# Patient Record
Sex: FEMALE | Race: BLACK OR AFRICAN AMERICAN | Employment: UNEMPLOYED | URBAN - METROPOLITAN AREA
[De-identification: names, ages, dates, MRNs, and addresses within clinical notes are randomized per-mention and may not be internally consistent; named-entity substitution may affect disease eponyms.]

---

## 2017-01-01 ENCOUNTER — TELEPHONE (OUTPATIENT)
Dept: ONCOLOGY | Facility: CLINIC | Age: 62
End: 2017-01-01

## 2017-01-01 ENCOUNTER — APPOINTMENT (OUTPATIENT)
Dept: CT IMAGING | Facility: CLINIC | Age: 62
End: 2017-01-01
Attending: EMERGENCY MEDICINE

## 2017-01-01 ENCOUNTER — HOSPITAL ENCOUNTER (OUTPATIENT)
Facility: AMBULATORY SURGERY CENTER | Age: 62
End: 2017-09-21
Attending: PHYSICIAN ASSISTANT

## 2017-01-01 ENCOUNTER — INFUSION THERAPY VISIT (OUTPATIENT)
Dept: ONCOLOGY | Facility: CLINIC | Age: 62
End: 2017-01-01
Attending: OBSTETRICS & GYNECOLOGY

## 2017-01-01 ENCOUNTER — NURSE TRIAGE (OUTPATIENT)
Dept: NURSING | Facility: CLINIC | Age: 62
End: 2017-01-01

## 2017-01-01 ENCOUNTER — OFFICE VISIT (OUTPATIENT)
Dept: INTERVENTIONAL RADIOLOGY/VASCULAR | Facility: CLINIC | Age: 62
End: 2017-01-01

## 2017-01-01 ENCOUNTER — INFUSION THERAPY VISIT (OUTPATIENT)
Dept: INFUSION THERAPY | Facility: CLINIC | Age: 62
End: 2017-01-01
Attending: OBSTETRICS & GYNECOLOGY
Payer: COMMERCIAL

## 2017-01-01 ENCOUNTER — OFFICE VISIT (OUTPATIENT)
Dept: PALLIATIVE CARE | Facility: CLINIC | Age: 62
End: 2017-01-01
Attending: INTERNAL MEDICINE
Payer: COMMERCIAL

## 2017-01-01 ENCOUNTER — INFUSION THERAPY VISIT (OUTPATIENT)
Dept: ONCOLOGY | Facility: CLINIC | Age: 62
End: 2017-01-01
Attending: NURSE PRACTITIONER
Payer: COMMERCIAL

## 2017-01-01 ENCOUNTER — APPOINTMENT (OUTPATIENT)
Dept: LAB | Facility: CLINIC | Age: 62
End: 2017-01-01
Attending: OBSTETRICS & GYNECOLOGY
Payer: COMMERCIAL

## 2017-01-01 ENCOUNTER — CARE COORDINATION (OUTPATIENT)
Dept: ONCOLOGY | Facility: CLINIC | Age: 62
End: 2017-01-01

## 2017-01-01 ENCOUNTER — APPOINTMENT (OUTPATIENT)
Dept: GENERAL RADIOLOGY | Facility: CLINIC | Age: 62
End: 2017-01-01
Attending: EMERGENCY MEDICINE
Payer: COMMERCIAL

## 2017-01-01 ENCOUNTER — MEDICAL CORRESPONDENCE (OUTPATIENT)
Dept: HEALTH INFORMATION MANAGEMENT | Facility: CLINIC | Age: 62
End: 2017-01-01

## 2017-01-01 ENCOUNTER — ONCOLOGY VISIT (OUTPATIENT)
Dept: ONCOLOGY | Facility: CLINIC | Age: 62
End: 2017-01-01
Attending: OBSTETRICS & GYNECOLOGY
Payer: COMMERCIAL

## 2017-01-01 ENCOUNTER — HOSPITAL ENCOUNTER (EMERGENCY)
Facility: CLINIC | Age: 62
Discharge: HOME | End: 2017-10-05
Attending: EMERGENCY MEDICINE | Admitting: EMERGENCY MEDICINE
Payer: COMMERCIAL

## 2017-01-01 ENCOUNTER — HOSPITAL ENCOUNTER (OUTPATIENT)
Facility: CLINIC | Age: 62
Setting detail: OBSERVATION
Discharge: HOME | End: 2017-09-07
Attending: EMERGENCY MEDICINE | Admitting: OBSTETRICS & GYNECOLOGY

## 2017-01-01 ENCOUNTER — APPOINTMENT (OUTPATIENT)
Dept: LAB | Facility: CLINIC | Age: 62
End: 2017-01-01
Attending: INTERNAL MEDICINE
Payer: COMMERCIAL

## 2017-01-01 ENCOUNTER — TRANSFERRED RECORDS (OUTPATIENT)
Dept: HEALTH INFORMATION MANAGEMENT | Facility: CLINIC | Age: 62
End: 2017-01-01

## 2017-01-01 ENCOUNTER — ANESTHESIA EVENT (OUTPATIENT)
Dept: SURGERY | Facility: AMBULATORY SURGERY CENTER | Age: 62
End: 2017-01-01

## 2017-01-01 ENCOUNTER — ANESTHESIA (OUTPATIENT)
Dept: SURGERY | Facility: AMBULATORY SURGERY CENTER | Age: 62
End: 2017-01-01

## 2017-01-01 ENCOUNTER — ONCOLOGY VISIT (OUTPATIENT)
Dept: ONCOLOGY | Facility: CLINIC | Age: 62
End: 2017-01-01
Attending: OBSTETRICS & GYNECOLOGY

## 2017-01-01 ENCOUNTER — TELEPHONE (OUTPATIENT)
Dept: PALLIATIVE CARE | Facility: CLINIC | Age: 62
End: 2017-01-01

## 2017-01-01 ENCOUNTER — SURGERY (OUTPATIENT)
Age: 62
End: 2017-01-01

## 2017-01-01 ENCOUNTER — CARE COORDINATION (OUTPATIENT)
Dept: CARE COORDINATION | Facility: CLINIC | Age: 62
End: 2017-01-01

## 2017-01-01 ENCOUNTER — ALLIED HEALTH/NURSE VISIT (OUTPATIENT)
Dept: ONCOLOGY | Facility: CLINIC | Age: 62
End: 2017-01-01

## 2017-01-01 VITALS
OXYGEN SATURATION: 96 % | RESPIRATION RATE: 16 BRPM | HEART RATE: 110 BPM | DIASTOLIC BLOOD PRESSURE: 88 MMHG | TEMPERATURE: 99 F | WEIGHT: 177.9 LBS | SYSTOLIC BLOOD PRESSURE: 154 MMHG | BODY MASS INDEX: 31.51 KG/M2

## 2017-01-01 VITALS
HEART RATE: 95 BPM | SYSTOLIC BLOOD PRESSURE: 118 MMHG | DIASTOLIC BLOOD PRESSURE: 62 MMHG | TEMPERATURE: 98.1 F | RESPIRATION RATE: 18 BRPM

## 2017-01-01 VITALS
HEART RATE: 114 BPM | HEIGHT: 63 IN | RESPIRATION RATE: 17 BRPM | WEIGHT: 189.15 LBS | DIASTOLIC BLOOD PRESSURE: 82 MMHG | SYSTOLIC BLOOD PRESSURE: 150 MMHG | TEMPERATURE: 99.4 F | BODY MASS INDEX: 33.52 KG/M2 | OXYGEN SATURATION: 96 %

## 2017-01-01 VITALS
WEIGHT: 190.8 LBS | DIASTOLIC BLOOD PRESSURE: 96 MMHG | OXYGEN SATURATION: 97 % | BODY MASS INDEX: 36.02 KG/M2 | RESPIRATION RATE: 16 BRPM | HEART RATE: 93 BPM | HEIGHT: 61 IN | TEMPERATURE: 98.1 F | SYSTOLIC BLOOD PRESSURE: 165 MMHG

## 2017-01-01 VITALS
RESPIRATION RATE: 14 BRPM | TEMPERATURE: 98.2 F | OXYGEN SATURATION: 95 % | DIASTOLIC BLOOD PRESSURE: 80 MMHG | SYSTOLIC BLOOD PRESSURE: 147 MMHG

## 2017-01-01 VITALS
HEART RATE: 89 BPM | BODY MASS INDEX: 33.52 KG/M2 | HEIGHT: 63 IN | OXYGEN SATURATION: 95 % | RESPIRATION RATE: 16 BRPM | TEMPERATURE: 98.7 F | DIASTOLIC BLOOD PRESSURE: 70 MMHG | WEIGHT: 189.15 LBS | SYSTOLIC BLOOD PRESSURE: 151 MMHG

## 2017-01-01 VITALS
SYSTOLIC BLOOD PRESSURE: 179 MMHG | TEMPERATURE: 97.9 F | OXYGEN SATURATION: 97 % | HEART RATE: 88 BPM | DIASTOLIC BLOOD PRESSURE: 90 MMHG

## 2017-01-01 VITALS — SYSTOLIC BLOOD PRESSURE: 163 MMHG | DIASTOLIC BLOOD PRESSURE: 96 MMHG

## 2017-01-01 VITALS
OXYGEN SATURATION: 93 % | DIASTOLIC BLOOD PRESSURE: 66 MMHG | SYSTOLIC BLOOD PRESSURE: 152 MMHG | TEMPERATURE: 98.5 F | RESPIRATION RATE: 16 BRPM | HEART RATE: 100 BPM

## 2017-01-01 VITALS
OXYGEN SATURATION: 99 % | HEART RATE: 98 BPM | DIASTOLIC BLOOD PRESSURE: 86 MMHG | WEIGHT: 176.6 LBS | BODY MASS INDEX: 31.29 KG/M2 | TEMPERATURE: 98.4 F | SYSTOLIC BLOOD PRESSURE: 146 MMHG

## 2017-01-01 VITALS — DIASTOLIC BLOOD PRESSURE: 72 MMHG | SYSTOLIC BLOOD PRESSURE: 127 MMHG | HEART RATE: 98 BPM

## 2017-01-01 VITALS
HEART RATE: 107 BPM | TEMPERATURE: 98.9 F | WEIGHT: 177 LBS | OXYGEN SATURATION: 98 % | HEIGHT: 63 IN | DIASTOLIC BLOOD PRESSURE: 99 MMHG | RESPIRATION RATE: 18 BRPM | SYSTOLIC BLOOD PRESSURE: 175 MMHG | BODY MASS INDEX: 31.36 KG/M2

## 2017-01-01 VITALS
DIASTOLIC BLOOD PRESSURE: 78 MMHG | RESPIRATION RATE: 16 BRPM | BODY MASS INDEX: 32.7 KG/M2 | WEIGHT: 184.6 LBS | HEART RATE: 92 BPM | TEMPERATURE: 98.8 F | OXYGEN SATURATION: 96 % | SYSTOLIC BLOOD PRESSURE: 160 MMHG

## 2017-01-01 VITALS
TEMPERATURE: 99.3 F | DIASTOLIC BLOOD PRESSURE: 97 MMHG | BODY MASS INDEX: 32.69 KG/M2 | OXYGEN SATURATION: 98 % | WEIGHT: 184.5 LBS | HEART RATE: 115 BPM | SYSTOLIC BLOOD PRESSURE: 182 MMHG | HEIGHT: 63 IN

## 2017-01-01 VITALS
TEMPERATURE: 99.2 F | HEART RATE: 96 BPM | SYSTOLIC BLOOD PRESSURE: 156 MMHG | OXYGEN SATURATION: 100 % | RESPIRATION RATE: 16 BRPM | DIASTOLIC BLOOD PRESSURE: 90 MMHG

## 2017-01-01 VITALS
SYSTOLIC BLOOD PRESSURE: 151 MMHG | WEIGHT: 192.4 LBS | HEART RATE: 95 BPM | DIASTOLIC BLOOD PRESSURE: 78 MMHG | RESPIRATION RATE: 16 BRPM | BODY MASS INDEX: 34.08 KG/M2 | TEMPERATURE: 99.4 F

## 2017-01-01 VITALS
RESPIRATION RATE: 18 BRPM | HEART RATE: 97 BPM | SYSTOLIC BLOOD PRESSURE: 150 MMHG | TEMPERATURE: 98.6 F | DIASTOLIC BLOOD PRESSURE: 83 MMHG | OXYGEN SATURATION: 100 %

## 2017-01-01 VITALS
DIASTOLIC BLOOD PRESSURE: 86 MMHG | WEIGHT: 184.4 LBS | HEART RATE: 106 BPM | TEMPERATURE: 99.4 F | RESPIRATION RATE: 16 BRPM | BODY MASS INDEX: 32.67 KG/M2 | SYSTOLIC BLOOD PRESSURE: 147 MMHG | OXYGEN SATURATION: 96 % | HEIGHT: 63 IN

## 2017-01-01 VITALS
RESPIRATION RATE: 18 BRPM | OXYGEN SATURATION: 95 % | TEMPERATURE: 98.5 F | SYSTOLIC BLOOD PRESSURE: 159 MMHG | DIASTOLIC BLOOD PRESSURE: 78 MMHG | HEART RATE: 100 BPM

## 2017-01-01 VITALS
DIASTOLIC BLOOD PRESSURE: 104 MMHG | HEART RATE: 106 BPM | RESPIRATION RATE: 18 BRPM | SYSTOLIC BLOOD PRESSURE: 184 MMHG | WEIGHT: 177 LBS | BODY MASS INDEX: 31.36 KG/M2 | HEIGHT: 63 IN | OXYGEN SATURATION: 98 % | TEMPERATURE: 98.9 F

## 2017-01-01 VITALS
HEIGHT: 63 IN | RESPIRATION RATE: 16 BRPM | WEIGHT: 188.7 LBS | BODY MASS INDEX: 33.43 KG/M2 | SYSTOLIC BLOOD PRESSURE: 136 MMHG | HEART RATE: 84 BPM | DIASTOLIC BLOOD PRESSURE: 67 MMHG | TEMPERATURE: 96.8 F | OXYGEN SATURATION: 93 %

## 2017-01-01 VITALS
DIASTOLIC BLOOD PRESSURE: 80 MMHG | HEART RATE: 98 BPM | WEIGHT: 189.2 LBS | SYSTOLIC BLOOD PRESSURE: 135 MMHG | OXYGEN SATURATION: 94 % | BODY MASS INDEX: 33.52 KG/M2

## 2017-01-01 VITALS
TEMPERATURE: 97.7 F | OXYGEN SATURATION: 98 % | SYSTOLIC BLOOD PRESSURE: 134 MMHG | WEIGHT: 185.2 LBS | HEART RATE: 94 BPM | BODY MASS INDEX: 32.81 KG/M2 | DIASTOLIC BLOOD PRESSURE: 84 MMHG | RESPIRATION RATE: 18 BRPM

## 2017-01-01 VITALS
BODY MASS INDEX: 31.64 KG/M2 | DIASTOLIC BLOOD PRESSURE: 92 MMHG | WEIGHT: 178.56 LBS | HEIGHT: 63 IN | RESPIRATION RATE: 16 BRPM | HEART RATE: 115 BPM | SYSTOLIC BLOOD PRESSURE: 156 MMHG | TEMPERATURE: 98.7 F | OXYGEN SATURATION: 99 %

## 2017-01-01 DIAGNOSIS — G62.0 DRUG-INDUCED POLYNEUROPATHY (H): ICD-10-CM

## 2017-01-01 DIAGNOSIS — C53.9 MALIGNANT NEOPLASM OF CERVIX, UNSPECIFIED SITE (H): ICD-10-CM

## 2017-01-01 DIAGNOSIS — C53.9 MALIGNANT NEOPLASM OF CERVIX, UNSPECIFIED SITE (H): Primary | ICD-10-CM

## 2017-01-01 DIAGNOSIS — R53.83 FATIGUE: ICD-10-CM

## 2017-01-01 DIAGNOSIS — G89.3 CANCER RELATED PAIN: ICD-10-CM

## 2017-01-01 DIAGNOSIS — R11.0 NAUSEA: ICD-10-CM

## 2017-01-01 DIAGNOSIS — M54.9 DORSALGIA: ICD-10-CM

## 2017-01-01 DIAGNOSIS — R91.8 LUNG MASS: ICD-10-CM

## 2017-01-01 DIAGNOSIS — C53.9 CERVIX CANCER (H): Primary | ICD-10-CM

## 2017-01-01 DIAGNOSIS — R63.4 LOSS OF WEIGHT: ICD-10-CM

## 2017-01-01 DIAGNOSIS — M79.10 MYALGIA: ICD-10-CM

## 2017-01-01 DIAGNOSIS — D64.9 ANEMIA, UNSPECIFIED: ICD-10-CM

## 2017-01-01 DIAGNOSIS — G62.0 CHEMOTHERAPY-INDUCED PERIPHERAL NEUROPATHY (H): ICD-10-CM

## 2017-01-01 DIAGNOSIS — F11.93 OPIATE WITHDRAWAL (H): ICD-10-CM

## 2017-01-01 DIAGNOSIS — Z51.11 ENCOUNTER FOR ANTINEOPLASTIC CHEMOTHERAPY: ICD-10-CM

## 2017-01-01 DIAGNOSIS — N39.0 URINARY TRACT INFECTION WITH HEMATURIA, SITE UNSPECIFIED: ICD-10-CM

## 2017-01-01 DIAGNOSIS — R07.9 CHEST PAIN, UNSPECIFIED: ICD-10-CM

## 2017-01-01 DIAGNOSIS — R11.0 NAUSEA: Primary | ICD-10-CM

## 2017-01-01 DIAGNOSIS — I10 ESSENTIAL HYPERTENSION: ICD-10-CM

## 2017-01-01 DIAGNOSIS — R10.9 STOMACH ACHE: ICD-10-CM

## 2017-01-01 DIAGNOSIS — R10.84 GENERALIZED ABDOMINAL PAIN: Primary | ICD-10-CM

## 2017-01-01 DIAGNOSIS — I15.9 SECONDARY HYPERTENSION: ICD-10-CM

## 2017-01-01 DIAGNOSIS — R63.0 ANOREXIA: ICD-10-CM

## 2017-01-01 DIAGNOSIS — R59.9 ENLARGED LYMPH NODES: Primary | ICD-10-CM

## 2017-01-01 DIAGNOSIS — T45.1X5A CHEMOTHERAPY-INDUCED PERIPHERAL NEUROPATHY (H): ICD-10-CM

## 2017-01-01 DIAGNOSIS — R10.84 GENERALIZED ABDOMINAL PAIN: ICD-10-CM

## 2017-01-01 DIAGNOSIS — C53.9 CERVICAL CANCER (H): Primary | ICD-10-CM

## 2017-01-01 DIAGNOSIS — R26.9 GAIT DISTURBANCE: ICD-10-CM

## 2017-01-01 DIAGNOSIS — R31.9 URINARY TRACT INFECTION WITH HEMATURIA, SITE UNSPECIFIED: ICD-10-CM

## 2017-01-01 DIAGNOSIS — N88.8 CERVICAL MASS: Primary | ICD-10-CM

## 2017-01-01 DIAGNOSIS — R06.02 SOB (SHORTNESS OF BREATH): ICD-10-CM

## 2017-01-01 DIAGNOSIS — G44.001 INTRACTABLE CLUSTER HEADACHE SYNDROME, UNSPECIFIED CHRONICITY PATTERN: ICD-10-CM

## 2017-01-01 DIAGNOSIS — Z71.9 VISIT FOR COUNSELING: Primary | ICD-10-CM

## 2017-01-01 LAB
ABO + RH BLD: NORMAL
ALBUMIN SERPL-MCNC: 2.8 G/DL (ref 3.4–5)
ALBUMIN SERPL-MCNC: 2.8 G/DL (ref 3.4–5)
ALBUMIN SERPL-MCNC: 2.9 G/DL (ref 3.4–5)
ALBUMIN SERPL-MCNC: 3.2 G/DL (ref 3.4–5)
ALBUMIN SERPL-MCNC: 3.3 G/DL (ref 3.4–5)
ALBUMIN SERPL-MCNC: 3.5 G/DL (ref 3.4–5)
ALBUMIN UR-MCNC: 30 MG/DL
ALBUMIN UR-MCNC: ABNORMAL MG/DL
ALBUMIN UR-MCNC: NEGATIVE MG/DL
ALP SERPL-CCNC: 102 U/L (ref 40–150)
ALP SERPL-CCNC: 110 U/L (ref 40–150)
ALP SERPL-CCNC: 113 U/L (ref 40–150)
ALP SERPL-CCNC: 86 U/L (ref 40–150)
ALP SERPL-CCNC: 86 U/L (ref 40–150)
ALP SERPL-CCNC: 94 U/L (ref 40–150)
ALP SERPL-CCNC: 97 U/L (ref 40–150)
ALP SERPL-CCNC: 98 U/L (ref 40–150)
ALT SERPL W P-5'-P-CCNC: 21 U/L (ref 0–50)
ALT SERPL W P-5'-P-CCNC: 22 U/L (ref 0–50)
ALT SERPL W P-5'-P-CCNC: 31 U/L (ref 0–50)
ALT SERPL W P-5'-P-CCNC: 32 U/L (ref 0–50)
ALT SERPL W P-5'-P-CCNC: 35 U/L (ref 0–50)
ALT SERPL W P-5'-P-CCNC: 80 U/L (ref 0–50)
ANION GAP SERPL CALCULATED.3IONS-SCNC: 10 MMOL/L (ref 3–14)
ANION GAP SERPL CALCULATED.3IONS-SCNC: 10 MMOL/L (ref 3–14)
ANION GAP SERPL CALCULATED.3IONS-SCNC: 6 MMOL/L (ref 3–14)
ANION GAP SERPL CALCULATED.3IONS-SCNC: 7 MMOL/L (ref 3–14)
ANION GAP SERPL CALCULATED.3IONS-SCNC: 8 MMOL/L (ref 3–14)
ANION GAP SERPL CALCULATED.3IONS-SCNC: 9 MMOL/L (ref 3–14)
APPEARANCE UR: ABNORMAL
APPEARANCE UR: CLEAR
APTT PPP: 28 SEC (ref 22–37)
AST SERPL W P-5'-P-CCNC: 18 U/L (ref 0–45)
AST SERPL W P-5'-P-CCNC: 18 U/L (ref 0–45)
AST SERPL W P-5'-P-CCNC: 20 U/L (ref 0–45)
AST SERPL W P-5'-P-CCNC: 22 U/L (ref 0–45)
AST SERPL W P-5'-P-CCNC: 29 U/L (ref 0–45)
AST SERPL W P-5'-P-CCNC: 33 U/L (ref 0–45)
AST SERPL W P-5'-P-CCNC: 40 U/L (ref 0–45)
AST SERPL W P-5'-P-CCNC: 65 U/L (ref 0–45)
BACTERIA #/AREA URNS HPF: ABNORMAL /HPF
BACTERIA SPEC CULT: NO GROWTH
BACTERIA SPEC CULT: NORMAL
BASOPHILS # BLD AUTO: 0 10E9/L (ref 0–0.2)
BASOPHILS NFR BLD AUTO: 0.2 %
BASOPHILS NFR BLD AUTO: 0.4 %
BASOPHILS NFR BLD AUTO: 0.4 %
BASOPHILS NFR BLD AUTO: 0.5 %
BASOPHILS NFR BLD AUTO: 0.8 %
BILIRUB SERPL-MCNC: 0.2 MG/DL (ref 0.2–1.3)
BILIRUB SERPL-MCNC: 0.4 MG/DL (ref 0.2–1.3)
BILIRUB SERPL-MCNC: 0.6 MG/DL (ref 0.2–1.3)
BILIRUB UR QL STRIP: NEGATIVE
BILIRUB UR QL STRIP: NEGATIVE
BLD GP AB SCN SERPL QL: NORMAL
BLD GP AB SCN SERPL QL: NORMAL
BLOOD BANK CMNT PATIENT-IMP: NORMAL
BLOOD BANK CMNT PATIENT-IMP: NORMAL
BUN SERPL-MCNC: 11 MG/DL (ref 7–30)
BUN SERPL-MCNC: 11 MG/DL (ref 7–30)
BUN SERPL-MCNC: 12 MG/DL (ref 7–30)
BUN SERPL-MCNC: 8 MG/DL (ref 7–30)
BUN SERPL-MCNC: 9 MG/DL (ref 7–30)
BUN SERPL-MCNC: 9 MG/DL (ref 7–30)
CALCIUM SERPL-MCNC: 8.6 MG/DL (ref 8.5–10.1)
CALCIUM SERPL-MCNC: 8.7 MG/DL (ref 8.5–10.1)
CALCIUM SERPL-MCNC: 8.8 MG/DL (ref 8.5–10.1)
CALCIUM SERPL-MCNC: 8.9 MG/DL (ref 8.5–10.1)
CALCIUM SERPL-MCNC: 9 MG/DL (ref 8.5–10.1)
CALCIUM SERPL-MCNC: 9 MG/DL (ref 8.5–10.1)
CALCIUM SERPL-MCNC: 9.1 MG/DL (ref 8.5–10.1)
CALCIUM SERPL-MCNC: 9.3 MG/DL (ref 8.5–10.1)
CHLORIDE SERPL-SCNC: 102 MMOL/L (ref 94–109)
CHLORIDE SERPL-SCNC: 104 MMOL/L (ref 94–109)
CHLORIDE SERPL-SCNC: 104 MMOL/L (ref 94–109)
CHLORIDE SERPL-SCNC: 105 MMOL/L (ref 94–109)
CHLORIDE SERPL-SCNC: 106 MMOL/L (ref 94–109)
CHLORIDE SERPL-SCNC: 106 MMOL/L (ref 94–109)
CK SERPL-CCNC: 63 U/L (ref 30–225)
CO2 BLDCOV-SCNC: 27 MMOL/L (ref 21–28)
CO2 SERPL-SCNC: 24 MMOL/L (ref 20–32)
CO2 SERPL-SCNC: 25 MMOL/L (ref 20–32)
CO2 SERPL-SCNC: 26 MMOL/L (ref 20–32)
CO2 SERPL-SCNC: 28 MMOL/L (ref 20–32)
COLOR UR AUTO: ABNORMAL
COLOR UR AUTO: YELLOW
COPATH REPORT: ABNORMAL
COPATH REPORT: NORMAL
COPATH REPORT: NORMAL
CREAT SERPL-MCNC: 0.69 MG/DL (ref 0.52–1.04)
CREAT SERPL-MCNC: 0.73 MG/DL (ref 0.52–1.04)
CREAT SERPL-MCNC: 0.78 MG/DL (ref 0.52–1.04)
CREAT SERPL-MCNC: 0.8 MG/DL (ref 0.52–1.04)
CREAT SERPL-MCNC: 0.8 MG/DL (ref 0.52–1.04)
CREAT SERPL-MCNC: 0.84 MG/DL (ref 0.52–1.04)
CREAT SERPL-MCNC: 0.84 MG/DL (ref 0.52–1.04)
CREAT SERPL-MCNC: 0.92 MG/DL (ref 0.52–1.04)
CRP SERPL-MCNC: 18 MG/L (ref 0–8)
D DIMER PPP FEU-MCNC: 0.9 UG/ML FEU (ref 0–0.5)
DIFFERENTIAL METHOD BLD: ABNORMAL
DIFFERENTIAL METHOD BLD: NORMAL
EOSINOPHIL # BLD AUTO: 0 10E9/L (ref 0–0.7)
EOSINOPHIL # BLD AUTO: 0.1 10E9/L (ref 0–0.7)
EOSINOPHIL NFR BLD AUTO: 0.2 %
EOSINOPHIL NFR BLD AUTO: 0.7 %
EOSINOPHIL NFR BLD AUTO: 0.8 %
EOSINOPHIL NFR BLD AUTO: 1 %
EOSINOPHIL NFR BLD AUTO: 1 %
EOSINOPHIL NFR BLD AUTO: 1.4 %
ERYTHROCYTE [DISTWIDTH] IN BLOOD BY AUTOMATED COUNT: 16.3 % (ref 10–15)
ERYTHROCYTE [DISTWIDTH] IN BLOOD BY AUTOMATED COUNT: 17.4 % (ref 10–15)
ERYTHROCYTE [DISTWIDTH] IN BLOOD BY AUTOMATED COUNT: 17.6 % (ref 10–15)
ERYTHROCYTE [DISTWIDTH] IN BLOOD BY AUTOMATED COUNT: 18 % (ref 10–15)
ERYTHROCYTE [DISTWIDTH] IN BLOOD BY AUTOMATED COUNT: 19.4 % (ref 10–15)
ERYTHROCYTE [DISTWIDTH] IN BLOOD BY AUTOMATED COUNT: 20.2 % (ref 10–15)
ERYTHROCYTE [DISTWIDTH] IN BLOOD BY AUTOMATED COUNT: 22.4 % (ref 10–15)
ERYTHROCYTE [DISTWIDTH] IN BLOOD BY AUTOMATED COUNT: 24.1 % (ref 10–15)
ERYTHROCYTE [DISTWIDTH] IN BLOOD BY AUTOMATED COUNT: 24.2 % (ref 10–15)
ERYTHROCYTE [SEDIMENTATION RATE] IN BLOOD BY WESTERGREN METHOD: 92 MM/H (ref 0–30)
FINAL DIAGNOSIS: NORMAL
GFR SERPL CREATININE-BSD FRML MDRD: 61 ML/MIN/1.7M2
GFR SERPL CREATININE-BSD FRML MDRD: 68 ML/MIN/1.7M2
GFR SERPL CREATININE-BSD FRML MDRD: 68 ML/MIN/1.7M2
GFR SERPL CREATININE-BSD FRML MDRD: 72 ML/MIN/1.7M2
GFR SERPL CREATININE-BSD FRML MDRD: 73 ML/MIN/1.7M2
GFR SERPL CREATININE-BSD FRML MDRD: 74 ML/MIN/1.7M2
GFR SERPL CREATININE-BSD FRML MDRD: 80 ML/MIN/1.7M2
GFR SERPL CREATININE-BSD FRML MDRD: 85 ML/MIN/1.7M2
GLUCOSE SERPL-MCNC: 100 MG/DL (ref 70–99)
GLUCOSE SERPL-MCNC: 105 MG/DL (ref 70–99)
GLUCOSE SERPL-MCNC: 127 MG/DL (ref 70–99)
GLUCOSE SERPL-MCNC: 134 MG/DL (ref 70–99)
GLUCOSE SERPL-MCNC: 134 MG/DL (ref 70–99)
GLUCOSE SERPL-MCNC: 91 MG/DL (ref 70–99)
GLUCOSE SERPL-MCNC: 94 MG/DL (ref 70–99)
GLUCOSE SERPL-MCNC: 96 MG/DL (ref 70–99)
GLUCOSE UR STRIP-MCNC: NEGATIVE MG/DL
GLUCOSE UR STRIP-MCNC: NEGATIVE MG/DL
HCT VFR BLD AUTO: 28 % (ref 35–47)
HCT VFR BLD AUTO: 29.1 % (ref 35–47)
HCT VFR BLD AUTO: 29.4 % (ref 35–47)
HCT VFR BLD AUTO: 30.5 % (ref 35–47)
HCT VFR BLD AUTO: 32.2 % (ref 35–47)
HCT VFR BLD AUTO: 33.4 % (ref 35–47)
HCT VFR BLD AUTO: 34.3 % (ref 35–47)
HCT VFR BLD AUTO: 34.4 % (ref 35–47)
HCT VFR BLD AUTO: 36.3 % (ref 35–47)
HGB BLD-MCNC: 10.1 G/DL (ref 11.7–15.7)
HGB BLD-MCNC: 10.2 G/DL (ref 11.7–15.7)
HGB BLD-MCNC: 10.9 G/DL (ref 11.7–15.7)
HGB BLD-MCNC: 8.3 G/DL (ref 11.7–15.7)
HGB BLD-MCNC: 8.5 G/DL (ref 11.7–15.7)
HGB BLD-MCNC: 8.7 G/DL (ref 11.7–15.7)
HGB BLD-MCNC: 9.1 G/DL (ref 11.7–15.7)
HGB BLD-MCNC: 9.1 G/DL (ref 11.7–15.7)
HGB BLD-MCNC: 9.6 G/DL (ref 11.7–15.7)
HGB UR QL STRIP: ABNORMAL
HGB UR QL STRIP: NEGATIVE
HPV HR 12 DNA CVX QL NAA+PROBE: NEGATIVE
HPV16 DNA SPEC QL NAA+PROBE: NEGATIVE
HPV18 DNA SPEC QL NAA+PROBE: NEGATIVE
IMM GRANULOCYTES # BLD: 0 10E9/L (ref 0–0.4)
IMM GRANULOCYTES NFR BLD: 0 %
IMM GRANULOCYTES NFR BLD: 0 %
IMM GRANULOCYTES NFR BLD: 0.2 %
IMM GRANULOCYTES NFR BLD: 0.3 %
INR PPP: 0.99 (ref 0.86–1.14)
INR PPP: 1.03 (ref 0.86–1.14)
INR PPP: 1.07 (ref 0.86–1.14)
INTERPRETATION ECG - MUSE: NORMAL
KETONES UR STRIP-MCNC: NEGATIVE MG/DL
KETONES UR STRIP-MCNC: NEGATIVE MG/DL
LACTATE BLD-SCNC: 1.2 MMOL/L (ref 0.7–2.1)
LACTATE BLD-SCNC: 1.4 MMOL/L (ref 0.7–2)
LEUKOCYTE ESTERASE UR QL STRIP: ABNORMAL
LEUKOCYTE ESTERASE UR QL STRIP: ABNORMAL
LIPASE SERPL-CCNC: 350 U/L (ref 73–393)
LIPASE SERPL-CCNC: 90 U/L (ref 73–393)
LYMPHOCYTES # BLD AUTO: 1 10E9/L (ref 0.8–5.3)
LYMPHOCYTES # BLD AUTO: 1.3 10E9/L (ref 0.8–5.3)
LYMPHOCYTES # BLD AUTO: 1.4 10E9/L (ref 0.8–5.3)
LYMPHOCYTES # BLD AUTO: 1.5 10E9/L (ref 0.8–5.3)
LYMPHOCYTES # BLD AUTO: 1.6 10E9/L (ref 0.8–5.3)
LYMPHOCYTES # BLD AUTO: 1.7 10E9/L (ref 0.8–5.3)
LYMPHOCYTES # BLD AUTO: 1.9 10E9/L (ref 0.8–5.3)
LYMPHOCYTES # BLD AUTO: 1.9 10E9/L (ref 0.8–5.3)
LYMPHOCYTES NFR BLD AUTO: 17.3 %
LYMPHOCYTES NFR BLD AUTO: 26.9 %
LYMPHOCYTES NFR BLD AUTO: 27.9 %
LYMPHOCYTES NFR BLD AUTO: 33.3 %
LYMPHOCYTES NFR BLD AUTO: 33.7 %
LYMPHOCYTES NFR BLD AUTO: 36.1 %
LYMPHOCYTES NFR BLD AUTO: 39.5 %
LYMPHOCYTES NFR BLD AUTO: 45.7 %
Lab: NORMAL
MAGNESIUM SERPL-MCNC: 1.8 MG/DL (ref 1.6–2.3)
MAGNESIUM SERPL-MCNC: 1.9 MG/DL (ref 1.6–2.3)
MAGNESIUM SERPL-MCNC: 2 MG/DL (ref 1.6–2.3)
MAGNESIUM SERPL-MCNC: 2 MG/DL (ref 1.6–2.3)
MAGNESIUM SERPL-MCNC: 2.1 MG/DL (ref 1.6–2.3)
MCH RBC QN AUTO: 20.6 PG (ref 26.5–33)
MCH RBC QN AUTO: 20.8 PG (ref 26.5–33)
MCH RBC QN AUTO: 21.1 PG (ref 26.5–33)
MCH RBC QN AUTO: 21.3 PG (ref 26.5–33)
MCH RBC QN AUTO: 21.5 PG (ref 26.5–33)
MCH RBC QN AUTO: 21.8 PG (ref 26.5–33)
MCH RBC QN AUTO: 22 PG (ref 26.5–33)
MCH RBC QN AUTO: 23.2 PG (ref 26.5–33)
MCH RBC QN AUTO: 24.7 PG (ref 26.5–33)
MCHC RBC AUTO-ENTMCNC: 28.3 G/DL (ref 31.5–36.5)
MCHC RBC AUTO-ENTMCNC: 28.7 G/DL (ref 31.5–36.5)
MCHC RBC AUTO-ENTMCNC: 28.9 G/DL (ref 31.5–36.5)
MCHC RBC AUTO-ENTMCNC: 29.4 G/DL (ref 31.5–36.5)
MCHC RBC AUTO-ENTMCNC: 29.6 G/DL (ref 31.5–36.5)
MCHC RBC AUTO-ENTMCNC: 29.7 G/DL (ref 31.5–36.5)
MCHC RBC AUTO-ENTMCNC: 29.8 G/DL (ref 31.5–36.5)
MCHC RBC AUTO-ENTMCNC: 29.9 G/DL (ref 31.5–36.5)
MCHC RBC AUTO-ENTMCNC: 30 G/DL (ref 31.5–36.5)
MCV RBC AUTO: 71 FL (ref 78–100)
MCV RBC AUTO: 72 FL (ref 78–100)
MCV RBC AUTO: 73 FL (ref 78–100)
MCV RBC AUTO: 75 FL (ref 78–100)
MCV RBC AUTO: 76 FL (ref 78–100)
MCV RBC AUTO: 78 FL (ref 78–100)
MCV RBC AUTO: 82 FL (ref 78–100)
MONOCYTES # BLD AUTO: 0.5 10E9/L (ref 0–1.3)
MONOCYTES # BLD AUTO: 0.6 10E9/L (ref 0–1.3)
MONOCYTES # BLD AUTO: 0.7 10E9/L (ref 0–1.3)
MONOCYTES # BLD AUTO: 0.7 10E9/L (ref 0–1.3)
MONOCYTES NFR BLD AUTO: 11.7 %
MONOCYTES NFR BLD AUTO: 11.8 %
MONOCYTES NFR BLD AUTO: 12.5 %
MONOCYTES NFR BLD AUTO: 12.8 %
MONOCYTES NFR BLD AUTO: 14 %
MONOCYTES NFR BLD AUTO: 15.5 %
MONOCYTES NFR BLD AUTO: 9.1 %
MONOCYTES NFR BLD AUTO: 9.7 %
MUCOUS THREADS #/AREA URNS LPF: PRESENT /LPF
MUCOUS THREADS #/AREA URNS LPF: PRESENT /LPF
NEUTROPHILS # BLD AUTO: 1.6 10E9/L (ref 1.6–8.3)
NEUTROPHILS # BLD AUTO: 2.1 10E9/L (ref 1.6–8.3)
NEUTROPHILS # BLD AUTO: 2.1 10E9/L (ref 1.6–8.3)
NEUTROPHILS # BLD AUTO: 2.3 10E9/L (ref 1.6–8.3)
NEUTROPHILS # BLD AUTO: 2.6 10E9/L (ref 1.6–8.3)
NEUTROPHILS # BLD AUTO: 3.1 10E9/L (ref 1.6–8.3)
NEUTROPHILS # BLD AUTO: 3.6 10E9/L (ref 1.6–8.3)
NEUTROPHILS # BLD AUTO: 4.1 10E9/L (ref 1.6–8.3)
NEUTROPHILS NFR BLD AUTO: 39.9 %
NEUTROPHILS NFR BLD AUTO: 46.9 %
NEUTROPHILS NFR BLD AUTO: 47.8 %
NEUTROPHILS NFR BLD AUTO: 52.6 %
NEUTROPHILS NFR BLD AUTO: 53.1 %
NEUTROPHILS NFR BLD AUTO: 60.4 %
NEUTROPHILS NFR BLD AUTO: 62.9 %
NEUTROPHILS NFR BLD AUTO: 69.2 %
NITRATE UR QL: NEGATIVE
NITRATE UR QL: NEGATIVE
NRBC # BLD AUTO: 0 10*3/UL
NRBC BLD AUTO-RTO: 0 /100
PAP: ABNORMAL
PCO2 BLDV: 36 MM HG (ref 40–50)
PH BLDV: 7.49 PH (ref 7.32–7.43)
PH UR STRIP: 5 PH (ref 5–7)
PH UR STRIP: 5.5 PH (ref 5–7)
PLATELET # BLD AUTO: 177 10E9/L (ref 150–450)
PLATELET # BLD AUTO: 219 10E9/L (ref 150–450)
PLATELET # BLD AUTO: 224 10E9/L (ref 150–450)
PLATELET # BLD AUTO: 255 10E9/L (ref 150–450)
PLATELET # BLD AUTO: 303 10E9/L (ref 150–450)
PLATELET # BLD AUTO: 381 10E9/L (ref 150–450)
PLATELET # BLD AUTO: 430 10E9/L (ref 150–450)
PLATELET # BLD AUTO: 439 10E9/L (ref 150–450)
PLATELET # BLD AUTO: 467 10E9/L (ref 150–450)
PO2 BLDV: 26 MM HG (ref 25–47)
POTASSIUM SERPL-SCNC: 3.5 MMOL/L (ref 3.4–5.3)
POTASSIUM SERPL-SCNC: 3.5 MMOL/L (ref 3.4–5.3)
POTASSIUM SERPL-SCNC: 3.6 MMOL/L (ref 3.4–5.3)
POTASSIUM SERPL-SCNC: 3.8 MMOL/L (ref 3.4–5.3)
POTASSIUM SERPL-SCNC: 3.9 MMOL/L (ref 3.4–5.3)
POTASSIUM SERPL-SCNC: 3.9 MMOL/L (ref 3.4–5.3)
PROT SERPL-MCNC: 7.4 G/DL (ref 6.8–8.8)
PROT SERPL-MCNC: 7.6 G/DL (ref 6.8–8.8)
PROT SERPL-MCNC: 7.9 G/DL (ref 6.8–8.8)
PROT SERPL-MCNC: 7.9 G/DL (ref 6.8–8.8)
PROT SERPL-MCNC: 8.3 G/DL (ref 6.8–8.8)
PROT SERPL-MCNC: 8.3 G/DL (ref 6.8–8.8)
PROT SERPL-MCNC: 8.6 G/DL (ref 6.8–8.8)
PROT SERPL-MCNC: 8.6 G/DL (ref 6.8–8.8)
RBC # BLD AUTO: 3.89 10E12/L (ref 3.8–5.2)
RBC # BLD AUTO: 4.08 10E12/L (ref 3.8–5.2)
RBC # BLD AUTO: 4.12 10E12/L (ref 3.8–5.2)
RBC # BLD AUTO: 4.24 10E12/L (ref 3.8–5.2)
RBC # BLD AUTO: 4.39 10E12/L (ref 3.8–5.2)
RBC # BLD AUTO: 4.41 10E12/L (ref 3.8–5.2)
RBC # BLD AUTO: 4.41 10E12/L (ref 3.8–5.2)
RBC # BLD AUTO: 4.42 10E12/L (ref 3.8–5.2)
RBC # BLD AUTO: 4.6 10E12/L (ref 3.8–5.2)
RBC #/AREA URNS AUTO: 0 /HPF (ref 0–2)
RBC #/AREA URNS AUTO: 24 /HPF (ref 0–2)
SAO2 % BLDV FROM PO2: 54 %
SODIUM SERPL-SCNC: 136 MMOL/L (ref 133–144)
SODIUM SERPL-SCNC: 137 MMOL/L (ref 133–144)
SODIUM SERPL-SCNC: 138 MMOL/L (ref 133–144)
SODIUM SERPL-SCNC: 139 MMOL/L (ref 133–144)
SODIUM SERPL-SCNC: 139 MMOL/L (ref 133–144)
SODIUM SERPL-SCNC: 140 MMOL/L (ref 133–144)
SOURCE: ABNORMAL
SOURCE: ABNORMAL
SP GR UR STRIP: 1.01 (ref 1–1.03)
SP GR UR STRIP: 1.02 (ref 1–1.03)
SPECIMEN DESCRIPTION: NORMAL
SPECIMEN EXP DATE BLD: NORMAL
SPECIMEN EXP DATE BLD: NORMAL
SPECIMEN SOURCE: NORMAL
SPECIMEN SOURCE: NORMAL
SQUAMOUS #/AREA URNS AUTO: <1 /HPF (ref 0–1)
SQUAMOUS #/AREA URNS AUTO: <1 /HPF (ref 0–1)
TRANS CELLS #/AREA URNS HPF: <1 /HPF (ref 0–1)
TROPONIN I SERPL-MCNC: 0.03 UG/L (ref 0–0.04)
UROBILINOGEN UR STRIP-MCNC: NORMAL MG/DL (ref 0–2)
UROBILINOGEN UR STRIP-MCNC: NORMAL MG/DL (ref 0–2)
VIT B6 SERPL-MCNC: 15 NMOL/L (ref 20–125)
WBC # BLD AUTO: 4 10E9/L (ref 4–11)
WBC # BLD AUTO: 4.1 10E9/L (ref 4–11)
WBC # BLD AUTO: 4.4 10E9/L (ref 4–11)
WBC # BLD AUTO: 4.9 10E9/L (ref 4–11)
WBC # BLD AUTO: 5 10E9/L (ref 4–11)
WBC # BLD AUTO: 5.2 10E9/L (ref 4–11)
WBC # BLD AUTO: 5.7 10E9/L (ref 4–11)
WBC # BLD AUTO: 5.7 10E9/L (ref 4–11)
WBC # BLD AUTO: 6.1 10E9/L (ref 4–11)
WBC #/AREA URNS AUTO: 1 /HPF (ref 0–2)
WBC #/AREA URNS AUTO: 11 /HPF (ref 0–2)

## 2017-01-01 PROCEDURE — 83735 ASSAY OF MAGNESIUM: CPT | Performed by: NURSE PRACTITIONER

## 2017-01-01 PROCEDURE — 99285 EMERGENCY DEPT VISIT HI MDM: CPT | Mod: 25 | Performed by: EMERGENCY MEDICINE

## 2017-01-01 PROCEDURE — 71020 XR CHEST 2 VW: CPT

## 2017-01-01 PROCEDURE — 99214 OFFICE O/P EST MOD 30 MIN: CPT | Mod: ZP | Performed by: INTERNAL MEDICINE

## 2017-01-01 PROCEDURE — 81003 URINALYSIS AUTO W/O SCOPE: CPT | Performed by: NURSE PRACTITIONER

## 2017-01-01 PROCEDURE — 25000132 ZZH RX MED GY IP 250 OP 250 PS 637: Performed by: STUDENT IN AN ORGANIZED HEALTH CARE EDUCATION/TRAINING PROGRAM

## 2017-01-01 PROCEDURE — 88305 TISSUE EXAM BY PATHOLOGIST: CPT | Performed by: OBSTETRICS & GYNECOLOGY

## 2017-01-01 PROCEDURE — 99204 OFFICE O/P NEW MOD 45 MIN: CPT | Mod: 25 | Performed by: OBSTETRICS & GYNECOLOGY

## 2017-01-01 PROCEDURE — 99212 OFFICE O/P EST SF 10 MIN: CPT | Mod: ZF

## 2017-01-01 PROCEDURE — 25000132 ZZH RX MED GY IP 250 OP 250 PS 637: Performed by: EMERGENCY MEDICINE

## 2017-01-01 PROCEDURE — 25000128 H RX IP 250 OP 636: Mod: ZF | Performed by: NURSE PRACTITIONER

## 2017-01-01 PROCEDURE — 99214 OFFICE O/P EST MOD 30 MIN: CPT | Performed by: INTERNAL MEDICINE

## 2017-01-01 PROCEDURE — 99215 OFFICE O/P EST HI 40 MIN: CPT | Mod: ZP | Performed by: NURSE PRACTITIONER

## 2017-01-01 PROCEDURE — 57500 BIOPSY OF CERVIX: CPT | Mod: ZF | Performed by: OBSTETRICS & GYNECOLOGY

## 2017-01-01 PROCEDURE — 74176 CT ABD & PELVIS W/O CONTRAST: CPT

## 2017-01-01 PROCEDURE — 72131 CT LUMBAR SPINE W/O DYE: CPT

## 2017-01-01 PROCEDURE — 99213 OFFICE O/P EST LOW 20 MIN: CPT | Mod: ZF

## 2017-01-01 PROCEDURE — 25000128 H RX IP 250 OP 636

## 2017-01-01 PROCEDURE — 25000125 ZZHC RX 250: Mod: ZF | Performed by: NURSE PRACTITIONER

## 2017-01-01 PROCEDURE — 96415 CHEMO IV INFUSION ADDL HR: CPT

## 2017-01-01 PROCEDURE — 83735 ASSAY OF MAGNESIUM: CPT | Performed by: OBSTETRICS & GYNECOLOGY

## 2017-01-01 PROCEDURE — 25000128 H RX IP 250 OP 636: Performed by: OBSTETRICS & GYNECOLOGY

## 2017-01-01 PROCEDURE — 86850 RBC ANTIBODY SCREEN: CPT | Performed by: OBSTETRICS & GYNECOLOGY

## 2017-01-01 PROCEDURE — 25000128 H RX IP 250 OP 636: Mod: ZF | Performed by: OBSTETRICS & GYNECOLOGY

## 2017-01-01 PROCEDURE — 86140 C-REACTIVE PROTEIN: CPT | Performed by: EMERGENCY MEDICINE

## 2017-01-01 PROCEDURE — 85610 PROTHROMBIN TIME: CPT | Performed by: STUDENT IN AN ORGANIZED HEALTH CARE EDUCATION/TRAINING PROGRAM

## 2017-01-01 PROCEDURE — 96375 TX/PRO/DX INJ NEW DRUG ADDON: CPT

## 2017-01-01 PROCEDURE — 82803 BLOOD GASES ANY COMBINATION: CPT

## 2017-01-01 PROCEDURE — 80053 COMPREHEN METABOLIC PANEL: CPT | Performed by: OBSTETRICS & GYNECOLOGY

## 2017-01-01 PROCEDURE — 96374 THER/PROPH/DIAG INJ IV PUSH: CPT

## 2017-01-01 PROCEDURE — 86901 BLOOD TYPING SEROLOGIC RH(D): CPT | Performed by: EMERGENCY MEDICINE

## 2017-01-01 PROCEDURE — 96417 CHEMO IV INFUS EACH ADDL SEQ: CPT

## 2017-01-01 PROCEDURE — 83605 ASSAY OF LACTIC ACID: CPT

## 2017-01-01 PROCEDURE — 96360 HYDRATION IV INFUSION INIT: CPT

## 2017-01-01 PROCEDURE — 88342 IMHCHEM/IMCYTCHM 1ST ANTB: CPT | Performed by: OBSTETRICS & GYNECOLOGY

## 2017-01-01 PROCEDURE — 80053 COMPREHEN METABOLIC PANEL: CPT | Performed by: NURSE PRACTITIONER

## 2017-01-01 PROCEDURE — 25000128 H RX IP 250 OP 636: Performed by: STUDENT IN AN ORGANIZED HEALTH CARE EDUCATION/TRAINING PROGRAM

## 2017-01-01 PROCEDURE — 99207 ZZC CDG-CORRECTLY CODED, REVIEWED AND AGREE: CPT | Performed by: INTERNAL MEDICINE

## 2017-01-01 PROCEDURE — 86850 RBC ANTIBODY SCREEN: CPT | Performed by: EMERGENCY MEDICINE

## 2017-01-01 PROCEDURE — 86900 BLOOD TYPING SEROLOGIC ABO: CPT | Performed by: OBSTETRICS & GYNECOLOGY

## 2017-01-01 PROCEDURE — 85025 COMPLETE CBC W/AUTO DIFF WBC: CPT | Performed by: NURSE PRACTITIONER

## 2017-01-01 PROCEDURE — 96365 THER/PROPH/DIAG IV INF INIT: CPT

## 2017-01-01 PROCEDURE — S0028 INJECTION, FAMOTIDINE, 20 MG: HCPCS | Mod: ZF | Performed by: NURSE PRACTITIONER

## 2017-01-01 PROCEDURE — 96376 TX/PRO/DX INJ SAME DRUG ADON: CPT

## 2017-01-01 PROCEDURE — 96413 CHEMO IV INFUSION 1 HR: CPT

## 2017-01-01 PROCEDURE — 96361 HYDRATE IV INFUSION ADD-ON: CPT

## 2017-01-01 PROCEDURE — 85027 COMPLETE CBC AUTOMATED: CPT | Performed by: STUDENT IN AN ORGANIZED HEALTH CARE EDUCATION/TRAINING PROGRAM

## 2017-01-01 PROCEDURE — 25000132 ZZH RX MED GY IP 250 OP 250 PS 637: Mod: ZF | Performed by: NURSE PRACTITIONER

## 2017-01-01 PROCEDURE — 83690 ASSAY OF LIPASE: CPT | Performed by: EMERGENCY MEDICINE

## 2017-01-01 PROCEDURE — 87040 BLOOD CULTURE FOR BACTERIA: CPT | Performed by: EMERGENCY MEDICINE

## 2017-01-01 PROCEDURE — 99284 EMERGENCY DEPT VISIT MOD MDM: CPT | Mod: 25

## 2017-01-01 PROCEDURE — 99214 OFFICE O/P EST MOD 30 MIN: CPT | Mod: ZP | Performed by: NURSE PRACTITIONER

## 2017-01-01 PROCEDURE — 85652 RBC SED RATE AUTOMATED: CPT | Performed by: EMERGENCY MEDICINE

## 2017-01-01 PROCEDURE — 85025 COMPLETE CBC W/AUTO DIFF WBC: CPT | Performed by: EMERGENCY MEDICINE

## 2017-01-01 PROCEDURE — 99214 OFFICE O/P EST MOD 30 MIN: CPT | Mod: ZP | Performed by: OBSTETRICS & GYNECOLOGY

## 2017-01-01 PROCEDURE — 84484 ASSAY OF TROPONIN QUANT: CPT | Performed by: EMERGENCY MEDICINE

## 2017-01-01 PROCEDURE — 80053 COMPREHEN METABOLIC PANEL: CPT | Performed by: EMERGENCY MEDICINE

## 2017-01-01 PROCEDURE — 71260 CT THORAX DX C+: CPT

## 2017-01-01 PROCEDURE — 99218 ZZC INITIAL OBSERVATION CARE,LEVL I: CPT | Mod: AI | Performed by: OBSTETRICS & GYNECOLOGY

## 2017-01-01 PROCEDURE — 36591 DRAW BLOOD OFF VENOUS DEVICE: CPT

## 2017-01-01 PROCEDURE — 87624 HPV HI-RISK TYP POOLED RSLT: CPT | Performed by: OBSTETRICS & GYNECOLOGY

## 2017-01-01 PROCEDURE — 82550 ASSAY OF CK (CPK): CPT | Performed by: EMERGENCY MEDICINE

## 2017-01-01 PROCEDURE — 81003 URINALYSIS AUTO W/O SCOPE: CPT | Performed by: OBSTETRICS & GYNECOLOGY

## 2017-01-01 PROCEDURE — 96374 THER/PROPH/DIAG INJ IV PUSH: CPT | Mod: 59 | Performed by: EMERGENCY MEDICINE

## 2017-01-01 PROCEDURE — 81001 URINALYSIS AUTO W/SCOPE: CPT | Performed by: EMERGENCY MEDICINE

## 2017-01-01 PROCEDURE — 86901 BLOOD TYPING SEROLOGIC RH(D): CPT | Performed by: OBSTETRICS & GYNECOLOGY

## 2017-01-01 PROCEDURE — 83605 ASSAY OF LACTIC ACID: CPT | Performed by: EMERGENCY MEDICINE

## 2017-01-01 PROCEDURE — 99214 OFFICE O/P EST MOD 30 MIN: CPT

## 2017-01-01 PROCEDURE — 87086 URINE CULTURE/COLONY COUNT: CPT | Performed by: EMERGENCY MEDICINE

## 2017-01-01 PROCEDURE — 25000128 H RX IP 250 OP 636: Performed by: EMERGENCY MEDICINE

## 2017-01-01 PROCEDURE — 85379 FIBRIN DEGRADATION QUANT: CPT | Performed by: EMERGENCY MEDICINE

## 2017-01-01 PROCEDURE — 99205 OFFICE O/P NEW HI 60 MIN: CPT | Mod: ZP | Performed by: INTERNAL MEDICINE

## 2017-01-01 PROCEDURE — 99284 EMERGENCY DEPT VISIT MOD MDM: CPT | Mod: Z6 | Performed by: EMERGENCY MEDICINE

## 2017-01-01 PROCEDURE — G0378 HOSPITAL OBSERVATION PER HR: HCPCS

## 2017-01-01 PROCEDURE — 25000125 ZZHC RX 250: Mod: ZF | Performed by: OBSTETRICS & GYNECOLOGY

## 2017-01-01 PROCEDURE — 25000125 ZZHC RX 250: Performed by: STUDENT IN AN ORGANIZED HEALTH CARE EDUCATION/TRAINING PROGRAM

## 2017-01-01 PROCEDURE — 93010 ELECTROCARDIOGRAM REPORT: CPT | Mod: Z6 | Performed by: EMERGENCY MEDICINE

## 2017-01-01 PROCEDURE — 85025 COMPLETE CBC W/AUTO DIFF WBC: CPT | Performed by: OBSTETRICS & GYNECOLOGY

## 2017-01-01 PROCEDURE — 88175 CYTOPATH C/V AUTO FLUID REDO: CPT | Performed by: OBSTETRICS & GYNECOLOGY

## 2017-01-01 PROCEDURE — 96367 TX/PROPH/DG ADDL SEQ IV INF: CPT

## 2017-01-01 PROCEDURE — 99212 OFFICE O/P EST SF 10 MIN: CPT | Mod: 25

## 2017-01-01 PROCEDURE — 36415 COLL VENOUS BLD VENIPUNCTURE: CPT | Performed by: STUDENT IN AN ORGANIZED HEALTH CARE EDUCATION/TRAINING PROGRAM

## 2017-01-01 PROCEDURE — 99205 OFFICE O/P NEW HI 60 MIN: CPT | Performed by: INTERNAL MEDICINE

## 2017-01-01 PROCEDURE — 85610 PROTHROMBIN TIME: CPT | Performed by: EMERGENCY MEDICINE

## 2017-01-01 PROCEDURE — 88341 IMHCHEM/IMCYTCHM EA ADD ANTB: CPT | Performed by: OBSTETRICS & GYNECOLOGY

## 2017-01-01 PROCEDURE — S0028 INJECTION, FAMOTIDINE, 20 MG: HCPCS | Mod: ZF | Performed by: OBSTETRICS & GYNECOLOGY

## 2017-01-01 PROCEDURE — 85730 THROMBOPLASTIN TIME PARTIAL: CPT | Performed by: STUDENT IN AN ORGANIZED HEALTH CARE EDUCATION/TRAINING PROGRAM

## 2017-01-01 PROCEDURE — 86900 BLOOD TYPING SEROLOGIC ABO: CPT | Performed by: EMERGENCY MEDICINE

## 2017-01-01 DEVICE — CATH PORT POWERPORT CLEARVUE ISP 8FR 5608062: Type: IMPLANTABLE DEVICE | Site: JUGULAR | Status: FUNCTIONAL

## 2017-01-01 RX ORDER — METHYLPREDNISOLONE SODIUM SUCCINATE 125 MG/2ML
125 INJECTION, POWDER, LYOPHILIZED, FOR SOLUTION INTRAMUSCULAR; INTRAVENOUS
Status: CANCELLED
Start: 2017-01-01

## 2017-01-01 RX ORDER — OXYCODONE HYDROCHLORIDE 5 MG/1
5-10 TABLET ORAL 3 TIMES DAILY
Qty: 15 TABLET | Refills: 0 | Status: SHIPPED | OUTPATIENT
Start: 2017-01-01 | End: 2017-01-01

## 2017-01-01 RX ORDER — SODIUM CHLORIDE 9 MG/ML
1000 INJECTION, SOLUTION INTRAVENOUS CONTINUOUS PRN
Status: CANCELLED
Start: 2017-01-01

## 2017-01-01 RX ORDER — OXYCODONE HYDROCHLORIDE 5 MG/1
10 TABLET ORAL ONCE
Status: COMPLETED | OUTPATIENT
Start: 2017-01-01 | End: 2017-01-01

## 2017-01-01 RX ORDER — MEPERIDINE HYDROCHLORIDE 25 MG/ML
25 INJECTION INTRAMUSCULAR; INTRAVENOUS; SUBCUTANEOUS EVERY 30 MIN PRN
Status: CANCELLED | OUTPATIENT
Start: 2017-01-01

## 2017-01-01 RX ORDER — HEPARIN SODIUM,PORCINE 10 UNIT/ML
VIAL (ML) INTRAVENOUS PRN
Status: DISCONTINUED | OUTPATIENT
Start: 2017-01-01 | End: 2017-01-01 | Stop reason: HOSPADM

## 2017-01-01 RX ORDER — DIPHENHYDRAMINE HCL 25 MG
50 CAPSULE ORAL ONCE
Status: COMPLETED | OUTPATIENT
Start: 2017-01-01 | End: 2017-01-01

## 2017-01-01 RX ORDER — EPINEPHRINE 0.3 MG/.3ML
0.3 INJECTION SUBCUTANEOUS EVERY 5 MIN PRN
Status: CANCELLED | OUTPATIENT
Start: 2017-01-01

## 2017-01-01 RX ORDER — PROCHLORPERAZINE 25 MG
25 SUPPOSITORY, RECTAL RECTAL EVERY 12 HOURS PRN
Status: DISCONTINUED | OUTPATIENT
Start: 2017-01-01 | End: 2017-01-01 | Stop reason: HOSPADM

## 2017-01-01 RX ORDER — AMOXICILLIN 250 MG
1-2 CAPSULE ORAL 2 TIMES DAILY PRN
Status: DISCONTINUED | OUTPATIENT
Start: 2017-01-01 | End: 2017-01-01 | Stop reason: HOSPADM

## 2017-01-01 RX ORDER — LIDOCAINE 40 MG/G
CREAM TOPICAL
Status: DISCONTINUED | OUTPATIENT
Start: 2017-01-01 | End: 2017-01-01 | Stop reason: HOSPADM

## 2017-01-01 RX ORDER — LORAZEPAM 2 MG/ML
1 INJECTION INTRAMUSCULAR EVERY 6 HOURS PRN
Status: CANCELLED | OUTPATIENT
Start: 2017-01-01

## 2017-01-01 RX ORDER — ALBUTEROL SULFATE 0.83 MG/ML
2.5 SOLUTION RESPIRATORY (INHALATION)
Status: CANCELLED | OUTPATIENT
Start: 2017-01-01

## 2017-01-01 RX ORDER — HEPARIN SODIUM (PORCINE) LOCK FLUSH IV SOLN 100 UNIT/ML 100 UNIT/ML
500 SOLUTION INTRAVENOUS EVERY 8 HOURS
Status: CANCELLED
Start: 2017-01-01

## 2017-01-01 RX ORDER — IBUPROFEN 600 MG/1
600 TABLET, FILM COATED ORAL EVERY 6 HOURS
Status: CANCELLED | OUTPATIENT
Start: 2017-01-01

## 2017-01-01 RX ORDER — HEPARIN SODIUM (PORCINE) LOCK FLUSH IV SOLN 100 UNIT/ML 100 UNIT/ML
SOLUTION INTRAVENOUS
Status: COMPLETED
Start: 2017-01-01 | End: 2017-01-01

## 2017-01-01 RX ORDER — DEXAMETHASONE SODIUM PHOSPHATE 10 MG/ML
20 INJECTION, SOLUTION INTRAMUSCULAR; INTRAVENOUS ONCE
Status: CANCELLED | OUTPATIENT
Start: 2017-01-01 | End: 2017-01-01

## 2017-01-01 RX ORDER — DIPHENHYDRAMINE HYDROCHLORIDE 50 MG/ML
50 INJECTION INTRAMUSCULAR; INTRAVENOUS
Status: CANCELLED
Start: 2017-01-01

## 2017-01-01 RX ORDER — ALBUTEROL SULFATE 90 UG/1
1-2 AEROSOL, METERED RESPIRATORY (INHALATION)
Status: CANCELLED
Start: 2017-01-01

## 2017-01-01 RX ORDER — DIPHENHYDRAMINE HCL 25 MG
50 CAPSULE ORAL ONCE
Status: CANCELLED
Start: 2017-01-01

## 2017-01-01 RX ORDER — PROCHLORPERAZINE MALEATE 10 MG
10 TABLET ORAL EVERY 6 HOURS PRN
Qty: 30 TABLET | Refills: 2 | Status: SHIPPED | OUTPATIENT
Start: 2017-01-01 | End: 2017-01-01

## 2017-01-01 RX ORDER — FENTANYL CITRATE 50 UG/ML
25-50 INJECTION, SOLUTION INTRAMUSCULAR; INTRAVENOUS
Status: DISCONTINUED | OUTPATIENT
Start: 2017-01-01 | End: 2017-01-01 | Stop reason: HOSPADM

## 2017-01-01 RX ORDER — OXYCODONE AND ACETAMINOPHEN 5; 325 MG/1; MG/1
TABLET ORAL
Status: ON HOLD | COMMUNITY
Start: 2017-01-01 | End: 2017-01-01

## 2017-01-01 RX ORDER — POLYETHYLENE GLYCOL 3350 17 G/17G
1-2 POWDER, FOR SOLUTION ORAL DAILY
Qty: 510 G | Refills: 1 | Status: SHIPPED | OUTPATIENT
Start: 2017-01-01 | End: 2017-01-01

## 2017-01-01 RX ORDER — ONDANSETRON 2 MG/ML
4 INJECTION INTRAMUSCULAR; INTRAVENOUS EVERY 6 HOURS PRN
Status: DISCONTINUED | OUTPATIENT
Start: 2017-01-01 | End: 2017-01-01 | Stop reason: HOSPADM

## 2017-01-01 RX ORDER — HEPARIN SODIUM (PORCINE) LOCK FLUSH IV SOLN 100 UNIT/ML 100 UNIT/ML
5 SOLUTION INTRAVENOUS
Status: DISCONTINUED | OUTPATIENT
Start: 2017-01-01 | End: 2017-01-01 | Stop reason: HOSPADM

## 2017-01-01 RX ORDER — EPINEPHRINE 1 MG/ML
0.3 INJECTION, SOLUTION, CONCENTRATE INTRAVENOUS EVERY 5 MIN PRN
Status: CANCELLED | OUTPATIENT
Start: 2017-01-01

## 2017-01-01 RX ORDER — IOPAMIDOL 755 MG/ML
116 INJECTION, SOLUTION INTRAVASCULAR ONCE
Status: COMPLETED | OUTPATIENT
Start: 2017-01-01 | End: 2017-01-01

## 2017-01-01 RX ORDER — DIPHENHYDRAMINE HYDROCHLORIDE 50 MG/ML
50 INJECTION INTRAMUSCULAR; INTRAVENOUS ONCE
Status: DISCONTINUED | OUTPATIENT
Start: 2017-01-01 | End: 2017-01-01 | Stop reason: HOSPADM

## 2017-01-01 RX ORDER — PALONOSETRON 0.05 MG/ML
0.25 INJECTION, SOLUTION INTRAVENOUS ONCE
Status: COMPLETED | OUTPATIENT
Start: 2017-01-01 | End: 2017-01-01

## 2017-01-01 RX ORDER — SODIUM CHLORIDE, SODIUM LACTATE, POTASSIUM CHLORIDE, CALCIUM CHLORIDE 600; 310; 30; 20 MG/100ML; MG/100ML; MG/100ML; MG/100ML
INJECTION, SOLUTION INTRAVENOUS CONTINUOUS
Status: DISCONTINUED | OUTPATIENT
Start: 2017-01-01 | End: 2017-01-01

## 2017-01-01 RX ORDER — HEPARIN SODIUM (PORCINE) LOCK FLUSH IV SOLN 100 UNIT/ML 100 UNIT/ML
500 SOLUTION INTRAVENOUS EVERY 8 HOURS
Status: DISCONTINUED | OUTPATIENT
Start: 2017-01-01 | End: 2017-01-01 | Stop reason: HOSPADM

## 2017-01-01 RX ORDER — AMOXICILLIN 250 MG
2-4 CAPSULE ORAL 2 TIMES DAILY
Qty: 100 TABLET | Refills: 1 | Status: SHIPPED | OUTPATIENT
Start: 2017-01-01 | End: 2017-01-01

## 2017-01-01 RX ORDER — SODIUM CHLORIDE 9 MG/ML
INJECTION, SOLUTION INTRAVENOUS CONTINUOUS
Status: DISCONTINUED | OUTPATIENT
Start: 2017-01-01 | End: 2017-01-01 | Stop reason: HOSPADM

## 2017-01-01 RX ORDER — DRONABINOL 5 MG/1
5 CAPSULE ORAL
Qty: 60 CAPSULE | Refills: 0 | Status: SHIPPED | OUTPATIENT
Start: 2017-01-01 | End: 2018-01-01

## 2017-01-01 RX ORDER — HYDRALAZINE HYDROCHLORIDE 20 MG/ML
5 INJECTION INTRAMUSCULAR; INTRAVENOUS
Status: DISCONTINUED | OUTPATIENT
Start: 2017-01-01 | End: 2017-01-01 | Stop reason: HOSPADM

## 2017-01-01 RX ORDER — HEPARIN SODIUM (PORCINE) LOCK FLUSH IV SOLN 100 UNIT/ML 100 UNIT/ML
500 SOLUTION INTRAVENOUS
Status: CANCELLED
Start: 2017-01-01

## 2017-01-01 RX ORDER — PROCHLORPERAZINE MALEATE 5 MG
5-10 TABLET ORAL EVERY 6 HOURS PRN
Status: DISCONTINUED | OUTPATIENT
Start: 2017-01-01 | End: 2017-01-01 | Stop reason: HOSPADM

## 2017-01-01 RX ORDER — HEPARIN SODIUM,PORCINE 10 UNIT/ML
5 VIAL (ML) INTRAVENOUS EVERY 24 HOURS
Status: DISCONTINUED | OUTPATIENT
Start: 2017-01-01 | End: 2017-01-01 | Stop reason: HOSPADM

## 2017-01-01 RX ORDER — HYDROMORPHONE HCL/0.9% NACL/PF 0.2MG/0.2
.2-.3 SYRINGE (ML) INTRAVENOUS
Status: COMPLETED | OUTPATIENT
Start: 2017-01-01 | End: 2017-01-01

## 2017-01-01 RX ORDER — PALONOSETRON 0.05 MG/ML
0.25 INJECTION, SOLUTION INTRAVENOUS ONCE
Status: CANCELLED
Start: 2017-01-01

## 2017-01-01 RX ORDER — HEPARIN SODIUM (PORCINE) LOCK FLUSH IV SOLN 100 UNIT/ML 100 UNIT/ML
5 SOLUTION INTRAVENOUS EVERY 8 HOURS PRN
Status: DISCONTINUED | OUTPATIENT
Start: 2017-01-01 | End: 2017-01-01 | Stop reason: HOSPADM

## 2017-01-01 RX ORDER — ONDANSETRON 2 MG/ML
4 INJECTION INTRAMUSCULAR; INTRAVENOUS EVERY 30 MIN PRN
Status: DISCONTINUED | OUTPATIENT
Start: 2017-01-01 | End: 2017-01-01 | Stop reason: HOSPADM

## 2017-01-01 RX ORDER — AMOXICILLIN 250 MG
2-4 CAPSULE ORAL 2 TIMES DAILY
Qty: 100 TABLET | Refills: 1 | Status: SHIPPED | OUTPATIENT
Start: 2017-01-01

## 2017-01-01 RX ORDER — ONDANSETRON 2 MG/ML
8 INJECTION INTRAMUSCULAR; INTRAVENOUS ONCE
Status: COMPLETED | OUTPATIENT
Start: 2017-01-01 | End: 2017-01-01

## 2017-01-01 RX ORDER — AMLODIPINE BESYLATE 2.5 MG/1
2.5 TABLET ORAL DAILY
Qty: 30 TABLET | Refills: 1 | Status: SHIPPED | OUTPATIENT
Start: 2017-01-01 | End: 2017-01-01

## 2017-01-01 RX ORDER — ONDANSETRON 2 MG/ML
INJECTION INTRAMUSCULAR; INTRAVENOUS
Status: DISCONTINUED
Start: 2017-01-01 | End: 2017-01-01 | Stop reason: HOSPADM

## 2017-01-01 RX ORDER — EPINEPHRINE 0.3 MG/.3ML
INJECTION SUBCUTANEOUS
Status: DISCONTINUED
Start: 2017-01-01 | End: 2017-01-01 | Stop reason: WASHOUT

## 2017-01-01 RX ORDER — HYDRALAZINE HYDROCHLORIDE 20 MG/ML
INJECTION INTRAMUSCULAR; INTRAVENOUS
Status: DISCONTINUED
Start: 2017-01-01 | End: 2017-01-01 | Stop reason: HOSPADM

## 2017-01-01 RX ORDER — GABAPENTIN 300 MG/1
300 CAPSULE ORAL ONCE
Status: COMPLETED | OUTPATIENT
Start: 2017-01-01 | End: 2017-01-01

## 2017-01-01 RX ORDER — HEPARIN SODIUM (PORCINE) LOCK FLUSH IV SOLN 100 UNIT/ML 100 UNIT/ML
5 SOLUTION INTRAVENOUS ONCE
Status: COMPLETED | OUTPATIENT
Start: 2017-01-01 | End: 2017-01-01

## 2017-01-01 RX ORDER — SODIUM CHLORIDE, SODIUM LACTATE, POTASSIUM CHLORIDE, CALCIUM CHLORIDE 600; 310; 30; 20 MG/100ML; MG/100ML; MG/100ML; MG/100ML
INJECTION, SOLUTION INTRAVENOUS CONTINUOUS
Status: DISCONTINUED | OUTPATIENT
Start: 2017-01-01 | End: 2017-01-01 | Stop reason: HOSPADM

## 2017-01-01 RX ORDER — TRAMADOL HYDROCHLORIDE 50 MG/1
TABLET ORAL
Qty: 120 TABLET | Refills: 0 | Status: SHIPPED | OUTPATIENT
Start: 2017-01-01 | End: 2017-01-01

## 2017-01-01 RX ORDER — EPINEPHRINE 1 MG/ML
0.3 INJECTION INTRAMUSCULAR; INTRAVENOUS; SUBCUTANEOUS EVERY 5 MIN PRN
Status: CANCELLED | OUTPATIENT
Start: 2017-01-01

## 2017-01-01 RX ORDER — LORAZEPAM 1 MG/1
1 TABLET ORAL EVERY 6 HOURS PRN
Qty: 30 TABLET | Refills: 2 | Status: SHIPPED | OUTPATIENT
Start: 2017-01-01 | End: 2017-01-01

## 2017-01-01 RX ORDER — HEPARIN SODIUM (PORCINE) LOCK FLUSH IV SOLN 100 UNIT/ML 100 UNIT/ML
500 SOLUTION INTRAVENOUS
Status: COMPLETED | OUTPATIENT
Start: 2017-01-01 | End: 2017-01-01

## 2017-01-01 RX ORDER — ONDANSETRON 4 MG/1
4 TABLET, ORALLY DISINTEGRATING ORAL EVERY 30 MIN PRN
Status: DISCONTINUED | OUTPATIENT
Start: 2017-01-01 | End: 2017-01-01 | Stop reason: HOSPADM

## 2017-01-01 RX ORDER — ONDANSETRON 2 MG/ML
INJECTION INTRAMUSCULAR; INTRAVENOUS
Status: DISCONTINUED
Start: 2017-01-01 | End: 2017-01-01 | Stop reason: WASHOUT

## 2017-01-01 RX ORDER — ONDANSETRON 4 MG/1
4 TABLET, ORALLY DISINTEGRATING ORAL EVERY 6 HOURS PRN
Status: DISCONTINUED | OUTPATIENT
Start: 2017-01-01 | End: 2017-01-01 | Stop reason: HOSPADM

## 2017-01-01 RX ORDER — HEPARIN SODIUM (PORCINE) LOCK FLUSH IV SOLN 100 UNIT/ML 100 UNIT/ML
5 SOLUTION INTRAVENOUS EVERY 8 HOURS
Status: DISCONTINUED | OUTPATIENT
Start: 2017-01-01 | End: 2017-01-01 | Stop reason: HOSPADM

## 2017-01-01 RX ORDER — ACETAMINOPHEN 325 MG/1
1000 TABLET ORAL 3 TIMES DAILY
Qty: 100 TABLET | Refills: 0 | Status: SHIPPED | OUTPATIENT
Start: 2017-01-01 | End: 2017-01-01

## 2017-01-01 RX ORDER — POLYETHYLENE GLYCOL 3350 17 G/17G
1-2 POWDER, FOR SOLUTION ORAL DAILY
Qty: 510 G | Refills: 1 | Status: SHIPPED | OUTPATIENT
Start: 2017-01-01

## 2017-01-01 RX ORDER — AMLODIPINE BESYLATE 2.5 MG/1
2.5 TABLET ORAL DAILY
Qty: 30 TABLET | Refills: 1 | Status: SHIPPED | OUTPATIENT
Start: 2017-01-01 | End: 2018-01-01

## 2017-01-01 RX ORDER — OXYCODONE HYDROCHLORIDE 5 MG/1
5 TABLET ORAL ONCE
Status: COMPLETED | OUTPATIENT
Start: 2017-01-01 | End: 2017-01-01

## 2017-01-01 RX ORDER — METOCLOPRAMIDE 10 MG/1
10 TABLET ORAL EVERY 6 HOURS PRN
Status: DISCONTINUED | OUTPATIENT
Start: 2017-01-01 | End: 2017-01-01 | Stop reason: HOSPADM

## 2017-01-01 RX ORDER — DEXAMETHASONE SODIUM PHOSPHATE 10 MG/ML
20 INJECTION, SOLUTION INTRAMUSCULAR; INTRAVENOUS ONCE
Status: COMPLETED | OUTPATIENT
Start: 2017-01-01 | End: 2017-01-01

## 2017-01-01 RX ORDER — AMOXICILLIN 250 MG
1-2 CAPSULE ORAL 2 TIMES DAILY
Status: DISCONTINUED | OUTPATIENT
Start: 2017-01-01 | End: 2017-01-01 | Stop reason: HOSPADM

## 2017-01-01 RX ORDER — DIPHENHYDRAMINE HYDROCHLORIDE 50 MG/ML
50 INJECTION INTRAMUSCULAR; INTRAVENOUS EVERY 6 HOURS PRN
Status: DISCONTINUED | OUTPATIENT
Start: 2017-01-01 | End: 2017-01-01 | Stop reason: HOSPADM

## 2017-01-01 RX ORDER — TRAMADOL HYDROCHLORIDE 50 MG/1
TABLET ORAL
Qty: 240 TABLET | Refills: 0 | Status: SHIPPED | OUTPATIENT
Start: 2017-01-01 | End: 2018-01-01

## 2017-01-01 RX ORDER — METOCLOPRAMIDE HYDROCHLORIDE 5 MG/ML
10 INJECTION INTRAMUSCULAR; INTRAVENOUS EVERY 6 HOURS PRN
Status: DISCONTINUED | OUTPATIENT
Start: 2017-01-01 | End: 2017-01-01 | Stop reason: HOSPADM

## 2017-01-01 RX ORDER — PROPOFOL 10 MG/ML
INJECTION, EMULSION INTRAVENOUS PRN
Status: DISCONTINUED | OUTPATIENT
Start: 2017-01-01 | End: 2017-01-01

## 2017-01-01 RX ORDER — LORAZEPAM 1 MG/1
1 TABLET ORAL EVERY 6 HOURS PRN
Qty: 30 TABLET | Refills: 2 | Status: SHIPPED | OUTPATIENT
Start: 2017-01-01 | End: 2018-01-01

## 2017-01-01 RX ORDER — PROPOFOL 10 MG/ML
INJECTION, EMULSION INTRAVENOUS CONTINUOUS PRN
Status: DISCONTINUED | OUTPATIENT
Start: 2017-01-01 | End: 2017-01-01

## 2017-01-01 RX ORDER — ACETAMINOPHEN 325 MG/1
975 TABLET ORAL 3 TIMES DAILY
Qty: 100 TABLET | Refills: 0 | COMMUNITY
Start: 2017-01-01 | End: 2018-01-01

## 2017-01-01 RX ORDER — HEPARIN SODIUM (PORCINE) LOCK FLUSH IV SOLN 100 UNIT/ML 100 UNIT/ML
500 SOLUTION INTRAVENOUS EVERY 8 HOURS
Status: CANCELLED | OUTPATIENT
Start: 2017-01-01

## 2017-01-01 RX ORDER — OXYCODONE HYDROCHLORIDE 5 MG/1
5-10 TABLET ORAL
Qty: 100 TABLET | Refills: 0 | Status: SHIPPED | OUTPATIENT
Start: 2017-01-01 | End: 2017-01-01

## 2017-01-01 RX ORDER — OXYCODONE HYDROCHLORIDE 5 MG/1
5-10 TABLET ORAL
Qty: 50 TABLET | Refills: 0 | Status: SHIPPED | OUTPATIENT
Start: 2017-01-01 | End: 2017-01-01

## 2017-01-01 RX ORDER — NALOXONE HYDROCHLORIDE 0.4 MG/ML
.1-.4 INJECTION, SOLUTION INTRAMUSCULAR; INTRAVENOUS; SUBCUTANEOUS
Status: DISCONTINUED | OUTPATIENT
Start: 2017-01-01 | End: 2017-01-01 | Stop reason: HOSPADM

## 2017-01-01 RX ORDER — ACETAMINOPHEN 325 MG/1
650 TABLET ORAL ONCE
Status: COMPLETED | OUTPATIENT
Start: 2017-01-01 | End: 2017-01-01

## 2017-01-01 RX ORDER — HEPARIN SODIUM (PORCINE) LOCK FLUSH IV SOLN 100 UNIT/ML 100 UNIT/ML
5 SOLUTION INTRAVENOUS
Status: COMPLETED | OUTPATIENT
Start: 2017-01-01 | End: 2017-01-01

## 2017-01-01 RX ORDER — DIPHENHYDRAMINE HYDROCHLORIDE 50 MG/ML
50 INJECTION INTRAMUSCULAR; INTRAVENOUS ONCE
Status: CANCELLED | OUTPATIENT
Start: 2017-01-01 | End: 1840-12-31

## 2017-01-01 RX ORDER — PALONOSETRON 0.05 MG/ML
0.25 INJECTION, SOLUTION INTRAVENOUS ONCE
Status: CANCELLED | OUTPATIENT
Start: 2017-01-01

## 2017-01-01 RX ORDER — DEXAMETHASONE SODIUM PHOSPHATE 10 MG/ML
20 INJECTION, SOLUTION INTRAMUSCULAR; INTRAVENOUS ONCE
Status: CANCELLED
Start: 2017-01-01 | End: 2017-01-01

## 2017-01-01 RX ORDER — ACETAMINOPHEN 325 MG/1
650 TABLET ORAL EVERY 6 HOURS
Status: DISCONTINUED | OUTPATIENT
Start: 2017-01-01 | End: 2017-01-01 | Stop reason: HOSPADM

## 2017-01-01 RX ORDER — AMLODIPINE BESYLATE 2.5 MG/1
2.5 TABLET ORAL DAILY
Status: DISCONTINUED | OUTPATIENT
Start: 2017-01-01 | End: 2017-01-01 | Stop reason: HOSPADM

## 2017-01-01 RX ORDER — BISACODYL 10 MG
10 SUPPOSITORY, RECTAL RECTAL DAILY PRN
Status: DISCONTINUED | OUTPATIENT
Start: 2017-01-01 | End: 2017-01-01 | Stop reason: HOSPADM

## 2017-01-01 RX ORDER — PROCHLORPERAZINE MALEATE 10 MG
10 TABLET ORAL EVERY 6 HOURS PRN
Qty: 30 TABLET | Refills: 2 | Status: SHIPPED | OUTPATIENT
Start: 2017-01-01 | End: 2018-01-01

## 2017-01-01 RX ORDER — HYDROMORPHONE HYDROCHLORIDE 1 MG/ML
0.3 INJECTION, SOLUTION INTRAMUSCULAR; INTRAVENOUS; SUBCUTANEOUS
Status: COMPLETED | OUTPATIENT
Start: 2017-01-01 | End: 2017-01-01

## 2017-01-01 RX ORDER — ACETAMINOPHEN 325 MG/1
975 TABLET ORAL ONCE
Status: COMPLETED | OUTPATIENT
Start: 2017-01-01 | End: 2017-01-01

## 2017-01-01 RX ORDER — CEFDINIR 300 MG/1
300 CAPSULE ORAL 2 TIMES DAILY
Qty: 14 CAPSULE | Refills: 0 | Status: SHIPPED | OUTPATIENT
Start: 2017-01-01 | End: 2017-01-01

## 2017-01-01 RX ORDER — OXYCODONE HYDROCHLORIDE 5 MG/1
5-10 TABLET ORAL EVERY 4 HOURS PRN
Status: DISCONTINUED | OUTPATIENT
Start: 2017-01-01 | End: 2017-01-01 | Stop reason: HOSPADM

## 2017-01-01 RX ADMIN — METOCLOPRAMIDE 10 MG: 5 INJECTION, SOLUTION INTRAMUSCULAR; INTRAVENOUS at 11:41

## 2017-01-01 RX ADMIN — PROPOFOL 100 MCG/KG/MIN: 10 INJECTION, EMULSION INTRAVENOUS at 08:39

## 2017-01-01 RX ADMIN — SODIUM CHLORIDE, PRESERVATIVE FREE 500 UNITS: 5 INJECTION INTRAVENOUS at 14:08

## 2017-01-01 RX ADMIN — PALONOSETRON HYDROCHLORIDE 0.25 MG: 0.25 INJECTION INTRAVENOUS at 11:15

## 2017-01-01 RX ADMIN — SODIUM CHLORIDE, PRESERVATIVE FREE 500 UNITS: 5 INJECTION INTRAVENOUS at 15:16

## 2017-01-01 RX ADMIN — ONDANSETRON 8 MG: 2 INJECTION INTRAMUSCULAR; INTRAVENOUS at 15:37

## 2017-01-01 RX ADMIN — ACETAMINOPHEN 650 MG: 325 TABLET ORAL at 08:30

## 2017-01-01 RX ADMIN — ONDANSETRON 4 MG: 2 INJECTION INTRAMUSCULAR; INTRAVENOUS at 06:37

## 2017-01-01 RX ADMIN — CARBOPLATIN 595 MG: 10 INJECTION, SOLUTION INTRAVENOUS at 16:26

## 2017-01-01 RX ADMIN — SODIUM CHLORIDE 1000 ML: 9 INJECTION, SOLUTION INTRAVENOUS at 13:29

## 2017-01-01 RX ADMIN — SODIUM CHLORIDE, PRESERVATIVE FREE 500 UNITS: 5 INJECTION INTRAVENOUS at 16:55

## 2017-01-01 RX ADMIN — DEXAMETHASONE SODIUM PHOSPHATE 20 MG: 10 INJECTION, SOLUTION INTRAMUSCULAR; INTRAVENOUS at 09:03

## 2017-01-01 RX ADMIN — CARBOPLATIN 575 MG: 10 INJECTION, SOLUTION INTRAVENOUS at 12:21

## 2017-01-01 RX ADMIN — SODIUM CHLORIDE: 9 INJECTION, SOLUTION INTRAVENOUS at 12:21

## 2017-01-01 RX ADMIN — FAMOTIDINE 40 MG: 10 INJECTION INTRAVENOUS at 09:20

## 2017-01-01 RX ADMIN — OXYCODONE HYDROCHLORIDE 5 MG: 5 TABLET ORAL at 20:36

## 2017-01-01 RX ADMIN — BEVACIZUMAB 1300 MG: 400 INJECTION, SOLUTION INTRAVENOUS at 13:36

## 2017-01-01 RX ADMIN — CARBOPLATIN 565 MG: 10 INJECTION, SOLUTION INTRAVENOUS at 12:46

## 2017-01-01 RX ADMIN — OXYCODONE HYDROCHLORIDE 10 MG: 5 TABLET ORAL at 06:37

## 2017-01-01 RX ADMIN — DIPHENHYDRAMINE HYDROCHLORIDE 50 MG: 50 INJECTION, SOLUTION INTRAMUSCULAR; INTRAVENOUS at 12:50

## 2017-01-01 RX ADMIN — PALONOSETRON HYDROCHLORIDE 0.25 MG: 0.25 INJECTION INTRAVENOUS at 09:31

## 2017-01-01 RX ADMIN — ONDANSETRON: 2 INJECTION INTRAMUSCULAR; INTRAVENOUS at 13:46

## 2017-01-01 RX ADMIN — FAMOTIDINE 40 MG: 10 INJECTION INTRAVENOUS at 11:17

## 2017-01-01 RX ADMIN — PACLITAXEL 345 MG: 6 INJECTION, SOLUTION INTRAVENOUS at 13:02

## 2017-01-01 RX ADMIN — PACLITAXEL 345 MG: 6 INJECTION, SOLUTION INTRAVENOUS at 09:59

## 2017-01-01 RX ADMIN — OXYCODONE HYDROCHLORIDE 5 MG: 5 TABLET ORAL at 02:34

## 2017-01-01 RX ADMIN — SODIUM CHLORIDE, PRESERVATIVE FREE 500 UNITS: 5 INJECTION INTRAVENOUS at 15:58

## 2017-01-01 RX ADMIN — DIPHENHYDRAMINE HYDROCHLORIDE 50 MG: 25 CAPSULE ORAL at 11:08

## 2017-01-01 RX ADMIN — SODIUM CHLORIDE 1000 ML: 9 INJECTION, SOLUTION INTRAVENOUS at 13:31

## 2017-01-01 RX ADMIN — SODIUM CHLORIDE, PRESERVATIVE FREE 500 UNITS: 5 INJECTION INTRAVENOUS at 15:32

## 2017-01-01 RX ADMIN — FAMOTIDINE 40 MG: 10 INJECTION INTRAVENOUS at 11:21

## 2017-01-01 RX ADMIN — PALONOSETRON HYDROCHLORIDE 0.25 MG: 0.25 INJECTION INTRAVENOUS at 12:21

## 2017-01-01 RX ADMIN — ACETAMINOPHEN 975 MG: 325 TABLET ORAL at 06:54

## 2017-01-01 RX ADMIN — SODIUM CHLORIDE, PRESERVATIVE FREE 500 UNITS: 5 INJECTION INTRAVENOUS at 14:45

## 2017-01-01 RX ADMIN — DIPHENHYDRAMINE HYDROCHLORIDE 50 MG: 25 CAPSULE ORAL at 11:07

## 2017-01-01 RX ADMIN — SODIUM CHLORIDE, PRESERVATIVE FREE 500 UNITS: 5 INJECTION INTRAVENOUS at 15:56

## 2017-01-01 RX ADMIN — SODIUM CHLORIDE 1000 ML: 9 INJECTION, SOLUTION INTRAVENOUS at 13:51

## 2017-01-01 RX ADMIN — SODIUM CHLORIDE, PRESERVATIVE FREE 5 ML: 5 INJECTION INTRAVENOUS at 15:44

## 2017-01-01 RX ADMIN — DEXAMETHASONE SODIUM PHOSPHATE 20 MG: 10 INJECTION, SOLUTION INTRAMUSCULAR; INTRAVENOUS at 11:15

## 2017-01-01 RX ADMIN — Medication 0.2 MG: at 08:29

## 2017-01-01 RX ADMIN — ACETAMINOPHEN 650 MG: 325 TABLET ORAL at 14:51

## 2017-01-01 RX ADMIN — SODIUM CHLORIDE, SODIUM LACTATE, POTASSIUM CHLORIDE, CALCIUM CHLORIDE: 600; 310; 30; 20 INJECTION, SOLUTION INTRAVENOUS at 07:54

## 2017-01-01 RX ADMIN — SODIUM CHLORIDE 1000 ML: 9 INJECTION, SOLUTION INTRAVENOUS at 12:52

## 2017-01-01 RX ADMIN — ACETAMINOPHEN 650 MG: 325 TABLET ORAL at 01:57

## 2017-01-01 RX ADMIN — ONDANSETRON: 2 INJECTION INTRAMUSCULAR; INTRAVENOUS at 14:03

## 2017-01-01 RX ADMIN — OXYCODONE HYDROCHLORIDE 10 MG: 5 TABLET ORAL at 14:51

## 2017-01-01 RX ADMIN — Medication 0.3 MG: at 01:57

## 2017-01-01 RX ADMIN — PACLITAXEL 345 MG: 6 INJECTION, SOLUTION INTRAVENOUS at 11:45

## 2017-01-01 RX ADMIN — PROPOFOL 20 MG: 10 INJECTION, EMULSION INTRAVENOUS at 08:43

## 2017-01-01 RX ADMIN — PALONOSETRON HYDROCHLORIDE 0.25 MG: 0.25 INJECTION INTRAVENOUS at 09:02

## 2017-01-01 RX ADMIN — SODIUM CHLORIDE, PRESERVATIVE FREE 500 UNITS: 5 INJECTION INTRAVENOUS at 16:07

## 2017-01-01 RX ADMIN — ACETAMINOPHEN 650 MG: 325 TABLET ORAL at 20:36

## 2017-01-01 RX ADMIN — ONDANSETRON 8 MG: 2 INJECTION INTRAMUSCULAR; INTRAVENOUS at 13:34

## 2017-01-01 RX ADMIN — CARBOPLATIN 600 MG: 10 INJECTION, SOLUTION INTRAVENOUS at 12:59

## 2017-01-01 RX ADMIN — CARBOPLATIN 620 MG: 10 INJECTION, SOLUTION INTRAVENOUS at 14:51

## 2017-01-01 RX ADMIN — IOPAMIDOL 116 ML: 755 INJECTION, SOLUTION INTRAVENOUS at 23:16

## 2017-01-01 RX ADMIN — DEXAMETHASONE SODIUM PHOSPHATE 20 MG: 10 INJECTION, SOLUTION INTRAMUSCULAR; INTRAVENOUS at 09:27

## 2017-01-01 RX ADMIN — PROPOFOL: 10 INJECTION, EMULSION INTRAVENOUS at 09:27

## 2017-01-01 RX ADMIN — DIPHENHYDRAMINE HYDROCHLORIDE 50 MG: 50 INJECTION INTRAMUSCULAR; INTRAVENOUS at 08:54

## 2017-01-01 RX ADMIN — AMLODIPINE BESYLATE 2.5 MG: 2.5 TABLET ORAL at 10:16

## 2017-01-01 RX ADMIN — PACLITAXEL 345 MG: 6 INJECTION, SOLUTION INTRAVENOUS at 13:11

## 2017-01-01 RX ADMIN — SODIUM CHLORIDE, PRESERVATIVE FREE 5 ML: 5 INJECTION INTRAVENOUS at 02:39

## 2017-01-01 RX ADMIN — PACLITAXEL 345 MG: 6 INJECTION, SOLUTION INTRAVENOUS at 09:51

## 2017-01-01 RX ADMIN — SODIUM CHLORIDE 1000 ML: 9 INJECTION, SOLUTION INTRAVENOUS at 15:32

## 2017-01-01 RX ADMIN — Medication 20 ML: at 09:13

## 2017-01-01 RX ADMIN — SODIUM CHLORIDE 250 ML: 9 INJECTION, SOLUTION INTRAVENOUS at 08:54

## 2017-01-01 RX ADMIN — OXYCODONE HYDROCHLORIDE 10 MG: 5 TABLET ORAL at 00:53

## 2017-01-01 RX ADMIN — DIPHENHYDRAMINE HYDROCHLORIDE 50 MG: 50 INJECTION, SOLUTION INTRAMUSCULAR; INTRAVENOUS at 09:30

## 2017-01-01 RX ADMIN — HEPARIN SODIUM (PORCINE) LOCK FLUSH IV SOLN 100 UNIT/ML 500 UNITS: 100 SOLUTION at 15:58

## 2017-01-01 RX ADMIN — HYDROMORPHONE HYDROCHLORIDE 0.3 MG: 1 INJECTION, SOLUTION INTRAMUSCULAR; INTRAVENOUS; SUBCUTANEOUS at 00:19

## 2017-01-01 RX ADMIN — BEVACIZUMAB 1300 MG: 400 INJECTION, SOLUTION INTRAVENOUS at 11:46

## 2017-01-01 RX ADMIN — SODIUM CHLORIDE, PRESERVATIVE FREE 5 ML: 5 INJECTION INTRAVENOUS at 17:37

## 2017-01-01 RX ADMIN — SODIUM CHLORIDE, POTASSIUM CHLORIDE, SODIUM LACTATE AND CALCIUM CHLORIDE: 600; 310; 30; 20 INJECTION, SOLUTION INTRAVENOUS at 02:08

## 2017-01-01 RX ADMIN — SODIUM CHLORIDE, PRESERVATIVE FREE 90 ML: 5 INJECTION INTRAVENOUS at 23:16

## 2017-01-01 RX ADMIN — SODIUM CHLORIDE, PRESERVATIVE FREE 5 ML: 5 INJECTION INTRAVENOUS at 07:09

## 2017-01-01 RX ADMIN — SODIUM CHLORIDE, PRESERVATIVE FREE 500 UNITS: 5 INJECTION INTRAVENOUS at 13:19

## 2017-01-01 RX ADMIN — Medication 5 ML: at 10:19

## 2017-01-01 RX ADMIN — FAMOTIDINE 40 MG: 10 INJECTION INTRAVENOUS at 09:10

## 2017-01-01 RX ADMIN — ONDANSETRON HYDROCHLORIDE: 2 INJECTION, SOLUTION INTRAVENOUS at 13:34

## 2017-01-01 RX ADMIN — GABAPENTIN 300 MG: 300 CAPSULE ORAL at 06:55

## 2017-01-01 RX ADMIN — FAMOTIDINE 40 MG: 10 INJECTION INTRAVENOUS at 12:39

## 2017-01-01 RX ADMIN — SODIUM CHLORIDE, PRESERVATIVE FREE 5 ML: 5 INJECTION INTRAVENOUS at 06:59

## 2017-01-01 RX ADMIN — BEVACIZUMAB 1300 MG: 400 INJECTION, SOLUTION INTRAVENOUS at 17:04

## 2017-01-01 RX ADMIN — BEVACIZUMAB 1300 MG: 400 INJECTION, SOLUTION INTRAVENOUS at 15:25

## 2017-01-01 RX ADMIN — HEPARIN SODIUM (PORCINE) LOCK FLUSH IV SOLN 100 UNIT/ML 500 UNITS: 100 SOLUTION at 15:32

## 2017-01-01 RX ADMIN — SODIUM CHLORIDE 1000 ML: 9 INJECTION, SOLUTION INTRAVENOUS at 00:54

## 2017-01-01 RX ADMIN — DEXAMETHASONE SODIUM PHOSPHATE 20 MG: 10 INJECTION, SOLUTION INTRAMUSCULAR; INTRAVENOUS at 12:21

## 2017-01-01 RX ADMIN — SODIUM CHLORIDE 250 ML: 9 INJECTION, SOLUTION INTRAVENOUS at 11:07

## 2017-01-01 RX ADMIN — SODIUM CHLORIDE 250 ML: 9 INJECTION, SOLUTION INTRAVENOUS at 09:02

## 2017-01-01 RX ADMIN — SODIUM CHLORIDE, POTASSIUM CHLORIDE, SODIUM LACTATE AND CALCIUM CHLORIDE: 600; 310; 30; 20 INJECTION, SOLUTION INTRAVENOUS at 11:26

## 2017-01-01 RX ADMIN — ONDANSETRON: 2 INJECTION INTRAMUSCULAR; INTRAVENOUS at 13:33

## 2017-01-01 RX ADMIN — SODIUM CHLORIDE, PRESERVATIVE FREE 5 ML: 5 INJECTION INTRAVENOUS at 09:15

## 2017-01-01 RX ADMIN — SODIUM CHLORIDE, PRESERVATIVE FREE 5 ML: 5 INJECTION INTRAVENOUS at 09:21

## 2017-01-01 RX ADMIN — SODIUM CHLORIDE, PRESERVATIVE FREE 500 UNITS: 5 INJECTION INTRAVENOUS at 15:18

## 2017-01-01 RX ADMIN — DEXAMETHASONE SODIUM PHOSPHATE: 10 INJECTION, SOLUTION INTRAMUSCULAR; INTRAVENOUS at 11:08

## 2017-01-01 RX ADMIN — SODIUM CHLORIDE 1000 ML: 9 INJECTION, SOLUTION INTRAVENOUS at 13:36

## 2017-01-01 ASSESSMENT — PAIN SCALES - GENERAL
PAINLEVEL: EXTREME PAIN (8)
PAINLEVEL: MODERATE PAIN (5)
PAINLEVEL: SEVERE PAIN (7)
PAINLEVEL: EXTREME PAIN (8)
PAINLEVEL: MODERATE PAIN (5)
PAINLEVEL: SEVERE PAIN (7)
PAINLEVEL: EXTREME PAIN (8)
PAINLEVEL: SEVERE PAIN (6)
PAINLEVEL: SEVERE PAIN (7)
PAINLEVEL: MODERATE PAIN (5)
PAINLEVEL: NO PAIN (0)
PAINLEVEL: SEVERE PAIN (6)
PAINLEVEL: SEVERE PAIN (6)

## 2017-01-01 ASSESSMENT — ENCOUNTER SYMPTOMS
FREQUENCY: 1
SORE THROAT: 0
SHORTNESS OF BREATH: 1
MYALGIAS: 1
ABDOMINAL DISTENTION: 1
NAUSEA: 1
HEMATURIA: 0
SHORTNESS OF BREATH: 1
DYSURIA: 0
FATIGUE: 1
ABDOMINAL PAIN: 1
VOMITING: 1
LIGHT-HEADEDNESS: 1
HEADACHES: 1
COUGH: 0
BACK PAIN: 1
DIZZINESS: 1
DIARRHEA: 0
DYSURIA: 0
APPETITE CHANGE: 1
NUMBNESS: 1
APPETITE CHANGE: 1
COUGH: 0

## 2017-01-01 ASSESSMENT — ACTIVITIES OF DAILY LIVING (ADL)
RETIRED_COMMUNICATION: 0-->UNDERSTANDS/COMMUNICATES WITHOUT DIFFICULTY
BATHING: 0-->INDEPENDENT
RETIRED_EATING: 0-->INDEPENDENT
COGNITION: 0 - NO COGNITION ISSUES REPORTED
FALL_HISTORY_WITHIN_LAST_SIX_MONTHS: NO
AMBULATION: 0-->INDEPENDENT
TRANSFERRING: 0-->INDEPENDENT
SWALLOWING: 0-->SWALLOWS FOODS/LIQUIDS WITHOUT DIFFICULTY
TOILETING: 0-->INDEPENDENT
DRESS: 0-->INDEPENDENT

## 2017-01-01 ASSESSMENT — PAIN DESCRIPTION - DESCRIPTORS
DESCRIPTORS: ACHING
DESCRIPTORS: ACHING;SORE

## 2017-08-29 NOTE — MR AVS SNAPSHOT
After Visit Summary   8/29/2017    Ashvin Tiwari    MRN: [id number]           Patient Information     Date Of Birth          1955        Visit Information        Provider Department      8/29/2017 11:00 AM Barbara Soto MD [facility]        Today's Diagnoses     Cervical mass    -  1      Care Instructions    Labs  MRI  Possible PET after MRI  Return visit with Charles          Follow-ups after your visit        Follow-up notes from your care team     Return in about 2 weeks (around 9/12/2017).      Your next 10 appointments already scheduled     Sep 01, 2017  4:00 PM CDT   (Arrive by 3:45 PM)   MR PELVIS W/O & W CONTRAST with [id number]   [facility] ([facility])    [address] [address] [phone]           Take your medicines as usual, unless your doctor tells you not to. Bring a list of your current medicines to your exam (including vitamins, minerals and over-the-counter drugs).  You will be given intravenous contrast for this exam. To prepare:   The day before your exam, drink extra fluids at least six 8-ounce glasses (unless your doctor tells you to restrict your fluids).   Have a blood test (creatinine test) within 30 days of your exam. Go to your clinic or Diagnostic Imaging Department for this test.  The MRI machine uses a strong magnet. Please wear clothes without metal (snaps, zippers). A sweatsuit works well, or we may give you a hospital gown.  Please remove any body piercings and hair extensions before you arrive. You will also remove watches, jewelry, hairpins, wallets, dentures, partial dental plates and hearing aids. You may wear contact lenses, and you may be able to wear your rings. We have a safe place to keep your personal items, but it is safer to leave them at home.   **IMPORTANT** THE INSTRUCTIONS BELOW ARE ONLY FOR THOSE PATIENTS WHO HAVE BEEN TOLD THEY WILL RECEIVE  SEDATION OR GENERAL ANESTHESIA DURING THEIR MRI PROCEDURE:  IF YOU WILL RECEIVE SEDATION (take medicine to help you relax during your exam):   You must get the medicine from your doctor before you arrive. Bring the medicine to the exam. Do not take it at home.   Arrive one hour early. Bring someone who can take you home after the test. Your medicine will make you sleepy. After the exam, you may not drive, take a bus or take a taxi by yourself.   No eating 8 hours before your exam. You may have clear liquids up until 4 hours before your exam. (Clear liquids include water, clear tea, black coffee and fruit juice without pulp.)  IF YOU WILL RECEIVE ANESTHESIA (be asleep for your exam):   Arrive 1 1/2 hours early. Bring someone who can take you home after the test. You may not drive, take a bus or take a taxi by yourself.   No eating 8 hours before your exam. You may have clear liquids up until 4 hours before your exam. (Clear liquids include water, clear tea, black coffee and fruit juice without pulp.)  Please call the Imaging Department at your exam site with any questions.            Sep 12, 2017  3:00 PM CDT   (Arrive by 2:45 PM)   Return Visit with Barbara Soto MD   [facility] ([facility])    [address] [place] [address] [phone]              Future tests that were ordered for you today     Open Future Orders        Priority Expected Expires Ordered    MRI Pelvis w & w/o contrast Routine  8/29/2018 8/29/2017            Who to contact     If you have questions or need follow up information about today's clinic visit or your schedule please contact [facility] directly at [phone].  Normal or non-critical lab and imaging results will be communicated to you by MyChart, letter or phone within 4 business days after the clinic has received the results. If you do not hear from us within 7 days, please contact the  "clinic through [employer] or phone. If you have a critical or abnormal lab result, we will notify you by phone as soon as possible.  Submit refill requests through [employer] or call your pharmacy and they will forward the refill request to us. Please allow 3 business days for your refill to be completed.          Additional Information About Your Visit        [employer] Qualified Information     [employer] lets you send messages to your doctor, view your test results, renew your prescriptions, schedule appointments and more. To sign up, go to [email or web address] . Click on \"Log in\" on the left side of the screen, which will take you to the Welcome page. Then click on \"Sign up Now\" on the right side of the page.     You will be asked to enter the access code listed below, as well as some personal information. Please follow the directions to create your username and password.     Your access code is: [email or web address]  Expires: 2017  6:30 AM     Your access code will  in 90 days. If you need help or a new code, please call your [facility] clinic or [phone].        Care EveryWhere ID     This is your Care EveryWhere ID. This could be used by other organizations to access your [facility] medical records  [id number]        Your Vitals Were     Pulse Temperature Respirations Height Pulse Oximetry Breastfeeding?    93 98.1  F (36.7  C) (Oral) 16 1.549 m (5' 1\") 97% No    BMI (Body Mass Index)                   36.05 kg/m2            Blood Pressure from Last 3 Encounters:   17 (!) 165/96    Weight from Last 3 Encounters:   17 86.5 kg (190 lb 12.8 oz)              We Performed the Following     ABO/Rh type and screen     Biopsy of Cervix     CBC with platelets differential     Comprehensive metabolic panel     HPV High Risk Types DNA Cervical     Pap imaged thin layer diagnostic with HPV (select HPV order below)     Surgical pathology exam        Primary Care Provider Office Phone # Fax #    Chelsea Wren [phone] " [phone]       [facility] [address]  [facility] [address]        Equal Access to Services     LISSET LUGO : Hadii aad ku hadoscaro Sosyd, wamaryurida luqadaha, qaybta kaalmada sofiemary grace, elizabeth lainain hayaarenetta carrizalesrodolfo worthy laLloydchris cheema. So [facility] [phone].    ATENCIÓN: Si habla español, tiene a mae disposición servicios gratuitos de asistencia lingüística. Llame al [phone].    We comply with applicable federal civil rights laws and [place] laws. We do not discriminate on the basis of race, color, national origin, age, disability sex, sexual orientation or gender identity.            Thank you!     Thank you for choosing [facility]  for your care. Our goal is always to provide you with excellent care. Hearing back from our patients is one way we can continue to improve our services. Please take a few minutes to complete the written survey that you may receive in the mail after your visit with us. Thank you!             Your Updated Medication List - Protect others around you: Learn how to safely use, store and throw away your medicines at www.disposemymeds.org.          This list is accurate as of: 8/29/17 12:18 PM.  Always use your most recent med list.                   Brand Name Dispense Instructions for use Diagnosis    oxyCODONE-acetaminophen 5-325 MG per tablet    PERCOCET

## 2017-08-29 NOTE — LETTER
2017       RE: Ashvin Tiwari  [address]  [facility] [place] [address]     Dear Colleague,    Thank you for referring your patient, Ashvin Tiwari, to the [facility]. Please see a copy of my visit note below.                            Consult Notes on Referred Patient    Date: 2017       Dr. Sita Gallardo  [facility]  [address]  [place] [address]       RE: Ashvin Tiwari  : 1955  DENIS: 2017    Dear Dr. Sita Gallardo:    I had the pleasure of seeing your patient Ashvin Tiwari here at the Gynecologic Cancer Clinic at the [facility] on 2017.  As you know she is a very pleasant 62 year old woman with a recent diagnosis of c ervical mass.  Given these findings she was subsequently sent to the Gynecologic Cancer Clinic for new patient consultation.     62 year old A1 woman with no significant past medical history presents with 3 weeks of vaginal spotting, lower abdominal pain that radiates to the lower back. The pain is now more constant, 9/10 in severity, aggravated by bowel movements. She is taking Percoset with some relief. She noticed the pain ~3 weeks ago and seeked medical attention at that time. She was treated for UTI with Amoxicillin, since her UA was suggestive of an infection. She didn't feel any improvement after the treatment and underwent pelvic exam that revealed cervical mass. She was referred to gynecology for biopsy however that was not done in the office due to concern of ongoing bleeding. Patient never had PAP smear done in the past, never had mammogram or colonoscopy. She denies any pain or symptoms with urination, denies any blood in her stool. She is using 2 pads/day for her spotting that started about 2 weeks ago. She is not sexually active.     Review of Systems:  12- point ROS is negative other than documented in HPI     Past Medical History:    No significant PMH    Past Surgical  "History:  No surgical history    Health Maintenance:  Health Maintenance Due   Topic Date Due     TETANUS IMMUNIZATION (SYSTEM ASSIGNED)  05/10/1971     HEPATITIS C SCREENING  05/10/1971     PAP SCREENING Q3 YR (SYSTEM ASSIGNED)  05/10/1974     LIPID SCREEN Q5 YR FEMALE (SYSTEM ASSIGNED)  05/10/1998     MAMMO SCREEN Q2 YR (SYSTEM ASSIGNED)  05/10/2003     COLON CANCER SCREEN (SYSTEM ASSIGNED)  05/10/2003     ADVANCE DIRECTIVE PLANNING Q5 YRS  05/10/2008       Last Pap Smear: never had.  Last Mammogram: never had  Last Colonoscopy: Never had                      Current Medications:     has a current medication list which includes the following prescription(s): oxycodone-acetaminophen.       Allergies:    No Known Allergies           Social History:     Social History   Substance Use Topics     Smoking status: Never Smoker     Smokeless tobacco: Never Used     Alcohol use No       History   Drug Use No     She is originally from [place], moved to the [place] in [place]. Lives alone, not sexually active. Had 2 partners in her lifetime.  Menopause at age 43.    Family History:   Non -contributory    Physical Exam:     BP (!) 165/96  Pulse 93  Temp 98.1  F (36.7  C) (Oral)  Resp 16  Ht 1.549 m (5' 1\")  Wt 86.5 kg (190 lb 12.8 oz)  SpO2 97%  Breastfeeding? No  BMI 36.05 kg/m2  Body mass index is 36.05 kg/(m^2).    General Appearance: healthy and alert, no distress     HEENT:  no thyromegaly, no palpable nodules or masses        Cardiovascular: regular rate and rhythm, no gallops, rubs or murmurs     Respiratory: lungs clear, no rales, rhonchi or wheezes, normal diaphragmatic excursion    Musculoskeletal: extremities non tender and without edema    Skin: no lesions or rashes     Neurological: normal gait, no gross defects     Psychiatric: appropriate mood and affect                               Hematological: normal cervical, supraclavicular and inguinal lymph nodes     Gastrointestinal:       abdomen soft, " non-tender, non-distended, no organomegaly or masses    Genitourinary: External genitalia and urethral meatus appears normal.  Vagina without obvious lesions in distal half.  Cervix replaced by large, necrotic, malodorous mass.  Extension into upper vagina. Bimanual exam with bilateral parametrial extension.  Rectal exam with smooth mucosa but slightly fixed.  Pap smear and cervical biopsy obtained.  Hemostasis with silver nitrate.     Assessment:    Ashvin Tiwari is a 62 year old woman with a new diagnosis of cervical mass, suspicious for cervical cancer, at least Stage IIB    PAP smear and cervix biopsies performed today. It highly suspicious for locally advanced cervical cancer, if biopsy confirms, and she has no metastatic disease, the treatment plan will involve chemoradiation. We will obtain pelvic MRI and PET/CT, renal function tests.     She will return to the clinic in a week with the tests results to discuss further plan.    A total of 60 minutes was spent with the patient, 45 minutes of which were spent in counseling the patient and/or treatment planning, 15 minutes procedure time.      Plan:     1.)   Biopsy of the cervical mass- performed today, pending results.     2.) Genetic risk factors were assessed and the patient does not meet the qualifications for a referral.      3.) Labs and/or tests ordered include:  Renal function test, PET/CT, MRI of the pelvis.     4.) Health maintenance issues addressed today include screening mammogram.      Barbara Soto MD  Gynecologic Oncology  [facility]      CC  Patient Care Team:  Chelsea Wren as PCP - General (Family Practice)  Sita Gallardo as Referring Physician (OB/Gyn)

## 2017-08-29 NOTE — NURSING NOTE
"Oncology Rooming Note    August 29, 2017 11:09 AM   Ashvin Tiwari is a 62 year old female who presents for:    Chief Complaint   Patient presents with     Oncology Clinic Visit     new patient visit for consultation related to cervical mass      Initial Vitals: BP (!) 165/96  Pulse 93  Temp 98.1  F (36.7  C) (Oral)  Resp 16  Ht 1.549 m (5' 1\")  Wt 86.5 kg (190 lb 12.8 oz)  SpO2 97%  Breastfeeding? No  BMI 36.05 kg/m2 Estimated body mass index is 36.05 kg/(m^2) as calculated from the following:    Height as of this encounter: 1.549 m (5' 1\").    Weight as of this encounter: 86.5 kg (190 lb 12.8 oz). Body surface area is 1.93 meters squared.  No Pain (0) Comment: Data Unavailable   No LMP recorded. Patient is postmenopausal.  Allergies reviewed: Yes  Medications reviewed: Yes    Medications: Medication refills not needed today.  Pharmacy name entered into EPIC: [facility] (USE ONLY AT [facility]) - [place], [place] - [facility] [address]    Clinical concerns: no concerns dr. grove was notified.    5 minutes for nursing intake (face to face time)     Trey Aponte CMA              "

## 2017-08-29 NOTE — PROGRESS NOTES
Consult Notes on Referred Patient    Date: 2017       Dr. Sita Gallardo  [facility]  [address]  [place] [address]       RE: Ashvin Tiwari  : 1955  DENIS: 2017    Dear Dr. Sita Gallardo:    I had the pleasure of seeing your patient Ashvin Tiwari here at the Gynecologic Cancer Clinic at the [facility] on 2017.  As you know she is a very pleasant 62 year old woman with a recent diagnosis of c ervical mass.  Given these findings she was subsequently sent to the Gynecologic Cancer Clinic for new patient consultation.     62 year old A1 woman with no significant past medical history presents with 3 weeks of vaginal spotting, lower abdominal pain that radiates to the lower back. The pain is now more constant, 9/10 in severity, aggravated by bowel movements. She is taking Percoset with some relief. She noticed the pain ~3 weeks ago and seeked medical attention at that time. She was treated for UTI with Amoxicillin, since her UA was suggestive of an infection. She didn't feel any improvement after the treatment and underwent pelvic exam that revealed cervical mass. She was referred to gynecology for biopsy however that was not done in the office due to concern of ongoing bleeding. Patient never had PAP smear done in the past, never had mammogram or colonoscopy. She denies any pain or symptoms with urination, denies any blood in her stool. She is using 2 pads/day for her spotting that started about 2 weeks ago. She is not sexually active.     Review of Systems:  12- point ROS is negative other than documented in HPI     Past Medical History:    No significant PMH    Past Surgical History:  No surgical history    Health Maintenance:  Health Maintenance Due   Topic Date Due     TETANUS IMMUNIZATION (SYSTEM ASSIGNED)  05/10/1971     HEPATITIS C SCREENING  05/10/1971     PAP SCREENING Q3 YR (SYSTEM ASSIGNED)  05/10/1974      "LIPID SCREEN Q5 YR FEMALE (SYSTEM ASSIGNED)  05/10/1998     MAMMO SCREEN Q2 YR (SYSTEM ASSIGNED)  05/10/2003     COLON CANCER SCREEN (SYSTEM ASSIGNED)  05/10/2003     ADVANCE DIRECTIVE PLANNING Q5 YRS  05/10/2008       Last Pap Smear: never had.  Last Mammogram: never had  Last Colonoscopy: Never had                      Current Medications:     has a current medication list which includes the following prescription(s): oxycodone-acetaminophen.       Allergies:    No Known Allergies           Social History:     Social History   Substance Use Topics     Smoking status: Never Smoker     Smokeless tobacco: Never Used     Alcohol use No       History   Drug Use No     She is originally from [place], moved to the [facility] in [place]. Lives alone, not sexually active. Had 2 partners in her lifetime.  Menopause at age 43.    Family History:   Non -contributory    Physical Exam:     BP (!) 165/96  Pulse 93  Temp 98.1  F (36.7  C) (Oral)  Resp 16  Ht 1.549 m (5' 1\")  Wt 86.5 kg (190 lb 12.8 oz)  SpO2 97%  Breastfeeding? No  BMI 36.05 kg/m2  Body mass index is 36.05 kg/(m^2).    General Appearance: healthy and alert, no distress     HEENT:  no thyromegaly, no palpable nodules or masses        Cardiovascular: regular rate and rhythm, no gallops, rubs or murmurs     Respiratory: lungs clear, no rales, rhonchi or wheezes, normal diaphragmatic excursion    Musculoskeletal: extremities non tender and without edema    Skin: no lesions or rashes     Neurological: normal gait, no gross defects     Psychiatric: appropriate mood and affect                               Hematological: normal cervical, supraclavicular and inguinal lymph nodes     Gastrointestinal:       abdomen soft, non-tender, non-distended, no organomegaly or masses    Genitourinary: External genitalia and urethral meatus appears normal.  Vagina without obvious lesions in distal half.  Cervix replaced by large, necrotic, malodorous mass.  Extension into upper " vagina. Bimanual exam with bilateral parametrial extension.  Rectal exam with smooth mucosa but slightly fixed.  Pap smear and cervical biopsy obtained.  Hemostasis with silver nitrate.     Assessment:    Ashvin Tiwari is a 62 year old woman with a new diagnosis of cervical mass, suspicious for cervical cancer, at least Stage IIB    PAP smear and cervix biopsies performed today. It highly suspicious for locally advanced cervical cancer, if biopsy confirms, and she has no metastatic disease, the treatment plan will involve chemoradiation. We will obtain pelvic MRI and PET/CT, renal function tests.     She will return to the clinic in a week with the tests results to discuss further plan.    A total of 60 minutes was spent with the patient, 45 minutes of which were spent in counseling the patient and/or treatment planning, 15 minutes procedure time.      Plan:     1.)   Biopsy of the cervical mass- performed today, pending results.     2.) Genetic risk factors were assessed and the patient does not meet the qualifications for a referral.      3.) Labs and/or tests ordered include:  Renal function test, PET/CT, MRI of the pelvis.     4.) Health maintenance issues addressed today include screening mammogram.      Barbara Soto MD  Gynecologic Oncology  [facility]      CC  Patient Care Team:  Chelsea Wren as PCP - General (Family Practice)  Kristopher Moralez as Referring Physician (OB/Gyn)  KRISTOPHER MORALEZ

## 2017-09-06 NOTE — IP AVS SNAPSHOT
[address] [address]    Phone:  [phone]                                       After Visit Summary   9/6/2017    Ashvin Tiwari    MRN: [id number]           After Visit Summary Signature Page     I have received my discharge instructions, and my questions have been answered. I have discussed any challenges I see with this plan with the nurse or doctor.    ..........................................................................................................................................  Patient/Patient Representative Signature      ..........................................................................................................................................  Patient Representative Print Name and Relationship to Patient    ..................................................               ................................................  Date                                            Time    ..........................................................................................................................................  Reviewed by Signature/Title    ...................................................              ..............................................  Date                                                            Time

## 2017-09-06 NOTE — PROGRESS NOTES
Biopsy proven cervical cancer.  Plan pretreatment PET scan.    Barbara Soto MD  Gynecologic Oncology  [facility]

## 2017-09-06 NOTE — Clinical Note
Lee Shane- Can you arrange PET scan ASAP for this patient.  Needs to be done before her return visit with me.  Thanks KATHIE

## 2017-09-06 NOTE — IP AVS SNAPSHOT
MRN:[id number]                      After Visit Summary   9/6/2017    Ashvin Tiwari    MRN: [id number]           Thank you!     Thank you for choosing [facility] for your care. Our goal is always to provide you with excellent care. Hearing back from our patients is one way we can continue to improve our services. Please take a few minutes to complete the written survey that you may receive in the mail after you visit with us. Thank you!        Patient Information     Date Of Birth          1955        Designated Caregiver       Most Recent Value    Caregiver    Will someone help with your care after discharge? yes    Name of designated caregiver Mariela Trejo    Phone number of caregiver [phone]    Caregiver address [place]      About your hospital stay     You were admitted on:  September 7, 2017 You last received care in the:  [address] [facility]    You were discharged on:  September 7, 2017        Reason for your hospital stay       Pain management                  Who to Call     For medical emergencies, please call [phone].  For non-urgent questions about your medical care, please call your primary care provider or clinic, [phone]          Attending Provider     Provider Specialty    Neeru Ramirez MD Emergency Medicine    [facility], Tri Torres MD Oncology       Primary Care Provider Office Phone # Fax #    Chelsea Wren [phone]       When to contact your care team       Call [phone] (or [phone] on nights or weekends) if you have:    Temperature greater than 100.4  Persistent nausea and vomiting  Severe uncontrolled pain  Redness, tenderness, or signs of infection (pain, swelling, redness, odor or green/yellow discharge around the site)  Difficulty breathing, headache or visual disturbances  Hives  Persistent dizziness or light-headedness  Extreme fatigue  Any other questions or concerns you may have after discharge    In an emergency, call [phone]  or go to an Emergency Department at a nearby hospital                  After Care Instructions     Activity       Your activity upon discharge: activity as tolerated    Pelvic rest - nothing in vagina            Diet       Follow this diet upon discharge:Regular Diet                  Follow-up Appointments     Adult [facility] Follow-up and recommended labs and tests       Follow-up tomorrow for PET/CT in nuclear medicine 9/8/2017. Follow up with Dr. Soto at scheduled appointment 9/12/17    Appointments on [place] and/or [facility] (with [facility] or [facility] provider or service). Call [phone] if you haven't heard regarding these appointments within 7 days of discharge.                  Your next 10 appointments already scheduled     Sep 08, 2017  9:30 AM CDT   PET ONCOLOGY WHOLE BODY with UMPPET1   [facility] ([facility])    [facility] [facility] [address]   [phone]           Tell your doctor:   If there is any chance you may be pregnant or if you are breastfeeding.   If you have problems lying in small spaces (claustrophobia). If you do, your doctor may give you medicine to help you relax. If you have diabetes:   Have your exam early in the morning. Your blood glucose will go up as the day goes by.   Your glucose level must be 180 or less at the start of the exam. Please take any medicines you need to ensure this blood glucose level. 24 hours before your scan: Don t do any heavy exercise. (No jogging, aerobics or other workouts.) Exercise will make your pictures less accurate. 6 hours before your scan:   Stop all food and liquids (except water).   Do not chew gum or suck on mints.   If you need to take medicine with food, you may take it with a few crackers.  Please call your Imaging Department at your exam site with any questions.            Sep 12, 2017  3:00 PM CDT   (Arrive by 2:45 PM)   Return Visit with Barbara Soto MD   [facility]  "Clinic ([facility])    [address]  [address]  [facility] [place] [address] [phone]              Pending Results     No orders found for last 3 day(s).            Statement of Approval     Ordered          17 1728  I have reviewed and agree with all the recommendations and orders detailed in this document.  EFFECTIVE NOW     Approved and electronically signed by:  Tri Chen MD             Admission Information     Date & Time Provider Department Dept. Phone    2017 Tri Chen MD [address] [facility] [phone]      Your Vitals Were     Blood Pressure Pulse Temperature Respirations Height Weight    136/67 84 96.8  F (36  C) (Axillary) 16 1.6 m (5' 3\") 85.6 kg (188 lb 11.2 oz)    Pulse Oximetry BMI (Body Mass Index)                93% 33.43 kg/m2          MyChart Information     [employer] lets you send messages to your doctor, view your test results, renew your prescriptions, schedule appointments and more. To sign up, go to [email or web address] . Click on \"Log in\" on the left side of the screen, which will take you to the Welcome page. Then click on \"Sign up Now\" on the right side of the page.     You will be asked to enter the access code listed below, as well as some personal information. Please follow the directions to create your username and password.     Your access code is: [email or web address]  Expires: 2017  6:30 AM     Your access code will  in 90 days. If you need help or a new code, please call your [facility] clinic or [phone].        Care EveryWhere ID     This is your Care EveryWhere ID. This could be used by other organizations to access your [facility] medical records  [id number]        Equal Access to Services     LISSET LUGO : Sundar Reese, juan miguel pelletier, elizabeth burciaga. So [facility] [phone].    ATENCIÓN: Si habla español, tiene a mae disposición " servicios gratuitos de asistencia lingüística. Remington singh [phone].    We comply with applicable federal civil rights laws and [place] laws. We do not discriminate on the basis of race, color, national origin, age, disability sex, sexual orientation or gender identity.               Review of your medicines      START taking        Dose / Directions    amLODIPine 2.5 MG tablet   Commonly known as:  NORVASC   Used for:  Essential hypertension        Dose:  2.5 mg   Take 1 tablet (2.5 mg) by mouth daily   Quantity:  30 tablet   Refills:  1       oxyCODONE 5 MG IR tablet   Commonly known as:  ROXICODONE   Used for:  Cancer related pain        Dose:  5-10 mg   Take 1-2 tablets (5-10 mg) by mouth every 3 hours as needed for moderate to severe pain   Quantity:  100 tablet   Refills:  0       polyethylene glycol powder   Commonly known as:  MIRALAX   Used for:  Cancer related pain        Dose:  1-2 capful   Take 17-34 g (1-2 capfuls) by mouth daily   Quantity:  510 g   Refills:  1       senna-docusate 8.6-50 MG per tablet   Commonly known as:  SENOKOT-S;PERICOLACE   Used for:  Cancer related pain        Dose:  2-4 tablet   Take 2-4 tablets by mouth 2 times daily   Quantity:  100 tablet   Refills:  1         CONTINUE these medicines which may have CHANGED, or have new prescriptions. If we are uncertain of the size of tablets/capsules you have at home, strength may be listed as something that might have changed.        Dose / Directions    acetaminophen 325 MG tablet   Commonly known as:  TYLENOL   This may have changed:    - medication strength  - how much to take  - when to take this  - reasons to take this   Used for:  Cancer related pain        Dose:  1000 mg   Take 3 tablets (975 mg) by mouth 3 times daily   Quantity:  100 tablet   Refills:  0         STOP taking     oxyCODONE-acetaminophen 5-325 MG per tablet   Commonly known as:  PERCOCET                Where to get your medicines      Some of these will need a  paper prescription and others can be bought over the counter. Ask your nurse if you have questions.     Bring a paper prescription for each of these medications     acetaminophen 325 MG tablet    amLODIPine 2.5 MG tablet    oxyCODONE 5 MG IR tablet    polyethylene glycol powder    senna-docusate 8.6-50 MG per tablet                Protect others around you: Learn how to safely use, store and throw away your medicines at www.disposemymeds.org.             Medication List: This is a list of all your medications and when to take them. Check marks below indicate your daily home schedule. Keep this list as a reference.      Medications           Morning Afternoon Evening Bedtime As Needed    acetaminophen 325 MG tablet   Commonly known as:  TYLENOL   Take 3 tablets (975 mg) by mouth 3 times daily   Last time this was given:  650 mg on 9/7/2017  2:51 PM                                amLODIPine 2.5 MG tablet   Commonly known as:  NORVASC   Take 1 tablet (2.5 mg) by mouth daily   Last time this was given:  2.5 mg on 9/7/2017 10:16 AM                                oxyCODONE 5 MG IR tablet   Commonly known as:  ROXICODONE   Take 1-2 tablets (5-10 mg) by mouth every 3 hours as needed for moderate to severe pain   Last time this was given:  10 mg on 9/7/2017  2:51 PM                                polyethylene glycol powder   Commonly known as:  MIRALAX   Take 17-34 g (1-2 capfuls) by mouth daily                                senna-docusate 8.6-50 MG per tablet   Commonly known as:  SENOKOT-S;PERICOLACE   Take 2-4 tablets by mouth 2 times daily

## 2017-09-07 PROBLEM — R10.9 ABDOMINAL PAIN: Status: ACTIVE | Noted: 2017-01-01

## 2017-09-07 NOTE — H&P
[facility] History and Physical    Ashvin Tiwari MRN# [id number]   Age: 62 year old YOB: 1955     Date of Admission:  9/6/2017    Primary care provider: Chelsea Wren             Chief Complaint:   Abdominal, chest pain         History of Present Illness:   This patient is a 62 year old female with new diagnosis of cervical cancer who presents with chest, abdominal, and back pain. She reports her symptoms started two weeks ago. She was seen in clinic and was prescribed oxycodone and tylenol. She has been taking 1 oxycodone tablet three times daily and 1-2 Tylenol three times a day. She also reports poor sleep due to the pain. Endorses decreased appetite and nausea. She has been tolerating oatmeal and fluids. Reports soft bowel movements. She has had vaginal bleeding for two weeks, typically goes through 4 pads per day. No change in vaginal bleeding today. Denies fevers, chills, shortness of breath, or LE edema    ED course: She was evaluated for MI with troponin, which was 0.026 and ECG, which revealed sinus rhythm. She was also evaluated for PE with a D.dimer, which was elevated to 0.9, however a CT PE was negative for PE. The CT PE also revealed multiple pulmonary nodules concerning for metastasis. CT abdomen revealed a poorly defined cervical mass with regional metastasis, iliac/obturator lymphadenopathy, peritoneal thickening, and moderate volume ascites.     Floor course: On admission, she was started on scheduled tylenol, PRN oxycodone, and dilaudid IV bumps. She reports good pain control with these measures and was able to sleep.             Cancer Treatment History:   8/2017: presented with 3 weeks of vaginal spotting, lower abdominal pain  8/29/2017: Cervical biopsy:  Fragments of invasive, poorly differentiated squamous cell carcinoma. Negative for lymphovascular invasion   9/1/2017: MRI pelvis  IMPRESSION:    1. Large cervical mass representing biopsy-proven squamous cell  carcinoma.  Mass  involves the right and left parametrium as well as  the anterior wall of the upper third of the vagina without convincing  pelvic sidewall, urinary bladder or rectal mucosal involvement. FIGO  IIB.  2. Poorly defined irregular mass within the endometrial canal without  convincing myometrial extension. While this is favored to represent a  degenerating submucosal uterine fibroid, it is difficult to completely  exclude extension of known cervical carcinoma. Correlation with  findings at PET/CT would be beneficial.  3. Suspicious lymph nodes in the left hemipelvis.  4. Apparent peritoneal nodule (versus lymph node) with small volume  free fluid in the pelvis. Correlation at PET/CT would be beneficial.  5. Diminutive appearance of the ovaries. Right hydrosalpinx.            Past Medical History:     Past Medical History:   Diagnosis Date     Cancer (H)     cervical            Past Surgical History:    History reviewed. No pertinent surgical history.         Social History:     Social History   Substance Use Topics     Smoking status: Never Smoker     Smokeless tobacco: Never Used     Alcohol use No            Family History:   No family history on file.         Allergies:   No Known Allergies         Medications:     No current facility-administered medications on file prior to encounter.   Current Outpatient Prescriptions on File Prior to Encounter:  oxyCODONE-acetaminophen (PERCOCET) 5-325 MG per tablet             Review of Systems:     CONSTITUTIONAL: Positive for decreased appetite. Negative for  fevers, chills  RESPIRATORY: Negative for  cough, shortness of breath, dyspnea and wheezing, hemoptysis  CARDIOVASCULAR: Positive for  chest pain. Negative for dyspnea, palpitations, edema  GASTROINTESTINAL: Positive for nausea, abdominal pain. Negative for vomiting, change in bowel habits, diarrhea, constipation, hematemesis and hemtochezia  GENITOURINARY: Negative for frequency, dysuria, nocturia, urinary incontinence  and hematuria         Physical Exam:     Vitals:    09/06/17 2250 09/06/17 2335 09/06/17 2350 09/07/17 0020   BP: 177/81 168/89 (!) 170/92 161/80   Pulse:       Resp: 13 16 23    Temp:       TempSrc:       SpO2: 97% 98% 96% 97%   Weight:       Height:         Constitutional: mildly restless appearing. Smiles and interactive with conversation.   Cardiovascular: Regular rate and rhythm without murmurs, clicks, gallops or rub  Respiratory: Clear to auscultation bilaterally without crackles or wheeze  Gastrointestinal: diffusely tender. Abdomen soft, BS normal. No masses, organomegaly  : peripad with scant bleeding  Neuro: CN grossly intact, no motor deficits   Extremities: trace LE edema  Psychiatric: mentation appears normal and affect normal/bright  Lines: PIV         Data:     Results for orders placed or performed during the hospital encounter of 09/06/17 (from the past 24 hour(s))   Routine UA with microscopic   Result Value Ref Range    Color Urine Straw     Appearance Urine Slightly Cloudy     Glucose Urine Negative NEG^Negative mg/dL    Bilirubin Urine Negative NEG^Negative    Ketones Urine Negative NEG^Negative mg/dL    Specific Gravity Urine 1.006 1.003 - 1.035    Blood Urine Negative NEG^Negative    pH Urine 5.0 5.0 - 7.0 pH    Protein Albumin Urine Negative NEG^Negative mg/dL    Urobilinogen mg/dL Normal 0.0 - 2.0 mg/dL    Nitrite Urine Negative NEG^Negative    Leukocyte Esterase Urine Small (A) NEG^Negative    Source Clean catch urine     WBC Urine 1 0 - 2 /HPF    RBC Urine 0 0 - 2 /HPF    Bacteria Urine Few (A) NEG^Negative /HPF    Squamous Epithelial /HPF Urine <1 0 - 1 /HPF    Mucous Urine Present (A) NEG^Negative /LPF   CBC with platelets differential   Result Value Ref Range    WBC 5.7 4.0 - 11.0 10e9/L    RBC Count 4.24 3.8 - 5.2 10e12/L    Hemoglobin 9.1 (L) 11.7 - 15.7 g/dL    Hematocrit 30.5 (L) 35.0 - 47.0 %    MCV 72 (L) 78 - 100 fl    MCH 21.5 (L) 26.5 - 33.0 pg    MCHC 29.8 (L) 31.5 - 36.5  g/dL    RDW 17.4 (H) 10.0 - 15.0 %    Platelet Count 430 150 - 450 10e9/L    Diff Method Automated Method     % Neutrophils 62.9 %    % Lymphocytes 26.9 %    % Monocytes 9.1 %    % Eosinophils 0.7 %    % Basophils 0.2 %    % Immature Granulocytes 0.2 %    Nucleated RBCs 0 0 /100    Absolute Neutrophil 3.6 1.6 - 8.3 10e9/L    Absolute Lymphocytes 1.5 0.8 - 5.3 10e9/L    Absolute Monocytes 0.5 0.0 - 1.3 10e9/L    Absolute Eosinophils 0.0 0.0 - 0.7 10e9/L    Absolute Basophils 0.0 0.0 - 0.2 10e9/L    Abs Immature Granulocytes 0.0 0 - 0.4 10e9/L    Absolute Nucleated RBC 0.0    INR   Result Value Ref Range    INR 0.99 0.86 - 1.14   ABO/Rh type and screen   Result Value Ref Range    ABO O     RH(D) Pos     Antibody Screen Neg     Test Valid Only At          [facility]    Specimen Expires 09/09/2017    Comprehensive metabolic panel   Result Value Ref Range    Sodium 139 133 - 144 mmol/L    Potassium 3.8 3.4 - 5.3 mmol/L    Chloride 106 94 - 109 mmol/L    Carbon Dioxide 24 20 - 32 mmol/L    Anion Gap 8 3 - 14 mmol/L    Glucose 94 70 - 99 mg/dL    Urea Nitrogen 9 7 - 30 mg/dL    Creatinine 0.84 0.52 - 1.04 mg/dL    GFR Estimate 68 >60 mL/min/1.7m2    GFR Estimate If Black 83 >60 mL/min/1.7m2    Calcium 9.0 8.5 - 10.1 mg/dL    Bilirubin Total 0.2 0.2 - 1.3 mg/dL    Albumin 3.3 (L) 3.4 - 5.0 g/dL    Protein Total 7.9 6.8 - 8.8 g/dL    Alkaline Phosphatase 94 40 - 150 U/L    ALT 31 0 - 50 U/L    AST 40 0 - 45 U/L   CRP inflammation   Result Value Ref Range    CRP Inflammation 18.0 (H) 0.0 - 8.0 mg/L   Erythrocyte sedimentation rate auto   Result Value Ref Range    Sed Rate 92 (H) 0 - 30 mm/h   D dimer quantitative   Result Value Ref Range    D Dimer 0.9 (H) 0.0 - 0.50 ug/ml FEU   Troponin I   Result Value Ref Range    Troponin I ES 0.026 0.000 - 0.045 ug/L   Lipase   Result Value Ref Range    Lipase 90 73 - 393 U/L   EKG 12-lead, tracing only   Result Value Ref Range     Interpretation ECG Click View Image link to view waveform and result    ISTAT gases lactate tripp POCT   Result Value Ref Range    Ph Venous 7.49 (H) 7.32 - 7.43 pH    PCO2 Venous 36 (L) 40 - 50 mm Hg    PO2 Venous 26 25 - 47 mm Hg    Bicarbonate Venous 27 21 - 28 mmol/L    O2 Sat Venous 54 %    Lactic Acid 1.2 0.7 - 2.1 mmol/L   CT Chest Pulmonary Embolism w Contrast    Narrative    1. No pulmonary embolus.  2. Innumerable pulmonary nodules, consistent with metastatic disease.   3. Nonspecific small right pleural effusion. No pneumonia.   CT Abdomen Pelvis w/o Contrast    Narrative    1. No acute findings within the abdomen or pelvis.  2. Redemonstration of large, poorly defined cervical mass with  regional metastatic disease, as reflected by left internal  iliac/obturator lymphadenopathy, peritoneal thickening, and moderate  volume ascites, which extends into the pelvis.  3. Multiple pulmonary metastases, better demonstrated on the chest CT.    4. Nonspecific small right pleural effusion.   Lumbar spine CT w/o contrast    Narrative    1. No radiographic abnormality to explain low back pain. No fracture.  2. Mild spondylosis of the lumbar spine.  3. Incompletely visualized pulmonary metastases.  4. Small right pleural effusion.       Imaging  CT abdomen/pelvis 9/6/2017  1. No acute findings within the abdomen or pelvis.  2. Redemonstration of large, poorly defined cervical mass with  regional metastatic disease, as reflected by left internal  iliac/obturator lymphadenopathy, peritoneal thickening, and moderate  volume ascites, which extends into the pelvis.  3. Multiple pulmonary metastases, better demonstrated on the chest CT.  4. Nonspecific small right pleural effusion.     CT chest PE study 9/6/2017  1. No pulmonary embolus.  2. Innumerable pulmonary nodules, consistent with metastatic disease.   3. Nonspecific small right pleural effusion. No pneumonia.             Assessment and Plan:   Assessment: 2 year old  female with new diagnosis of cervical cancer who presents with chest, abdominal, and back pain. Biopsy in clinic revealed invasive, poorly differentiated squamous cell carcinoma. CT scan revealing advanced disease with metastasis to the lungs. Admit for pain control. Also found to have elevated blood pressure on admission.     Problem List  1) Advanced cervical cancer  2) Poor pain control  3) Poor PO intake  4) Hypertension, uncontrolled.     Plan:  Dz: Invasive, poorly differentiated squamous cell carcinoma. Initial plan was for PET scan however CT scan reveals likely distant metastasis, will discuss with primary provider regarding new treatment recommendations. Consider IR consult for biopsy of pulmonary nodule to confirm metastasis.   FEN: NPO, LR 100cc/hr  Pain: Scheduled tylenol, PRN Oxycodone, PRN dilaudid bumps. Consider Palliative consult.   Heme: Anemia as above, likely due to vaginal bleeding. Continue to trend hemoglobin while inpatient.   CV: Hypertension, uncontrolled. Pt is not on any medications. Consider starting amlodipine.   Pulm: CT PE study negative for PE, multiple pulmonary mets.   GI: Bowel regimen ordered while inpatient  : No issues   ID: No issues. Lactate on admission 1.2  Endocrine: No issues   Psych/Neuro: No issues  PPX: SCDs  Dispo:  Pending improvement in pain control.     Dasha Alcantara MD   Resident Physician, PGY2  Obstetrics, Gynecology, and Women's Health       Provider Disclosure:   I agree with above History, Review of Systems, Physical exam and Plan. I have reviewed the content of the documentation and have edited it as needed. I have personally performed the services documented here and the documentation accurately represents those services and the decisions I have made.     Electronically signed by:   Jeremy Dean MD   Gynecologic Oncology   [facility]

## 2017-09-07 NOTE — PROGRESS NOTES
Patient has been assessed for Home Oxygen needs. Oxygen readings:    *Pulse oximetry (SpO2) = 94% on room air at rest while awake.    *SpO2 improved to 94% on no liters/minute at rest.    *SpO2 = 91% on room air during activity/with exercise.    *SpO2 improved to no oxygen put on% on liters/minute during activity/with exercise.

## 2017-09-07 NOTE — ED PROVIDER NOTES
[place] EMERGENCY DEPARTMENT ([facility])  9/06/17   History     Chief Complaint   Patient presents with     Chest Pain     Back Pain     Abdominal Pain     Vaginal Bleeding     HPI  Ashvin Tiwari is a 62 year old female with a medical history of post-menopausal bleeding. Per review of patient's chart, patient was recently referred to [facility] by Dr. MILKA Wren from the medicine clinic after a mass in the vagina was noted on pelvic exam with associated vaginal bleeding. The mass was suspicious for cervical cancer; however, a biopsy could not be performed at the OB/GYN clinic due to the amount of vaginal bleeding. Patient was then see by an oncology clinic on 8/29/2017 where she had a biopsy performed and had an MRI of the pelvis ordered. The biopsy confirmed cervical cancer and patient had the MRI of the pelvis performed on 9/1/2017.    Patient presents to the Emergency Department today complaining of abdominal pain, back pain, chest pain and vaginal bleeding. Patient reports the back and chest pain started approximately 2 weeks ago and has been constant since then. She reports the pain is primarily on her right side in the back, abdomen and chest. The pain has been getting progressively worse over the past 2 weeks. She notes increased abdominal pain and back pain with bowel movements. She also complains of intermittent left hand numbness over the past 2 weeks, which generally occurs when she is laying down and her legs cramp. She also notes intermittent headaches recently. She reports bleeding through 3-4 pads daily. She also notes some shortness of breath with walking long distances and when laying down on the right side. She also complains of increased urination recently. She denies any hematuria, dysuria, cough, leg pain, leg swelling, vision changes, hearing changes, sore throat, rashes, recent falls or traumas, or history of PE or DVT. She notes a loss of appetite and loss of sleep due to  pain.    MRI Pelvis w/ & w/o Contrast (9/1/2017)  IMPRESSION:    1. Large cervical mass representing biopsy-proven squamous cell  carcinoma.  Mass involves the right and left parametrium as well as  the anterior wall of the upper third of the vagina without convincing  pelvic sidewall, urinary bladder or rectal mucosal involvement. FIGO  IIB.  2. Poorly defined irregular mass within the endometrial canal without  convincing myometrial extension. While this is favored to represent a  degenerating submucosal uterine fibroid, it is difficult to completely  exclude extension of known cervical carcinoma. Correlation with  findings at PET/CT would be beneficial.  3. Suspicious lymph nodes in the left hemipelvis.  4. Apparent peritoneal nodule (versus lymph node) with small volume  free fluid in the pelvis. Correlation at PET/CT would be beneficial.  5. Diminutive appearance of the ovaries. Right hydrosalpinx.    I have reviewed the Medications, Allergies, Past Medical and Surgical History, and Social History in the [employer] system.    Past Medical History:   Diagnosis Date     Cancer (H)     cervical       History reviewed. No pertinent surgical history.    No family history on file.    Social History   Substance Use Topics     Smoking status: Never Smoker     Smokeless tobacco: Never Used     Alcohol use No       No current facility-administered medications for this encounter.      Current Outpatient Prescriptions   Medication     ACETAMINOPHEN PO     oxyCODONE-acetaminophen (PERCOCET) 5-325 MG per tablet      No Known Allergies      Review of Systems   Constitutional: Positive for appetite change (loss).   HENT: Negative for ear pain, sore throat and tinnitus.    Eyes: Negative for visual disturbance.   Respiratory: Positive for shortness of breath (with exertion and right-sided laying down). Negative for cough.    Cardiovascular: Positive for chest pain (right-sided). Negative for leg swelling.   Gastrointestinal: Positive  "for abdominal pain (right-sided).   Genitourinary: Positive for frequency and vaginal bleeding. Negative for dysuria and hematuria.   Musculoskeletal: Positive for back pain (right-sided).   Skin: Negative for rash.   Neurological: Positive for numbness (intermittent; left hand).       Physical Exam   BP: (!) 179/91  Pulse: 97  Temp: 99.6  F (37.6  C)  Resp: 18  Height: 160 cm (5' 3\")  Weight: 86.2 kg (190 lb)  SpO2: 99 %  Physical Exam   CONSTITUTIONAL: Well-developed and well-nourished. Awake and alert. Non-toxic appearance. No acute distress.   HENT:   - Head: Normocephalic and atraumatic.   - Ears: Hearing and external ear grossly normal.   - Nose: Nose normal. No rhinorrhea. No epistaxis.   - Mouth/Throat: Oropharynx is clear and MMM  EYES: Conjunctivae and lids are normal. No scleral icterus.   NECK: Normal range of motion and phonation normal. Neck supple.  No tracheal deviation, no stridor. No edema or erythema noted.  CARDIOVASCULAR: Normal rate, regular rhythm and no appreciable abnormal heart sounds.  PULMONARY/CHEST: Effort normal. No accessory muscle usage or stridor. No respiratory distress.  No appreciable abnormal breath sounds.   ABDOMEN: Soft, non-distended. Mild diffuse abdominal pain, worse in the lower abdomen. No rigidity, rebound or guarding.   MUSCULOSKELETAL: Extremities warm and seemingly well perfused. No edema or calf tenderness. No midline back tenderness in the C/T spine areas, but diffusely a bit sore in the upper right back. Diffuse LBP. No focal bony tenderness. Normal inspection. No abnormal warmth or ROM.   NEUROLOGIC: Awake, alert. Not disoriented.  Normal tone. No seizure activity. Coordination normal. GCS 15  SKIN: Skin is warm and dry. No rash noted. No diaphoresis. No pallor.   PSYCHIATRIC: Normal mood and affect. Speech and behavior normal. Thought processes linear. Cognition and memory are normal.       ED Course   8:17 PM  The patient was seen and examined by Neeru Ramirez, " MD in Room ED11.     ED Course   Value Comment By Time   Sed Rate: (!) 92 (Reviewed) Neeru Ramirez MD 09/06 2209   CRP Inflammation: (!) 18.0 (Reviewed) Neeru Ramirez MD 09/06 2209   D-Dimer: (!) 0.9 (Reviewed) Neeru Rmairez MD 09/06 2209   Lactic Acid: 1.2 (Reviewed) Neeru Ramirez MD 09/06 2211   Creatinine: 0.84 (Reviewed) Neeru Ramirez MD 09/06 2231   Hemoglobin: (!) 9.1 Down from 10.1 on 8/29 Neeru Ramirez MD 09/06 2231    Awaiting CT chest/abdomen/back imaging Neeru Ramirez MD 09/06 2238    Just spoke with Gynecology Oncology regarding the patient.  I think the patient should be admitted for observation for serial hemoglobins, serial troponins, pain management.  They will come down and see the patient and then provide additional recommendations. Neeru Ramirez MD 09/07 0003    Reviewed the laboratory and imaging findings, clinical concern for metastatic disease, with the patient and her loved one/guest. Neeru Ramirez MD 09/07 0006     Procedures             EKG Interpretation:      Interpreted by Neeru Ramirez MD  Time reviewed: 20:32  Symptoms at time of EKG: Chest Pain, Back Pain, Abdominal Pain and Vaginal Bleeding   Rhythm: normal sinus   Rate: 84 bpm  Axis: Normal  Ectopy: none  Conduction: normal  ST Segments/ T Waves: No ST-T wave changes  Comparison to prior: No old EKG available  Clinical Impression: normal EKG        Assessments & Plan (with Medical Decision Making)   IMPRESSION: 62-year-old female, PMH notable for recent diagnosis of cervical cancer with mass (squamous cell per recent biopsy, see imaging reports above), postmenopausal bleeding, age-related cataract, who contacted her ObGyn team who made this diagnosis via telephone and they recommended her to be evaluated in the ED, pt reporting a 2 week history of intermittent low abdominal, low back, right sided chest and right upper back discomfort, poor appetite, etc. as described further above in HPI/ROS.   Thankfully, it does not sound as though the patient is having the degree of bleeding that it appears they thought she was having on the referring phone call (VB requiring changes pads 3-4 times a day, as opposed to the initially reported 3-4 pads/hour).  She does not appear toxic or in any acute distress though she does have a low-grade temp of 99.6.  HR slightly elevated in the high 90s, some HTN but SPO2 is normal on RA.  She has diffuse pain to palpation over the right upper back, right chest, diffuse abdomen (worse in the midline suprapubic area), but no crepitus in the thoracic area, no peritoneal findings of the abdomen, no midline back symptoms, no lower extremity swelling or findings for DVT.    Based upon her symptomatology there may be a couple of issues going on, with regards to the right upper chest/back discomfort as well as the low abdominal/suprapubic and low back discomfort.  With regards to the upper symptoms things to consider would be PNA or other infectious cause, less likely PE, unlikely PTX, pericarditis, pericardial effusion, ACS, dissection.  Consider MSK related pain, metastatic disease, etc. with regards to the low symptoms, there is the no masses here perhaps I could be uncomfortable for her.  Symptoms though she is having ongoing VB which could be related to the cancer, but does not sound like an emergent amount of blood loss we will get labs to evaluate further.  Other things to consider would be UTI given her urinary frequency, but no other bowel symptoms, etc.    PLAN: Laboratory studies, urine studies, chest/abdominal imaging, symptom management, discussed with Gyn Onc    RESULTS:  - Labs: D-dimer positive, downtrending Hgb, normal lactate, somewhat elevated inflammatory markers  --- See ED Course section above for particular pertinent findings and comments  - Urine: No UTI   - Imaging: Images and written preliminary reports reviewed by myself and revealed:  --- CT CT PE and A/P:  Large cervical mass with regional metastatic disease, no apparent obstruction, multiple pulm mets, no PE, right pleural effusion, hilar and mediastinal and lymphadenopathy. No acute findings on the spine    INTERVENTIONS:   - PO oxycodone  - PO Tylenol  - IV Dilaudid    RE-EVALUATION:  See ED Course section above for particular pertinent findings and comments  - The patient's symptoms were not much improved with the oral medications. Some improved with IV. Hemodynamics and respiratory status unchanged.     DISCUSSIONS:  - w/ Gyn Onc: They have seen and evaluated the pt here in the ED. They will admit for further evaluation/management.   - w/ Patient: I have reviewed the available findings, plan with the patient/ her family/loved one. They expressed understanding and agreement with this plan. All questions answered to the best of our ability at this time.     DISPOSITION/PLANNING:  - FINAL IMPRESSION:   --- Anemia (Hgb down 1 g in ~ 1 week) in setting of ongoing slow VB  --- Abdominal, back and chest pain  --- Pulmonary nodules thought to be metastatic disease  - DISPOSITION: Admit to Gyn Onc for further evaluation/management  --- Recommendations: Consider pain consult, serial Hgbs and troponins (consider outpatient cards workup), etc.      ______________________________________________________________________________    - I have reviewed the available nursing notes.      New Prescriptions    No medications on file       Final diagnoses:   None     IDavid, am serving as a trained medical scribe to document services personally performed by Neeru Ramirez MD, based on the provider's statements to me.   Neeru BARLOW MD, was physically present and have reviewed and verified the accuracy of this note documented by David Diaz.    9/6/2017   [facility], EMERGENCY DEPARTMENT     Neeru Ramirez MD  09/08/17 0557

## 2017-09-07 NOTE — PLAN OF CARE
Problem: Goal Outcome Summary  Goal: Goal Outcome Summary  Outcome: No Change    OBSERVATION GOALS:   Diagnostic tests and consults completed and resulted: Goal in progress, may have biopsy today 9/7.   Vital signs normal or at patient baseline: Goal in progress, pt still hypertensive but per pt d/t pain and this is improving  Tolerating oral intake to maintain hydration: Goal not met, still NPO for possible biopsy. C/o nausea this am.  Adequate pain control on oral analgesics: Goal in progress, pain improving but still needed IV dilaudid x1 upon arrival from ED      Pt arrived to 7C @approx 0130 this am from ED, admitted d/t increase in abdominal pain. Scans in ED showed known cervical cancer with mets. Pt has been c/o chest pain since admission to facility, team aware (d/t mets most likely per team), some CARMONA. HTN - no change in orders, team aware, AOVSS. Pain managed with IV dilaudid x1, scheduled tylenol & prn po oxycodone. NPO for possible biopsy. C/o nausea this am @0630, IV zofran given, aromatherapy at bedside for pain/nausea. + flatus. Daughter at bedside & supportive. Voiding spont. LR @100/hr through PIV. Up with SBA for lines management.

## 2017-09-07 NOTE — PROGRESS NOTES
Patient c/o abdominal pain not relieved with Oxycodone, NPO for possible biopsy.Notified MD awaiting  order for iv Dilaudid.

## 2017-09-07 NOTE — PROGRESS NOTES
Social Work Services Progress Note    Hospital Day: 2  Collaborated with:  Page, Financial Counselor, [id number]    Data:  Pt has no insurance listed on the facesheet.    Intervention:  Notified Page in Financial Counseling however after reviewing financial notes, it appears pt may have [employer] through [employer].  Page e-mailed Financial Counseling colleagues with request to follow up on pt's insurance status.    Assessment:  Question about pt's insurance coverage.    Plan:    Anticipated Disposition:  to be determined.    Barriers to d/c plan:  Medical stability.    Follow Up:  QUINTIN available as needed.    JAYNA Reddy covering for   [phone] phone  [phone] pager

## 2017-09-07 NOTE — CONSULTS
[facility], [place]  Palliative Care Consultation    Ashvin Tiwari MRN# [id number]   Age: 62 year old YOB: 1955   Date of Admission: 9/6/2017    Reason for consult: Symptom management       Requesting physician/service: GYN onc       Recommendations        Symptom management:   Pain control: Pain in lower abdomen / pelvis radiating to lower back and also with pain in right chest wall and right upper back.  ?metastatic lesions causing abdominal pain but unclear to me cause of right back and chest wall (?referred). Well management today after 3 doses hydromorphone IV and oxycodone.   - oxycodone 5-10mg PO q3hrs PRN  - tylenol 1000mg TID   - can use hydromorphone 0.3-0.5mg IV q2hrs PRN if pt with nausea and not able to tolerate POs.   - depending on pain control tonight, would consider addition of gabapentin 300mg qHS    Opioid induced constipation:  - would increase senna to 2-4 tabs BID scheduled  - add miralax qd    Nausea and anorexia: Likely due to peritoneal metastasis and decreased appetite when pain is present and 10 lb weight loss over past 1-2 months.  - continue with zofran PRN for now but consider scheduling it if nausea persists  - can consider appetite stimulant if still with anorexia once pain is controlled    Please schedule pt to be seen in palliative care clinic if discharging today    Coordination of Care     Findings & plan of care discussed with: GYN onc team    Thank you for the opportunity to continue to participate in the care of this patient and family.  Please feel free to contact on-call palliative provider with any emergent needs.  We can be reached via team pager [phone] (answered 8-4:30 Monday-Friday); after-hours answering service ([phone]); Main Palliative Clinic - [phone]      Total time on the floor involved in the patient's care: 75 minutes. Greater than 50% of my time was spent counseling and/or coordination of care  regarding symptoms and goals. : .  Briana Medina MD / Palliative Medicine / Pager [phone]           Disease Process/es & Symptoms     Newly dx metastatic cervical cancer (dx 8/2017) with likely lung mets  Admitted for abdominal pain, right chest wall pain and right sided back pain  Anorexia and weight loss  Shortness of breath with mild exertion     Support/Coping   (We typically ask each of our patients the following questions):    How do you make sense of this? nd  Are you Pentecostalism? Spiritual? Not so much? Yes - Moravian, Christianity very important to her, member of [employer]  Have you found peace? nd  What are your concerns, both medical and non-medical, that are not being addressed? no  Who are your  go to  people when you need support? Family and Gnosticism      Plan for psychosocial/spiritual support/follow-up: no     Decision-Making & Goals of Care     Discussion/counseling today about goals of care/decisions:   no  Patient has a completed health care directive available in the chart (Y/N): no  Health care agent (only if patient has an available, complete HCD):  Code Status: Full code   Patient has decision-making capacity (Y/N): yes           Chief Complaint     Abdominal, chest wall and back pain; anorexia, nausea         History of Present Illness     History obtained from: patient and EPIC chart review    62 year old female with newly diagnosed cervical cancer presents with chest, abdominal, and back pain. She hasn't felt well for past 1-2 months but about 2 weeks ago, her abdominal pain and vaginal bleeding began and pain worsened despite taking prescribed pain medications (oxycodone 5mg TID and tylenol TID). Pain also spread to her lower back and right sided chest wall. Her appetite has not been good over this time and has lost about 10lbs, intermittent nausea. Regular bowel movements.      ED course: She was evaluated for MI with troponin, which was 0.026 and ECG, which revealed sinus  "rhythm. She was also evaluated for PE with a D.dimer, which was elevated to 0.9, however a CT PE was negative for PE. The CT PE also revealed multiple pulmonary nodules concerning for metastasis. CT abdomen revealed a poorly defined cervical mass with regional metastasis, iliac/obturator lymphadenopathy, peritoneal thickening, and moderate volume ascites.           Past Medical History:   Past Medical History:   Diagnosis Date     Cancer (H)     cervical              Past Surgical History:   History reviewed. No pertinent surgical history.            Social/Spiritual History:     Living situation: lives with her niece, niece's  and 3 kids  Family system: 15 siblings, 2 children, 8 grandkids; born in [facility], moved to [place] in 1999  Functional status (needs help with ADLs or IADLs): independent of all ADLs  Employment/education: works as nursing assistant  Activities/interests: being with family, Latter day  History of substance use/abuse: not discussed  Involvement in mariah community: yes - [employer]            Family History:   2 children healthy            Allergies:   No Known Allergies           Medications:   I have reviewed this patient's medication profile and medications during this hospitalization  Tylenol 650mg q6hrs PRN   Senna 1-2 tabs BID  reglan PRN  zofran PRN  Oxycodone 5-10mg q4hrs PRN           Review of Systems:   The comprehensive review of systems is negative other than noted here and in the HPI.    Palliative Symptom Review (0=no symptom/no concern, 1=mild, 2=moderate, 3=severe):      Pain: 1      Fatigue: 1      Nausea: 1      Constipation: 0      Diarrhea: 0      Depressive Symptoms: 0      Anxiety: 0      Drowsiness: 0      Poor Appetite: 3      Shortness of Breath: 1      Insomnia: 2-3 due to pain               Vital signs:   Heart Rate: 84, Blood pressure 141/66, pulse 84, temperature 97.9  F (36.6  C), temperature source Oral, resp. rate 16, height 1.6 m (5' 3\"), weight 85.6 " "kg (188 lb 11.2 oz), SpO2 98 %, not currently breastfeeding.  Estimated body mass index is 33.43 kg/(m^2) as calculated from the following:    Height as of this encounter: 1.6 m (5' 3\").    Weight as of this encounter: 85.6 kg (188 lb 11.2 oz).  General: well nourished, appears younger than stated age, in NAD  Eyes: EOMI, sclera clear  HEENT: NC/AT; mucous membranes moist  Lungs: no increased work of breathing, speaking full sentences, CTA b/l  Back: tenderness to palpation of right upper and lower back, no erythema or increased warmth  Heart: RRR  Abdomen: decreased BS, soft, diffuse tenderness to palpation without rebound or guarding  Ext: trace pedal edema b/l  Neuro: A&O x 3; CN II-XII grossly intact;   Neuropsych: alert, good eye contact, affect full, engaged, pleasant, sensorium intact         Data Reviewed:   GFR 83; alb 3.3;   CT abdomen / pelvis:   IMPRESSION:   1. Large, poorly defined cervical mass with regional metastatic  disease, as reflected by left internal iliac/obturator  lymphadenopathy, peritoneal nodularity, and moderate volume ascites,  which extends into the pelvis.  Peritoneal nodularity is concerning  for component of peritoneal carcinomatosis. Some mesenteric  lymphadenopathy.  2. Sigmoid colon and some of the small bowel loops closely abut the  cervical mass, however no evidence of obstruction.  Non dilated bowel.     3. Multiple pulmonary metastases, demonstrated on the chest CT.   4. Small right pleural effusion.      "

## 2017-09-07 NOTE — ED NOTES
Pt presents to the ED with c/o chest, back, abdominal and head pain x 2 weeks. She was recently dx'd with cervical cancer and had biopsies taken 9/1, she has been having vaginal bleeding for about 3 weeks as well. Pt also c/o urinary frequency.

## 2017-09-07 NOTE — PLAN OF CARE
Problem: Goal Outcome Summary  Goal: Goal Outcome Summary  Outcome: Adequate for Discharge Date Met:  09/07/17  OBS PATIENT GOALS     -diagnostic tests and consults completed and resulted- met  -vital signs normal or at patient baseline- met  -tolerating oral intake to maintain hydration- met  -adequate pain control on oral analgesics- met     Pt is A/O x4, VSS. Pt had moderate emesis after small meal at 1630. Pt denies nausea and tolarted crackers and ice chips after. Pt felt meal was eaten too quickly. MD aware of emesis, followed up with patient, OK to DC. Discharge instructions reviewed with patient. Discharge pharmacy called and DC orders signed. Pt brought down by transport via wheelchair.

## 2017-09-07 NOTE — CONSULTS
Patient can be scheduled with Christy Gaffney NP for a clinic visit in IR prior to biopsy. To schedule this visit call IR  at [phone]. After the clinic visit, the biopsy will be scheduled. This biopsy does not need to be done on a urgent basis and can be done as an outpatient. This should not delay patient discharge.     Case and CT images from 9/6/17 discussed with Angelica Maldonado MD. The right posterior apical lesion S4 I 30 looks favorable to biopsy.    Michelle Sparrow PA-C  Interventional Radiology  Phone: [phone]

## 2017-09-07 NOTE — PROGRESS NOTES
"CLINICAL NUTRITION SERVICES - ASSESSMENT NOTE     Nutrition Prescription    RECOMMENDATIONS FOR MDs/PROVIDERS TO ORDER:  - None at this time    Malnutrition Status:    - Patient does not meet two of the above criteria necessary for diagnosing malnutrition but is at risk for malnutrition    Recommendations already ordered by Registered Dietitian (RD):  - None at this time    Future/Additional Recommendations:  - Monitor diet advancement, follow for ONS needs and order calorie counts if suspected of sub-optimal PO intake.      REASON FOR ASSESSMENT  Ashvin Tiwrai is a/an 62 year old female assessed by the dietitian for Admission Nutrition Risk Screen for unintentional loss of 10# or more in the past two months and reduced oral intake over the last month. Pt admitted for abdominal pain and chest pain. 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 Pt currently NPO for lung nodule biopsy    NUTRITION HISTORY  Met and her sister and family. Pt reports she had good appetite and intake until 3 weeks ago. Pt was on a regular diet. Pt reports 10 lb wt loss in 3 weeks and poor PO for 3 weeks 2/2 Abdominal pain.      CURRENT NUTRITION ORDERS  Diet: NPO  Intake/Tolerance: unable to assess 2/2 NPO status     LABS  Labs reviewed    MEDICATIONS  Medications reviewed    ANTHROPOMETRICS  Height: 160 cm (5' 3\")  Most Recent Weight: 85.6 kg (188 lb 11.2 oz)    IBW: 52 kg  BMI: Obesity Grade I BMI 30-34.9  Weight History:   Wt Readings from Last 15 Encounters:   09/07/17 85.6 kg (188 lb 11.2 oz)   08/29/17 86.5 kg (190 lb 12.8 oz)   2 lb wt loss in the past 8 days  Dosing Weight: 61 kg (Adjusted for obesity)    ASSESSED NUTRITION NEEDS  Estimated Energy Needs: 1525 - 1830 kcals/day (25 - 30 kcals/kg)  Justification:  Obese and  Moderately Increased needs with new dx of metastatic cancer  Estimated Protein Needs: 61 - 91 grams protein/day (1 - 1.5 grams of " pro/kg)  Justification: Hypercatabolism with acute illness and Maintenance  Estimated Fluid Needs:  (1 mL/kcal)   Justification: Maintenance and Per provider pending fluid status    PHYSICAL FINDINGS  See malnutrition section below.       MALNUTRITION  % Intake: < 75% for > 7 days (non-severe)  % Weight Loss: Weight loss does not meet criteria  Subcutaneous Fat Loss: None observed  Muscle Loss: None observed  Fluid Accumulation/Edema: None noted  Malnutrition Diagnosis: Patient does not meet two of the above criteria necessary for diagnosing malnutrition but is at risk for malnutrition    NUTRITION DIAGNOSIS  Inadequate oral intake related to Abdominal pain, decreased appetite as evidenced by pt reporting poor PO x 3 weeks and 10 lb wt loss in the past 3 weeks      INTERVENTIONS  Implementation  Nutrition Education: Provided education on role of RD in pt's POC, nutrition hx, using ONS as snacks or meal replacement to boost kcal and protein, importance of adequate nutrition while undergoing cancer treatments and discussion on tips to cope with nausea etc.      None at this time     Goals  1) Diet adv v nutrition support within 2-3 days.  2) Patient to consume % of nutritionally adequate meal trays TID, or the equivalent with supplements/snacks.     Monitoring/Evaluation  Progress toward goals will be monitored and evaluated per protocol.    Lance Grayson RD LD  Weekday pager: [phone]  Weekend pager - [phone]

## 2017-09-07 NOTE — PROGRESS NOTES
OBSERVATION GOALS:   Diagnostic tests and consults completed and resulted: Goal in progress, may have biopsy today 9/7    Vital signs normal or at patient baseline: Goal in progress, pt still hypertensive but per pt d/t pain and this is improving  Tolerating oral intake to maintain hydration: Goal not met, still NPO for possible biopsy   Adequate pain control on oral analgesics: Goal in progress, pain improving but still needed IV dilaudid x1 upon arrival from ED

## 2017-09-07 NOTE — PLAN OF CARE
Problem: Goal Outcome Summary  Goal: Goal Outcome Summary  Outcome: No Change  PATIENT OBS GOALS:     -diagnostic tests and consults completed and resulted- met  -vital signs normal or at patient baseline- met  -tolerating oral intake to maintain hydration- not met (pt had emesis at 1545, MD notified, evaluating patient, awaiting orders)  -adequate pain control on oral analgesics- met

## 2017-09-07 NOTE — PROGRESS NOTES
Dilaudid iv given for abdominal pain with good relief, c/o nausea, Reglan given for nausea with relief. Awaiting Palliative consult.

## 2017-09-07 NOTE — PLAN OF CARE
Problem: Goal Outcome Summary  Goal: Goal Outcome Summary  Outcome: Improving  C/O abdominal pain this am, not releived with Oxycodone, Dilaudid IV given with relief, nausea relieved with Reglan. Voiding spont, passing gas. Ambulating in halls with sba,Pallative consult done,denies pain this afternoon, discharge this evening with outpatient follow up.

## 2017-09-08 NOTE — PROGRESS NOTES
"[facility]  \"Hello, my name is Deepthi Riley , and I am calling from the [facility].  I want to check in and see how you are doing, after leaving the hospital.  You may also receive a call from your Care Coordinator (care team), but I want to make sure you don t have any urgent needs.  I have a couple questions to review with you:     Post-Discharge Outreach                                                    Ashvin Tiwari is a 62 year old female         Care Team:    Patient Care Team       Relationship Specialty Notifications Start End    Chelsea Wren PCP - General Family Practice  8/24/17     Phone: [phone] Fax: [phone]         [facility] [address] [facility] [address]    Sita Gallardo Referring Physician OB/Gyn  8/24/17     Phone: [phone] Fax: [phone]         [facility] [place] [facility]  [address] [facility] [address]            Transition of Care Review                                                      Did you have a surgery or procedure during your hospital visit? No   If yes, do you have any of the following:     Signs of infection:  No:     Pain:  Yes     Pain Scale (0-10) 7/10- better than the hospital- she is taking her pain meds. She states she slept way better last night.     Location: Back and stomach    Wound/incision concerns? NO    Do you have all of your medications/refills?  Yes    Are you having any side effects or questions about your medication(s)? No    Do you have any new or worsening symptoms?  No-better than the hospital- she is taking her pain meds. She states she slept way better last night.      Do you have any future appointments scheduled?  9/12/17              Plan                                                      Thanks for your time.  Your Care Coordinator may follow-up within the next couple days.  In the meantime if you have questions, concerns or problems call your care team.  "       Deepthi Riley

## 2017-09-12 NOTE — MR AVS SNAPSHOT
After Visit Summary   9/12/2017    Ashvin Tiwari    MRN: [id number]           Patient Information     Date Of Birth          1955        Visit Information        Provider Department      9/12/2017 3:00 PM Barbara Soto MD [facility]        Today's Diagnoses     Cervical cancer (H)    -  1       Follow-ups after your visit        Your next 10 appointments already scheduled     Sep 19, 2017 10:00 AM CDT   (Arrive by 9:45 AM)   New Patient Visit with ROSA Neff [facility] Interventional Radiology ([facility] [place])    [address] [place] [address]  1st [facility] [address] [phone]            Sep 21, 2017   Procedure with Sonu Denson PA-C   [facility] ([facility])    [address] [place] [address]  5th [facility] [address] [phone]           Located in the Clinics and Surgery Center at [address] [place] [address].   parking is very convenient and highly recommended.  is a $6 flat rate fee.  Both  and self parkers should enter the main arrival plaza from [facility]; parking attendants will direct you based on your parking preference.            Sep 21, 2017  8:15 AM CDT   (Arrive by 6:45 AM)   IR CHEST PORT PLACEMENT > 5 YRS OF AGE with [id number]   [facility] ASC Imaging ([facility] [place])    [address] [place] [address]  5th [facility] [address]              1. Your doctor will need to do a history and physical within 7 days before this procedure. 2. Your doctor will which medications should not be taken the morning of the exam. 3. Laboratory tests are to be obtained by your doctor prior to the exam (Basic Metabolic Panel, CBCP, PTT and INR) (No labs needed if you are having a tunneled catheter exchange or removal) 4. If you have allergies to x-ray contrast or iodine, contact your doctor or a Radiology nurse  prior to the exam day for instructions. 5. Someone will need to drive you to and from the hospital. 6. If you are or may be pregnant, contact your doctor or a Radiology nurse prior to the day of the exam. 7. If you have diabetes, check with your doctor or a Radiology nurse to see if your insulin needs to be adjusted for the exam. 8. If you are taking a medication called Glucophage or Glucovance; these medications need to be held the day of the exam and for approximately 48 hours following. A blood sample must be drawn so your creatinine level can be checked before resuming this medication. 9. If you are taking Coumadin (to thin you blood) please contact your doctor or a Radiology nurse at least 3 days before the exam for special instructions. 10. You should not have received contrast within 48 hours of this exam. 11. The day before your exam you may eat your regular diet and are encouraged to drink at least 2 quarts of clear liquids. Drink no alcoholic beverages for 24 hours prior to the exam. 12. If you have a colostomy you will need to irrigate it with tap water at 8PM the evening before and again at 6AM the morning of the exam. 13. Do not smoke for 24 hours prior to the procedure. 14. Birth to 4 years: - Breast feeding must be stopped 4 hours prior to exam - Solid food or formula must be stopped 6 hours prior to exam - Tube feedings must be stopped 6 hours prior to exam 15. 4-10 years old: - Nothing to eat or drink 6 hours prior to exam 16. 10+ years old: - Nothing to eat or drink 8 hours prior to exam 17. The morning of the exam you may brush your teeth and take medications as directed with a sip of water. 18. When discharged, you cannot drive until morning, and an adult must be with you until then. You should stay in the [facility] overnight. 19. Bring a list of all drugs you are taking; include supplements and over-the-counter medications. Wear comfortable clothes and leave your valuables at home.             Sep 21, 2017 11:30 AM CDT   Infusion 360 with UC ONCOLOGY INFUSION, UC 24 ATC   [facility] ([facility])    [address] [address] [phone]            Oct 12, 2017  7:00 AM CDT   Masonic Lab Draw with UC MASONIC LAB DRAW   [facility] Masonic Lab Draw ([facility])    [address] [address] [phone]            Oct 12, 2017  7:30 AM CDT   (Arrive by 7:15 AM)   Return Active Treatment with ROSA Mendoza CNP   [facility] ([facility])    [address] [address] [phone]            Oct 12, 2017  8:30 AM CDT   Infusion 360 with UC ONCOLOGY INFUSION, UC 20 ATC   [facility] ([facility])    [address] [address] [phone]            Nov 02, 2017  7:00 AM CDT   Masonic Lab Draw with UC MASONIC LAB DRAW   [facility] Masonic Lab Draw ([facility])    [address] [address] [phone]              Who to contact     If you have questions or need follow up information about today's clinic visit or your schedule please contact [facility] directly at [phone].  Normal or non-critical lab and imaging results will be communicated to you by MyChart, letter or phone within 4 business days after the clinic has received the results. If you do not hear from us within 7 days, please contact the clinic through [employer] or phone. If you have a critical or abnormal lab result, we will notify you by phone as soon as possible.  Submit refill requests through [employer] or call your pharmacy and they will forward the refill request to us. Please allow 3 business days for your refill to be completed.          Additional Information  "About Your Visit        G[employer]hart Information     [employer] lets you send messages to your doctor, view your test results, renew your prescriptions, schedule appointments and more. To sign up, go to [email or web address] . Click on \"Log in\" on the left side of the screen, which will take you to the Welcome page. Then click on \"Sign up Now\" on the right side of the page.     You will be asked to enter the access code listed below, as well as some personal information. Please follow the directions to create your username and password.     Your access code is: [email or web address]  Expires: 2017  6:30 AM     Your access code will  in 90 days. If you need help or a new code, please call your [facility] clinic or [phone].        Care EveryWhere ID     This is your Care EveryWhere ID. This could be used by other organizations to access your [facility] medical records  [id number]        Your Vitals Were     Pulse Temperature Respirations BMI (Body Mass Index)          95 99.4  F (37.4  C) (Oral) 16 34.08 kg/m2         Blood Pressure from Last 3 Encounters:   17 151/78   17 136/67   17 (!) 165/96    Weight from Last 3 Encounters:   17 87.3 kg (192 lb 6.4 oz)   17 85.6 kg (188 lb 11.2 oz)   17 86.5 kg (190 lb 12.8 oz)               Primary Care Provider Office Phone # Fax #    Chelsea Wren [phone]       [facility] [address] [facility] [address]        Equal Access to Services     [facility] : Hadii elvia Reese, christenda liyah, qamohsenta anglealelizabeth danielle . So [facility] [phone].    ATENCIÓN: Si habla español, tiene a mae disposición servicios gratuitos de asistencia lingüística. Llame al [phone].    We comply with applicable federal civil rights laws and [place] laws. We do not discriminate on the basis of race, color, national origin, age, disability sex, sexual orientation or gender " identity.            Thank you!     Thank you for choosing [facility]  for your care. Our goal is always to provide you with excellent care. Hearing back from our patients is one way we can continue to improve our services. Please take a few minutes to complete the written survey that you may receive in the mail after your visit with us. Thank you!             Your Updated Medication List - Protect others around you: Learn how to safely use, store and throw away your medicines at www.disposemymeds.org.          This list is accurate as of: 9/12/17 11:59 PM.  Always use your most recent med list.                   Brand Name Dispense Instructions for use Diagnosis    acetaminophen 325 MG tablet    TYLENOL    100 tablet    Take 3 tablets (975 mg) by mouth 3 times daily    Cancer related pain       amLODIPine 2.5 MG tablet    NORVASC    30 tablet    Take 1 tablet (2.5 mg) by mouth daily    Essential hypertension       oxyCODONE 5 MG IR tablet    ROXICODONE    100 tablet    Take 1-2 tablets (5-10 mg) by mouth every 3 hours as needed for moderate to severe pain    Cancer related pain       polyethylene glycol powder    MIRALAX    510 g    Take 17-34 g (1-2 capfuls) by mouth daily    Cancer related pain, Generalized abdominal pain       senna-docusate 8.6-50 MG per tablet    SENOKOT-S;PERICOLACE    100 tablet    Take 2-4 tablets by mouth 2 times daily    Cancer related pain

## 2017-09-12 NOTE — NURSING NOTE
"Oncology Rooming Note    September 12, 2017 2:13 PM   Ashvin Tiwari is a 62 year old female who presents for:    Chief Complaint   Patient presents with     Oncology Clinic Visit     PET scan     Initial Vitals: /78  Pulse 95  Temp 99.4  F (37.4  C) (Oral)  Resp 16  Wt 87.3 kg (192 lb 6.4 oz)  BMI 34.08 kg/m2 Estimated body mass index is 34.08 kg/(m^2) as calculated from the following:    Height as of 9/6/17: 1.6 m (5' 3\").    Weight as of this encounter: 87.3 kg (192 lb 6.4 oz). Body surface area is 1.97 meters squared.  Moderate Pain (5) Comment: lower abdomen   No LMP recorded. Patient is postmenopausal.  Allergies reviewed: Yes  Medications reviewed: Yes    Medications: Medication refills not needed today.  Pharmacy name entered into EPIC: [facility] (USE ONLY AT [facility]) - [place], [place] - [facility] [address]    Clinical concerns:      6 minutes for nursing intake (face to face time)     Yesy Chester CMA              "

## 2017-09-12 NOTE — LETTER
2017       RE: Ashvin Tiwari  [address]  [facility] [place] [address]     Dear Colleague,    Thank you for referring your patient, Ashvin Tiwari, to the [facility]. Please see a copy of my visit note below.                            Consult Notes on Referred Patient    Date: 2017     Patient returns today for follow-up.    Her history is as follows:  Patient is a 62 year old A1 woman with no significant past medical history who presented with 3 weeks of vaginal spotting, lower abdominal pain that radiates to the lower back. TShe was treated for UTI with Amoxicillin, since her UA was suggestive of an infection. She didn't feel any improvement after the treatment and underwent pelvic exam that revealed cervical mass. She was referred to gynecology at [facility] for biopsy however that was not done in the office due to concern of ongoing bleeding. Patient never had PAP smear done in the past, never had mammogram or colonoscopy.     17:  Seen in Gyn Onc.  Exam concerning for at least a Stage IIB cervical cancer.  Biopsy obtained consistent with poorly differentiated squamous cell ca.  MRI ordered.    17: MRI Pelvis with 7 x 7cm mass at level of cervix, protrudes into upper third of vagina, bilateral parametrial extension, abuts bilateral ureters but no obstruction, abuts rectal wall with obliteration of fat plan but no mucosal invasion, no clear bladder wall invasion, suspicious lymph nodes left hemipelvis, peritoneal nodule versus lymph node.    17 to 17:  Patient admitted to hospital with pain. CT Chest PE was negative for PE but showed multiple pulmonary nodules consistent with metastatic disease.  CT A/P showed cervical mass with regional metastatic disease as well as peritoneal nodularity.    17:  PET scan with multiple metastatic hypermetabolic lung nodules, axillary, mediastinal, hilar and abdominal lymph nodes.    The patient returns today with her  family to discuss findings and treatment plan.  Her pain is adequately controlled, she is having minimal bleeding.  Her energy is low, appetite is poor.      Past Medical History:    Past Medical History:   Diagnosis Date     Cancer (H)     cervical         Past Surgical History:    No past surgical history on file.      Health Maintenance:  Health Maintenance Due   Topic Date Due     TETANUS IMMUNIZATION (SYSTEM ASSIGNED)  05/10/1971     HEPATITIS C SCREENING  05/10/1971     LIPID SCREEN Q5 YR FEMALE (SYSTEM ASSIGNED)  05/10/1998     MAMMO SCREEN Q2 YR (SYSTEM ASSIGNED)  05/10/2003     COLON CANCER SCREEN (SYSTEM ASSIGNED)  05/10/2003     ADVANCE DIRECTIVE PLANNING Q5 YRS  05/10/2008     INFLUENZA VACCINE (SYSTEM ASSIGNED)  09/01/2017       Current Medications:     has a current medication list which includes the following prescription(s): oxycodone, acetaminophen, senna-docusate, polyethylene glycol, and amlodipine.       Allergies:     [unfilled]        Social History:     Social History   Substance Use Topics     Smoking status: Never Smoker     Smokeless tobacco: Never Used     Alcohol use No       History   Drug Use No           Family History:     The patient's family history is notable for:    No family history on file.      Physical Exam:     /78  Pulse 95  Temp 99.4  F (37.4  C) (Oral)  Resp 16  Wt 87.3 kg (192 lb 6.4 oz)  BMI 34.08 kg/m2  Body mass index is 34.08 kg/(m^2).    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  Treatment approach  and prognosis discussed in detail with patient and her family.  Would recommend attempt at palliative chemotherapy with  regimen substituting Carboplatin for Cisplatin given patient's decreased PFS, recent hospitalization and less toxicity with Carboplatin versus Cisplatin.  Plan Carboplatin, Paclitaxel and Bevacizumab.  Discussed chemotherapy in detail including drugs used, administration, port placement, toxcitites, anticipated side-effects including fatigue, alopecia, neuropathy, nausea, hematologic toxicities (anemia/neutropenia), changes in kidney function, allergic reaction, anaphylaxis, infection risk, sepsis, VTE, bleeding, GI perforation, hypertension, encephelopathy.  Discussed treatment schedule, monitoring during and after chemotherapy.  Discussed potential need for blood transfusion, IV hydration, growth factor support.     Patient willing to proceed with chemotherapy.  Additionally, after lengthy discussion with patient and her family, will plan to attempt IR biopsy of one of the distant metastatic lesions (pulmonary, axillary) to confirm diagnosis of distant disease although I feel that imaging findings are consistent with metastatic disease.    Will proceed with scheduling chemo, port placement, IR consult.  Questions answered.       2.) Genetic risk factors were assessed and the patient does not meet the qualifications for a referral.      3.) Labs and/or tests ordered include: Port, IR, labs     4.) Health maintenance issues addressed today include need for ongoing treatment      Barbara Soto MD  Gynecologic Oncology  [facility]    CC  Patient Care Team:  Chelsea Wren as PCP - General (Family Practice)  Sita Gallardo as Referring Physician (OB/Gyn)  Christy Gaffney APRN CNS as Nurse Practitioner (Nurse)  Rima Saunders RN as Continuity Care Coordinator (Gyn-Onc)

## 2017-09-13 PROBLEM — C53.9 CERVIX CANCER (H): Status: ACTIVE | Noted: 2017-01-01

## 2017-09-13 NOTE — PROGRESS NOTES
Care Coordinator Note  Left message for first time for chemo and port placement on 9/21/17 also reviewed prep.    Also left my return number if there are any questions      Patient verbalized back understanding of the above information discussed.   Rima MADSEN RN, OCN  Care Coordinator   Gynecologic Cancer   Office [phone]  Pager [phone]

## 2017-09-13 NOTE — PROGRESS NOTES
MImplanted Port Booklet  Chemotherapy Packet and Cards  Disease Packet  Health GYN-Oncology Teaching Note    Relevant Diagnosis:  Cervix cancer IV    Teaching Topic:  Chemotherapy Taxol Carbo avastin for 3 cycles then rescan.   Implanted Port    Person(s) involved in teaching:  Sister, daughter and niece and patient    Motivation Level:   Asks Questions:   Yes  Eager to Learn:  Yes  Cooperative:  Yes  Receptive (willing/able to accept information):  Yes  Comments:   Asked appropirate questions Wants to get started ASAP   Dr Soto stated that the Bx does not need to be done prior to the chemo getting started.     Patient and Family demonstrates understanding of the following:  Reason for the appointment, diagnosis and treatment plan:  Yes  Knowledge of proper use of medications and conditions for which they are ordered (with special attention to potential side effects or drug interactions):  Yes  Which situations necessitate calling provider and whom to contact:  Yes    Teaching Concerns:  No    Instructional Materials Used/Given:  Implanted Port Booklet  Chemotherapy Packet and Cards  Disease Packet  Chemo and you , Helpful greg [facility] Inforamtion Handbook My business card     Time spent teaching with patient:  60.  Rima MADSEN RN, OCN  Care Coordinator   Gynecologic Cancer   Office [phone]  Pager [phone] #[phone]

## 2017-09-18 NOTE — PROGRESS NOTES
Consult Notes on Referred Patient    Date: 2017     Patient returns today for follow-up.    Her history is as follows:  Patient is a 62 year old A1 woman with no significant past medical history who presented with 3 weeks of vaginal spotting, lower abdominal pain that radiates to the lower back. TShe was treated for UTI with Amoxicillin, since her UA was suggestive of an infection. She didn't feel any improvement after the treatment and underwent pelvic exam that revealed cervical mass. She was referred to gynecology at [facility] for biopsy however that was not done in the office due to concern of ongoing bleeding. Patient never had PAP smear done in the past, never had mammogram or colonoscopy.     17:  Seen in Gyn Onc.  Exam concerning for at least a Stage IIB cervical cancer.  Biopsy obtained consistent with poorly differentiated squamous cell ca.  MRI ordered.    17: MRI Pelvis with 7 x 7cm mass at level of cervix, protrudes into upper third of vagina, bilateral parametrial extension, abuts bilateral ureters but no obstruction, abuts rectal wall with obliteration of fat plan but no mucosal invasion, no clear bladder wall invasion, suspicious lymph nodes left hemipelvis, peritoneal nodule versus lymph node.    17 to 17:  Patient admitted to hospital with pain. CT Chest PE was negative for PE but showed multiple pulmonary nodules consistent with metastatic disease.  CT A/P showed cervical mass with regional metastatic disease as well as peritoneal nodularity.    17:  PET scan with multiple metastatic hypermetabolic lung nodules, axillary, mediastinal, hilar and abdominal lymph nodes.    The patient returns today with her family to discuss findings and treatment plan.  Her pain is adequately controlled, she is having minimal bleeding.  Her energy is low, appetite is poor.      Past Medical History:    Past Medical History:   Diagnosis Date     Cancer (H)     cervical          Past Surgical History:    No past surgical history on file.      Health Maintenance:  Health Maintenance Due   Topic Date Due     TETANUS IMMUNIZATION (SYSTEM ASSIGNED)  05/10/1971     HEPATITIS C SCREENING  05/10/1971     LIPID SCREEN Q5 YR FEMALE (SYSTEM ASSIGNED)  05/10/1998     MAMMO SCREEN Q2 YR (SYSTEM ASSIGNED)  05/10/2003     COLON CANCER SCREEN (SYSTEM ASSIGNED)  05/10/2003     ADVANCE DIRECTIVE PLANNING Q5 YRS  05/10/2008     INFLUENZA VACCINE (SYSTEM ASSIGNED)  09/01/2017       Current Medications:     has a current medication list which includes the following prescription(s): oxycodone, acetaminophen, senna-docusate, polyethylene glycol, and amlodipine.       Allergies:     [unfilled]        Social History:     Social History   Substance Use Topics     Smoking status: Never Smoker     Smokeless tobacco: Never Used     Alcohol use No       History   Drug Use No           Family History:     The patient's family history is notable for:    No family history on file.      Physical Exam:     /78  Pulse 95  Temp 99.4  F (37.4  C) (Oral)  Resp 16  Wt 87.3 kg (192 lb 6.4 oz)  BMI 34.08 kg/m2  Body mass index is 34.08 kg/(m^2).    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  Treatment approach and prognosis discussed in detail with patient and her family.  Would recommend attempt at palliative chemotherapy with  regimen substituting Carboplatin for Cisplatin given patient's decreased PFS, recent hospitalization and less toxicity with  Carboplatin versus Cisplatin.  Plan Carboplatin, Paclitaxel and Bevacizumab.  Discussed chemotherapy in detail including drugs used, administration, port placement, toxcitites, anticipated side-effects including fatigue, alopecia, neuropathy, nausea, hematologic toxicities (anemia/neutropenia), changes in kidney function, allergic reaction, anaphylaxis, infection risk, sepsis, VTE, bleeding, GI perforation, hypertension, encephelopathy.  Discussed treatment schedule, monitoring during and after chemotherapy.  Discussed potential need for blood transfusion, IV hydration, growth factor support.     Patient willing to proceed with chemotherapy.  Additionally, after lengthy discussion with patient and her family, will plan to attempt IR biopsy of one of the distant metastatic lesions (pulmonary, axillary) to confirm diagnosis of distant disease although I feel that imaging findings are consistent with metastatic disease.    Will proceed with scheduling chemo, port placement, IR consult.  Questions answered.       2.) Genetic risk factors were assessed and the patient does not meet the qualifications for a referral.      3.) Labs and/or tests ordered include: Port, IR, labs     4.) Health maintenance issues addressed today include need for ongoing treatment      Barbara Soto MD  Gynecologic Oncology  [facility]    CC  Patient Care Team:  Chelsea Wren as PCP - General (Family Practice)  Sita Gallardo as Referring Physician (OB/Gyn)  Christy Gaffney APRN CNS as Nurse Practitioner (Nurse)  Rima Saunders, TIMOTHY as Continuity Care Coordinator (Gyn-Onc)  CHELSEA WREN

## 2017-09-19 PROBLEM — E66.9 OBESITY: Status: ACTIVE | Noted: 2017-01-01

## 2017-09-19 NOTE — LETTER
9/19/2017       RE: Ashvin Tiwari  [address]  [place] [address]     Dear Colleague,    Thank you for referring your patient, Ashvin Tiwari, to the [facility] INTERVENTIONAL RADIOLOGY at [facility]. Please see a copy of my visit note below.    First Name: Ashvin   Age: 62 year old   Referring Physician: Dr. Soto   REASON FOR REFERRAL: Consult for lymph node biopsy    Assessment:  Ashvin is a 61 yo with recently diagnosed SCCA cervix.  PET scan shows multiple PET avid lesions.  Biopsy is requested.    Plan:  Image guided biopsy of right axillary lymph node  Blood work is up to date  Will try to arrange so that biopsy can be performed at the same time that the port a cath is being placed.    HPI: This is a patient who was in good health until recently.  She developed post menopausal bleeding.  She has a cervical mass that is biopsy proven invasive, poorly differentiated squamous cell carcinoma.  She had a PET Ct scan which showed multiple metastatic hypermetabolic lung nodules, axillary,mediastinal, hilar and abdominal lymph nodes.  Dr. Miller from IR reviewed the imaging and felt that the right axillary lymph node was easily accessible.  Series 11, images 51-52.     PAST MEDICAL HISTORY:   Past Medical History:   Diagnosis Date     Cancer (H)     cervical     Hypertension      PAST SURGICAL HISTORY:   Past Surgical History:   Procedure Laterality Date     no previous surgery       FAMILY HISTORY: No family history on file.  SOCIAL HISTORY:   Social History   Substance Use Topics     Smoking status: Never Smoker     Smokeless tobacco: Never Used     Alcohol use No     PROBLEM LIST:   Patient Active Problem List    Diagnosis Date Noted     Obesity 09/19/2017     Priority: Medium     Cervix cancer (H) 09/13/2017     Priority: Medium     Abdominal pain 09/07/2017     Priority: Medium     MEDICATIONS:   Prescription Medications as of 9/19/2017             oxyCODONE  (ROXICODONE) 5 MG IR tablet Take 1-2 tablets (5-10 mg) by mouth every 3 hours as needed for moderate to severe pain    acetaminophen (TYLENOL) 325 MG tablet Take 3 tablets (975 mg) by mouth 3 times daily    senna-docusate (SENOKOT-S;PERICOLACE) 8.6-50 MG per tablet Take 2-4 tablets by mouth 2 times daily    polyethylene glycol (MIRALAX) powder Take 17-34 g (1-2 capfuls) by mouth daily    amLODIPine (NORVASC) 2.5 MG tablet Take 1 tablet (2.5 mg) by mouth daily        ALLERGIES: Review of patient's allergies indicates no known allergies.  VITALS: /80  Pulse 98  Wt 85.8 kg (189 lb 3.2 oz)  SpO2 94%  BMI 33.52 kg/m2    ROS:  Skin: negative  Eyes: negative  Ears/Nose/Throat: negative  Respiratory: No shortness of breath, dyspnea on exertion, cough, or hemoptysis  Cardiovascular: hypertenson  Gastrointestinal: abdominal pain  Genitourinary: negative  Musculoskeletal: negative  Neurologic: negative  Psychiatric: negative  Hematologic/Lymphatic/Immunologic: c/o burning in the right axilla.    Endocrine: negative      Physical Examination: Vital signs are reviewed and they are stable  Constitutional: Pleasant, older woman, in no acute physical distress, came with her daughter to the appt  Cardiovascular: negative  Respiratory: negative  Musculoskeletal: extremities normal- no gross deformities noted, gait normal and normal muscle tone  Skin: no suspicious lesions or rashes  Neurologic: negative  Psychiatric: affect normal/bright and mentation appears normal.    BMP RESULTS:  Lab Results   Component Value Date     09/06/2017    POTASSIUM 3.8 09/06/2017    CHLORIDE 106 09/06/2017    CO2 24 09/06/2017    ANIONGAP 8 09/06/2017     (A) 09/08/2017    BUN 9 09/06/2017    CR 0.84 09/06/2017    GFRESTIMATED 68 09/06/2017    GFRESTBLACK 83 09/06/2017    BEATRIZ 9.0 09/06/2017        CBC RESULTS:  Lab Results   Component Value Date    WBC 5.7 09/07/2017    RBC 3.89 09/07/2017    HGB 8.3 (L) 09/07/2017    HCT 28.0 (L)  09/07/2017    MCV 72 (L) 09/07/2017    MCH 21.3 (L) 09/07/2017    MCHC 29.6 (L) 09/07/2017    RDW 17.6 (H) 09/07/2017     09/07/2017       INR/PTT:  Lab Results   Component Value Date    INR 1.03 09/07/2017    PTT 28 09/07/2017       Diagnostic studies: see recent CT scan    PROVIDER NOTE:  I explained the procedure to   This included:  Preparing for the procedure, the actual procedure and recovery.  I explained the risks of the lymph node biopsy:  Bleeding, infection, hitting an unintended organ (vessel or nerve)  I explained that usually the results return after two to four business days.    I explained that he/she would be contacted by Dr. Soto's  office following this to determine a future plan.  Thank you for involving us in the care of your patient.    CC  Patient Care Team:  Chelsea Wren as PCP - General (Family Practice)  Sita Gallardo as Referring Physician (OB/Gyn)  Rima Saunders, RN as Continuity Care Coordinator (Gyn-Onc)  SOREN SOTO

## 2017-09-19 NOTE — MR AVS SNAPSHOT
After Visit Summary   9/19/2017    Ashvin Tiwari    MRN: [id number]           Patient Information     Date Of Birth          1955        Visit Information        Provider Department      9/19/2017 10:00 AM Christy Gaffney APRN CNS [facility] Interventional Radiology        Today's Diagnoses     Enlarged lymph nodes    -  1    Malignant neoplasm of cervix, unspecified site (H)           Follow-ups after your visit        Follow-up notes from your care team     Discussed this visit      Your next 10 appointments already scheduled     Sep 21, 2017   Procedure with Sonu Denson PA-C   [facility] ([facility])    [address] [place] [id number] [phone]           Located in the [facility] at [address] [place] [address].   parking is very convenient and highly recommended.  is a $6 flat rate fee.  Both  and self parkers should enter the main arrival plaza from [facility]; parking attendants will direct you based on your parking preference.            Sep 21, 2017  8:15 AM CDT   (Arrive by 6:45 AM)   IR CHEST PORT PLACEMENT > 5 YRS OF AGE with [id number]   [facility] ([facility])    [address] [place] [id number]              1. Your doctor will need to do a history and physical within 7 days before this procedure. 2. Your doctor will which medications should not be taken the morning of the exam. 3. Laboratory tests are to be obtained by your doctor prior to the exam (Basic Metabolic Panel, CBCP, PTT and INR) (No labs needed if you are having a tunneled catheter exchange or removal) 4. If you have allergies to x-ray contrast or iodine, contact your doctor or a Radiology nurse prior to the exam day for instructions. 5. Someone will need to drive you to and from the hospital. 6. If you are or may be  pregnant, contact your doctor or a Radiology nurse prior to the day of the exam. 7. If you have diabetes, check with your doctor or a Radiology nurse to see if your insulin needs to be adjusted for the exam. 8. If you are taking a medication called Glucophage or Glucovance; these medications need to be held the day of the exam and for approximately 48 hours following. A blood sample must be drawn so your creatinine level can be checked before resuming this medication. 9. If you are taking Coumadin (to thin you blood) please contact your doctor or a Radiology nurse at least 3 days before the exam for special instructions. 10. You should not have received contrast within 48 hours of this exam. 11. The day before your exam you may eat your regular diet and are encouraged to drink at least 2 quarts of clear liquids. Drink no alcoholic beverages for 24 hours prior to the exam. 12. If you have a colostomy you will need to irrigate it with tap water at 8PM the evening before and again at 6AM the morning of the exam. 13. Do not smoke for 24 hours prior to the procedure. 14. Birth to 4 years: - Breast feeding must be stopped 4 hours prior to exam - Solid food or formula must be stopped 6 hours prior to exam - Tube feedings must be stopped 6 hours prior to exam 15. 4-10 years old: - Nothing to eat or drink 6 hours prior to exam 16. 10+ years old: - Nothing to eat or drink 8 hours prior to exam 17. The morning of the exam you may brush your teeth and take medications as directed with a sip of water. 18. When discharged, you cannot drive until morning, and an adult must be with you until then. You should stay in the [facility] overnight. 19. Bring a list of all drugs you are taking; include supplements and over-the-counter medications. Wear comfortable clothes and leave your valuables at home.            Sep 21, 2017 11:30 AM CDT   Infusion 360 with  ONCOLOGY INFUSION,  24 ATC   [facility]  [facility])    [address] [facility]  2nd [facility] [address]   [phone]            Oct 12, 2017  7:00 AM CDT   Masonic Lab Draw with UC MASONIC LAB DRAW   [facility] Draw ([facility])    [address] [address]   [phone]            Oct 12, 2017  7:30 AM CDT   (Arrive by 7:15 AM)   Return Active Treatment with ROSA Mendoza CNP   [facility] ([facility])    [address] [address]   [phone]            Oct 12, 2017  8:30 AM CDT   Infusion 360 with UC ONCOLOGY INFUSION, UC 20 ATC   [facility] ([facility])    [address] [address]   [phone]            Nov 02, 2017  7:00 AM CDT   Masonic Lab Draw with UC MASONIC LAB DRAW   [facility] Draw ([facility])    [address] [address]   [phone]            Nov 02, 2017  7:30 AM CDT   (Arrive by 7:15 AM)   Return Active Treatment with ROSA Mendoza CNP   [facility] ([facility])    [address] [place] [address]   [phone]              Future tests that were ordered for you today     Open Future Orders        Priority Expected Expires Ordered    IR Lymph Node Biopsy Routine 9/21/2017 9/19/2018 9/19/2017            Who to contact     Please call your clinic at [phone] to:    Ask questions about your health    Make or cancel appointments    Discuss your medicines    Learn about your test results    Speak to your doctor   If you have compliments or concerns about an experience at your clinic, or if you wish to file a complaint, please contact [facility] Patient Relations at [phone] or email us  at [email or web address]         Additional Information About Your Visit        [employer] Information     [employer] is an electronic gateway that provides easy, online access to your medical records. With [employer], you can request a clinic appointment, read your test results, renew a prescription or communicate with your care team.     To sign up for [employer] visit the website at [email or web address]   You will be asked to enter the access code listed below, as well as some personal information. Please follow the directions to create your username and password.     Your access code is: [email or web address]  Expires: 2017  6:30 AM     Your access code will  in 90 days. If you need help or a new code, please contact your [facility] or call [phone] for assistance.        Care EveryWhere ID     This is your Care EveryWhere ID. This could be used by other organizations to access your [facility] medical records  [id number]        Your Vitals Were     Pulse Pulse Oximetry BMI (Body Mass Index)             98 94% 33.52 kg/m2          Blood Pressure from Last 3 Encounters:   17 135/80   17 151/78   17 136/67    Weight from Last 3 Encounters:   17 85.8 kg (189 lb 3.2 oz)   17 87.3 kg (192 lb 6.4 oz)   17 85.6 kg (188 lb 11.2 oz)               Primary Care Provider Office Phone # Fax #    Chelsea Wren [phone]       [facility] [address] [facility] [address]        Equal Access to Services     LISSET LUGO : Hadii elvia Reese, waaxda luqadaha, qaybta kaalelizabeth danielle. So [facility] [phone].    ATENCIÓN: Si habla español, tiene a ame disposición servicios gratuitos de asistencia lingüística. Llame al [phone].    We comply with applicable federal civil rights laws and [place] laws. We do not discriminate on the basis of race, color, national  origin, age, disability sex, sexual orientation or gender identity.            Thank you!     Thank you for choosing [facility] INTERVENTIONAL RADIOLOGY  for your care. Our goal is always to provide you with excellent care. Hearing back from our patients is one way we can continue to improve our services. Please take a few minutes to complete the written survey that you may receive in the mail after your visit with us. Thank you!             Your Updated Medication List - Protect others around you: Learn how to safely use, store and throw away your medicines at www.disposemymeds.org.          This list is accurate as of: 9/19/17  4:18 PM.  Always use your most recent med list.                   Brand Name Dispense Instructions for use Diagnosis    acetaminophen 325 MG tablet    TYLENOL    100 tablet    Take 3 tablets (975 mg) by mouth 3 times daily    Cancer related pain       amLODIPine 2.5 MG tablet    NORVASC    30 tablet    Take 1 tablet (2.5 mg) by mouth daily    Essential hypertension       oxyCODONE 5 MG IR tablet    ROXICODONE    100 tablet    Take 1-2 tablets (5-10 mg) by mouth every 3 hours as needed for moderate to severe pain    Cancer related pain       polyethylene glycol powder    MIRALAX    510 g    Take 17-34 g (1-2 capfuls) by mouth daily    Cancer related pain, Generalized abdominal pain       senna-docusate 8.6-50 MG per tablet    SENOKOT-S;PERICOLACE    100 tablet    Take 2-4 tablets by mouth 2 times daily    Cancer related pain

## 2017-09-19 NOTE — PROGRESS NOTES
First Name: Ashvin   Age: 62 year old   Referring Physician: Dr. Soto   REASON FOR REFERRAL: Consult for lymph node biopsy    Assessment:  Ashvin is a 63 yo with recently diagnosed SCCA cervix.  PET scan shows multiple PET avid lesions.  Biopsy is requested.    Plan:  Image guided biopsy of right axillary lymph node  Blood work is up to date  Will try to arrange so that biopsy can be performed at the same time that the port a cath is being placed.    HPI: This is a patient who was in good health until recently.  She developed post menopausal bleeding.  She has a cervical mass that is biopsy proven invasive, poorly differentiated squamous cell carcinoma.  She had a PET Ct scan which showed multiple metastatic hypermetabolic lung nodules, axillary,mediastinal, hilar and abdominal lymph nodes.  Dr. Miller from IR reviewed the imaging and felt that the right axillary lymph node was easily accessible.  Series 11, images 51-52.     PAST MEDICAL HISTORY:   Past Medical History:   Diagnosis Date     Cancer (H)     cervical     Hypertension      PAST SURGICAL HISTORY:   Past Surgical History:   Procedure Laterality Date     no previous surgery       FAMILY HISTORY: No family history on file.  SOCIAL HISTORY:   Social History   Substance Use Topics     Smoking status: Never Smoker     Smokeless tobacco: Never Used     Alcohol use No     PROBLEM LIST:   Patient Active Problem List    Diagnosis Date Noted     Obesity 09/19/2017     Priority: Medium     Cervix cancer (H) 09/13/2017     Priority: Medium     Abdominal pain 09/07/2017     Priority: Medium     MEDICATIONS:   Prescription Medications as of 9/19/2017             oxyCODONE (ROXICODONE) 5 MG IR tablet Take 1-2 tablets (5-10 mg) by mouth every 3 hours as needed for moderate to severe pain    acetaminophen (TYLENOL) 325 MG tablet Take 3 tablets (975 mg) by mouth 3 times daily    senna-docusate (SENOKOT-S;PERICOLACE) 8.6-50 MG per tablet Take 2-4 tablets by mouth 2 times  daily    polyethylene glycol (MIRALAX) powder Take 17-34 g (1-2 capfuls) by mouth daily    amLODIPine (NORVASC) 2.5 MG tablet Take 1 tablet (2.5 mg) by mouth daily        ALLERGIES: Review of patient's allergies indicates no known allergies.  VITALS: /80  Pulse 98  Wt 85.8 kg (189 lb 3.2 oz)  SpO2 94%  BMI 33.52 kg/m2    ROS:  Skin: negative  Eyes: negative  Ears/Nose/Throat: negative  Respiratory: No shortness of breath, dyspnea on exertion, cough, or hemoptysis  Cardiovascular: hypertenson  Gastrointestinal: abdominal pain  Genitourinary: negative  Musculoskeletal: negative  Neurologic: negative  Psychiatric: negative  Hematologic/Lymphatic/Immunologic: c/o burning in the right axilla.    Endocrine: negative      Physical Examination: Vital signs are reviewed and they are stable  Constitutional: Pleasant, older woman, in no acute physical distress, came with her daughter to the appt  Cardiovascular: negative  Respiratory: negative  Musculoskeletal: extremities normal- no gross deformities noted, gait normal and normal muscle tone  Skin: no suspicious lesions or rashes  Neurologic: negative  Psychiatric: affect normal/bright and mentation appears normal.    BMP RESULTS:  Lab Results   Component Value Date     09/06/2017    POTASSIUM 3.8 09/06/2017    CHLORIDE 106 09/06/2017    CO2 24 09/06/2017    ANIONGAP 8 09/06/2017     (A) 09/08/2017    BUN 9 09/06/2017    CR 0.84 09/06/2017    GFRESTIMATED 68 09/06/2017    GFRESTBLACK 83 09/06/2017    BEATRIZ 9.0 09/06/2017        CBC RESULTS:  Lab Results   Component Value Date    WBC 5.7 09/07/2017    RBC 3.89 09/07/2017    HGB 8.3 (L) 09/07/2017    HCT 28.0 (L) 09/07/2017    MCV 72 (L) 09/07/2017    MCH 21.3 (L) 09/07/2017    MCHC 29.6 (L) 09/07/2017    RDW 17.6 (H) 09/07/2017     09/07/2017       INR/PTT:  Lab Results   Component Value Date    INR 1.03 09/07/2017    PTT 28 09/07/2017       Diagnostic studies: see recent CT scan    PROVIDER  NOTE:  I explained the procedure to   This included:  Preparing for the procedure, the actual procedure and recovery.  I explained the risks of the lymph node biopsy:  Bleeding, infection, hitting an unintended organ (vessel or nerve)  I explained that usually the results return after two to four business days.    I explained that he/she would be contacted by Dr. Soto's  office following this to determine a future plan.  Thank you for involving us in the care of your patient.    CC  Patient Care Team:  Chelsea Wren as PCP - General (Family Practice)  Sita Gallardo as Referring Physician (OB/Gyn)  Christy Gaffney APRN CNS as Nurse Practitioner (Nurse)  Rima Saunders, RN as Continuity Care Coordinator (Gyn-Onc)  SOREN SOTO

## 2017-09-21 NOTE — ANESTHESIA PREPROCEDURE EVALUATION
Anesthesia Evaluation     . Pt has not had prior anesthetic            ROS/MED HX    ENT/Pulmonary:  - neg pulmonary ROS     Neurologic:  - neg neurologic ROS     Cardiovascular:     (+) hypertension----. : . . CARMONA, . :. . Previous cardiac testing date:results:date: results:ECG reviewed date:09/08/17 results:Sinus rhythm date: results:          METS/Exercise Tolerance:     Hematologic:  - neg hematologic  ROS   (+) Anemia (8.3 on 09/07/17, which is trending down), -      Musculoskeletal:  - neg musculoskeletal ROS       GI/Hepatic:  - neg GI/hepatic ROS       Renal/Genitourinary:  - ROS Renal section negative       Endo:     (+) Obesity, .      Psychiatric:  - neg psychiatric ROS       Infectious Disease:  - neg infectious disease ROS       Malignancy:   (+) Malignancy History of Other  Other CA Metastatic cervical cancer Active status post         Other:    (+) H/O Chronic Pain,H/O chronic opiod use ,                    Physical Exam  Normal systems: pulmonary    Airway   Mallampati: II  TM distance: >3 FB  Neck ROM: full    Dental   (+) missing  Comment: Missing upper right incisor    Cardiovascular   Rhythm and rate: regular and normal  (+) murmur (Systolic murmur)       Pulmonary                     Anesthesia Plan      History & Physical Review      ASA Status:  2 .        Plan for MAC with Propofol and Intravenous induction. Maintenance will be TIVA.  Reason for MAC:  Deep or markedly invasive procedure (G8)  PONV prophylaxis:  Ondansetron (or other 5HT-3)       Postoperative Care      Consents  Anesthetic plan, risks, benefits and alternatives discussed with:  Patient..                      Procedure: Procedure(s):  Single Lumen Chest Power Port, Right Axillary Lymph Node Biopsy - Wound Class: I-Clean      HPI: Toosie Monique Tiwari is a 62 year old female with history of Metastatic cervical cancer, chronic pain 2/2 cancer, CARMONA, HTN who is scheduled for the above procedure.     PMHx/PSHx:  Past Medical History:    Diagnosis Date     Cancer (H)     cervical     Hypertension        Past Surgical History:   Procedure Laterality Date     no previous surgery         Social History   Substance Use Topics     Smoking status: Never Smoker     Smokeless tobacco: Never Used     Alcohol use No        No Known Allergies      (Not in a hospital admission)    Current Outpatient Prescriptions   Medication Sig Dispense Refill     oxyCODONE (ROXICODONE) 5 MG IR tablet Take 1-2 tablets (5-10 mg) by mouth every 3 hours as needed for moderate to severe pain 100 tablet 0     amLODIPine (NORVASC) 2.5 MG tablet Take 1 tablet (2.5 mg) by mouth daily 30 tablet 1     LORazepam (ATIVAN) 1 MG tablet Take 1 tablet (1 mg) by mouth every 6 hours as needed (Anxiety, Nausea/Vomiting or Sleep) 30 tablet 2     prochlorperazine (COMPAZINE) 10 MG tablet Take 1 tablet (10 mg) by mouth every 6 hours as needed (Nausea/Vomiting) 30 tablet 2     acetaminophen (TYLENOL) 325 MG tablet Take 3 tablets (975 mg) by mouth 3 times daily 100 tablet 0     senna-docusate (SENOKOT-S;PERICOLACE) 8.6-50 MG per tablet Take 2-4 tablets by mouth 2 times daily 100 tablet 1     polyethylene glycol (MIRALAX) powder Take 17-34 g (1-2 capfuls) by mouth daily 510 g 1       Objective:   There were no vitals taken for this visit.  Lab Results   Component Value Date    WBC 5.7 09/07/2017    HGB 8.3 (L) 09/07/2017    HCT 28.0 (L) 09/07/2017     09/07/2017     09/06/2017    POTASSIUM 3.8 09/06/2017    CHLORIDE 106 09/06/2017    CO2 24 09/06/2017    BUN 9 09/06/2017    CR 0.84 09/06/2017     (A) 09/08/2017    SED 92 (H) 09/06/2017    DD 0.9 (H) 09/06/2017    TROPI 0.026 09/06/2017    AST 40 09/06/2017    ALT 31 09/06/2017    ALKPHOS 94 09/06/2017    BILITOTAL 0.2 09/06/2017    INR 1.03 09/07/2017       Previous Intubation:  None available    Assessment: Ashvin Tiwari is a 62 year old female with history of Metastatic cervical cancer who is scheduled for  Procedure(s):  Single Lumen Chest Power Port, Right Axillary Lymph Node Biopsy - Wound Class: I-Clean  .     Plan: To be discussed with staff.   - ASA 2  - MAC with standard ASA monitors, IV induction, balanced anesthetic  - PIV x 1  - Antibiotics per surgery  - PONV prophylaxis    Ángel Cohen, DO  [id number]

## 2017-09-21 NOTE — MR AVS SNAPSHOT
After Visit Summary   9/21/2017    Ashvin Tiwari    MRN: [id number]           Patient Information     Date Of Birth          1955        Visit Information        Provider Department      9/21/2017 11:30 AM  24 ATC;  ONCOLOGY INFUSION [facility]        Today's Diagnoses     Malignant neoplasm of cervix, unspecified site (H)    -  1      Care Instructions    Contact Numbers  [facility] Nurse Triage: [phone]  After Hours Nurse Line:  [phone]    Please call the [facility] Nurse Triage line or after hours number if you experience a temperature greater than or equal to 100.5, shaking chills, have uncontrolled nausea, vomiting and/or diarrhea, dizziness, shortness of breath, chest pain, bleeding, unexplained bruising, or if you have any other new/concerning symptoms, questions or concerns.     If you are having any concerning symptoms or wish to speak to a provider before your next infusion visit, please call your care coordinator or triage to notify them so we can adequately serve you.     If you need a refill on a narcotic prescription or other medication, please call triage before your infusion appointment.           September 2017 Sunday Monday Tuesday Wednesday Thursday Friday Saturday                            1     MR PELVIS WWO    3:45 PM   (45 min.)   [id number]   [facility] MRI 2       3     4     5     6     Admission    7:58 PM   Tri Chen MD   [address] [facility]   (Discharge: 9/7/2017)     CT CHEST PULMONARY EMBOLISM W   10:55 PM   (30 min.)   [address], [place]     CT ABDOMEN PELVIS W   11:00 PM   (30 min.)   [address], [place]     CT LUMBAR SPINE WO   11:20 PM   (15 min.)   [address], [place] 7     8     PET ONCOLOGY WHOLE BODY    9:30 AM   (120 min.)   [place] [id number]   [facility] 9       10     11     12     [facility]    2:45 PM   (30 min.)   Barbara Soto MD   [facility] 13     14     15     16       17     18     19     [facility] NEW    9:45 AM   (60 min.)   Christy Gaffney APRN CNS   [facility] Interventional Radiology 20     21     Outpatient Visit    6:01 AM   [facility]     IR CHEST PORT PLACEMENT >5 YRS    6:45 AM   (90 min.)   [id number]   [facility] Imaging     INSERT PORT VASCULAR ACCESS    8:15 AM   Sonu Denson PA-C   [facility] OR     IR CHEST PORT PLACEMENT >5 YRS    9:55 AM   (90 min.)   [id number]   [facility]     P ONC INFUSION 360   11:30 AM   (360 min.)   [facility] ONCOLOGY INFUSION   [facility] 22     23       24     25     26     27     28     29     30                October 2017 Sunday Monday Tuesday Wednesday Thursday Friday Saturday   1     2     3     4     5     6     7       8     9     10     11     12     [facility] DRAW    7:00 AM   (15 min.)    [facility] DRAW   [facility] Lab Draw     [facility] RETURN ACTIVE TREATMENT    7:15 AM   (30 min.)   Rachelle Miranda APRN CNP   [facility] ONC INFUSION 360    8:30 AM   (360 min.)   [facility] ONCOLOGY INFUSION   [facility] 13     14       15     16     17     18     19     20     21       22     23     24     25     26     27     28       29     30     31                                     Recent Results (from the past 24 hour(s))   CBC with platelets    Collection Time: 09/21/17  7:05 AM   Result Value Ref Range    WBC 6.1 4.0 - 11.0 10e9/L    RBC Count 4.12 3.8 - 5.2 10e12/L    Hemoglobin 8.7 (L) 11.7 - 15.7 g/dL    Hematocrit 29.1 (L) 35.0 - 47.0 %    MCV 71 (L) 78 - 100 fl    MCH 21.1 (L) 26.5 - 33.0 pg    MCHC 29.9 (L) 31.5 - 36.5 g/dL    RDW 18.0 (H) 10.0 - 15.0 %    Platelet Count 439 150 - 450 10e9/L   INR    Collection Time: 09/21/17  7:05 AM   Result Value Ref Range    INR 1.07 0.86 - 1.14   WBC Differential    Collection Time: 09/21/17  7:05 AM   Result Value Ref  Range    Diff Method Automated Method     % Neutrophils 69.2 %    % Lymphocytes 17.3 %    % Monocytes 11.7 %    % Eosinophils 1.0 %    % Basophils 0.5 %    % Immature Granulocytes 0.3 %    Nucleated RBCs 0 0 /100    Absolute Neutrophil 4.1 1.6 - 8.3 10e9/L    Absolute Lymphocytes 1.0 0.8 - 5.3 10e9/L    Absolute Monocytes 0.7 0.0 - 1.3 10e9/L    Absolute Eosinophils 0.1 0.0 - 0.7 10e9/L    Absolute Basophils 0.0 0.0 - 0.2 10e9/L    Abs Immature Granulocytes 0.0 0 - 0.4 10e9/L    Absolute Nucleated RBC 0.0    Comprehensive metabolic panel    Collection Time: 09/21/17 11:55 AM   Result Value Ref Range    Sodium 137 133 - 144 mmol/L    Potassium 3.5 3.4 - 5.3 mmol/L    Chloride 104 94 - 109 mmol/L    Carbon Dioxide 24 20 - 32 mmol/L    Anion Gap 8 3 - 14 mmol/L    Glucose 134 (H) 70 - 99 mg/dL    Urea Nitrogen 9 7 - 30 mg/dL    Creatinine 0.80 0.52 - 1.04 mg/dL    GFR Estimate 72 >60 mL/min/1.7m2    GFR Estimate If Black 87 >60 mL/min/1.7m2    Calcium 8.6 8.5 - 10.1 mg/dL    Bilirubin Total 0.2 0.2 - 1.3 mg/dL    Albumin 2.9 (L) 3.4 - 5.0 g/dL    Protein Total 7.4 6.8 - 8.8 g/dL    Alkaline Phosphatase 86 40 - 150 U/L    ALT 80 (H) 0 - 50 U/L    AST 65 (H) 0 - 45 U/L   Magnesium    Collection Time: 09/21/17 11:55 AM   Result Value Ref Range    Magnesium 2.1 1.6 - 2.3 mg/dL                 Follow-ups after your visit        Your next 10 appointments already scheduled     Oct 10, 2017  2:30 PM CDT   (Arrive by 2:15 PM)   New Patient Visit with Briana Medina MD   [facility] ([facility])    [address] [place] [address] [phone]            Oct 12, 2017  7:00 AM T   [facility] Draw with  [facility] DRAW   [facility] Draw ([facility])    [address] [address] [phone]            Oct 12, 2017  7:30 AM CDT   (Arrive by 7:15 AM)   Return Active Treatment with  ROSA Mendoza CNP   [facility] ([facility])    [address]  2nd [address]  [facility] [address]   [phone]            Oct 12, 2017  8:30 AM CDT   Infusion 360 with UC ONCOLOGY INFUSION, [facility] 20 ATC   [facility] ([facility])    [address]  [address] [facility] [address] [phone]            Nov 02, 2017  7:00 AM CDT   [facility]   [facility] Draw ([facility])    [address]  [address] [facility] [address] [phone]            Nov 02, 2017  7:30 AM CDT   (Arrive by 7:15 AM)   Return Active Treatment with ROSA Mendoza CNP   [facility] ([facility])    [address] [address] [phone]            Nov 02, 2017  8:30 AM CDT   Infusion 360 with UC ONCOLOGY INFUSION, UC 19 ATC   [facility] ([facility])    [address] [address] [phone]              Who to contact     If you have questions or need follow up information about today's clinic visit or your schedule please contact [facility] directly at [phone].  Normal or non-critical lab and imaging results will be communicated to you by MyChart, letter or phone within 4 business days after the clinic has received the results. If you do not hear from us within 7 days, please contact the clinic through MyChart or phone. If you have a critical or abnormal lab result, we will notify you by phone as soon as possible.  Submit refill requests through [employer] or call your pharmacy and they will forward the refill request to us. Please allow 3 business days for your refill to be completed.          Additional Information About Your Visit        [facility]  "Information     [employer] lets you send messages to your doctor, view your test results, renew your prescriptions, schedule appointments and more. To sign up, go to [email or web address] . Click on \"Log in\" on the left side of the screen, which will take you to the Welcome page. Then click on \"Sign up Now\" on the right side of the page.     You will be asked to enter the access code listed below, as well as some personal information. Please follow the directions to create your username and password.     Your access code is: [email or web address]  Expires: 2017  6:30 AM     Your access code will  in 90 days. If you need help or a new code, please call your [place] clinic or [phone].        Care EveryWhere ID     This is your Care EveryWhere ID. This could be used by other organizations to access your [place] medical records  [id number]        Your Vitals Were     Pulse Temperature Respirations Height Pulse Oximetry BMI (Body Mass Index)    89 98.7  F (37.1  C) (Oral) 16 1.6 m (5' 2.99\") 95% 33.52 kg/m2       Blood Pressure from Last 3 Encounters:   17 151/70   17 147/80   17 135/80    Weight from Last 3 Encounters:   17 85.8 kg (189 lb 2.5 oz)   17 85.8 kg (189 lb 3.2 oz)   17 87.3 kg (192 lb 6.4 oz)              We Performed the Following     Comprehensive metabolic panel     Magnesium     Protein qualitative urine          Today's Medication Changes          These changes are accurate as of: 17  5:37 PM.  If you have any questions, ask your nurse or doctor.               Start taking these medicines.        Dose/Directions    LORazepam 1 MG tablet   Commonly known as:  ATIVAN   Used for:  Malignant neoplasm of cervix, unspecified site (H)        Dose:  1 mg   Take 1 tablet (1 mg) by mouth every 6 hours as needed (Anxiety, Nausea/Vomiting or Sleep)   Quantity:  30 tablet   Refills:  2       prochlorperazine 10 MG tablet   Commonly known as:  COMPAZINE   Used for:  " Malignant neoplasm of cervix, unspecified site (H)        Dose:  10 mg   Take 1 tablet (10 mg) by mouth every 6 hours as needed (Nausea/Vomiting)   Quantity:  30 tablet   Refills:  2            Where to get your medicines      These medications were sent to [facility] [place], [place] - [address]  [address], [facility] [address]    Hours:  TRANSPLANT PHONE NUMBER [phone] Phone:  [phone]     prochlorperazine 10 MG tablet         Some of these will need a paper prescription and others can be bought over the counter.  Ask your nurse if you have questions.     Bring a paper prescription for each of these medications     LORazepam 1 MG tablet    oxyCODONE 5 MG IR tablet                Primary Care Provider Office Phone # Fax #    Chelsea Wren [phone]       [facility] [address]  [facility] [address]        Equal Access to Services     LISSET LUGO : Hadii elvia ku hadasho Soomaali, waaxda luqadaha, qaybta kaalmada adeegyada, waxay idiin hayaden marquis ga . So [facility] [phone].    ATENCIÓN: Si habla español, tiene a mae disposición servicios gratuitos de asistencia lingüística. [place] al [phone].    We comply with applicable federal civil rights laws and [place] laws. We do not discriminate on the basis of race, color, national origin, age, disability sex, sexual orientation or gender identity.            Thank you!     Thank you for choosing [facility]  for your care. Our goal is always to provide you with excellent care. Hearing back from our patients is one way we can continue to improve our services. Please take a few minutes to complete the written survey that you may receive in the mail after your visit with us. Thank you!             Your Updated Medication List - Protect others around you: Learn how to safely use, store and throw away your medicines at  www.disposemymeds.org.          This list is accurate as of: 9/21/17  5:37 PM.  Always use your most recent med list.                   Brand Name Dispense Instructions for use Diagnosis    acetaminophen 325 MG tablet    TYLENOL    100 tablet    Take 3 tablets (975 mg) by mouth 3 times daily    Cancer related pain       amLODIPine 2.5 MG tablet    NORVASC    30 tablet    Take 1 tablet (2.5 mg) by mouth daily    Essential hypertension       LORazepam 1 MG tablet    ATIVAN    30 tablet    Take 1 tablet (1 mg) by mouth every 6 hours as needed (Anxiety, Nausea/Vomiting or Sleep)    Malignant neoplasm of cervix, unspecified site (H)       oxyCODONE 5 MG IR tablet    ROXICODONE    50 tablet    Take 1-2 tablets (5-10 mg) by mouth every 3 hours as needed for moderate to severe pain    Cancer related pain       polyethylene glycol powder    MIRALAX    510 g    Take 17-34 g (1-2 capfuls) by mouth daily    Cancer related pain, Generalized abdominal pain       prochlorperazine 10 MG tablet    COMPAZINE    30 tablet    Take 1 tablet (10 mg) by mouth every 6 hours as needed (Nausea/Vomiting)    Malignant neoplasm of cervix, unspecified site (H)       senna-docusate 8.6-50 MG per tablet    SENOKOT-S;PERICOLACE    100 tablet    Take 2-4 tablets by mouth 2 times daily    Cancer related pain

## 2017-09-21 NOTE — PATIENT INSTRUCTIONS
Contact Numbers  [facility] Nurse Triage: [phone]  After Hours Nurse Line:  [phone]    Please call the [facility] Nurse Triage line or after hours number if you experience a temperature greater than or equal to 100.5, shaking chills, have uncontrolled nausea, vomiting and/or diarrhea, dizziness, shortness of breath, chest pain, bleeding, unexplained bruising, or if you have any other new/concerning symptoms, questions or concerns.     If you are having any concerning symptoms or wish to speak to a provider before your next infusion visit, please call your care coordinator or triage to notify them so we can adequately serve you.     If you need a refill on a narcotic prescription or other medication, please call triage before your infusion appointment.           September 2017 Sunday Monday Tuesday Wednesday Thursday Friday Saturday                            1     MR PELVIS WWO    3:45 PM   (45 min.)   [id number]   [facility] MRI 2       3     4     5     6     Admission    7:58 PM   Tri Chen MD   [address] [facility]   (Discharge: 9/7/2017)     CT CHEST PULMONARY EMBOLISM W   10:55 PM   (30 min.)   [id number]   [facility], [place]     CT ABDOMEN PELVIS W   11:00 PM   (30 min.)   [facility]   [facility], [place]     CT LUMBAR SPINE WO   11:20 PM   (15 min.)   [address], [place] 7     8     PET ONCOLOGY WHOLE BODY    9:30 AM   (120 min.)   [facility] 9       10     11     12     [facility] RETURN    2:45 PM   (30 min.)   Barbara Soto MD   [facility] 13     14     15     16       17     18     19     [facility] NEW    9:45 AM   (60 min.)   Christy Gaffney APRN [facility] Interventional Radiology 20     21     Outpatient Visit    6:01 AM   [facility]     IR CHEST PORT PLACEMENT >5 YRS    6:45 AM   (90 min.)   [id number]   [facility]     INSERT PORT VASCULAR ACCESS    8:15 AM   Jarett  Sonu Zamora PA-C   [facility]     IR CHEST PORT PLACEMENT >5 YRS    9:55 AM   (90 min.)   [email or web address]   [facility]     [facility] ONC INFUSION 360   11:30 AM   (360 min.)    ONCOLOGY INFUSION   [facility] 22     23       24     25     26     27     28     29     30 October 2017 Sunday Monday Tuesday Wednesday Thursday Friday Saturday   1     2     3     4     5     6     7       8     9     10     11     12     [facility] DRAW    7:00 AM   (15 min.)    [facility] DRAW   [facility] Lab Draw     [facility] RETURN ACTIVE TREATMENT    7:15 AM   (30 min.)   Rachelle Miranda APRN CNP   [facility] ONC INFUSION 360    8:30 AM   (360 min.)    ONCOLOGY INFUSION   [facility] 13     14       15     16     17     18     19     20     21       22     23     24     25     26     27     28       29     30     31                                     Recent Results (from the past 24 hour(s))   CBC with platelets    Collection Time: 09/21/17  7:05 AM   Result Value Ref Range    WBC 6.1 4.0 - 11.0 10e9/L    RBC Count 4.12 3.8 - 5.2 10e12/L    Hemoglobin 8.7 (L) 11.7 - 15.7 g/dL    Hematocrit 29.1 (L) 35.0 - 47.0 %    MCV 71 (L) 78 - 100 fl    MCH 21.1 (L) 26.5 - 33.0 pg    MCHC 29.9 (L) 31.5 - 36.5 g/dL    RDW 18.0 (H) 10.0 - 15.0 %    Platelet Count 439 150 - 450 10e9/L   INR    Collection Time: 09/21/17  7:05 AM   Result Value Ref Range    INR 1.07 0.86 - 1.14   WBC Differential    Collection Time: 09/21/17  7:05 AM   Result Value Ref Range    Diff Method Automated Method     % Neutrophils 69.2 %    % Lymphocytes 17.3 %    % Monocytes 11.7 %    % Eosinophils 1.0 %    % Basophils 0.5 %    % Immature Granulocytes 0.3 %    Nucleated RBCs 0 0 /100    Absolute Neutrophil 4.1 1.6 - 8.3 10e9/L    Absolute Lymphocytes 1.0 0.8 - 5.3 10e9/L    Absolute Monocytes 0.7 0.0 - 1.3 10e9/L    Absolute Eosinophils 0.1 0.0 - 0.7 10e9/L    Absolute  Basophils 0.0 0.0 - 0.2 10e9/L    Abs Immature Granulocytes 0.0 0 - 0.4 10e9/L    Absolute Nucleated RBC 0.0    Comprehensive metabolic panel    Collection Time: 09/21/17 11:55 AM   Result Value Ref Range    Sodium 137 133 - 144 mmol/L    Potassium 3.5 3.4 - 5.3 mmol/L    Chloride 104 94 - 109 mmol/L    Carbon Dioxide 24 20 - 32 mmol/L    Anion Gap 8 3 - 14 mmol/L    Glucose 134 (H) 70 - 99 mg/dL    Urea Nitrogen 9 7 - 30 mg/dL    Creatinine 0.80 0.52 - 1.04 mg/dL    GFR Estimate 72 >60 mL/min/1.7m2    GFR Estimate If Black 87 >60 mL/min/1.7m2    Calcium 8.6 8.5 - 10.1 mg/dL    Bilirubin Total 0.2 0.2 - 1.3 mg/dL    Albumin 2.9 (L) 3.4 - 5.0 g/dL    Protein Total 7.4 6.8 - 8.8 g/dL    Alkaline Phosphatase 86 40 - 150 U/L    ALT 80 (H) 0 - 50 U/L    AST 65 (H) 0 - 45 U/L   Magnesium    Collection Time: 09/21/17 11:55 AM   Result Value Ref Range    Magnesium 2.1 1.6 - 2.3 mg/dL

## 2017-09-21 NOTE — IP AVS SNAPSHOT
[facility]    [address] [place] [address]    Phone:  [phone]    Fax:  [phone]                                       After Visit Summary   9/21/2017    Ashvin Tiwari    MRN: [id number]           After Visit Summary Signature Page     I have received my discharge instructions, and my questions have been answered. I have discussed any challenges I see with this plan with the nurse or doctor.    ..........................................................................................................................................  Patient/Patient Representative Signature      ..........................................................................................................................................  Patient Representative Print Name and Relationship to Patient    ..................................................               ................................................  Date                                            Time    ..........................................................................................................................................  Reviewed by Signature/Title    ...................................................              ..............................................  Date                                                            Time

## 2017-09-21 NOTE — DISCHARGE INSTRUCTIONS
A collaboration between [facility] and [facility]  Experts in minimally invasive, targeted treatments performed using imaging guidance    Venous Access Device,  Port Catheter or Tunneled Central Line Placement    Today you had a procedure today to install a venous access device; either a tunneled central vein catheter or a subcutaneous port catheter.    One of our Radiology PAs performed this procedure for you today:  ? Maico Thomas PA-C  ? CHARLEE Sanchez PA-C  ? Aguilar Wright PA-C  ? Michelle Sparrow PA-C   ? Stevo Arguelles PA-C    After you go home:  - Drink plenty of fluids.  Generally 6-8 (8 ounce) glasses a day is recommended.  - Resume your regular diet unless otherwise ordered by a medical provider.  - Keep any applied tape/gauze dressings clean and dry.  Change tape/gauze dressings if they get wet or soiled.  - You may shower the following day after procedure, however cover and protect from moisture any tape/gauze dressings.  You may let water hit and run over dried skin glue, but do not scrub.  Pat the area dry after showering.  - Port placement incisions are closed with absorbable suture, meaning they do not need to be removed at a later date, and a topical skin adhesive (skin glue).  This glue will wear off in 7-14 days.  Do not remove before this time.  If 14 days have passed and residual glue is present, you may gently remove it.  - Do not apply gels, lotions, or ointments to the glue site for the first 10 days as this may cause the glue to prematurely soften and fail.  - Do not perform strenuous activities or lift greater than 10 pounds for the next three days.  - If there is bleeding or oozing from the procedure site, apply firm pressure to the area for 5-10 minutes.  If the bleeding continues seek medical advice at the numbers below.  - Mild procedure site discomfort can be treated with an ice pack and over-the-counter pain  relievers.        For 24 hours after any sedation used:  - Relax and take it easy.  No strenuous activities.  - Do not drive or operate machines at home or at work.  - No alcohol consumption.  - Do not make any important or legal decisions.    Call our Interventional Radiology (IR) service if:  - If you start bleeding from the procedure site.  If you do start to bleed from the site, lie down and hold some pressure on the site.  Our radiology provider can help you decide if you need to return to the hospital.  - If you have new or worsening pain related to the procedure.  - If you have concerning swelling at the procedure site.  - If you develop persistent nausea or vomiting.  - If you develop hives or a rash or any unexplained itching.  - If you have a fever of greater than 100.5  F and chills in the first 5 days after procedure.  - Any other concerns related to your procedure.      [facility]  Interventional Radiology (IR)  [address] [place] [address]    Contact Number:  [phone]  (IR control desk)  - Monday - Friday 8:00 am - 4:30 pm    After hours for urgent concerns:  [phone]  - After 4:30 pm Monday - Friday, Weekends and Holidays.   - Ask for Interventional Radiology on-call.  Someone is available 24 hours a day.  - [facility] toll free number:  [phone]

## 2017-09-21 NOTE — PROCEDURES
Interventional Radiology Brief Post Procedure Note    Procedure:    IR CHEST PORT PLACEMENT > 5 YRS OF AGE [ORC2357]  IR LYMPH NODE BIOPSY [[id number]]    Proceduralist: Sonu Denson PA-C    Assistant: None    Time Out: Prior to the start of the procedure and with procedural staff participation, I verbally confirmed the patient s identity using two indicators, relevant allergies, that the procedure was appropriate and matched the consent or emergent situation, and that the correct equipment/implants were available. Immediately prior to starting the procedure I conducted the Time Out with the procedural staff and re-confirmed the patient s name, procedure, and site/side. (The Joint Commission universal protocol was followed.)  Yes        Sedation: Monitored Anesthesia Care (MAC) administered and documented by Anesthesia Care Provider    Findings:   1)  Completed ultrasound guided RIGHT axillary lymph node biopsy. Technically difficult due to patient's body habitus, multiple nodes, and compliant tissue. Seven samples were taken from two sites and delivered to in-room pathology. Their impression was that the tissue was not touch prepping well and specimen was likely inadequate. Imaging and yield could not be improved, so procedure was aborted. Patient may need repeat biopsy, excisional biopsy, or biopsy of an alternate site. Dx: Malignant neoplasm of cervix, unspecified site; Enlarged lymph nodes. Jarett. MAC  2)  Completed placement of an 8 [place] 25 cm, Bard ClearVUE isp brand, single lumen venous chest PowerPort via RIJV. Tip lying in the right atrium. Reservoir sutured in pocket x2 with Ethilon suture. PORT is ready for immediate use. Dx: Malignant neoplasm of cervix, unspecified site. Jarett. MAC <1    Estimated Blood Loss: Minimal    Fluoroscopy Time:  minute(s)    SPECIMENS: Core needle biopsy specimens sent for pathological analysis    Complications: 1. None     Condition: Stable    Plan:     Comments:  See dictated procedure note for full details.    Sonu Denson PA-C

## 2017-09-21 NOTE — ANESTHESIA CARE TRANSFER NOTE
Patient: Ashvin Monique Tiwari    Procedure(s):  Single Lumen Chest Power Port, Right Axillary Lymph Node Biopsy - Wound Class: I-Clean   - Wound Class: I-Clean    Diagnosis: Cervical Cancer  Diagnosis Additional Information: No value filed.    Anesthesia Type:   MAC     Note:  Airway :Room Air  Patient transferred to:Phase II        Vitals: (Last set prior to Anesthesia Care Transfer)    CRNA VITALS  9/21/2017 1004 - 9/21/2017 1038      9/21/2017             NIBP: 172/80    NIBP Mean: 92                Electronically Signed By: ROSA Erazo CRNA  September 21, 2017  10:38 AM

## 2017-09-21 NOTE — PROGRESS NOTES
Infusion Nursing Note:  Ashvin Tiwari presents today for Cycle 1 Day 1 Taxol, Carboplatin, and Avastin.    Patient seen by provider today: No    Intravenous Access:  Implanted Port.    Treatment Conditions:  Lab Results   Component Value Date    HGB 8.7 09/21/2017     Lab Results   Component Value Date    WBC 6.1 09/21/2017      Lab Results   Component Value Date    ANEU 4.1 09/21/2017     Lab Results   Component Value Date     09/21/2017      Lab Results   Component Value Date     09/21/2017                   Lab Results   Component Value Date    POTASSIUM 3.5 09/21/2017           Lab Results   Component Value Date    MAG 2.1 09/21/2017            Lab Results   Component Value Date    CR 0.80 09/21/2017                   Lab Results   Component Value Date    BEATRIZ 8.6 09/21/2017                Lab Results   Component Value Date    BILITOTAL 0.2 09/21/2017           Lab Results   Component Value Date    ALBUMIN 2.9 09/21/2017                    Lab Results   Component Value Date    ALT 80 09/21/2017           Lab Results   Component Value Date    AST 65 09/21/2017     Results reviewed, labs MET treatment parameters, ok to proceed with treatment.  Urine Protein Negative.  BP:  151/71.    Note: Patient is new to the infusion room today and is receiving Taxol, Carboplatin, and Avastin for the first time.  Patient oriented to infusion room, location of bathrooms and nutrition stations, and call light.  Verified that patient recieved written chemotherapy information previously.  Verbally reviewed chemotherapy teaching, side effects, take-home medications, and follow-up schedule with patient. Patient instructed to call triage with any questions or if she experiences a temperature >100.5, shaking chills, uncontrolled nausea/vomiting/diarrhea, dizziness, shortness of breath, bleeding not relieved with pressure, or with any other concerns.  Instructed patient to call the after hours nurse line or [phone]  on nights/weekends/holidays.    Post Infusion Assessment:  Patient tolerated infusion without incident.  Blood return noted pre and post infusion.  Access discontinued per protocol.    Discharge Plan:   Prescription refills given for Oxycodone, Ativan, and Compazine.  Discharge instructions reviewed with: Patient and Family.  Patient and/or family verbalized understanding of discharge instructions and all questions answered.  Copy of AVS reviewed with patient and/or family.  Patient will return 10/12/17 for next appointment.  Patient discharged in stable condition accompanied by: sister.  Departure Mode: Wheelchair.    ROSA ISELA SRIVASTAVA RN

## 2017-09-21 NOTE — ANESTHESIA POSTPROCEDURE EVALUATION
Patient: Ashvin Tiwari    Procedure(s):  Single Lumen Chest Power Port, Right Axillary Lymph Node Biopsy - Wound Class: I-Clean   - Wound Class: I-Clean    Diagnosis:Cervical Cancer  Diagnosis Additional Information: No value filed.    Anesthesia Type:  MAC    Note:  Anesthesia Post Evaluation    Patient location during evaluation: PACU  Patient participation: Able to fully participate in evaluation  Level of consciousness: awake  Pain management: adequate  Airway patency: patent  Cardiovascular status: acceptable  Respiratory status: acceptable  Hydration status: balanced  PONV: none     Anesthetic complications: None          Last vitals:  Vitals:    09/21/17 1035 09/21/17 1050 09/21/17 1100   BP: 143/83 141/79 147/80   Resp: 14 14 14   Temp: 36.8  C (98.2  F)  36.8  C (98.2  F)   SpO2: 95% 95% 95%         Electronically Signed By: Mike Fuentes MD  September 21, 2017  12:00 PM

## 2017-09-21 NOTE — IP AVS SNAPSHOT
MRN:[id number]                      After Visit Summary   9/21/2017    Ashvin Tiwari    MRN: [id number]           Thank you!     Thank you for choosing [facility] for your care. Our goal is always to provide you with excellent care. Hearing back from our patients is one way we can continue to improve our services. Please take a few minutes to complete the written survey that you may receive in the mail after you visit with us. Thank you!        Patient Information     Date Of Birth          1955        About your hospital stay     You were admitted on:  September 21, 2017 You last received care in the:  [facility]    You were discharged on:  September 21, 2017       Who to Call     For medical emergencies, please call 911.  For non-urgent questions about your medical care, please call your primary care provider or clinic, [phone]  For questions related to your surgery, please call your surgery clinic        Attending Provider     Provider Specialty    Sonu Denson PA-C Radiology       Primary Care Provider Office Phone # Fax #    Chelsea Wren [phone]      Your next 10 appointments already scheduled     Sep 21, 2017 11:30 AM CDT   Infusion 360 with  ONCOLOGY INFUSION, UC 24 ATC   [facility] ([facility] [place])    [address] [place] [address] [phone]            Oct 12, 2017  7:00 AM CDT   [facility] Draw with  [facility] LAB DRAW   [facility] Draw ([facility])    [address] [id number] [phone]            Oct 12, 2017  7:30 AM CDT   (Arrive by 7:15 AM)   Return Active Treatment with ROSA Mendoza CNP   [facility] ([facility] [place])    [address] [id number] [phone]            Oct 12,  2017  8:30 AM CDT   Infusion 360 with UC ONCOLOGY INFUSION, UC 20 ATC   [facility] ([facility])    [address] [address]  2nd Floor  [facility] [address]   [phone]            Nov 02, 2017  7:00 AM CDT   [facility] Lab Draw with  MASONIC LAB DRAW   [facility] Lab Draw ([facility])    [address] [address]   [phone]            Nov 02, 2017  7:30 AM CDT   (Arrive by 7:15 AM)   Return Active Treatment with ROSA Mendoza CNP   [facility] ([facility])    [address]  2nd Floor  [facility] [address]   [phone]            Nov 02, 2017  8:30 AM CDT   Infusion 360 with UC ONCOLOGY INFUSION, UC 19 ATC   [facility] ([facility])    [address] [address]   [phone]              Further instructions from your care team         A collaboration between [facility] and [facility]  Experts in minimally invasive, targeted treatments performed using imaging guidance    Venous Access Device,  Port Catheter or Tunneled Central Line Placement    Today you had a procedure today to install a venous access device; either a tunneled central vein catheter or a subcutaneous port catheter.    One of our Radiology PAs performed this procedure for you today:  ? Maico Thomas PA-C  ? CHARLEE Sanchez PA-C  ? Aguilar Wright PA-C  ? Michelle Sparrow PA-C   ? Stevo Arguelles PA-C    After you go home:  - Drink plenty of fluids.  Generally 6-8 (8 ounce) glasses a day is recommended.  - Resume your regular diet unless otherwise ordered by a medical provider.  - Keep any applied tape/gauze dressings clean and dry.  Change tape/gauze dressings if they get wet or soiled.  - You may shower the following day after procedure,  however cover and protect from moisture any tape/gauze dressings.  You may let water hit and run over dried skin glue, but do not scrub.  Pat the area dry after showering.  - Port placement incisions are closed with absorbable suture, meaning they do not need to be removed at a later date, and a topical skin adhesive (skin glue).  This glue will wear off in 7-14 days.  Do not remove before this time.  If 14 days have passed and residual glue is present, you may gently remove it.  - Do not apply gels, lotions, or ointments to the glue site for the first 10 days as this may cause the glue to prematurely soften and fail.  - Do not perform strenuous activities or lift greater than 10 pounds for the next three days.  - If there is bleeding or oozing from the procedure site, apply firm pressure to the area for 5-10 minutes.  If the bleeding continues seek medical advice at the numbers below.  - Mild procedure site discomfort can be treated with an ice pack and over-the-counter pain relievers.        For 24 hours after any sedation used:  - Relax and take it easy.  No strenuous activities.  - Do not drive or operate machines at home or at work.  - No alcohol consumption.  - Do not make any important or legal decisions.    Call our Interventional Radiology (IR) service if:  - If you start bleeding from the procedure site.  If you do start to bleed from the site, lie down and hold some pressure on the site.  Our radiology provider can help you decide if you need to return to the hospital.  - If you have new or worsening pain related to the procedure.  - If you have concerning swelling at the procedure site.  - If you develop persistent nausea or vomiting.  - If you develop hives or a rash or any unexplained itching.  - If you have a fever of greater than 100.5  F and chills in the first 5 days after procedure.  - Any other concerns related to your procedure.      [facility]  Interventional  Radiology (IR)  [address]  2nd Floor, [facility] [place] [address]    Contact Number:  [phone]  (IR control desk)  - Monday - Friday 8:00 am - 4:30 pm    After hours for urgent concerns:  [phone]  - After 4:30 pm Monday - Friday, Weekends and Holidays.   - Ask for Interventional Radiology on-call.  Someone is available 24 hours a day.  - [facility] toll free number:  [phone]        Pending Results     Date and Time Order Name Status Description    2017 0944 Surgical pathology exam In process     2017 0749 IR LYMPH NODE BIOPSY In process     2017 0749 IR CHEST PORT PLACEMENT > 5 YRS OF AGE In process             Admission Information     Date & Time Provider Department Dept. Phone    2017 Sonu Denson PA-C [facility] [phone]      Your Vitals Were     Blood Pressure Temperature Respirations Pulse Oximetry          155/78 98.6  F (37  C) (Temporal) 15 94%        MyChart Information     Goodie Goodie App is an electronic gateway that provides easy, online access to your medical records. With Goodie Goodie App, you can request a clinic appointment, read your test results, renew a prescription or communicate with your care team.     To sign up for Goodie Goodie App visit the website at [email or web address]   You will be asked to enter the access code listed below, as well as some personal information. Please follow the directions to create your username and password.     Your access code is: [email or web address]  Expires: 2017  6:30 AM     Your access code will  in 90 days. If you need help or a new code, please contact your [facility] or call [phone] for assistance.        Care EveryWhere ID     This is your Care EveryWhere ID. This could be used by other organizations to access your [facility] medical records  [id number]        Equal Access to Services     LISSET LUGO AH: juan miguel Jimenez  adarshzane, nikkita karonitda ademary grace, elizabeth guanaan ah. So [facility] [phone].    ATENCIÓN: Si jazlyn thomas, tiene a mae disposición servicios gratuitos de asistencia lingüística. Llame al [phone].    We comply with applicable federal civil rights laws and [place] laws. We do not discriminate on the basis of race, color, national origin, age, disability sex, sexual orientation or gender identity.               Review of your medicines      UNREVIEWED medicines. Ask your doctor about these medicines        Dose / Directions    acetaminophen 325 MG tablet   Commonly known as:  TYLENOL   Used for:  Cancer related pain        Dose:  1000 mg   Take 3 tablets (975 mg) by mouth 3 times daily   Quantity:  100 tablet   Refills:  0       amLODIPine 2.5 MG tablet   Commonly known as:  NORVASC   Used for:  Essential hypertension        Dose:  2.5 mg   Take 1 tablet (2.5 mg) by mouth daily   Quantity:  30 tablet   Refills:  1       LORazepam 1 MG tablet   Commonly known as:  ATIVAN   Used for:  Malignant neoplasm of cervix, unspecified site (H)        Dose:  1 mg   Take 1 tablet (1 mg) by mouth every 6 hours as needed (Anxiety, Nausea/Vomiting or Sleep)   Quantity:  30 tablet   Refills:  2       oxyCODONE 5 MG IR tablet   Commonly known as:  ROXICODONE   Used for:  Cancer related pain        Dose:  5-10 mg   Take 1-2 tablets (5-10 mg) by mouth every 3 hours as needed for moderate to severe pain   Quantity:  100 tablet   Refills:  0       polyethylene glycol powder   Commonly known as:  MIRALAX   Used for:  Cancer related pain, Generalized abdominal pain        Dose:  1-2 capful   Take 17-34 g (1-2 capfuls) by mouth daily   Quantity:  510 g   Refills:  1       prochlorperazine 10 MG tablet   Commonly known as:  COMPAZINE   Used for:  Malignant neoplasm of cervix, unspecified site (H)        Dose:  10 mg   Take 1 tablet (10 mg) by mouth every 6 hours as needed (Nausea/Vomiting)   Quantity:  30 tablet    Refills:  2       senna-docusate 8.6-50 MG per tablet   Commonly known as:  SENOKOT-S;PERICOLACE   Used for:  Cancer related pain        Dose:  2-4 tablet   Take 2-4 tablets by mouth 2 times daily   Quantity:  100 tablet   Refills:  1                Protect others around you: Learn how to safely use, store and throw away your medicines at www.disposemymeds.org.             Medication List: This is a list of all your medications and when to take them. Check marks below indicate your daily home schedule. Keep this list as a reference.      Medications           Morning Afternoon Evening Bedtime As Needed    acetaminophen 325 MG tablet   Commonly known as:  TYLENOL   Take 3 tablets (975 mg) by mouth 3 times daily   Last time this was given:  975 mg on 9/21/2017  6:54 AM                                amLODIPine 2.5 MG tablet   Commonly known as:  NORVASC   Take 1 tablet (2.5 mg) by mouth daily                                LORazepam 1 MG tablet   Commonly known as:  ATIVAN   Take 1 tablet (1 mg) by mouth every 6 hours as needed (Anxiety, Nausea/Vomiting or Sleep)                                oxyCODONE 5 MG IR tablet   Commonly known as:  ROXICODONE   Take 1-2 tablets (5-10 mg) by mouth every 3 hours as needed for moderate to severe pain                                polyethylene glycol powder   Commonly known as:  MIRALAX   Take 17-34 g (1-2 capfuls) by mouth daily                                prochlorperazine 10 MG tablet   Commonly known as:  COMPAZINE   Take 1 tablet (10 mg) by mouth every 6 hours as needed (Nausea/Vomiting)                                senna-docusate 8.6-50 MG per tablet   Commonly known as:  SENOKOT-S;PERICOLACE   Take 2-4 tablets by mouth 2 times daily

## 2017-09-22 NOTE — PROGRESS NOTES
Care Coordinator Note  Received a call from Infusion patient requesting more oxycodone.     Reviewed with Rachelle Miranda Pt is rating her pain 6/10 and taking 2 oxycodone every 3 hours around the clock. Gets relief with meds. Is also taking her tylenol as prescribed  Prescription fill and a order for Palliative Care placed. Patient was seen by Palliative while she was in the hospital.   Prescription was carried down to the Pharmacy.       Patient verbalized back understanding of the above information discussed.   Rima MADSEN, RN, OCN  Care Coordinator   Gynecologic Cancer   Office [phone]  Pager [phone] #[phone]

## 2017-09-26 NOTE — PROGRESS NOTES
Addendum:  CT guided biopsy of axillary negative but limited sampling.  I do not think it is beneficial to pursue more invasive excisional biopsy.  PET consistent with widely metastatic disease.  Would continue treatment as currently planned with repeat imaging after three cycles.    Barbara Soto MD  Gynecologic Oncology  [facility]

## 2017-10-04 NOTE — ED AVS SNAPSHOT
[facility], [place], Emergency Department    [address] [facility] [address]    Phone:  [phone]                                       Ashvin Tiwari   MRN: [id number]    Department:  [facility], Emergency Department   Date of Visit:  10/4/2017           After Visit Summary Signature Page     I have received my discharge instructions, and my questions have been answered. I have discussed any challenges I see with this plan with the nurse or doctor.    ..........................................................................................................................................  Patient/Patient Representative Signature      ..........................................................................................................................................  Patient Representative Print Name and Relationship to Patient    ..................................................               ................................................  Date                                            Time    ..........................................................................................................................................  Reviewed by Signature/Title    ...................................................              ..............................................  Date                                                            Time

## 2017-10-04 NOTE — ED AVS SNAPSHOT
[facility], Emergency Department    [address] [facility] [address]    Phone:  [phone]                                       Ashvin Tiwari   MRN: [id number]    Department:  [facility], Emergency Department   Date of Visit:  10/4/2017           Patient Information     Date Of Birth          1955        Your diagnoses for this visit were:     Myalgia     Malignant neoplasm of cervix, unspecified site (H)     Urinary tract infection with hematuria, site unspecified     Opiate withdrawal (H)        You were seen by Tri Mendez MD.        Discharge Instructions       You should taper the oxycodone, if you decide you don't want to keep taking it, rather than stopping quickly. Return with any worsening or concerns. Follow up with your oncologist within a week.    Future Appointments        Provider Department Dept Phone Center    10/10/2017 2:30 PM Briana Medina MD [facility] [place] [address] [phone] [facility]    10/12/2017 7:00 AM [facility] Draw [facility] Draw [phone] [facility]    10/12/2017 7:30 AM ROSA Mendoza CNP [facility] [place] [address] [phone] [facility]    10/12/2017 8:30 AM [facility]; Oncology Infusion [facility] [place] [address] [phone] [facility]    11/2/2017 7:00 AM [facility] Draw [facility] Draw [phone] [facility]    11/2/2017 7:30 AM ROSA Mendoza CNP [facility] [place] [address] [phone] [facility]    11/2/2017 8:30 AM [facility]; Oncology Infusion [facility] [place] [address] [phone] [facility]      24 Hour Appointment Hotline       To make an appointment at any [facility], call [phone] ([phone]). If you don't have a family doctor or clinic, we will help you find one. [facility] clinics are conveniently located to serve the needs of you and your family.             Review of your medicines      START taking        Dose / Directions Last dose  taken    cefdinir 300 MG capsule   Commonly known as:  OMNICEF   Dose:  300 mg   Quantity:  14 capsule        Take 1 capsule (300 mg) by mouth 2 times daily for 7 days   Refills:  0          CONTINUE these medicines which may have CHANGED, or have new prescriptions. If we are uncertain of the size of tablets/capsules you have at home, strength may be listed as something that might have changed.        Dose / Directions Last dose taken    * oxyCODONE 5 MG IR tablet   Commonly known as:  ROXICODONE   Dose:  5-10 mg   What changed:  Another medication with the same name was added. Make sure you understand how and when to take each.   Quantity:  50 tablet        Take 1-2 tablets (5-10 mg) by mouth every 3 hours as needed for moderate to severe pain   Refills:  0        * oxyCODONE 5 MG IR tablet   Commonly known as:  ROXICODONE   Dose:  5-10 mg   What changed:  You were already taking a medication with the same name, and this prescription was added. Make sure you understand how and when to take each.   Quantity:  15 tablet        Take 1-2 tablets (5-10 mg) by mouth 3 times daily   Refills:  0        * Notice:  This list has 2 medication(s) that are the same as other medications prescribed for you. Read the directions carefully, and ask your doctor or other care provider to review them with you.      Our records show that you are taking the medicines listed below. If these are incorrect, please call your family doctor or clinic.        Dose / Directions Last dose taken    acetaminophen 325 MG tablet   Commonly known as:  TYLENOL   Dose:  1000 mg   Quantity:  100 tablet        Take 3 tablets (975 mg) by mouth 3 times daily   Refills:  0        amLODIPine 2.5 MG tablet   Commonly known as:  NORVASC   Dose:  2.5 mg   Quantity:  30 tablet        Take 1 tablet (2.5 mg) by mouth daily   Refills:  1        LORazepam 1 MG tablet   Commonly known as:  ATIVAN   Dose:  1 mg   Quantity:  30 tablet        Take 1 tablet (1 mg) by  mouth every 6 hours as needed (Anxiety, Nausea/Vomiting or Sleep)   Refills:  2        polyethylene glycol powder   Commonly known as:  MIRALAX   Dose:  1-2 capful   Quantity:  510 g        Take 17-34 g (1-2 capfuls) by mouth daily   Refills:  1        prochlorperazine 10 MG tablet   Commonly known as:  COMPAZINE   Dose:  10 mg   Quantity:  30 tablet        Take 1 tablet (10 mg) by mouth every 6 hours as needed (Nausea/Vomiting)   Refills:  2        senna-docusate 8.6-50 MG per tablet   Commonly known as:  SENOKOT-S;PERICOLACE   Dose:  2-4 tablet   Quantity:  100 tablet        Take 2-4 tablets by mouth 2 times daily   Refills:  1                Prescriptions were sent or printed at these locations (2 Prescriptions)                   Other Prescriptions                Printed at Department/Unit printer (2 of 2)         cefdinir (OMNICEF) 300 MG capsule               oxyCODONE (ROXICODONE) 5 MG IR tablet                Procedures and tests performed during your visit     Blood Culture ONE site    CBC with platelets differential    CK total    Chest XR,  PA & LAT    Comprehensive metabolic panel    Lactic acid    Lipase    UA with Microscopic      Orders Needing Specimen Collection     None      Pending Results     Date and Time Order Name Status Description    10/5/2017 0023 Chest XR,  PA & LAT Preliminary     10/5/2017 0023 Blood Culture ONE site In process             Pending Culture Results     Date and Time Order Name Status Description    10/5/2017 0023 Blood Culture ONE site In process             Pending Results Instructions     If you had any lab results that were not finalized at the time of your Discharge, you can call the ED Lab Result RN at [phone]. You will be contacted by this team for any positive Lab results or changes in treatment. The nurses are available 7 days a week from 10A to 6:30P.  You can leave a message 24 hours per day and they will return your call.        Thank you for choosing  "[facility]       Thank you for choosing [facility] for your care. Our goal is always to provide you with excellent care. Hearing back from our patients is one way we can continue to improve our services. Please take a few minutes to complete the written survey that you may receive in the mail after you visit with us. Thank you!        [employer] Information     [employer] lets you send messages to your doctor, view your test results, renew your prescriptions, schedule appointments and more. To sign up, go to [email or web address] . Click on \"Log in\" on the left side of the screen, which will take you to the Welcome page. Then click on \"Sign up Now\" on the right side of the page.     You will be asked to enter the access code listed below, as well as some personal information. Please follow the directions to create your username and password.     Your access code is: [email or web address]  Expires: 2017  6:30 AM     Your access code will  in 90 days. If you need help or a new code, please call your [facility] clinic or [phone].        Care EveryWhere ID     This is your Care EveryWhere ID. This could be used by other organizations to access your [facility] medical records  [id number]        Equal Access to Services     LISSET LUGO : Sundar Reese, wamaryurida liyah, qaybta kaalmada ademary grace, elizabeth cheema. So [facility] [phone].    ATENCIÓN: Si habla español, tiene a mae disposición servicios gratuitos de asistencia lingüística. Llame al [phone].    We comply with applicable federal civil rights laws and [place] laws. We do not discriminate on the basis of race, color, national origin, age, disability, sex, sexual orientation, or gender identity.            After Visit Summary       This is your record. Keep this with you and show to your community pharmacist(s) and doctor(s) at your next visit.                  "

## 2017-10-05 NOTE — ED PROVIDER NOTES
History     Chief Complaint   Patient presents with     Headache     HPI  Ashvin Tiwari is a 62 year old female with a new diagnosis of metastatic cervical cancer, who presents to Emergency Department today with complaint of body aches, headache, as well as other complaints.  She states her last chemotherapy processes (which was her 1st course) was September 21.  Since then,  she has been feeling periodically dizzy and lightheaded, nauseous, periodic vomiting, poor appetite, shortness of breath, and exhausted.  There has been no cough or diarrhea.  She has had some chronic ongoing abdominal fullness and distention, worse in the periumbilical region and bilateral flanks.  No dysuria.  Over the last 3 days though, she says all this has seemed somewhat worse.  She has also developed an diffuse body aches, somewhat more bothersome in the neck and shoulders, as well as,  posterior headache.  She has been using primarily just Tylenol and ibuprofen for pain.  She had been using oxycodone regularly, 10 mg 3 times a day up until a few days ago, which she stopped as she felt it was probably making her more nauseated.    This part of the medical record was transcribed by Héctor Wilkes Scribe, from a dictation done by Tri Mendez MD.     PAST MEDICAL HISTORY  Past Medical History:   Diagnosis Date     Cancer (H)     cervical     Hypertension      PAST SURGICAL HISTORY  Past Surgical History:   Procedure Laterality Date     BIOPSY/EXCISION LYMPH NODE(S), NEEDLE, SUPERFICIAL (CERVICAL/INGUINAL/AXILLARY) Right 9/21/2017    Procedure: BIOPSY/EXCISION LYMPH NODE(S), NEEDLE, SUPERFICIAL (CERVICAL/INGUINAL/AXILLARY);;  Surgeon: Sonu Denson PA-C;  [facility]     INSERT PORT VASCULAR ACCESS Right 9/21/2017    Procedure: INSERT PORT VASCULAR ACCESS;  Single Lumen Chest Power Port, Right Axillary Lymph Node Biopsy;  Surgeon: Sonu Denson PA-C;  Location: [facility]     no previous surgery    "    FAMILY HISTORY  No family history on file.  SOCIAL HISTORY  Social History   Substance Use Topics     Smoking status: Never Smoker     Smokeless tobacco: Never Used     Alcohol use No     MEDICATIONS  Current Facility-Administered Medications   Medication     0.9% sodium chloride BOLUS     heparin 100 UNIT/ML injection 5 mL     Current Outpatient Prescriptions   Medication     cefdinir (OMNICEF) 300 MG capsule     oxyCODONE (ROXICODONE) 5 MG IR tablet     oxyCODONE (ROXICODONE) 5 MG IR tablet     LORazepam (ATIVAN) 1 MG tablet     prochlorperazine (COMPAZINE) 10 MG tablet     acetaminophen (TYLENOL) 325 MG tablet     senna-docusate (SENOKOT-S;PERICOLACE) 8.6-50 MG per tablet     polyethylene glycol (MIRALAX) powder     amLODIPine (NORVASC) 2.5 MG tablet     ALLERGIES  No Known Allergies          I have reviewed the Medications, Allergies, Past Medical and Surgical History, and Social History in the Epic system.    Review of Systems   Constitutional: Positive for appetite change (loss of appetite) and fatigue.   Respiratory: Positive for shortness of breath. Negative for cough.    Gastrointestinal: Positive for abdominal distention, nausea and vomiting. Negative for diarrhea.   Genitourinary: Negative for dysuria.   Musculoskeletal: Positive for myalgias (neck and shoulders).   Neurological: Positive for dizziness, light-headedness and headaches (posterior).   All other systems reviewed and are negative.      Physical Exam   BP: 174/85  Pulse: 118  Temp: 99.4  F (37.4  C)  Resp: 18  Height: 160 cm (5' 3\")  Weight: 85.8 kg (189 lb 2.5 oz)  SpO2: 98 %  Physical Exam   Constitutional: She is oriented to person, place, and time. No distress.   HENT:   Head: Atraumatic.   Mouth/Throat: Oropharynx is clear and moist. No oropharyngeal exudate.   Eyes: Pupils are equal, round, and reactive to light. No scleral icterus.   Neck: Neck supple.   Cardiovascular: Normal heart sounds and intact distal pulses.  "   Pulmonary/Chest: Breath sounds normal. No respiratory distress.   Abdominal: Soft. There is tenderness (Mild mid abdominal tenderness without guarding).   Musculoskeletal: She exhibits tenderness (Diffuse muscular tenderness, including neck, back, extremities.). She exhibits no edema.   Neurological: She is alert and oriented to person, place, and time. No cranial nerve deficit. She exhibits normal muscle tone. Coordination normal.   Skin: Skin is warm. No rash noted. She is not diaphoretic.       ED Course     ED Course     Procedures             Critical Care time:  none             Labs Ordered and Resulted from Time of ED Arrival Up to the Time of Departure from the ED   CBC WITH PLATELETS DIFFERENTIAL - Abnormal; Notable for the following:        Result Value    Hemoglobin 8.5 (*)     Hematocrit 29.4 (*)     MCV 72 (*)     MCH 20.8 (*)     MCHC 28.9 (*)     RDW 19.4 (*)     All other components within normal limits   COMPREHENSIVE METABOLIC PANEL - Abnormal; Notable for the following:     Glucose 105 (*)     Albumin 2.8 (*)     All other components within normal limits   ROUTINE UA WITH MICROSCOPIC - Abnormal; Notable for the following:     Blood Urine Small (*)     Protein Albumin Urine 30 (*)     Leukocyte Esterase Urine Moderate (*)     WBC Urine 11 (*)     RBC Urine 24 (*)     Mucous Urine Present (*)     All other components within normal limits   LACTIC ACID WHOLE BLOOD   CK TOTAL   LIPASE   URINE CULTURE AEROBIC BACTERIAL            Assessments & Plan (with Medical Decision Making)   The patient's neurologic exam is normal.  Her neck is supple.  I don't think this is meningitis, she's had a headache and neck pain for 3 days, has no fever, no elevated white count, not toxic appearing.  Urine does look suspicious for UTI with 11 white cells, moderate LE, in what appears to be clean sample.  I will treat with Omnicef.  The remainder of her labs are not remarkable.  Lactic acid is normal.  Her myalgias all  seemed to start shortly after she abruptly discontinued her chronic oxycodone.  She did this because she thought it was making her nauseous.  I did give her 2 oxycodone here, liter of fluid, and she states she feels dramatically improved.  I do think at least some of her symptoms are related to opiate withdrawal.  I will give her small refill of her oxycodone, she states she only has a few left.  I've instructed her to, if she still wants to overall stop the oxycodone, to taper on a slower basis.  I gave examples to her, her and her family member verbalize understanding.  The patient states she feels she is able to go home, does not feel she needs to be admitted.  I did speak with the GYN on-call resident, who will have someone call her tomorrow and see how she is feeling.  She is instructed to return to the ER if new or worsening concerns.  She verbalizes understanding and is agreeable to plan.  She does have a follow-up planned within the next week.    I have reviewed the nursing notes.    I have reviewed the findings, diagnosis, plan and need for follow up with the patient.    Discharge Medication List as of 10/5/2017  2:10 AM      START taking these medications    Details   cefdinir (OMNICEF) 300 MG capsule Take 1 capsule (300 mg) by mouth 2 times daily for 7 days, Disp-14 capsule, R-0, Local Print      !! oxyCODONE (ROXICODONE) 5 MG IR tablet Take 1-2 tablets (5-10 mg) by mouth 3 times daily, Disp-15 tablet, R-0, Local Print       !! - Potential duplicate medications found. Please discuss with provider.          Final diagnoses:   Myalgia   Malignant neoplasm of cervix, unspecified site (H)   Urinary tract infection with hematuria, site unspecified   Opiate withdrawal (H)       10/4/2017   [facility], [place], EMERGENCY DEPARTMENT     Tri Mendez MD  10/05/17 1106

## 2017-10-05 NOTE — ED NOTES
Patient presents ambulatory with complaints of headache and neck pain and body aches. Patient is currently receiving chemo treatments for esophagus cancer. Last chemo 10/1.

## 2017-10-05 NOTE — TELEPHONE ENCOUNTER
Reason for Disposition    Difficulty breathing or unusual sweating (e.g., sweating without exertion)    Protocols used: NECK PAIN OR STIFFNESS-ADULT-AH

## 2017-10-05 NOTE — DISCHARGE INSTRUCTIONS
You should taper the oxycodone, if you decide you don't want to keep taking it, rather than stopping quickly. Return with any worsening or concerns. Follow up with your oncologist within a week.

## 2017-10-10 NOTE — MR AVS SNAPSHOT
After Visit Summary   10/10/2017    Ashvin Tiwari    MRN: [id number]           Patient Information     Date Of Birth          1955        Visit Information        Provider Department      10/10/2017 2:30 PM Briana Medina MD [facility]        Today's Diagnoses     Cancer related pain        Generalized abdominal pain        Malignant neoplasm of cervix, unspecified site (H)          Care Instructions    Pain: head and stomach.  - start tylenol 1000mg three times a day - morning, mid day, before bed  - trial of tramadol 2 tablets every morning and before bed,  - tramadol 1-2 tablets twice a day if needed in addition to the scheduled tramadol in the morning and evening    Nausea:   - scheduled compazine 10mg three times a day  - ativan if needed for nausea not controlled with the compazine     Constipation prevention: the pain medication will cause constipation and constipation causes increased nausea and pain so take medication to prevent constipation.  - senna 2-4 tablets twice a day everyday. Adjust as needed or daily bowel movements.   - if not having regular bowel movements with senna, add miralax    Shortness of breath. Walk slowly, sit frequently, have people help you. Have a portable fan that you can direct to your face and cheek when feeling short of breath    Follow up with me in 4 weeks    Kanika Nicky is your cancer care coordinator. Her number is #[phone]            Follow-ups after your visit        Your next 10 appointments already scheduled     Oct 12, 2017  7:00 AM CDT   [facility] Lab Draw with  [facility] DRAW   [facility] Draw ([facility])    [address] [place] [id number]   [phone]            Oct 12, 2017  7:30 AM CDT   (Arrive by 7:15 AM)   Return Active Treatment with ROSA Mendoza CNP   [facility] ([facility])    [address] [address]   [phone]            Oct 12, 2017  8:30 AM CDT   Infusion 360 with [facility] ONCOLOGY INFUSION, UC 20 ATC   [facility] ([facility])    [address] [place] [address]   [phone]            Oct 24, 2017 12:00 PM CDT   (Arrive by 11:45 AM)   Return Visit with Briana Medina MD   [facility] ([facility])    [address] [place] [phone]   [phone]            Nov 02, 2017  7:00 AM CDT   [facility] Lab Draw with  [facility] DRAW   [facility] Lab Draw ([facility])    [address] [place] [address]   [phone]            Nov 02, 2017  7:30 AM CDT   (Arrive by 7:15 AM)   Return Active Treatment with ROSA Mendoza CNP   [facility] ([facility])    [address] [place] [address]   [phone]            Nov 02, 2017  8:30 AM CDT   Infusion 360 with UC ONCOLOGY INFUSION, UC 19 ATC   [facility] ([facility])    [address] [place] [address]   [phone]            Nov 07, 2017 10:00 AM CST   (Arrive by 9:45 AM)   Return Visit with Briana Medina MD   [facility] ([facility])    [address] [place] [address]   [phone]              Who to contact     If you have questions or need follow up information about today's clinic visit or your schedule please contact [facility] directly at [phone].  Normal or non-critical lab and imaging results will be communicated to you by MyChart, letter or phone within 4 business days after the clinic has received the results. If you do not hear from us within 7  "days, please contact the clinic through [employer] or phone. If you have a critical or abnormal lab result, we will notify you by phone as soon as possible.  Submit refill requests through [employer] or call your pharmacy and they will forward the refill request to us. Please allow 3 business days for your refill to be completed.          Additional Information About Your Visit        [employer] Information     [employer] lets you send messages to your doctor, view your test results, renew your prescriptions, schedule appointments and more. To sign up, go to [email or web address] . Click on \"Log in\" on the left side of the screen, which will take you to the Welcome page. Then click on \"Sign up Now\" on the right side of the page.     You will be asked to enter the access code listed below, as well as some personal information. Please follow the directions to create your username and password.     Your access code is: [email or web address]  Expires: 2017  6:30 AM     Your access code will  in 90 days. If you need help or a new code, please call your [place] clinic or [phone].        Care EveryWhere ID     This is your Care EveryWhere ID. This could be used by other organizations to access your [place] medical records  [id number]        Your Vitals Were     Pulse Temperature Height Pulse Oximetry BMI (Body Mass Index)       115 99.3  F (37.4  C) (Oral) 1.6 m (5' 3\") 98% 32.68 kg/m2        Blood Pressure from Last 3 Encounters:   10/10/17 (!) 182/97   10/05/17 150/82   17 151/70    Weight from Last 3 Encounters:   10/10/17 83.7 kg (184 lb 8 oz)   10/04/17 85.8 kg (189 lb 2.5 oz)   17 85.8 kg (189 lb 2.5 oz)              Today, you had the following     No orders found for display         Today's Medication Changes          These changes are accurate as of: 10/10/17  4:25 PM.  If you have any questions, ask your nurse or doctor.               Start taking these medicines.        Dose/Directions    traMADol " 50 MG tablet   Commonly known as:  ULTRAM   Used for:  Malignant neoplasm of cervix, unspecified site (H), Cancer related pain   Started by:  Briana Medina MD        Take 2 tablets (100mg) every morning and evening before bed.  Take 1-2 tablets twice a day if needed for pain not controlled with the morning and evening doses.   Quantity:  120 tablet   Refills:  0         Stop taking these medicines if you haven't already. Please contact your care team if you have questions.     oxyCODONE 5 MG IR tablet   Commonly known as:  ROXICODONE   Stopped by:  Briana Medina MD                Where to get your medicines      These medications were sent to [facility] [address]  [address], [facility] [address]    Hours:  TRANSPLANT PHONE NUMBER [phone] Phone:  [phone]     polyethylene glycol powder    prochlorperazine 10 MG tablet    senna-docusate 8.6-50 MG per tablet         Some of these will need a paper prescription and others can be bought over the counter.  Ask your nurse if you have questions.     Bring a paper prescription for each of these medications     LORazepam 1 MG tablet    traMADol 50 MG tablet                Primary Care Provider Office Phone # Fax #    Chelsea Wren [phone]       [facility] [address]  [facility] [address]        Equal Access to Services     LISSET LUGO : Hadii elvia ku hadasho Soomaali, waaxda luqadaha, qaybta kaalmada adeegyada, elizabeth paul haychris cheema. So [facility] [phone].    ATENCIÓN: Si habla español, tiene a mae disposición servicios gratcucaos de asistencia lingüística. [place] al [phone].    We comply with applicable federal civil rights laws and [place] laws. We do not discriminate on the basis of race, color, national origin, age, disability, sex, sexual orientation, or gender identity.            Thank you!     Thank you  for [facility]  for your care. Our goal is always to provide you with excellent care. Hearing back from our patients is one way we can continue to improve our services. Please take a few minutes to complete the written survey that you may receive in the mail after your visit with us. Thank you!             Your Updated Medication List - Protect others around you: Learn how to safely use, store and throw away your medicines at www.disposemymeds.org.          This list is accurate as of: 10/10/17  4:25 PM.  Always use your most recent med list.                   Brand Name Dispense Instructions for use Diagnosis    acetaminophen 325 MG tablet    TYLENOL    100 tablet    Take 3 tablets (975 mg) by mouth 3 times daily    Cancer related pain       amLODIPine 2.5 MG tablet    NORVASC    30 tablet    Take 1 tablet (2.5 mg) by mouth daily    Essential hypertension       cefdinir 300 MG capsule    OMNICEF    14 capsule    Take 1 capsule (300 mg) by mouth 2 times daily for 7 days        LORazepam 1 MG tablet    ATIVAN    30 tablet    Take 1 tablet (1 mg) by mouth every 6 hours as needed (Anxiety, Nausea/Vomiting or Sleep)    Malignant neoplasm of cervix, unspecified site (H)       polyethylene glycol powder    MIRALAX    510 g    Take 17-34 g (1-2 capfuls) by mouth daily    Cancer related pain, Generalized abdominal pain       prochlorperazine 10 MG tablet    COMPAZINE    30 tablet    Take 1 tablet (10 mg) by mouth every 6 hours as needed (Nausea/Vomiting)    Malignant neoplasm of cervix, unspecified site (H)       senna-docusate 8.6-50 MG per tablet    SENOKOT-S;PERICOLACE    100 tablet    Take 2-4 tablets by mouth 2 times daily    Cancer related pain       traMADol 50 MG tablet    ULTRAM    120 tablet    Take 2 tablets (100mg) every morning and evening before bed.  Take 1-2 tablets twice a day if needed for pain not controlled with the morning and evening doses.    Malignant neoplasm of cervix,  unspecified site (H), Cancer related pain

## 2017-10-10 NOTE — NURSING NOTE
"Oncology Rooming Note    October 10, 2017 2:15 PM   Ashvin Tiwari is a 62 year old female who presents for:    Chief Complaint   Patient presents with     Oncology Clinic Visit     New Patient-Cervix Cancer     Initial Vitals: BP (!) 182/97 (BP Location: Right arm, Patient Position: Sitting, Cuff Size: Adult Regular)  Pulse 115  Temp 99.3  F (37.4  C) (Oral)  Ht 1.6 m (5' 3\")  Wt 83.7 kg (184 lb 8 oz)  SpO2 98%  BMI 32.68 kg/m2 Estimated body mass index is 32.68 kg/(m^2) as calculated from the following:    Height as of this encounter: 1.6 m (5' 3\").    Weight as of this encounter: 83.7 kg (184 lb 8 oz). Body surface area is 1.93 meters squared.  Severe Pain (7) Comment: Neck and abd.   No LMP recorded. Patient is postmenopausal.  Allergies reviewed: Yes  Medications reviewed: Yes    Medications: MEDICATION REFILLS NEEDED TODAY. Provider was notified.  Pharmacy name entered into EPIC: [facility] (USE ONLY AT [facility]) - [place], [place] - [address] [place] [address]    Clinical concerns: Refills on Lorazepam,Amlodipine, Questions Dr. Medina was notified.    15 minutes for nursing intake (face to face time)     Alexandra Perla LPN              "

## 2017-10-10 NOTE — PROGRESS NOTES
Palliative Care Outpatient Clinic Consultation Note    Patient:  Ashvin Tiwari 62 year old female who is presenting to the palliative medicine clinic today at the request of Dr. Soto for support and symptom management in newly dx, metastatic cervical cancer      Chief Complaint:   Metastatic cervical cancer  Back of head, neck and abdominal pain  Nausea  Anorexia  fatigue    History of Present Illness:  - pt is 62 yr old female from [facility] who developed sig vaginal bleeding in July, 2017 and was subsequently diagnosed with metastatic cervical cancer with lung lesions on scan that are consistent with metastatic dz but lung lesions not biopsy proven to be metatasis. She is s/p cycle 1 of carboplatin, paclitaxel and bevacizumab on 9/21/17.     She present today with complaints of pain in the back of her head radiating down neck as well as ongoing abdominal pain.     - Pain now in the back of head all the way down the back of neck. This pain ongoing for about 2 weeks. Never had this pain before.  Dull pain that is always there. Pain medications help but they don't take the pain completely away and pain still present. Heat sometimes helps. Pain in head is worse with lying down. Not able to sleep well at night due to pain in head. Last week tried two oxycodone and that made her vomit.    - pain in stomach as well and thighs. Stomach muscles hurting since July. Takes oxycodone 1 tablet (5mg) TID and that helps but pain still there. 2 tablets (10mg) made her vomit. Pains in stomach are bothersome but pain in head most bothersome.      - She also complains of sig SOB which is very frightening for her family to see as often she gets very short of breath when just walking short distances. Not everyday but when it occurs, she has to rest when walking very short distances (bed to bathroom).     Nausea: with constant nausea now. Taking medicine for nausea and sometimes it helps and sometimes not.  "Taking compazine she thinks. Maybe ran out of it. Taking ativan as well.     No appetite; +weight loss.     Review of Systems:  ROS: 10 point ROS neg other than the symptoms noted above in the HPI and here  Bowel movements twice a day.       Patient's Disease Understanding: \"cancer of the cervix, told it could be treated through chemo or radiation but with radiation it will come back and would therefore need chemo again so Dr. Soto recommended doing chemo first and then would have to be tested again to see if it has shrunk. When she initially presented, she had a lot of vaginal bleeding and peeing all the time but now no vaginal bleeding or peeing. Was told the the cancer would initially shrink and for some people it would go away.  Was told that the cancer was in the lungs but they did not get any result for the biopsy.     Functional Status: sleeping or in bed much of the day. Very tired but able to do her ADLs. Has a lot of family and support and everyone helping her out all the time. When her daughter is at work, [employer]az[employer] members and or family stay at the house.       Social History  Living Situation: lives with daughter who takes care of her. 2 biological kids, other adopted kids and many grandkids; 15+ siblings; from [facility].   Support System: Very large family all very supportive ava 2 of her sisters, one lives here and one in [place]; Jew; goes to [employer] every Sunday, bible studies, very involved in the community.   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   Substance Use/History of misuse: no      Family History  No family history on file.      Advance Care Planning:  Advance Directive:    No advanced directive  Code status full    No Known Allergies  Current Outpatient Prescriptions   Medication Sig Dispense Refill     cefdinir (OMNICEF) 300 MG capsule Take 1 capsule (300 mg) by mouth 2 times daily for 7 days 14 capsule 0     oxyCODONE (ROXICODONE) " "5 MG IR tablet Take 1-2 tablets (5-10 mg) by mouth every 3 hours as needed for moderate to severe pain 50 tablet 0     LORazepam (ATIVAN) 1 MG tablet Take 1 tablet (1 mg) by mouth every 6 hours as needed (Anxiety, Nausea/Vomiting or Sleep) 30 tablet 2     prochlorperazine (COMPAZINE) 10 MG tablet Take 1 tablet (10 mg) by mouth every 6 hours as needed (Nausea/Vomiting) 30 tablet 2     acetaminophen (TYLENOL) 325 MG tablet Take 3 tablets (975 mg) by mouth 3 times daily 100 tablet 0     senna-docusate (SENOKOT-S;PERICOLACE) 8.6-50 MG per tablet Take 2-4 tablets by mouth 2 times daily 100 tablet 1     polyethylene glycol (MIRALAX) powder Take 17-34 g (1-2 capfuls) by mouth daily 510 g 1     amLODIPine (NORVASC) 2.5 MG tablet Take 1 tablet (2.5 mg) by mouth daily 30 tablet 1     Past Medical History:   Diagnosis Date     Cancer (H)     cervical     Hypertension      Past Surgical History:   Procedure Laterality Date     BIOPSY/EXCISION LYMPH NODE(S), NEEDLE, SUPERFICIAL (CERVICAL/INGUINAL/AXILLARY) Right 9/21/2017    Procedure: BIOPSY/EXCISION LYMPH NODE(S), NEEDLE, SUPERFICIAL (CERVICAL/INGUINAL/AXILLARY);;  Surgeon: Sonu Denson PA-C;  Location:  OR     INSERT PORT VASCULAR ACCESS Right 9/21/2017    Procedure: INSERT PORT VASCULAR ACCESS;  Single Lumen Chest Power Port, Right Axillary Lymph Node Biopsy;  Surgeon: Sonu Denson PA-C;  Location:  OR     no previous surgery             Vital signs:    , Blood pressure (!) 182/97, pulse 115, temperature 99.3  F (37.4  C), temperature source Oral, height 1.6 m (5' 3\"), weight 83.7 kg (184 lb 8 oz), SpO2 98 %, not currently breastfeeding.  Estimated body mass index is 32.68 kg/(m^2) as calculated from the following:    Height as of this encounter: 1.6 m (5' 3\").    Weight as of this encounter: 83.7 kg (184 lb 8 oz).  General: well groomed, well nourished, appears tired, stated age, in NAD  Eyes: EOMI, sclera clear  HEENT: NC/AT; mucous " membranes moist; neck supple, none tender to palpation  Lungs: no increased work of breathing, speaking full sentences, CTA b/l, no wheezing  Heart: RRR  Abdomen: +BS, soft, diffusely tender to palpation, no rebound or guarding  Ext: +1 pitting edema lower ext b/l  Neuro: A&O x 3; CN II-XII grossly intact;   Neuropsych: alert, good eye contact, tired, engaged, pleasant, sensorium intact      Data Reviewed:    GRF >90  Alb 2.8  Hemoglobin 8.5  PET scan:   IMPRESSION: In this patient with biopsy-proven cervix cancer;  1. 13 x 6 x 8.5 cm hypermetabolic pelvic mass, representing the biopsy-proven primary cancer.  2. Multiple metastatic hypermetabolic lung nodules, axillary, mediastinal, hilar and abdominal lymph nodes.   3. Bilateral pleural effusions.    Chest xray:   1. Small bilateral pleural effusions and adjacent basilar atelectasis and/or consolidation.  2. Mild pulmonary vascular congestion.  3. Numerous bilateral nodular opacities correlating with pulmonary metastatic disease on prior chest CT.         Impressions:    62 yr old female with newly dx metastatic cervical cancer s/p cycle one of taxol, carboplatin, avastin on 9/21 presents to establish palliative care and with symptoms of head, neck and abdominal pain, nausea, anorexia, shortness of breath.     Recommendations & Counseling:    Pain: pt complains of week of pain in back of head and neck as well as ongoing stomach pain.  The pain in the back of her head and neck is most bothersome and is preventing her from sleeping at times.   - start tylenol 1000mg three times a day - morning, mid day, before bed  - trial of tramadol 2 tablets every morning and before bed,  - tramadol 1-2 tablets twice a day if needed in addition to the scheduled tramadol in the morning and evening    Nausea / anorexia / weight loss:   - scheduled compazine 10mg three times a day  - ativan if needed for nausea not controlled with the compazine   - I will consider zyprexa if not  controlled with above.     Constipation prevention: the pain medication will cause constipation and constipation causes increased nausea and pain so take medication to prevent constipation.  - senna 2-4 tablets twice a day everyday. Adjust as needed or daily bowel movements.   - if not having regular bowel movements with senna, add miralax    Shortness of breath. Walk slowly, sit frequently, have people help you. Have a portable fan that you can direct to your face and cheek when feeling short of breath. Recent chest xray without sig effusions.     Goals: Discussed with pt and her family that goal of chemotherapy is to try and slow growth of the cancer, maybe shrink some of the cancer but that the cancer is not curable.  Goal would be to give her more good time.     Will need to do advanced care planning at upcoming visits.     Follow up with me in 2 weeks    Thank you for involving us in the patient's care. 70 minutes face time over half spent counseling.  Briana Medina MD / Palliative Medicine / Pager [phone] / After-Hours Answering Service [phone] / Main Palliative Clinic - [phone] Inpatient Team Consult Pager [phone] (answered 8am-430pm M-F)

## 2017-10-10 NOTE — PATIENT INSTRUCTIONS
Pain: head and stomach.  - start tylenol 1000mg three times a day - morning, mid day, before bed  - trial of tramadol 2 tablets every morning and before bed,  - tramadol 1-2 tablets twice a day if needed in addition to the scheduled tramadol in the morning and evening    Nausea:   - scheduled compazine 10mg three times a day  - ativan if needed for nausea not controlled with the compazine     Constipation prevention: the pain medication will cause constipation and constipation causes increased nausea and pain so take medication to prevent constipation.  - senna 2-4 tablets twice a day everyday. Adjust as needed or daily bowel movements.   - if not having regular bowel movements with senna, add miralax    Shortness of breath. Walk slowly, sit frequently, have people help you. Have a portable fan that you can direct to your face and cheek when feeling short of breath    Follow up with me in 4 weeks    Kanika Saunders is your cancer care coordinator. Her number is #[phone]

## 2017-10-10 NOTE — LETTER
10/10/2017       RE: Ashvin Tiwari  [address]  [facility] [address]     Dear Colleague,    Thank you for referring your patient, Ashvin Tiwari, to the [facility] at [facility]. Please see a copy of my visit note below.    Palliative Care Outpatient Clinic Consultation Note    Patient:  Ashvin Tiwari 62 year old female who is presenting to the palliative medicine clinic today at the request of Dr. Soto for support and symptom management in newly dx, metastatic cervical cancer      Chief Complaint:   Metastatic cervical cancer  Back of head, neck and abdominal pain  Nausea  Anorexia  fatigue    History of Present Illness:  - pt is 62 yr old female from [facility] who developed sig vaginal bleeding in July, 2017 and was subsequently diagnosed with metastatic cervical cancer with lung lesions on scan that are consistent with metastatic dz but lung lesions not biopsy proven to be metatasis. She is s/p cycle 1 of carboplatin, paclitaxel and bevacizumab on 9/21/17.     She present today with complaints of pain in the back of her head radiating down neck as well as ongoing abdominal pain.     - Pain now in the back of head all the way down the back of neck. This pain ongoing for about 2 weeks. Never had this pain before.  Dull pain that is always there. Pain medications help but they don't take the pain completely away and pain still present. Heat sometimes helps. Pain in head is worse with lying down. Not able to sleep well at night due to pain in head. Last week tried two oxycodone and that made her vomit.    - pain in stomach as well and thighs. Stomach muscles hurting since July. Takes oxycodone 1 tablet (5mg) TID and that helps but pain still there. 2 tablets (10mg) made her vomit. Pains in stomach are bothersome but pain in head most bothersome.      - She also complains of sig SOB which is very frightening for her  "family to see as often she gets very short of breath when just walking short distances. Not everyday but when it occurs, she has to rest when walking very short distances (bed to bathroom).     Nausea: with constant nausea now. Taking medicine for nausea and sometimes it helps and sometimes not. Taking compazine she thinks. Maybe ran out of it. Taking ativan as well.     No appetite; +weight loss.     Review of Systems:  ROS: 10 point ROS neg other than the symptoms noted above in the HPI and here  Bowel movements twice a day.       Patient's Disease Understanding: \"cancer of the cervix, told it could be treated through chemo or radiation but with radiation it will come back and would therefore need chemo again so Dr. Soto recommended doing chemo first and then would have to be tested again to see if it has shrunk. When she initially presented, she had a lot of vaginal bleeding and peeing all the time but now no vaginal bleeding or peeing. Was told the the cancer would initially shrink and for some people it would go away.  Was told that the cancer was in the lungs but they did not get any result for the biopsy.     Functional Status: sleeping or in bed much of the day. Very tired but able to do her ADLs. Has a lot of family and support and everyone helping her out all the time. When her daughter is at work, Christianity members and or family stay at the house.       Social History  Living Situation: lives with daughter who takes care of her. 2 biological kids, other adopted kids and many grandkids; 15+ siblings; from [facility].   Support System: Very large family all very supportive ava 2 of her sisters, one lives here and one in [place]; Mormon; goes to Christianity every Sunday, bible studies, very involved in the community.   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   Substance Use/History of misuse: no      Family History  No family history on file.      Advance Care " "Planning:  Advance Directive:    No advanced directive  Code status full    No Known Allergies  Current Outpatient Prescriptions   Medication Sig Dispense Refill     cefdinir (OMNICEF) 300 MG capsule Take 1 capsule (300 mg) by mouth 2 times daily for 7 days 14 capsule 0     oxyCODONE (ROXICODONE) 5 MG IR tablet Take 1-2 tablets (5-10 mg) by mouth every 3 hours as needed for moderate to severe pain 50 tablet 0     LORazepam (ATIVAN) 1 MG tablet Take 1 tablet (1 mg) by mouth every 6 hours as needed (Anxiety, Nausea/Vomiting or Sleep) 30 tablet 2     prochlorperazine (COMPAZINE) 10 MG tablet Take 1 tablet (10 mg) by mouth every 6 hours as needed (Nausea/Vomiting) 30 tablet 2     acetaminophen (TYLENOL) 325 MG tablet Take 3 tablets (975 mg) by mouth 3 times daily 100 tablet 0     senna-docusate (SENOKOT-S;PERICOLACE) 8.6-50 MG per tablet Take 2-4 tablets by mouth 2 times daily 100 tablet 1     polyethylene glycol (MIRALAX) powder Take 17-34 g (1-2 capfuls) by mouth daily 510 g 1     amLODIPine (NORVASC) 2.5 MG tablet Take 1 tablet (2.5 mg) by mouth daily 30 tablet 1     Past Medical History:   Diagnosis Date     Cancer (H)     cervical     Hypertension      Past Surgical History:   Procedure Laterality Date     BIOPSY/EXCISION LYMPH NODE(S), NEEDLE, SUPERFICIAL (CERVICAL/INGUINAL/AXILLARY) Right 9/21/2017    Procedure: BIOPSY/EXCISION LYMPH NODE(S), NEEDLE, SUPERFICIAL (CERVICAL/INGUINAL/AXILLARY);;  Surgeon: Sonu Denson PA-C;  Location:  OR     INSERT PORT VASCULAR ACCESS Right 9/21/2017    Procedure: INSERT PORT VASCULAR ACCESS;  Single Lumen Chest Power Port, Right Axillary Lymph Node Biopsy;  Surgeon: Sonu Denson PA-C;  Location: [facility]     no previous surgery             Vital signs:    , Blood pressure (!) 182/97, pulse 115, temperature 99.3  F (37.4  C), temperature source Oral, height 1.6 m (5' 3\"), weight 83.7 kg (184 lb 8 oz), SpO2 98 %, not currently breastfeeding.  Estimated " "body mass index is 32.68 kg/(m^2) as calculated from the following:    Height as of this encounter: 1.6 m (5' 3\").    Weight as of this encounter: 83.7 kg (184 lb 8 oz).  General: well groomed, well nourished, appears tired, stated age, in NAD  Eyes: EOMI, sclera clear  HEENT: NC/AT; mucous membranes moist; neck supple, none tender to palpation  Lungs: no increased work of breathing, speaking full sentences, CTA b/l, no wheezing  Heart: RRR  Abdomen: +BS, soft, diffusely tender to palpation, no rebound or guarding  Ext: +1 pitting edema lower ext b/l  Neuro: A&O x 3; CN II-XII grossly intact;   Neuropsych: alert, good eye contact, tired, engaged, pleasant, sensorium intact      Data Reviewed:    GRF >90  Alb 2.8  Hemoglobin 8.5  PET scan:   IMPRESSION: In this patient with biopsy-proven cervix cancer;  1. 13 x 6 x 8.5 cm hypermetabolic pelvic mass, representing the biopsy-proven primary cancer.  2. Multiple metastatic hypermetabolic lung nodules, axillary, mediastinal, hilar and abdominal lymph nodes.   3. Bilateral pleural effusions.    Chest xray:   1. Small bilateral pleural effusions and adjacent basilar atelectasis and/or consolidation.  2. Mild pulmonary vascular congestion.  3. Numerous bilateral nodular opacities correlating with pulmonary metastatic disease on prior chest CT.         Impressions:    62 yr old female with newly dx metastatic cervical cancer s/p cycle one of taxol, carboplatin, avastin on 9/21 presents to establish palliative care and with symptoms of head, neck and abdominal pain, nausea, anorexia, shortness of breath.     Recommendations & Counseling:    Pain: pt complains of week of pain in back of head and neck as well as ongoing stomach pain.  The pain in the back of her head and neck is most bothersome and is preventing her from sleeping at times.   - start tylenol 1000mg three times a day - morning, mid day, before bed  - trial of tramadol 2 tablets every morning and before bed,  - " tramadol 1-2 tablets twice a day if needed in addition to the scheduled tramadol in the morning and evening    Nausea / anorexia / weight loss:   - scheduled compazine 10mg three times a day  - ativan if needed for nausea not controlled with the compazine   - I will consider zyprexa if not controlled with above.     Constipation prevention: the pain medication will cause constipation and constipation causes increased nausea and pain so take medication to prevent constipation.  - senna 2-4 tablets twice a day everyday. Adjust as needed or daily bowel movements.   - if not having regular bowel movements with senna, add miralax    Shortness of breath. Walk slowly, sit frequently, have people help you. Have a portable fan that you can direct to your face and cheek when feeling short of breath. Recent chest xray without sig effusions.     Goals: Discussed with pt and her family that goal of chemotherapy is to try and slow growth of the cancer, maybe shrink some of the cancer but that the cancer is not curable.  Goal would be to give her more good time.     Will need to do advanced care planning at upcoming visits.     Follow up with me in 2 weeks    Thank you for involving us in the patient's care. 70 minutes face time over half spent counseling.  Briana Medina MD / Palliative Medicine / Pager [phone] / After-Hours Answering Service [phone] / Main Palliative Clinic - [phone] Inpatient Team Consult Pager [phone] (answered 8am-430pm M-F)

## 2017-10-11 PROBLEM — R11.0 NAUSEA: Status: ACTIVE | Noted: 2017-01-01

## 2017-10-11 NOTE — PROGRESS NOTES
"Care Coordinator Note   Patient seen in Palliative care clinic today 10/10/17 with her daughter and sister.   Reviewed plan of care 3 cycles then re-eval.  To see Dr Soto  Will make arrangements to have fluids on 10/16 and 10/17.  \"Nothing taste good.\" Discussed supplements and encouargaed seeing a dietician not interested at this time.       Patient verbalized back understanding of the above information discussed.   Face to face time spent with patient 10.  Rima Nicky MADSEN RN, OCN  Care Coordinator   Gynecologic Cancer   Office [phone]  Pager [phone] #[phone]  "

## 2017-10-12 PROBLEM — Z51.11 ENCOUNTER FOR ANTINEOPLASTIC CHEMOTHERAPY: Status: ACTIVE | Noted: 2017-01-01

## 2017-10-12 NOTE — MR AVS SNAPSHOT
After Visit Summary   10/12/2017    Ashvin Tiwari    MRN: [id number]           Patient Information     Date Of Birth          1955        Visit Information        Provider Department      10/12/2017 7:30 AM Rachelle Miranda APRN CNP [facility]        Today's Diagnoses     Malignant neoplasm of cervix, unspecified site (H)    -  1    Encounter for antineoplastic chemotherapy        Secondary hypertension           Follow-ups after your visit        Follow-up notes from your care team     Return if symptoms worsen or fail to improve.      Your next 10 appointments already scheduled     Oct 12, 2017  8:30 AM CDT   Infusion 360 with UC ONCOLOGY INFUSION, UC 20 ATC   [facility] ([facility])    [address] [place] [address]  2nd Floor  [facility] [address]   [phone]            Oct 16, 2017  9:00 AM CDT   Level 2 with BAY 1 INFUSION   [facility])    [address] [place] [address] [phone]            Oct 16, 2017  1:00 PM CDT   Level 2 with UR CH 03   [facility], Infusion Services ([facility])    [address] [place] [address] [address].  [address]  [facility] [address]   [phone]            Oct 17, 2017  1:30 PM CDT   Level 2 with UR CH 05   [facility], Infusion Services ([facility])    [address] [place] [address] [address].  [address]  [facility] [address]   [phone]            Oct 24, 2017 12:00 PM CDT   (Arrive by 11:45 AM)   Return Visit with Briana Medina MD   [facility] ([facility])    [address] [place] [address]  2nd Floor  [facility] [address]   [phone]            Nov 02, 2017  7:00 AM CDT   [facility] Lab Draw with  MASONIC LAB DRAW   [facility] Lab Draw ([facility])    [address] [place] [address]  "[facility] [address] [phone]            Nov 02, 2017  7:30 AM CDT   (Arrive by 7:15 AM)   Return Active Treatment with ROSA Mendoza CNP   [facility] ([facility])    [address] [place] [address]  2nd [facility] [address] [phone]            Nov 02, 2017  8:30 AM CDT   Infusion 360 with UC ONCOLOGY INFUSION, UC 19 ATC   [facility] ([facility])    [address] [place] [id number] [phone]              Who to contact     If you have questions or need follow up information about today's clinic visit or your schedule please contact [facility] directly at [phone].  Normal or non-critical lab and imaging results will be communicated to you by MyChart, letter or phone within 4 business days after the clinic has received the results. If you do not hear from us within 7 days, please contact the clinic through Briggohart or phone. If you have a critical or abnormal lab result, we will notify you by phone as soon as possible.  Submit refill requests through [email or web address] or call your pharmacy and they will forward the refill request to us. Please allow 3 business days for your refill to be completed.          Additional Information About Your Visit        MyChart Information     [email or web address] lets you send messages to your doctor, view your test results, renew your prescriptions, schedule appointments and more. To sign up, go to [email or web address] . Click on \"Log in\" on the left side of the screen, which will take you to the Welcome page. Then click on \"Sign up Now\" on the right side of the page.     You will be asked to enter the access code listed below, as well as some personal information. Please follow the directions to create your username and password.     Your access code is: [email or web address]  Expires: 11/27/2017  6:30 AM     Your access code will "  in 90 days. If you need help or a new code, please call your [place] clinic or [phone].        Care EveryWhere ID     This is your Care EveryWhere ID. This could be used by other organizations to access your [place] medical records  [id number]        Your Vitals Were     Pulse Temperature Respirations Pulse Oximetry BMI (Body Mass Index)       94 97.7  F (36.5  C) (Oral) 18 98% 32.81 kg/m2        Blood Pressure from Last 3 Encounters:   10/12/17 134/84   10/10/17 (!) 182/97   10/05/17 150/82    Weight from Last 3 Encounters:   10/12/17 84 kg (185 lb 3.2 oz)   10/10/17 83.7 kg (184 lb 8 oz)   10/04/17 85.8 kg (189 lb 2.5 oz)              Today, you had the following     No orders found for display       Primary Care Provider Office Phone # Fax #    Chelsea Wren [phone]       [facility] [address] [facility] [address]        Equal Access to Services     [facility]: Hadii aad ku hadasho Soomaali, waaxda luqadaha, qaybta kaalmada adeegyafernando, elizabeth ga . So [facility] [phone].    ATENCIÓN: Si habla español, tiene a mae disposición servicios gratuitos de asistencia lingüística. [facility] [phone].    We comply with applicable federal civil rights laws and [place] laws. We do not discriminate on the basis of race, color, national origin, age, disability, sex, sexual orientation, or gender identity.            Thank you!     Thank you for choosing [facility]  for your care. Our goal is always to provide you with excellent care. Hearing back from our patients is one way we can continue to improve our services. Please take a few minutes to complete the written survey that you may receive in the mail after your visit with us. Thank you!             Your Updated Medication List - Protect others around you: Learn how to safely use, store and throw away your medicines at www.disposemymeds.org.          This list is  accurate as of: 10/12/17  8:16 AM.  Always use your most recent med list.                   Brand Name Dispense Instructions for use Diagnosis    acetaminophen 325 MG tablet    TYLENOL    100 tablet    Take 3 tablets (975 mg) by mouth 3 times daily    Cancer related pain       amLODIPine 2.5 MG tablet    NORVASC    30 tablet    Take 1 tablet (2.5 mg) by mouth daily    Essential hypertension       cefdinir 300 MG capsule    OMNICEF    14 capsule    Take 1 capsule (300 mg) by mouth 2 times daily for 7 days        LORazepam 1 MG tablet    ATIVAN    30 tablet    Take 1 tablet (1 mg) by mouth every 6 hours as needed (Anxiety, Nausea/Vomiting or Sleep)    Malignant neoplasm of cervix, unspecified site (H)       polyethylene glycol powder    MIRALAX    510 g    Take 17-34 g (1-2 capfuls) by mouth daily    Cancer related pain, Generalized abdominal pain       prochlorperazine 10 MG tablet    COMPAZINE    30 tablet    Take 1 tablet (10 mg) by mouth every 6 hours as needed (Nausea/Vomiting)    Malignant neoplasm of cervix, unspecified site (H)       senna-docusate 8.6-50 MG per tablet    SENOKOT-S;PERICOLACE    100 tablet    Take 2-4 tablets by mouth 2 times daily    Cancer related pain       traMADol 50 MG tablet    ULTRAM    120 tablet    Take 2 tablets (100mg) every morning and evening before bed.  Take 1-2 tablets twice a day if needed for pain not controlled with the morning and evening doses.    Malignant neoplasm of cervix, unspecified site (H), Cancer related pain

## 2017-10-12 NOTE — NURSING NOTE
"Oncology Rooming Note    October 12, 2017 7:25 AM   Ashvin Tiwari is a 62 year old female who presents for:    Chief Complaint   Patient presents with     Port Draw     Labs drawn via port by RN. Line flushed and hep locked. Urine specimen sent. VS taken.     Oncology Clinic Visit     Cervical CA     Initial Vitals: BP (!) 150/94 (BP Location: Right arm, Patient Position: Sitting, Cuff Size: Adult Large)  Pulse 94  Temp 97.7  F (36.5  C) (Oral)  Resp 18  Wt 84 kg (185 lb 3.2 oz)  SpO2 98%  BMI 32.81 kg/m2 Estimated body mass index is 32.81 kg/(m^2) as calculated from the following:    Height as of 10/10/17: 1.6 m (5' 3\").    Weight as of this encounter: 84 kg (185 lb 3.2 oz). Body surface area is 1.93 meters squared.  Moderate Pain (5) Comment: Data Unavailable   No LMP recorded. Patient is postmenopausal.  Allergies reviewed: Yes  Medications reviewed: Yes    Medications: Medication refills not needed today.  Pharmacy name entered into EPIC: [facility] (USE ONLY AT [facility]) - [place], [place] - [address] [place] [address]    Clinical concerns:      6 minutes for nursing intake (face to face time)     Yesy Chester CMA    Patient was offered a flu vaccine today but declined.    Yesy Chester CMA ([facility])            "

## 2017-10-12 NOTE — MR AVS SNAPSHOT
After Visit Summary   10/12/2017    Ashvin Tiwari    MRN: [id number]           Patient Information     Date Of Birth          1955        Visit Information        Provider Department      10/12/2017 8:30 AM UC 20 ATC; UC ONCOLOGY INFUSION [facility]        Today's Diagnoses     Malignant neoplasm of cervix, unspecified site (H)    -  1      Care Instructions    Contact Numbers    [facility] [place]: [phone]  [facility] [place]:  [phone]    Call triage with chills and/or temperature greater than or equal to 100.5, uncontrolled nausea/vomiting, diarrhea, constipation, dizziness, shortness of breath, chest pain, bleeding, unexplained bruising, or any new/concerning symptoms, questions/concerns.     If you are having any concerning symptoms or wish to speak to a provider before your next infusion visit, please call your care coordinator or triage to notify them so we can adequately serve you.       After Hours: [phone]    If after hours, weekends, or holidays, call main hospital  and ask for Oncology doctor on call.         October 2017 Sunday Monday Tuesday Wednesday Thursday Friday Saturday   1     2     3     4     Admission   11:14 PM   Tri Mendez MD   [facility], Emergency Department   (Discharge: 10/5/2017) 5     XR CHEST 2 VIEWS   12:45 AM   (15 min.)   UUXR3   [facility],  Radiology 6     7       8     9     10     [facility] NEW    2:15 PM   (60 min.)   Briana Medina MD   [facility] 11     12     [facility] LAB DRAW    7:00 AM   (15 min.)    [facility] LAB DRAW   [facility] Lab Draw     [facility] RETURN ACTIVE TREATMENT    7:15 AM   (30 min.)   Rachelle Miranda APRN CNP   [facility] ONC INFUSION 360    8:30 AM   (360 min.)   UC ONCOLOGY INFUSION   [facility] 13     14       15     16     LEVEL 2    1:00 PM   (120 min.)   UR CH 03   [facility], Infusion Services  17     LEVEL 2    1:30 PM   (120 min.)   UR CH 05   [facility], Infusion Services 18     19     20     21       22     23     24     [facility] RETURN   11:45 AM   (30 min.)   Briana Medina MD   [facility] 25     26     27     28       29     30     31 November 2017 Sunday Monday Tuesday Wednesday Thursday Friday Saturday                  1     2     [facility] LAB DRAW    7:00 AM   (15 min.)    [facility] LAB DRAW   [facility] Lab Draw     [facility] RETURN ACTIVE TREATMENT    7:15 AM   (30 min.)   Rachelle Miranda APRN CNP   [facility]     [facility] ONC INFUSION 360    8:30 AM   (360 min.)   UC ONCOLOGY INFUSION   [facility] 3     4       5     6     7     [facility] RETURN    9:45 AM   (30 min.)   Briana Medina MD   [facility] 8     9     10     11       12     13     14     15     16     17     18       19     20     21     [facility] RETURN    2:15 PM   (30 min.)   Barbara Soto MD   [facility] 22     23     24     25       26     27     28     29     30                           Recent Results (from the past 24 hour(s))   CBC with platelets differential    Collection Time: 10/12/17  7:08 AM   Result Value Ref Range    WBC 4.0 4.0 - 11.0 10e9/L    RBC Count 4.41 3.8 - 5.2 10e12/L    Hemoglobin 9.1 (L) 11.7 - 15.7 g/dL    Hematocrit 32.2 (L) 35.0 - 47.0 %    MCV 73 (L) 78 - 100 fl    MCH 20.6 (L) 26.5 - 33.0 pg    MCHC 28.3 (L) 31.5 - 36.5 g/dL    RDW 20.2 (H) 10.0 - 15.0 %    Platelet Count 219 150 - 450 10e9/L    Diff Method Automated Method     % Neutrophils 52.6 %    % Lymphocytes 33.3 %    % Monocytes 12.5 %    % Eosinophils 0.8 %    % Basophils 0.8 %    % Immature Granulocytes 0.0 %    Nucleated RBCs 0 0 /100    Absolute Neutrophil 2.1 1.6 - 8.3 10e9/L    Absolute Lymphocytes 1.3 0.8 - 5.3 10e9/L    Absolute Monocytes 0.5 0.0 - 1.3 10e9/L    Absolute Eosinophils 0.0 0.0 -  0.7 10e9/L    Absolute Basophils 0.0 0.0 - 0.2 10e9/L    Abs Immature Granulocytes 0.0 0 - 0.4 10e9/L    Absolute Nucleated RBC 0.0    Comprehensive metabolic panel    Collection Time: 10/12/17  7:08 AM   Result Value Ref Range    Sodium 139 133 - 144 mmol/L    Potassium 3.8 3.4 - 5.3 mmol/L    Chloride 105 94 - 109 mmol/L    Carbon Dioxide 28 20 - 32 mmol/L    Anion Gap 6 3 - 14 mmol/L    Glucose 96 70 - 99 mg/dL    Urea Nitrogen 8 7 - 30 mg/dL    Creatinine 0.78 0.52 - 1.04 mg/dL    GFR Estimate 74 >60 mL/min/1.7m2    GFR Estimate If Black 89 >60 mL/min/1.7m2    Calcium 8.9 8.5 - 10.1 mg/dL    Bilirubin Total 0.4 0.2 - 1.3 mg/dL    Albumin 2.8 (L) 3.4 - 5.0 g/dL    Protein Total 7.9 6.8 - 8.8 g/dL    Alkaline Phosphatase 98 40 - 150 U/L    ALT 22 0 - 50 U/L    AST 20 0 - 45 U/L   Magnesium    Collection Time: 10/12/17  7:08 AM   Result Value Ref Range    Magnesium 2.0 1.6 - 2.3 mg/dL   Protein qualitative urine    Collection Time: 10/12/17  7:09 AM   Result Value Ref Range    Protein Albumin Urine Negative NEG^Negative mg/dL                 Follow-ups after your visit        Your next 10 appointments already scheduled     Oct 16, 2017  1:00 PM CDT   Level 2 with UR CH 03   [facility], Infusion Services ([facility])    [address] [place] [address] [address]  [address] [place], [place] [id number]   [phone]            Oct 17, 2017  1:30 PM CDT   Level 2 with UR CH 05   [facility], Infusion Services ([facility])    [address] [place] [address] [address]  [address] [place] [id number] [phone]            Oct 24, 2017 12:00 PM CDT   (Arrive by 11:45 AM)   Return Visit with Briana Medina MD   [facility] ([facility])    [address] [facility]  2nd Floor  [facility] [address] [phone]            Nov 02, 2017  7:00 AM T   [facility] Lab Draw with  MASONIC LAB DRAW   [facility] Lab Draw [facility]  [facility])    [address] [place] [address]   [phone]            Nov 02, 2017  7:30 AM CDT   (Arrive by 7:15 AM)   Return Active Treatment with ROSA Mendoza CNP   [facility] ([facility])    [address] [place] [address]   [phone]            Nov 02, 2017  8:30 AM CDT   Infusion 360 with UC ONCOLOGY INFUSION, UC 19 ATC   [facility] ([facility])    [address] [place] [address]   [phone]            Nov 07, 2017 10:00 AM CST   (Arrive by 9:45 AM)   Return Visit with Briana Medina MD   [facility] ([facility])    [address] [place] [address]   [phone]            Nov 21, 2017  2:30 PM CST   (Arrive by 2:15 PM)   Return Visit with Barbara Soto MD   [facility] ([facility])    [address] [place] [address]   [phone]              Who to contact     If you have questions or need follow up information about today's clinic visit or your schedule please contact [facility] directly at [phone].  Normal or non-critical lab and imaging results will be communicated to you by HungerTimehart, letter or phone within 4 business days after the clinic has received the results. If you do not hear from us within 7 days, please contact the clinic through HungerTimehart or phone. If you have a critical or abnormal lab result, we will notify you by phone as soon as possible.  Submit refill requests through DocVerse or call your pharmacy and they will forward the refill request to us. Please allow 3 business days for your refill to be completed.          Additional Information About Your Visit        DocVerse Information     DocVerse lets you send  "messages to your doctor, view your test results, renew your prescriptions, schedule appointments and more. To sign up, go to [email or web address] . Click on \"Log in\" on the left side of the screen, which will take you to the Welcome page. Then click on \"Sign up Now\" on the right side of the page.     You will be asked to enter the access code listed below, as well as some personal information. Please follow the directions to create your username and password.     Your access code is: [email or web address]  Expires: 2017  6:30 AM     Your access code will  in 90 days. If you need help or a new code, please call your [place] clinic or [phone].        Care EveryWhere ID     This is your Care EveryWhere ID. This could be used by other organizations to access your [place] medical records  [id number]         Blood Pressure from Last 3 Encounters:   10/12/17 134/84   10/10/17 (!) 182/97   10/05/17 150/82    Weight from Last 3 Encounters:   10/12/17 84 kg (185 lb 3.2 oz)   10/10/17 83.7 kg (184 lb 8 oz)   10/04/17 85.8 kg (189 lb 2.5 oz)              We Performed the Following     CBC with platelets differential     Comprehensive metabolic panel     Magnesium     Protein qualitative urine        Primary Care Provider Office Phone # Fax #    Chelsea Wren [phone]       [facility] [address] [facility] [address]        Equal Access to Services     SHAN LUGO : Hadii aad ku hadasho Soomaali, waaxda luqadaha, qaybta kaalmada adeegyada, waxay humberto cheema. So [facility] [phone].    ATENCIÓN: Si habla william, tiene a mae disposición servicios gratuitos de asistencia lingüística. Llame al [phone].    We comply with applicable federal civil rights laws and [place] laws. We do not discriminate on the basis of race, color, national origin, age, disability, sex, sexual orientation, or gender identity.            Thank you!     Thank you for choosing [facility]  for your care. Our goal is always to provide you with excellent care. Hearing back from our patients is one way we can continue to improve our services. Please take a few minutes to complete the written survey that you may receive in the mail after your visit with us. Thank you!             Your Updated Medication List - Protect others around you: Learn how to safely use, store and throw away your medicines at www.disposemymeds.org.          This list is accurate as of: 10/12/17  9:36 AM.  Always use your most recent med list.                   Brand Name Dispense Instructions for use Diagnosis    acetaminophen 325 MG tablet    TYLENOL    100 tablet    Take 3 tablets (975 mg) by mouth 3 times daily    Cancer related pain       amLODIPine 2.5 MG tablet    NORVASC    30 tablet    Take 1 tablet (2.5 mg) by mouth daily    Essential hypertension       cefdinir 300 MG capsule    OMNICEF    14 capsule    Take 1 capsule (300 mg) by mouth 2 times daily for 7 days        LORazepam 1 MG tablet    ATIVAN    30 tablet    Take 1 tablet (1 mg) by mouth every 6 hours as needed (Anxiety, Nausea/Vomiting or Sleep)    Malignant neoplasm of cervix, unspecified site (H)       polyethylene glycol powder    MIRALAX    510 g    Take 17-34 g (1-2 capfuls) by mouth daily    Cancer related pain, Generalized abdominal pain       prochlorperazine 10 MG tablet    COMPAZINE    30 tablet    Take 1 tablet (10 mg) by mouth every 6 hours as needed (Nausea/Vomiting)    Malignant neoplasm of cervix, unspecified site (H)       senna-docusate 8.6-50 MG per tablet    SENOKOT-S;PERICOLACE    100 tablet    Take 2-4 tablets by mouth 2 times daily    Cancer related pain       traMADol 50 MG tablet    ULTRAM    120 tablet    Take 2 tablets (100mg) every morning and evening before bed.  Take 1-2 tablets twice a day if needed for pain not controlled with the morning and evening doses.    Malignant neoplasm of cervix, unspecified  site (H), Cancer related pain

## 2017-10-12 NOTE — MR AVS SNAPSHOT
After Visit Summary   10/12/2017    Ashvin Tiwari    MRN: [id number]           Patient Information     Date Of Birth          1955        Visit Information        Provider Department      10/12/2017 7:10 PM Han, Soo Yeon, MSW [facility]        Today's Diagnoses     Visit for counseling    -  1       Follow-ups after your visit        Your next 10 appointments already scheduled     Oct 16, 2017  1:00 PM CDT   Level 2 with UR CH 03   [facility] [place], Infusion Services ([facility])    [address]  [address]  [facility] [address]   [phone]            Oct 17, 2017  1:30 PM CDT   Level 2 with UR CH 05   [facility] [place], Infusion Services ([facility])    [address] [place] [address] [address].  [address]  [facility] [address]   [phone]            Oct 24, 2017 12:00 PM CDT   (Arrive by 11:45 AM)   Return Visit with Briana Medina MD   [facility])    [address] [place] [address]   [phone]            Nov 02, 2017  7:00 AM CDT   [facility] Lab Draw with  [facility] DRAW   [facility] Lab Draw ([facility])    [address] [place] [address]   [phone]            Nov 02, 2017  7:30 AM CDT   (Arrive by 7:15 AM)   Return Active Treatment with ROSA Mendoza CNP   [facility] ([facility])    [address] [place] [address]   [phone]            Nov 02, 2017  8:30 AM CDT   Infusion 360 with [facility] ONCOLOGY INFUSION, UC 19 ATC   [facility] ([facility])    [address] [place] [address]   [phone]            Nov 07, 2017 10:00 AM CST   (Arrive by 9:45 AM)   Return Visit with Briana Medina  "MD   [facility] ([facility])    [address] [address] [phone]            2017  2:30 PM CST   (Arrive by 2:15 PM)   Return Visit with Barbara Soto MD   [facility] ([facility])    [address] [id number] [phone]              Who to contact     If you have questions or need follow up information about today's clinic visit or your schedule please contact [facility] CANCER [facility] directly at [phone].  Normal or non-critical lab and imaging results will be communicated to you by MyChart, letter or phone within 4 business days after the clinic has received the results. If you do not hear from us within 7 days, please contact the clinic through MyChart or phone. If you have a critical or abnormal lab result, we will notify you by phone as soon as possible.  Submit refill requests through [employer] or call your pharmacy and they will forward the refill request to us. Please allow 3 business days for your refill to be completed.          Additional Information About Your Visit        MyChart Information     [employer] lets you send messages to your doctor, view your test results, renew your prescriptions, schedule appointments and more. To sign up, go to [email or web address] . Click on \"Log in\" on the left side of the screen, which will take you to the Welcome page. Then click on \"Sign up Now\" on the right side of the page.     You will be asked to enter the access code listed below, as well as some personal information. Please follow the directions to create your username and password.     Your access code is: [email or web address]  Expires: 2017  6:30 AM     Your access code will  in 90 days. If you need help or a new code, please call your [place] clinic or [phone].        Care EveryWhere ID     This is your Care " EveryWhere ID. This could be used by other organizations to access your [place] medical records  [id number]         Blood Pressure from Last 3 Encounters:   10/12/17 134/84   10/10/17 (!) 182/97   10/05/17 150/82    Weight from Last 3 Encounters:   10/12/17 84 kg (185 lb 3.2 oz)   10/10/17 83.7 kg (184 lb 8 oz)   10/04/17 85.8 kg (189 lb 2.5 oz)              Today, you had the following     No orders found for display       Primary Care Provider Office Phone # Fax #    Chelsea Wren [phone]       [facility] [address]  [facility] [address]        Equal Access to Services     LISSET LUGO : Hadii elvia ku hadasho Soomaali, waaxda luqadaha, qaybta kaalmada adeegyada, waxay idiin chrissie cheema. So [facility] [phone].    ATENCIÓN: Si habla español, tiene a mae disposición servicios gratuitos de asistencia lingüística. [facility] [phone].    We comply with applicable federal civil rights laws and [place] laws. We do not discriminate on the basis of race, color, national origin, age, disability, sex, sexual orientation, or gender identity.            Thank you!     Thank you for choosing [facility] CLINIC  for your care. Our goal is always to provide you with excellent care. Hearing back from our patients is one way we can continue to improve our services. Please take a few minutes to complete the written survey that you may receive in the mail after your visit with us. Thank you!             Your Updated Medication List - Protect others around you: Learn how to safely use, store and throw away your medicines at www.disposemymeds.org.          This list is accurate as of: 10/12/17  7:15 PM.  Always use your most recent med list.                   Brand Name Dispense Instructions for use Diagnosis    acetaminophen 325 MG tablet    TYLENOL    100 tablet    Take 3 tablets (975 mg) by mouth 3 times daily    Cancer related pain       amLODIPine 2.5 MG  tablet    NORVASC    30 tablet    Take 1 tablet (2.5 mg) by mouth daily    Essential hypertension       cefdinir 300 MG capsule    OMNICEF    14 capsule    Take 1 capsule (300 mg) by mouth 2 times daily for 7 days        LORazepam 1 MG tablet    ATIVAN    30 tablet    Take 1 tablet (1 mg) by mouth every 6 hours as needed (Anxiety, Nausea/Vomiting or Sleep)    Malignant neoplasm of cervix, unspecified site (H)       polyethylene glycol powder    MIRALAX    510 g    Take 17-34 g (1-2 capfuls) by mouth daily    Cancer related pain, Generalized abdominal pain       prochlorperazine 10 MG tablet    COMPAZINE    30 tablet    Take 1 tablet (10 mg) by mouth every 6 hours as needed (Nausea/Vomiting)    Malignant neoplasm of cervix, unspecified site (H)       senna-docusate 8.6-50 MG per tablet    SENOKOT-S;PERICOLACE    100 tablet    Take 2-4 tablets by mouth 2 times daily    Cancer related pain       traMADol 50 MG tablet    ULTRAM    120 tablet    Take 2 tablets (100mg) every morning and evening before bed.  Take 1-2 tablets twice a day if needed for pain not controlled with the morning and evening doses.    Malignant neoplasm of cervix, unspecified site (H), Cancer related pain

## 2017-10-12 NOTE — PATIENT INSTRUCTIONS
Contact Numbers    [facility] [place]: [phone]  [facility] T[place]:  [phone]    Call triage with chills and/or temperature greater than or equal to 100.5, uncontrolled nausea/vomiting, diarrhea, constipation, dizziness, shortness of breath, chest pain, bleeding, unexplained bruising, or any new/concerning symptoms, questions/concerns.     If you are having any concerning symptoms or wish to speak to a provider before your next infusion visit, please call your care coordinator or triage to notify them so we can adequately serve you.       After Hours: [phone]    If after hours, weekends, or holidays, call main hospital  and ask for Oncology doctor on call.         October 2017 Sunday Monday Tuesday Wednesday Thursday Friday Saturday   1     2     3     4     Admission   11:14 PM   Tri Mendez MD   [facility], Emergency Department   (Discharge: 10/5/2017) 5     XR CHEST 2 VIEWS   12:45 AM   (15 min.)   UUXR3   [facility],  Radiology 6     7       8     9     10     [facility] NEW    2:15 PM   (60 min.)   Briana Medina MD   [facility] 11     12     [facility] LAB DRAW    7:00 AM   (15 min.)    [facility] LAB DRAW   [facility] Lab Draw     [facility] RETURN ACTIVE TREATMENT    7:15 AM   (30 min.)   Rachelle Miranda APRN CNP   [facility] ONC INFUSION 360    8:30 AM   (360 min.)   [facility] ONCOLOGY INFUSION   [facility] 13     14       15     16     LEVEL 2    1:00 PM   (120 min.)   UR CH 03   [facility], Infusion Services 17     LEVEL 2    1:30 PM   (120 min.)   UR CH 05   [facility], Infusion Services 18     19     20     21       22     23     24     [facility] RETURN   11:45 AM   (30 min.)   Briana Medina MD   [facility] 25     26     27     28       29     30     31 November 2017 Sunday Monday Tuesday Wednesday Thursday Friday Saturday                   1     2     [facility]    7:00 AM   (15 min.)   [facility] LAB DRAW   [facility] Lab Draw     [facility] RETURN ACTIVE TREATMENT    7:15 AM   (30 min.)   Rachelle Miranda APRN CNP   [facility] EDP ONC INFUSION 360    8:30 AM   (360 min.)   [facility] ONCOLOGY INFUSION   [facility] 3     4       5     6     7     [facility] RETURN    9:45 AM   (30 min.)   Briana Medina MD   [facility] 8     9     10     11       12     13     14     15     16     17     18       19     20     21     [facility] RETURN    2:15 PM   (30 min.)   Barbara Soto MD   [facility] 22     23     24     25       26     27     28     29     30                           Recent Results (from the past 24 hour(s))   CBC with platelets differential    Collection Time: 10/12/17  7:08 AM   Result Value Ref Range    WBC 4.0 4.0 - 11.0 10e9/L    RBC Count 4.41 3.8 - 5.2 10e12/L    Hemoglobin 9.1 (L) 11.7 - 15.7 g/dL    Hematocrit 32.2 (L) 35.0 - 47.0 %    MCV 73 (L) 78 - 100 fl    MCH 20.6 (L) 26.5 - 33.0 pg    MCHC 28.3 (L) 31.5 - 36.5 g/dL    RDW 20.2 (H) 10.0 - 15.0 %    Platelet Count 219 150 - 450 10e9/L    Diff Method Automated Method     % Neutrophils 52.6 %    % Lymphocytes 33.3 %    % Monocytes 12.5 %    % Eosinophils 0.8 %    % Basophils 0.8 %    % Immature Granulocytes 0.0 %    Nucleated RBCs 0 0 /100    Absolute Neutrophil 2.1 1.6 - 8.3 10e9/L    Absolute Lymphocytes 1.3 0.8 - 5.3 10e9/L    Absolute Monocytes 0.5 0.0 - 1.3 10e9/L    Absolute Eosinophils 0.0 0.0 - 0.7 10e9/L    Absolute Basophils 0.0 0.0 - 0.2 10e9/L    Abs Immature Granulocytes 0.0 0 - 0.4 10e9/L    Absolute Nucleated RBC 0.0    Comprehensive metabolic panel    Collection Time: 10/12/17  7:08 AM   Result Value Ref Range    Sodium 139 133 - 144 mmol/L    Potassium 3.8 3.4 - 5.3 mmol/L    Chloride 105 94 - 109 mmol/L    Carbon Dioxide 28 20 - 32 mmol/L    Anion Gap 6 3 - 14 mmol/L    Glucose  96 70 - 99 mg/dL    Urea Nitrogen 8 7 - 30 mg/dL    Creatinine 0.78 0.52 - 1.04 mg/dL    GFR Estimate 74 >60 mL/min/1.7m2    GFR Estimate If Black 89 >60 mL/min/1.7m2    Calcium 8.9 8.5 - 10.1 mg/dL    Bilirubin Total 0.4 0.2 - 1.3 mg/dL    Albumin 2.8 (L) 3.4 - 5.0 g/dL    Protein Total 7.9 6.8 - 8.8 g/dL    Alkaline Phosphatase 98 40 - 150 U/L    ALT 22 0 - 50 U/L    AST 20 0 - 45 U/L   Magnesium    Collection Time: 10/12/17  7:08 AM   Result Value Ref Range    Magnesium 2.0 1.6 - 2.3 mg/dL   Protein qualitative urine    Collection Time: 10/12/17  7:09 AM   Result Value Ref Range    Protein Albumin Urine Negative NEG^Negative mg/dL

## 2017-10-12 NOTE — LETTER
10/12/2017       RE: Ashvin Tiwari  [address]  [facility] [place] [address]     Dear Colleague,    Thank you for referring your patient, Ashvin Tiwari, to the [facility]ER [facility]. Please see a copy of my visit note below.                Follow Up Notes on Referred Patient    Date: 10/12/2017       Dr. Chelsea Wren  [facility] [address]  [address] [place] [address]       RE: Ashvin Tiwari  : 1955  DENIS: 10/12/2017    Dear Dr. Chelsea Wren:    Ashvin Tiwari is a 62 year old woman with a diagnosis of stage IV poorly differentiated SCC cervix.   She is here today for follow up and disease management.     Oncology history:   17:  Seen in Gyn Onc.  Exam concerning for at least a Stage IIB cervical cancer.  Biopsy obtained consistent with poorly differentiated squamous cell ca.  MRI ordered.     17: MRI Pelvis with 7 x 7cm mass at level of cervix, protrudes into upper third of vagina, bilateral parametrial extension, abuts bilateral ureters but no obstruction, abuts rectal wall with obliteration of fat plan but no mucosal invasion, no clear bladder wall invasion, suspicious lymph nodes left hemipelvis, peritoneal nodule versus lymph node.     17 to 17:  Patient admitted to hospital with pain. CT Chest PE was negative for PE but showed multiple pulmonary nodules consistent with metastatic disease.  CT A/P showed cervical mass with regional metastatic disease as well as peritoneal nodularity.     17:  PET scan with multiple metastatic hypermetabolic lung nodules, axillary, mediastinal, hilar and abdominal lymph nodes.    17: Cycle #1 Paclitaxel/Cisplatin/Avastin.   10/12/17: Cycle #2 Paclitaxel/Cisplatin/Avastin.      Today she comes to clinic reporting she has one more day of antibiotics (she has not taken her dose this morning yet). She has not taken her HTN medication yet this morning but does have her pills with her. She states she tolerated  "her first cycle \"pretty well\"; she did have some nausea and constipation. She reports the vaginal bleeding has stopped. She is consuming a can per day of Ensure. She denies any lower extremity edema, and no difficulties eating or sleeping. She denies any abdominal discomfort/bloating, no fevers or chills, and no chest pain or shortness of breath. She does not need any medication refills and feels ready for her infusion today. She is being seen by Palliative Care. She is not working now and is wondering about finances.         Review of Systems:    Systemic           no weight changes; no fever; no chills; no night sweats; no appetite changes; + fatigue  Skin           no rashes, or lesions  Eye           no irritation; no changes in vision  Tabby-Laryngeal           no dysphagia; no hoarseness   Pulmonary    no cough; no shortness of breath  Cardiovascular    no chest pain; no palpitations; + HTN  Gastrointestinal   no diarrhea; + constipation; no abdominal pain; no changes in bowel habits; no blood in stool  Genitourinary   no urinary frequency; no urinary urgency; no dysuria; no pain; no abnormal vaginal discharge; no abnormal vaginal bleeding  Breast    no breast discharge; no breast changes; no breast pain  Musculoskeletal    no myalgias; + arthralgias; + back pain  Psychiatric           no depressed mood; no anxiety    Hematologic               no tender lymph nodes; no noticeable swellings or lumps   Endocrine    no hot flashes; no heat/cold intolerance         Neurological   no tremor; no numbness and tingling; no headaches; + difficulty sleeping      Past Medical History:    Past Medical History:   Diagnosis Date     Cancer (H)     cervical     Hypertension          Past Surgical History:    Past Surgical History:   Procedure Laterality Date     BIOPSY/EXCISION LYMPH NODE(S), NEEDLE, SUPERFICIAL (CERVICAL/INGUINAL/AXILLARY) Right 9/21/2017    Procedure: BIOPSY/EXCISION LYMPH NODE(S), NEEDLE, SUPERFICIAL " (CERVICAL/INGUINAL/AXILLARY);;  Surgeon: Sonu Desnon PA-C;  [facility]     INSERT PORT VASCULAR ACCESS Right 9/21/2017    Procedure: INSERT PORT VASCULAR ACCESS;  Single Lumen Chest Power Port, Right Axillary Lymph Node Biopsy;  Surgeon: Sonu Denson PA-C;  [facility] OR     no previous surgery           Health Maintenance Due   Topic Date Due     TETANUS IMMUNIZATION (SYSTEM ASSIGNED)  05/10/1971     HEPATITIS C SCREENING  05/10/1971     LIPID SCREEN Q5 YR FEMALE (SYSTEM ASSIGNED)  05/10/1998     MAMMO SCREEN Q2 YR (SYSTEM ASSIGNED)  05/10/2003     COLON CANCER SCREEN (SYSTEM ASSIGNED)  05/10/2003     ADVANCE DIRECTIVE PLANNING Q5 YRS  05/10/2008     INFLUENZA VACCINE (SYSTEM ASSIGNED)  09/01/2017       Current Medications:     Current Outpatient Prescriptions   Medication Sig Dispense Refill     senna-docusate (SENOKOT-S;PERICOLACE) 8.6-50 MG per tablet Take 2-4 tablets by mouth 2 times daily 100 tablet 1     polyethylene glycol (MIRALAX) powder Take 17-34 g (1-2 capfuls) by mouth daily 510 g 1     prochlorperazine (COMPAZINE) 10 MG tablet Take 1 tablet (10 mg) by mouth every 6 hours as needed (Nausea/Vomiting) 30 tablet 2     LORazepam (ATIVAN) 1 MG tablet Take 1 tablet (1 mg) by mouth every 6 hours as needed (Anxiety, Nausea/Vomiting or Sleep) 30 tablet 2     traMADol (ULTRAM) 50 MG tablet Take 2 tablets (100mg) every morning and evening before bed.  Take 1-2 tablets twice a day if needed for pain not controlled with the morning and evening doses. 120 tablet 0     acetaminophen (TYLENOL) 325 MG tablet Take 3 tablets (975 mg) by mouth 3 times daily 100 tablet 0     cefdinir (OMNICEF) 300 MG capsule Take 1 capsule (300 mg) by mouth 2 times daily for 7 days 14 capsule 0     amLODIPine (NORVASC) 2.5 MG tablet Take 1 tablet (2.5 mg) by mouth daily 30 tablet 1         Allergies:      No Known Allergies     Social History:     Social History   Substance Use Topics     Smoking status:  Never Smoker     Smokeless tobacco: Never Used     Alcohol use No       History   Drug Use No         Family History:         No family history on file.      Physical Exam:     /84  Pulse 94  Temp 97.7  F (36.5  C) (Oral)  Resp 18  Wt 84 kg (185 lb 3.2 oz)  SpO2 98%  BMI 32.81 kg/m2  Body mass index is 32.81 kg/(m^2).    General Appearance: healthy and alert, no distress     HEENT: no thyromegaly, no palpable nodules or masses        Cardiovascular: regular rate and rhythm, no gallops, rubs or murmurs     Respiratory: lungs clear, no rales, rhonchi or wheezes, normal diaphragmatic excursion    Musculoskeletal: extremities non tender and without edema    Skin: no lesions or rashes     Neurological: normal gait, no gross defects     Psychiatric: appropriate mood and affect                               Hematological: normal cervical, supraclavicular  lymph nodes     Gastrointestinal:       abdomen soft, non-tender, non-distended, no organomegaly or masses    Genitourinary: Not indicated      Assessment:    Ashvin Tiwari is a 62 year old woman with a diagnosis of stage IV poorly differentiated SCC cervix.   She is here today for follow up and disease management.      30 minutes were spent with this patient, over 50% of that time was spent in symptom management, treatment planning and in counseling and coordination of care.      Plan:     1.)        Ok to proceed with planned chemotherapy pending labs are WNL. Reviewed importance of taking her scheduled medication. Discussed eating smaller meals more often, increasing to 2 cans nutritional supplement/day, pacing activites, and staying hydrated. She will return in 3 weeks for her next cycle. Reviewed signs and symptoms for when she should contact the clinic or seek additional care. Patient to contact the clinic with any questions or concerns in the interim. Will have Svitlana contact her regarding finances/seeing a .      2.) Genetic risk factors  were assessed and the patient does not meet the qualifications for a referral.      3.) Labs and/or tests ordered include:  Chemo labs.      4.) Health maintenance issues addressed today include annual health maintenance and non-gynecologic issues with PCP.    ROSA Tavera, WHNP-BC, ANP-BC  Women's Health Nurse Practitioner  Adult Nurse Pracitioner  Gynecologic Oncology      CC  Patient Care Team:  Chelsea Wren as PCP - General (Family Practice)  Sita Gallardo as Referring Physician (OB/Gyn)  Christy Gaffney APRN CNS as Nurse Practitioner (Nurse)  Rima Saunders, TIMOTHY as Continuity Care Coordinator (Gyn-Onc)  CHELSEA WREN

## 2017-10-12 NOTE — NURSING NOTE
Chief Complaint   Patient presents with     Port Draw     Labs drawn via port by RN. Line flushed and hep locked. Urine specimen sent. VS taken.     Angela Julio RN

## 2017-10-12 NOTE — PROGRESS NOTES
Infusion Nursing Note:  Ashvin Tiwari presents today for Cycle 2 Day 1 Taxol, Carboplatin, Avastin.    Patient seen by provider today: Yes: Rachelle Miranda NP   present during visit today: Not Applicable.    Intravenous Access:  Implanted Port.    Treatment Conditions:  Lab Results   Component Value Date    HGB 9.1 10/12/2017     Lab Results   Component Value Date    WBC 4.0 10/12/2017      Lab Results   Component Value Date    ANEU 2.1 10/12/2017     Lab Results   Component Value Date     10/12/2017      Lab Results   Component Value Date     10/12/2017                   Lab Results   Component Value Date    POTASSIUM 3.8 10/12/2017           Lab Results   Component Value Date    MAG 2.0 10/12/2017            Lab Results   Component Value Date    CR 0.78 10/12/2017                   Lab Results   Component Value Date    BEATRIZ 8.9 10/12/2017                Lab Results   Component Value Date    BILITOTAL 0.4 10/12/2017           Lab Results   Component Value Date    ALBUMIN 2.8 10/12/2017                    Lab Results   Component Value Date    ALT 22 10/12/2017           Lab Results   Component Value Date    AST 20 10/12/2017     Results reviewed, labs MET treatment parameters, ok to proceed with treatment.  Urine negative for protein.    Post Infusion Assessment:  Patient tolerated infusion without incident.  Blood return noted pre and post infusion.  Site patent and intact, free from redness, edema or discomfort.  No evidence of extravasations.  Access discontinued per protocol.    Discharge Plan:   Patient declined prescription refills.  Copy of AVS reviewed with patient and/or family. Patient will return 11/2/17 for next appointment (IVF at [facility] on 10/16 and 10/17).  Patient discharged in stable condition accompanied by: sister and daughter.  Departure Mode: Ambulatory.    Tri Haynes RN

## 2017-10-12 NOTE — PROGRESS NOTES
"            Follow Up Notes on Referred Patient    Date: 10/12/2017       Dr. Chelsea Wren  [facility]  [address] [place] [address]       RE: Ashvin Tiwari  : 1955  DENIS: 10/12/2017    Dear Dr. Chelsea Wren:    Ashvin Tiwari is a 62 year old woman with a diagnosis of stage IV poorly differentiated SCC cervix.   She is here today for follow up and disease management.     Oncology history:   17:  Seen in Gyn Onc.  Exam concerning for at least a Stage IIB cervical cancer.  Biopsy obtained consistent with poorly differentiated squamous cell ca.  MRI ordered.     17: MRI Pelvis with 7 x 7cm mass at level of cervix, protrudes into upper third of vagina, bilateral parametrial extension, abuts bilateral ureters but no obstruction, abuts rectal wall with obliteration of fat plan but no mucosal invasion, no clear bladder wall invasion, suspicious lymph nodes left hemipelvis, peritoneal nodule versus lymph node.     17 to 17:  Patient admitted to hospital with pain. CT Chest PE was negative for PE but showed multiple pulmonary nodules consistent with metastatic disease.  CT A/P showed cervical mass with regional metastatic disease as well as peritoneal nodularity.     17:  PET scan with multiple metastatic hypermetabolic lung nodules, axillary, mediastinal, hilar and abdominal lymph nodes.    17: Cycle #1 Paclitaxel/Cisplatin/Avastin.   10/12/17: Cycle #2 Paclitaxel/Cisplatin/Avastin.      Today she comes to clinic reporting she has one more day of antibiotics (she has not taken her dose this morning yet). She has not taken her HTN medication yet this morning but does have her pills with her. She states she tolerated her first cycle \"pretty well\"; she did have some nausea and constipation. She reports the vaginal bleeding has stopped. She is consuming a can per day of Ensure. She denies any lower extremity edema, and no difficulties eating or sleeping. She " denies any abdominal discomfort/bloating, no fevers or chills, and no chest pain or shortness of breath. She does not need any medication refills and feels ready for her infusion today. She is being seen by Palliative Care. She is not working now and is wondering about finances.         Review of Systems:    Systemic           no weight changes; no fever; no chills; no night sweats; no appetite changes; + fatigue  Skin           no rashes, or lesions  Eye           no irritation; no changes in vision  Tabby-Laryngeal           no dysphagia; no hoarseness   Pulmonary    no cough; no shortness of breath  Cardiovascular    no chest pain; no palpitations; + HTN  Gastrointestinal   no diarrhea; + constipation; no abdominal pain; no changes in bowel habits; no blood in stool  Genitourinary   no urinary frequency; no urinary urgency; no dysuria; no pain; no abnormal vaginal discharge; no abnormal vaginal bleeding  Breast    no breast discharge; no breast changes; no breast pain  Musculoskeletal    no myalgias; + arthralgias; + back pain  Psychiatric           no depressed mood; no anxiety    Hematologic               no tender lymph nodes; no noticeable swellings or lumps   Endocrine    no hot flashes; no heat/cold intolerance         Neurological   no tremor; no numbness and tingling; no headaches; + difficulty sleeping      Past Medical History:    Past Medical History:   Diagnosis Date     Cancer (H)     cervical     Hypertension          Past Surgical History:    Past Surgical History:   Procedure Laterality Date     BIOPSY/EXCISION LYMPH NODE(S), NEEDLE, SUPERFICIAL (CERVICAL/INGUINAL/AXILLARY) Right 9/21/2017    Procedure: BIOPSY/EXCISION LYMPH NODE(S), NEEDLE, SUPERFICIAL (CERVICAL/INGUINAL/AXILLARY);;  Surgeon: Sonu Denson PA-C;  Location:  OR     INSERT PORT VASCULAR ACCESS Right 9/21/2017    Procedure: INSERT PORT VASCULAR ACCESS;  Single Lumen Chest Power Port, Right Axillary Lymph Node Biopsy;   Surgeon: Sonu Denson PA-C;  Location: [facility]     no previous surgery           Health Maintenance Due   Topic Date Due     TETANUS IMMUNIZATION (SYSTEM ASSIGNED)  05/10/1971     HEPATITIS C SCREENING  05/10/1971     LIPID SCREEN Q5 YR FEMALE (SYSTEM ASSIGNED)  05/10/1998     MAMMO SCREEN Q2 YR (SYSTEM ASSIGNED)  05/10/2003     COLON CANCER SCREEN (SYSTEM ASSIGNED)  05/10/2003     ADVANCE DIRECTIVE PLANNING Q5 YRS  05/10/2008     INFLUENZA VACCINE (SYSTEM ASSIGNED)  09/01/2017       Current Medications:     Current Outpatient Prescriptions   Medication Sig Dispense Refill     senna-docusate (SENOKOT-S;PERICOLACE) 8.6-50 MG per tablet Take 2-4 tablets by mouth 2 times daily 100 tablet 1     polyethylene glycol (MIRALAX) powder Take 17-34 g (1-2 capfuls) by mouth daily 510 g 1     prochlorperazine (COMPAZINE) 10 MG tablet Take 1 tablet (10 mg) by mouth every 6 hours as needed (Nausea/Vomiting) 30 tablet 2     LORazepam (ATIVAN) 1 MG tablet Take 1 tablet (1 mg) by mouth every 6 hours as needed (Anxiety, Nausea/Vomiting or Sleep) 30 tablet 2     traMADol (ULTRAM) 50 MG tablet Take 2 tablets (100mg) every morning and evening before bed.  Take 1-2 tablets twice a day if needed for pain not controlled with the morning and evening doses. 120 tablet 0     acetaminophen (TYLENOL) 325 MG tablet Take 3 tablets (975 mg) by mouth 3 times daily 100 tablet 0     cefdinir (OMNICEF) 300 MG capsule Take 1 capsule (300 mg) by mouth 2 times daily for 7 days 14 capsule 0     amLODIPine (NORVASC) 2.5 MG tablet Take 1 tablet (2.5 mg) by mouth daily 30 tablet 1         Allergies:      No Known Allergies     Social History:     Social History   Substance Use Topics     Smoking status: Never Smoker     Smokeless tobacco: Never Used     Alcohol use No       History   Drug Use No         Family History:         No family history on file.      Physical Exam:     /84  Pulse 94  Temp 97.7  F (36.5  C) (Oral)  Resp 18  Wt 84  kg (185 lb 3.2 oz)  SpO2 98%  BMI 32.81 kg/m2  Body mass index is 32.81 kg/(m^2).    General Appearance: healthy and alert, no distress     HEENT: no thyromegaly, no palpable nodules or masses        Cardiovascular: regular rate and rhythm, no gallops, rubs or murmurs     Respiratory: lungs clear, no rales, rhonchi or wheezes, normal diaphragmatic excursion    Musculoskeletal: extremities non tender and without edema    Skin: no lesions or rashes     Neurological: normal gait, no gross defects     Psychiatric: appropriate mood and affect                               Hematological: normal cervical, supraclavicular  lymph nodes     Gastrointestinal:       abdomen soft, non-tender, non-distended, no organomegaly or masses    Genitourinary: Not indicated      Assessment:    Ashvin Tiwari is a 62 year old woman with a diagnosis of stage IV poorly differentiated SCC cervix.   She is here today for follow up and disease management.      30 minutes were spent with this patient, over 50% of that time was spent in symptom management, treatment planning and in counseling and coordination of care.      Plan:     1.)        Ok to proceed with planned chemotherapy pending labs are WNL. Reviewed importance of taking her scheduled medication. Discussed eating smaller meals more often, increasing to 2 cans nutritional supplement/day, pacing activites, and staying hydrated. She will return in 3 weeks for her next cycle. Reviewed signs and symptoms for when she should contact the clinic or seek additional care. Patient to contact the clinic with any questions or concerns in the interim. Will have Svitlana contact her regarding finances/seeing a .      2.) Genetic risk factors were assessed and the patient does not meet the qualifications for a referral.      3.) Labs and/or tests ordered include:  Chemo labs.      4.) Health maintenance issues addressed today include annual health maintenance and non-gynecologic issues  with PCP.    ROSA Tavera, WHNP-BC, ANP-BC  Women's Health Nurse Practitioner  Adult Nurse Pracitioner  Gynecologic Oncology          CC  Patient Care Team:  Chelsea Wren as PCP - General (Family Practice)  Sita Gallardo as Referring Physician (OB/Gyn)  Christy Gaffney APRN CNS as Nurse Practitioner (Nurse)  Rima Saunders, RN as Continuity Care Coordinator (Gyn-Onc)  CHELSEA WREN

## 2017-10-13 NOTE — PROGRESS NOTES
Social Work Follow-Up Encounter Visit  Oncology Clinic    Data/Intervention:  Patient Name:  Ashvin Tiwari  /Age:  1955 (62 year old)    Reason for Follow-Up:  SW requested by medical provider to meet with patient regarding financial concerns.    Collaborated With:    -Patient  -Family members    Social work attempted to meet with patient in infusion. Patient appeared to be very drowsy and was not able to stay awake for conversation with this worker.  Family was present but indicated they would like this worker to talk with patient about concerns. Social work offered to follow up with patient by phone later after her infusion when she is more alert.  Social work gave brochures for different oncology resources to family (Cancer [employer] Line, [employer], Senior [employer]) to family to give to patient which social work will review with patient over phone later.    Resources Provided:  NA    Assessment:  Patient too drowsy to engage in conversation/communicate needs.    Plan:  Social work contact information provided.  This worker will also contact patient again later to follow up regarding expressed financial concerns.    Soo Yeon Han LICSW  10/12/2017  Pager: [phone]  Phone Number: [phone]

## 2017-10-13 NOTE — TELEPHONE ENCOUNTER
"Social Work Note: Telephone Call  Oncology Clinic    Data/Intervention:  Patient Name:  Ashvin Tiwari  /Age:  1955 (62 year old)    Call From:  Social work attempted to call patient over phone as follow up to yesterday's missed connection during infusion appointment.  Reason for Call:  Patient requested assistance with financial concerns.    Assessment:  Patient is a 62 year old female with cervical cancer.    Social work contacted patient over phone to follow up from attempted visit during infusion yesterday.  Patient indicated she is much less drowsy today and engaged well in conversation.  Patient indicated \"I'm not working anymore because of the cancer. I don't have any money and need help with food, bills, getting money, transportation, everything.\" Patient told this worker that she is currently living with her daughter who is very supportive and providing assistance with patient's needs but \"she has a job, kids and can be busy.\" Social work reviewed [employer] carmen and patient wished to complete application at this time. [employer] carmen application completed and submitted.  SW also provided education regarding [employer] meal delivery services and application was completed and submitted.  SW told patient that she has benefits for transportation through her [employer] insurance.  SW provided contact information for [employer] and explained that patient would need to call at least three business days in advance to arrange a ride for upcoming appointments.  Patient seemed to express understanding of this information given and took down phone number.  SW also recommended patient speak with [facility] about a  to assist in coordinating her benefits/care.  SW also provided information about connecting with [facility] to complete applications/screenings to see if patient would be eligible for [facility] for food stamps, [place]/cash assistance.  Social work also asked if patient had " considered applying for SSDI which patient denied.  SW provided information about SSDI application and contact information for social security administration.    Resources provided:  AF carmen brianne completed  [employer] referral completed  [employer]DI  [facility] resources    Plan:   contact information provided. AF and [employer] will contact patient directly regarding services/applications. Social work will continue to follow and is available to assist with needs.    Soo Yeon Jeromy LICSW  10/13/2017  Pager: [phone]  Phone Number: [phone]

## 2017-10-16 NOTE — MR AVS SNAPSHOT
After Visit Summary   10/16/2017    Ashvin Tiwari    MRN: [id number]           Patient Information     Date Of Birth          1955        Visit Information        Provider Department      10/16/2017 1:00 PM UR CH 03 [facility], Infusion Services        Today's Diagnoses     Nausea    -  1       Follow-ups after your visit        Your next 10 appointments already scheduled     Oct 17, 2017  1:30 PM CDT   Level 2 with UR CH 05   [facility], Infusion Services ([facility])    [address] [place] [address] S  [address] [place] [address]   [phone]            Oct 24, 2017 12:00 PM CDT   (Arrive by 11:45 AM)   Return Visit with Briana Medina MD   [facility] ([facility])    [address] [place] [address]   [phone]            Nov 02, 2017  7:00 AM CDT   [facility] Lab Draw with  [facility] LAB DRAW   [facility] Lab Draw ([facility])    [address] [place] [address]   [phone]            Nov 02, 2017  7:30 AM CDT   (Arrive by 7:15 AM)   Return Active Treatment with ROSA Mendoza CNP   [facility] ([facility])    [address] [place] [address]   [phone]            Nov 02, 2017  8:30 AM CDT   Infusion 360 with [facility], [facility] ATC   [facility] ([facility])    [address] [place] [address]   [phone]            Nov 07, 2017 10:00 AM CST   (Arrive by 9:45 AM)   Return Visit with Briana Medina MD   [facility] ([facility])    [address] [place] [address]   [phone]            Nov 21, 2017  2:30 PM CST   (Arrive by 2:15 PM)   Return Visit with Barbara Stoo  "[facility] ([facility] [place])    [address] [facility]  [address] [facility] [address] [phone]              Who to contact     If you have questions or need follow up information about today's clinic visit or your schedule please contact [facility] directly at [phone].  Normal or non-critical lab and imaging results will be communicated to you by MyChart, letter or phone within 4 business days after the clinic has received the results. If you do not hear from us within 7 days, please contact the clinic through MyChart or phone. If you have a critical or abnormal lab result, we will notify you by phone as soon as possible.  Submit refill requests through [employer] or call your pharmacy and they will forward the refill request to us. Please allow 3 business days for your refill to be completed.          Additional Information About Your Visit        [employer] Information     [employer]ware lets you send messages to your doctor, view your test results, renew your prescriptions, schedule appointments and more. To sign up, go to [email or web address] . Click on \"Log in\" on the left side of the screen, which will take you to the Welcome page. Then click on \"Sign up Now\" on the right side of the page.     You will be asked to enter the access code listed below, as well as some personal information. Please follow the directions to create your username and password.     Your access code is: [email or web address]  Expires: 2017  6:30 AM     Your access code will  in 90 days. If you need help or a new code, please call your [place] clinic or [phone].        Care EveryWhere ID     This is your Care EveryWhere ID. This could be used by other organizations to access your [place] medical records  [id number]        Your Vitals Were     Pulse Temperature Respirations Pulse Oximetry          100 98.5  F (36.9  C) (Oral) 18 95%         Blood " Pressure from Last 3 Encounters:   10/16/17 159/78   10/12/17 134/84   10/10/17 (!) 182/97    Weight from Last 3 Encounters:   10/12/17 84 kg (185 lb 3.2 oz)   10/10/17 83.7 kg (184 lb 8 oz)   10/04/17 85.8 kg (189 lb 2.5 oz)              Today, you had the following     No orders found for display       Primary Care Provider Office Phone # Fax #    Chelsea Wren [phone]       [facility] [address]  [facility] [address]        Equal Access to Services     [facility]: Hadii aad ku hadasho Soomaali, waaxda luqadaha, qaybta kaalmada adeegyada, elizabeth ga . So [facility] [phone].    ATENCIÓN: Si habla español, tiene a mae disposición servicios gratuitos de asistencia lingüística. [facility] [phone].    We comply with applicable federal civil rights laws and [place] laws. We do not discriminate on the basis of race, color, national origin, age, disability, sex, sexual orientation, or gender identity.            Thank you!     Thank you for choosing [facility]  for your care. Our goal is always to provide you with excellent care. Hearing back from our patients is one way we can continue to improve our services. Please take a few minutes to complete the written survey that you may receive in the mail after your visit with us. Thank you!             Your Updated Medication List - Protect others around you: Learn how to safely use, store and throw away your medicines at www.disposemymeds.org.          This list is accurate as of: 10/16/17  3:45 PM.  Always use your most recent med list.                   Brand Name Dispense Instructions for use Diagnosis    acetaminophen 325 MG tablet    TYLENOL    100 tablet    Take 3 tablets (975 mg) by mouth 3 times daily    Cancer related pain       amLODIPine 2.5 MG tablet    NORVASC    30 tablet    Take 1 tablet (2.5 mg) by mouth daily    Essential hypertension       LORazepam 1 MG tablet     ATIVAN    30 tablet    Take 1 tablet (1 mg) by mouth every 6 hours as needed (Anxiety, Nausea/Vomiting or Sleep)    Malignant neoplasm of cervix, unspecified site (H)       polyethylene glycol powder    MIRALAX    510 g    Take 17-34 g (1-2 capfuls) by mouth daily    Cancer related pain, Generalized abdominal pain       prochlorperazine 10 MG tablet    COMPAZINE    30 tablet    Take 1 tablet (10 mg) by mouth every 6 hours as needed (Nausea/Vomiting)    Malignant neoplasm of cervix, unspecified site (H)       senna-docusate 8.6-50 MG per tablet    SENOKOT-S;PERICOLACE    100 tablet    Take 2-4 tablets by mouth 2 times daily    Cancer related pain       traMADol 50 MG tablet    ULTRAM    120 tablet    Take 2 tablets (100mg) every morning and evening before bed.  Take 1-2 tablets twice a day if needed for pain not controlled with the morning and evening doses.    Malignant neoplasm of cervix, unspecified site (H), Cancer related pain

## 2017-10-16 NOTE — PROGRESS NOTES
Infusion Nursing Note:  Ashvin Tiwari presents today for fluids and zofran.    Patient seen by provider today: No   present during visit today: Not Applicable.    Note: Patient states she has been nauseated, but no vomiting.  Appetite is poor.    Intravenous Access:  Implanted Port.    Post Infusion Assessment:  Patient tolerated infusion without incident.  States she feels better after infusion.  Blood return noted pre and post infusion.  Site patent and intact, free from redness, edema or discomfort.  No evidence of extravasations.  Access discontinued per protocol.    Discharge Plan:   Patient discharged in stable condition accompanied by: daughter.  Departure Mode: Ambulatory.    Jeanine Russo RN

## 2017-10-17 NOTE — MR AVS SNAPSHOT
After Visit Summary   10/17/2017    Ashvin Tiwari    MRN: [id number]           Patient Information     Date Of Birth          1955        Visit Information        Provider Department      10/17/2017 1:30 PM [facility] [place], Infusion Services        Today's Diagnoses     Nausea    -  1       Follow-ups after your visit        Your next 10 appointments already scheduled     Oct 24, 2017 12:00 PM CDT   (Arrive by 11:45 AM)   Return Visit with Briana Medina MD   [facility] ([facility])    [address] [place] [address]   [phone]            Nov 02, 2017  7:00 AM CDT   [facility] with [facility]   [facility] Lab Draw ([facility])    [address] [place] [address]   [phone]            Nov 02, 2017  7:30 AM CDT   (Arrive by 7:15 AM)   Return Active Treatment with ROSA Mendoza CNP   formerly [facility] ([facility])    [address] [place] [address]   [phone]            Nov 02, 2017  8:30 AM CDT   Infusion 360 with  [facility], [facility] ATC   formerly [facility] ([facility])    [address] [place] [address]   [phone]            Nov 07, 2017 10:00 AM CST   (Arrive by 9:45 AM)   Return Visit with Briana Medina MD   [facility] ([facility])    [address] [place] [address]   [phone]            Nov 21, 2017  2:30 PM CST   (Arrive by 2:15 PM)   Return Visit with Barbara Soto MD   [facility])    [address] [place] [address]   [phone]              Who to contact     If you have questions or need follow up  "information about today's clinic visit or your schedule please contact [facility] directly at [phone].  Normal or non-critical lab and imaging results will be communicated to you by JDCPhosphatehart, letter or phone within 4 business days after the clinic has received the results. If you do not hear from us within 7 days, please contact the clinic through JDC[employer]osphatehart or phone. If you have a critical or abnormal lab result, we will notify you by phone as soon as possible.  Submit refill requests through [employer] or call your pharmacy and they will forward the refill request to us. Please allow 3 business days for your refill to be completed.          Additional Information About Your Visit        JDC[employer]osphateharGipis Information     [employer] lets you send messages to your doctor, view your test results, renew your prescriptions, schedule appointments and more. To sign up, go to [email or web address] . Click on \"Log in\" on the left side of the screen, which will take you to the Welcome page. Then click on \"Sign up Now\" on the right side of the page.     You will be asked to enter the access code listed below, as well as some personal information. Please follow the directions to create your username and password.     Your access code is: [email or web address]  Expires: 2017  6:30 AM     Your access code will  in 90 days. If you need help or a new code, please call your [place] clinic or [phone].        Care EveryWhere ID     This is your Care EveryWhere ID. This could be used by other organizations to access your [place] medical records  [id number]        Your Vitals Were     Pulse Temperature Pulse Oximetry             88 97.9  F (36.6  C) 97%          Blood Pressure from Last 3 Encounters:   10/17/17 179/90   10/16/17 159/78   10/12/17 134/84    Weight from Last 3 Encounters:   10/12/17 84 kg (185 lb 3.2 oz)   10/10/17 83.7 kg (184 lb 8 oz)   10/04/17 85.8 kg (189 lb 2.5 oz)              Today, you had " the following     No orders found for display       Primary Care Provider Office Phone # Fax #    Chelsea Wren [phone]       [facility] [address]  [facility] [address]        Equal Access to Services     LISSET LUGO : Hadii aad ku hadoscaro Soomaali, waaxda luqadaha, qaybta kaalmada adeegyada, waxdioni idiin hayaan maruqis worthy harmeet cheema. So [facility] [phone].    ATENCIÓN: Si habla español, tiene a mae disposición servicios gratuitos de asistencia lingüística. [facility] [phone].    We comply with applicable federal civil rights laws and [place] laws. We do not discriminate on the basis of race, color, national origin, age, disability, sex, sexual orientation, or gender identity.            Thank you!     Thank you for choosing [facility]  for your care. Our goal is always to provide you with excellent care. Hearing back from our patients is one way we can continue to improve our services. Please take a few minutes to complete the written survey that you may receive in the mail after your visit with us. Thank you!             Your Updated Medication List - Protect others around you: Learn how to safely use, store and throw away your medicines at www.disposemymeds.org.          This list is accurate as of: 10/17/17  3:33 PM.  Always use your most recent med list.                   Brand Name Dispense Instructions for use Diagnosis    acetaminophen 325 MG tablet    TYLENOL    100 tablet    Take 3 tablets (975 mg) by mouth 3 times daily    Cancer related pain       amLODIPine 2.5 MG tablet    NORVASC    30 tablet    Take 1 tablet (2.5 mg) by mouth daily    Essential hypertension       LORazepam 1 MG tablet    ATIVAN    30 tablet    Take 1 tablet (1 mg) by mouth every 6 hours as needed (Anxiety, Nausea/Vomiting or Sleep)    Malignant neoplasm of cervix, unspecified site (H)       polyethylene glycol powder    MIRALAX    510 g    Take 17-34 g (1-2 capfuls) by  mouth daily    Cancer related pain, Generalized abdominal pain       prochlorperazine 10 MG tablet    COMPAZINE    30 tablet    Take 1 tablet (10 mg) by mouth every 6 hours as needed (Nausea/Vomiting)    Malignant neoplasm of cervix, unspecified site (H)       senna-docusate 8.6-50 MG per tablet    SENOKOT-S;PERICOLACE    100 tablet    Take 2-4 tablets by mouth 2 times daily    Cancer related pain       traMADol 50 MG tablet    ULTRAM    120 tablet    Take 2 tablets (100mg) every morning and evening before bed.  Take 1-2 tablets twice a day if needed for pain not controlled with the morning and evening doses.    Malignant neoplasm of cervix, unspecified site (H), Cancer related pain

## 2017-10-17 NOTE — PROGRESS NOTES
Infusion Nursing Note:  Ashvin Tiwari presents today for IVF, zofram.    Patient seen by provider today: No   present during visit today: Not Applicable.    Note: States she is nauseated, no vomiting,however.    Intravenous Access:  Implanted Port.    Treatment Conditions:  Not Applicable.      Post Infusion Assessment:  Patient tolerated infusion without incident.  Blood return noted pre and post infusion.  Site patent and intact, free from redness, edema or discomfort.  No evidence of extravasations.  Access discontinued per protocol.    Discharge Plan:   Discharge instructions reviewed with: Patient.  Patient discharged in stable condition accompanied by: daughter.  Departure Mode: Ambulatory.    Mady Walters RN

## 2017-10-24 NOTE — LETTER
10/24/2017       RE: Ashvin Tiwari  [address]  [facility] [address]     Dear Colleague,    Thank you for referring your patient, Ashvin Tiwari, to the [facility] at [facility]. Please see a copy of my visit note below.    Palliative Care Outpatient Clinic Progress Note    Patient Name:  Ashvin Tiwari  Primary Provider:  Chelsea Wren    Chief Complaint:   Metastatic cervical cancer  Back of head, neck and abdominal pain  fatigue     Interim History:  Ashvin Tiwari 62 year old female returns to be seen by palliative care today.  She nausea has resolved. Taking compazine TID and no nausea with compazine. Appetite is very good.     No constipation or diarrhea. Taking senna 2 tabs BID.     Pain: pain has changed. Not as high in abdomen but in lower abdomen and goes around to the back. Feels pain all the the time. Worse when lying down and gets up or sitting and gets up. Pain when moves. Can lie in bed at night without pain but as soon as rolls over or needs get up to use he bathroom, pain can be significant.   Pain medication:   - taking tramadol 100mg BID and then will usually end up taking 2 tablets in the middle of the night after using the bathroom.     Has slowed down when walking and the shortness of breath is no longer a problem.       Review of Systems:  ROS: 10 point ROS neg other than the symptoms noted above in the HPI         No Known Allergies  Current Outpatient Prescriptions   Medication Sig Dispense Refill     senna-docusate (SENOKOT-S;PERICOLACE) 8.6-50 MG per tablet Take 2-4 tablets by mouth 2 times daily 100 tablet 1     polyethylene glycol (MIRALAX) powder Take 17-34 g (1-2 capfuls) by mouth daily 510 g 1     prochlorperazine (COMPAZINE) 10 MG tablet Take 1 tablet (10 mg) by mouth every 6 hours as needed (Nausea/Vomiting) 30 tablet 2     LORazepam (ATIVAN) 1 MG tablet Take 1 tablet (1 mg) by mouth every 6 hours as  "needed (Anxiety, Nausea/Vomiting or Sleep) 30 tablet 2     traMADol (ULTRAM) 50 MG tablet Take 2 tablets (100mg) every morning and evening before bed.  Take 1-2 tablets twice a day if needed for pain not controlled with the morning and evening doses. 120 tablet 0     acetaminophen (TYLENOL) 325 MG tablet Take 3 tablets (975 mg) by mouth 3 times daily 100 tablet 0     amLODIPine (NORVASC) 2.5 MG tablet Take 1 tablet (2.5 mg) by mouth daily 30 tablet 1     Past Medical History:   Diagnosis Date     Cancer (H)     cervical     Hypertension      Past Surgical History:   Procedure Laterality Date     BIOPSY/EXCISION LYMPH NODE(S), NEEDLE, SUPERFICIAL (CERVICAL/INGUINAL/AXILLARY) Right 9/21/2017    Procedure: BIOPSY/EXCISION LYMPH NODE(S), NEEDLE, SUPERFICIAL (CERVICAL/INGUINAL/AXILLARY);;  Surgeon: Sonu Denson PA-C;  [facility]     INSERT PORT VASCULAR ACCESS Right 9/21/2017    Procedure: INSERT PORT VASCULAR ACCESS;  Single Lumen Chest Power Port, Right Axillary Lymph Node Biopsy;  Surgeon: Sonu Denson PA-C;  Location:  OR     no previous surgery             /86  Pulse 106  Temp 99.4  F (37.4  C) (Oral)  Resp 16  Ht 1.6 m (5' 3\")  Wt 83.6 kg (184 lb 6.4 oz)  SpO2 96%  BMI 32.66 kg/m2  General: well groomed, well nourished, appears comfortable in NAD  Eyes: EOMI, sclera clear  HEENT: NC/AT; mucous membranes moist  Lungs: no increased work of breathing, speaking full sentences   Abdomen: +BS, soft, diffusely tender to palpation, no rebound or guarding  Neuro: A&O x 3; CN II-XII grossly intact; gait normal  Neuropsych: alert, good eye contact, affect full, engaged, pleasant, sensorium intact      Key Data Reviewed:  GFR 89; alb 2.8; ALT/AST 22/20      Impression:     Metastatic cervical cancer receiving taxol, carboplatin and avastin.     Recommendations & Counseling:    Pain control:  - increase tramadol to 2 tablets (100mg) every morning when you wake up, 2 tablets " (100mg) in the middle of the day and 2 tablets (100mg) before bed.  Take 1-2 tablets once a day if needed for pain not controlled with the three scheduled doses.  - can take tylenol 1000mg three times a day as needed for pain not controlled with above medications.      Constipation prevention: the pain medication will cause constipation and constipation causes increased nausea and pain so take medication to prevent constipation.  - senna 2-4 tablets twice a day everyday. Adjust as needed or daily bowel movements.   - if not having regular bowel movements with senna, add miralax    Please review the advanced care directive and we can fill it out at your follow up appointment    Follow up one month - November 21st at 11am      Thank you for involving us in the patient's care. 30 minutes face time over half spent counseling.  Briana Medina MD / Palliative Medicine / Pager [phone] / After-Hours Answering Service [phone] / Main Palliative Clinic - [phone] / [facility] Inpatient Team Consult Pager [phone] (answered 8am-430pm M-F)      Again, thank you for allowing me to participate in the care of your patient.      Sincerely,    Briana Medina MD

## 2017-10-24 NOTE — NURSING NOTE
"Oncology Rooming Note    October 24, 2017 11:50 AM   Ashvin Tiwari is a 62 year old female who presents for:    Chief Complaint   Patient presents with     Oncology Clinic Visit     Return: Cervix Cancer     Initial Vitals: /86  Pulse 106  Temp 99.4  F (37.4  C) (Oral)  Resp 16  Ht 1.6 m (5' 3\")  Wt 83.6 kg (184 lb 6.4 oz)  SpO2 96%  BMI 32.66 kg/m2 Estimated body mass index is 32.66 kg/(m^2) as calculated from the following:    Height as of this encounter: 1.6 m (5' 3\").    Weight as of this encounter: 83.6 kg (184 lb 6.4 oz). Body surface area is 1.93 meters squared.  Extreme Pain (8) Comment: Back and Stomach   No LMP recorded. Patient is postmenopausal.  Allergies reviewed: Yes  Medications reviewed: Yes    Medications: MEDICATION REFILLS NEEDED TODAY. Provider was notified.  Pharmacy name entered into EPIC: [facility] (USE ONLY AT [facility]) - [place], [place] - [address] [place] [address]    Clinical concerns: Pt. Stated that she has a pain score of 8. She has pain in her back and stomach. Pt. Also stated that she needs a refill of Amlodipine. TIMOTHY Dupree was notified.    Offered flu shot to pt. Pt. Denied    10 minutes for nursing intake (face to face time)     Rajesh Brand MA              "

## 2017-10-24 NOTE — PROGRESS NOTES
Palliative Care Outpatient Clinic Progress Note    Patient Name:  Ashvin Tiwari  Primary Provider:  Chelsea Wren    Chief Complaint:   Metastatic cervical cancer  Back of head, neck and abdominal pain  fatigue     Interim History:  Ashvin Tiwari 62 year old female returns to be seen by palliative care today.  She nausea has resolved. Taking compazine TID and no nausea with compazine. Appetite is very good.     No constipation or diarrhea. Taking senna 2 tabs BID.     Pain: pain has changed. Not as high in abdomen but in lower abdomen and goes around to the back. Feels pain all the the time. Worse when lying down and gets up or sitting and gets up. Pain when moves. Can lie in bed at night without pain but as soon as rolls over or needs get up to use he bathroom, pain can be significant.   Pain medication:   - taking tramadol 100mg BID and then will usually end up taking 2 tablets in the middle of the night after using the bathroom.     Has slowed down when walking and the shortness of breath is no longer a problem.       Review of Systems:  ROS: 10 point ROS neg other than the symptoms noted above in the HPI         No Known Allergies  Current Outpatient Prescriptions   Medication Sig Dispense Refill     senna-docusate (SENOKOT-S;PERICOLACE) 8.6-50 MG per tablet Take 2-4 tablets by mouth 2 times daily 100 tablet 1     polyethylene glycol (MIRALAX) powder Take 17-34 g (1-2 capfuls) by mouth daily 510 g 1     prochlorperazine (COMPAZINE) 10 MG tablet Take 1 tablet (10 mg) by mouth every 6 hours as needed (Nausea/Vomiting) 30 tablet 2     LORazepam (ATIVAN) 1 MG tablet Take 1 tablet (1 mg) by mouth every 6 hours as needed (Anxiety, Nausea/Vomiting or Sleep) 30 tablet 2     traMADol (ULTRAM) 50 MG tablet Take 2 tablets (100mg) every morning and evening before bed.  Take 1-2 tablets twice a day if needed for pain not controlled with the morning and evening doses. 120 tablet 0     acetaminophen (TYLENOL) 325 MG  "tablet Take 3 tablets (975 mg) by mouth 3 times daily 100 tablet 0     amLODIPine (NORVASC) 2.5 MG tablet Take 1 tablet (2.5 mg) by mouth daily 30 tablet 1     Past Medical History:   Diagnosis Date     Cancer (H)     cervical     Hypertension      Past Surgical History:   Procedure Laterality Date     BIOPSY/EXCISION LYMPH NODE(S), NEEDLE, SUPERFICIAL (CERVICAL/INGUINAL/AXILLARY) Right 9/21/2017    Procedure: BIOPSY/EXCISION LYMPH NODE(S), NEEDLE, SUPERFICIAL (CERVICAL/INGUINAL/AXILLARY);;  Surgeon: Sonu Denson PA-C;  [facility]     INSERT PORT VASCULAR ACCESS Right 9/21/2017    Procedure: INSERT PORT VASCULAR ACCESS;  Single Lumen Chest Power Port, Right Axillary Lymph Node Biopsy;  Surgeon: Snou Denson PA-C;  Location:  OR     no previous surgery             /86  Pulse 106  Temp 99.4  F (37.4  C) (Oral)  Resp 16  Ht 1.6 m (5' 3\")  Wt 83.6 kg (184 lb 6.4 oz)  SpO2 96%  BMI 32.66 kg/m2  General: well groomed, well nourished, appears comfortable in NAD  Eyes: EOMI, sclera clear  HEENT: NC/AT; mucous membranes moist  Lungs: no increased work of breathing, speaking full sentences   Abdomen: +BS, soft, diffusely tender to palpation, no rebound or guarding  Neuro: A&O x 3; CN II-XII grossly intact; gait normal  Neuropsych: alert, good eye contact, affect full, engaged, pleasant, sensorium intact      Key Data Reviewed:  GFR 89; alb 2.8; ALT/AST 22/20      Impression:     Metastatic cervical cancer receiving taxol, carboplatin and avastin.     Recommendations & Counseling:    Pain control:  - increase tramadol to 2 tablets (100mg) every morning when you wake up, 2 tablets (100mg) in the middle of the day and 2 tablets (100mg) before bed.  Take 1-2 tablets once a day if needed for pain not controlled with the three scheduled doses.  - can take tylenol 1000mg three times a day as needed for pain not controlled with above medications.      Constipation prevention: the pain " medication will cause constipation and constipation causes increased nausea and pain so take medication to prevent constipation.  - senna 2-4 tablets twice a day everyday. Adjust as needed or daily bowel movements.   - if not having regular bowel movements with senna, add miralax    Please review the advanced care directive and we can fill it out at your follow up appointment    Follow up one month - November 21st at 11am      Thank you for involving us in the patient's care. 30 minutes face time over half spent counseling.  Briana Medina MD / Palliative Medicine / Pager [phone] / After-Hours Answering Service [phone] / Main Palliative Clinic - [phone] / [facility] Inpatient Team Consult Pager [phone] (answered 8am-430pm M-F)

## 2017-10-24 NOTE — PATIENT INSTRUCTIONS
Pain control:  - increase tramadol to 2 tablets (100mg) every morning when you wake up, 2 tablets (100mg) in the middle of the day and 2 tablets (100mg) before bed.  Take 1-2 tablets once a day if needed for pain not controlled with the three scheduled doses.  - can take tylenol 1000mg three times a day as needed for pain not controlled with above medications.      Constipation prevention: the pain medication will cause constipation and constipation causes increased nausea and pain so take medication to prevent constipation.  - senna 2-4 tablets twice a day everyday. Adjust as needed or daily bowel movements.   - if not having regular bowel movements with senna, add miralax    Please review the advanced care directive and we can fill it out at your follow up appointment    Follow up one month - November 21st at 11am

## 2017-10-24 NOTE — MR AVS SNAPSHOT
After Visit Summary   10/24/2017    Ashvin Tiwari    MRN: [id number]           Patient Information     Date Of Birth          1955        Visit Information        Provider Department      10/24/2017 12:00 PM Briana Medina MD [facility]        Today's Diagnoses     Malignant neoplasm of cervix, unspecified site (H)        Cancer related pain        Essential hypertension          Care Instructions    Pain control:  - increase tramadol to 2 tablets (100mg) every morning when you wake up, 2 tablets (100mg) in the middle of the day and 2 tablets (100mg) before bed.  Take 1-2 tablets once a day if needed for pain not controlled with the three scheduled doses.  - can take tylenol 1000mg three times a day as needed for pain not controlled with above medications.      Constipation prevention: the pain medication will cause constipation and constipation causes increased nausea and pain so take medication to prevent constipation.  - senna 2-4 tablets twice a day everyday. Adjust as needed or daily bowel movements.   - if not having regular bowel movements with senna, add miralax    Please review the advanced care directive and we can fill it out at your follow up appointment    Follow up one month - November 21st at 11am          Follow-ups after your visit        Your next 10 appointments already scheduled     Nov 02, 2017  7:00 AM CDT   [facility] with [facility]   [facility] Lab Draw ([facility])    [address] [place] [id number]   [phone]            Nov 02, 2017  7:30 AM CDT   (Arrive by 7:15 AM)   Return Active Treatment with ROAS Mendoza CNP   [facility] ([facility])    [address] [place] [id number]   [phone]            Nov 02, 2017  8:30 AM CDT   Infusion 360 with  ONCOLOGY INFUSION,  19 ATC     "[facility] ([facility])    [address] [place] [id number]   [phone]            Nov 21, 2017 11:00 AM CST   (Arrive by 10:45 AM)   Return Visit with Briana Medina MD   [facility] ([facility])    [address] [place] [id number] [phone]            Nov 21, 2017  2:30 PM CST   (Arrive by 2:15 PM)   Return Visit with Barbara Soto MD   [facility] ([facility])    [address] [place] [id number] [phone]              Who to contact     If you have questions or need follow up information about today's clinic visit or your schedule please contact [facility] directly at [phone].  Normal or non-critical lab and imaging results will be communicated to you by SingleHophart, letter or phone within 4 business days after the clinic has received the results. If you do not hear from us within 7 days, please contact the clinic through Local Matterst or phone. If you have a critical or abnormal lab result, we will notify you by phone as soon as possible.  Submit refill requests through [employer] or call your pharmacy and they will forward the refill request to us. Please allow 3 business days for your refill to be completed.          Additional Information About Your Visit        SingleHopharServerPilot Information     [employer] lets you send messages to your doctor, view your test results, renew your prescriptions, schedule appointments and more. To sign up, go to [email or web address] . Click on \"Log in\" on the left side of the screen, which will take you to the Welcome page. Then click on \"Sign up Now\" on the right side of the page.     You will be asked to enter the access code listed below, as well as some personal information. Please follow the directions to create your username and password.   " "  Your access code is: [email or web address]  Expires: 2017  6:30 AM     Your access code will  in 90 days. If you need help or a new code, please call your [facility] or [phone].        Care EveryWhere ID     This is your Care EveryWhere ID. This could be used by other organizations to access your [facility] medical records  [id number]        Your Vitals Were     Pulse Temperature Respirations Height Pulse Oximetry BMI (Body Mass Index)    106 99.4  F (37.4  C) (Oral) 16 1.6 m (5' 3\") 96% 32.66 kg/m2       Blood Pressure from Last 3 Encounters:   10/24/17 147/86   10/17/17 179/90   10/16/17 159/78    Weight from Last 3 Encounters:   10/24/17 83.6 kg (184 lb 6.4 oz)   10/12/17 84 kg (185 lb 3.2 oz)   10/10/17 83.7 kg (184 lb 8 oz)              Today, you had the following     No orders found for display         Today's Medication Changes          These changes are accurate as of: 10/24/17  1:06 PM.  If you have any questions, ask your nurse or doctor.               These medicines have changed or have updated prescriptions.        Dose/Directions    traMADol 50 MG tablet   Commonly known as:  ULTRAM   This may have changed:  additional instructions   Used for:  Malignant neoplasm of cervix, unspecified site (H), Cancer related pain   Changed by:  Briana Medina MD        Take 2 tablets (100mg) every morning when you wake up, 2 tablets (100mg) in the middle of the day and 2 tablets (100mg) before bed.  Take 1-2 tablets once a day if needed for pain not controlled with the three scheduled doses.   Quantity:  240 tablet   Refills:  0            Where to get your medicines      These medications were sent to [facility] [place], [place] - [address] [facility] [address]  [address] [place] [address] [facility] [place] [address]    Hours:  TRANSPLANT PHONE NUMBER [phone] Phone:  [phone]     amLODIPine 2.5 MG tablet         Some of these will need a paper prescription and others can " be bought over the counter.  Ask your nurse if you have questions.     Bring a paper prescription for each of these medications     traMADol 50 MG tablet                Primary Care Provider Office Phone # Fax #    Chelsea Wren [phone]       [facility] [address]  [facility] [address]        Equal Access to Services     LISSET LUGO : Hadii aad ku hadasho Soomaali, waaxda luqadaha, qaybta kaalmada adeegyada, waxay idiin hayaan aderodolfo kharash laaraceli . So [facility] [phone].    ATENCIÓN: Si habla español, tiene a mae disposición servicios gratuitos de asistencia lingüística. [place] al [phone].    We comply with applicable federal civil rights laws and [place] laws. We do not discriminate on the basis of race, color, national origin, age, disability, sex, sexual orientation, or gender identity.            Thank you!     Thank you for choosing [facility]  for your care. Our goal is always to provide you with excellent care. Hearing back from our patients is one way we can continue to improve our services. Please take a few minutes to complete the written survey that you may receive in the mail after your visit with us. Thank you!             Your Updated Medication List - Protect others around you: Learn how to safely use, store and throw away your medicines at www.disposemymeds.org.          This list is accurate as of: 10/24/17  1:06 PM.  Always use your most recent med list.                   Brand Name Dispense Instructions for use Diagnosis    acetaminophen 325 MG tablet    TYLENOL    100 tablet    Take 3 tablets (975 mg) by mouth 3 times daily    Cancer related pain       amLODIPine 2.5 MG tablet    NORVASC    30 tablet    Take 1 tablet (2.5 mg) by mouth daily    Essential hypertension       LORazepam 1 MG tablet    ATIVAN    30 tablet    Take 1 tablet (1 mg) by mouth every 6 hours as needed (Anxiety, Nausea/Vomiting or Sleep)    Malignant neoplasm of  cervix, unspecified site (H)       polyethylene glycol powder    MIRALAX    510 g    Take 17-34 g (1-2 capfuls) by mouth daily    Cancer related pain, Generalized abdominal pain       prochlorperazine 10 MG tablet    COMPAZINE    30 tablet    Take 1 tablet (10 mg) by mouth every 6 hours as needed (Nausea/Vomiting)    Malignant neoplasm of cervix, unspecified site (H)       senna-docusate 8.6-50 MG per tablet    SENOKOT-S;PERICOLACE    100 tablet    Take 2-4 tablets by mouth 2 times daily    Cancer related pain       traMADol 50 MG tablet    ULTRAM    240 tablet    Take 2 tablets (100mg) every morning when you wake up, 2 tablets (100mg) in the middle of the day and 2 tablets (100mg) before bed.  Take 1-2 tablets once a day if needed for pain not controlled with the three scheduled doses.    Malignant neoplasm of cervix, unspecified site (H), Cancer related pain

## 2017-11-01 NOTE — PROGRESS NOTES
Follow Up Notes on Referred Patient    Date: 2017       Dr. Chelsea Wren  [facility] [address]  [address] [place] [address]       RE: Ashvin Tiwari  : 1955  DENIS: 2017    Dear Dr. Chelsea Wren:    Ashvin Tiwari is a 62 year old woman with a diagnosis of stage IV poorly differentiated SCC cervix.   She is here today for follow up and disease management.      Oncology history:   17:  Seen in Gyn Onc.  Exam concerning for at least a Stage IIB cervical cancer.  Biopsy obtained consistent with poorly differentiated squamous cell ca.  MRI ordered.      17: MRI Pelvis with 7 x 7cm mass at level of cervix, protrudes into upper third of vagina, bilateral parametrial extension, abuts bilateral ureters but no obstruction, abuts rectal wall with obliteration of fat plan but no mucosal invasion, no clear bladder wall invasion, suspicious lymph nodes left hemipelvis, peritoneal nodule versus lymph node.      17 to 17:  Patient admitted to hospital with pain. CT Chest PE was negative for PE but showed multiple pulmonary nodules consistent with metastatic disease.  CT A/P showed cervical mass with regional metastatic disease as well as peritoneal nodularity.      17:  PET scan with multiple metastatic hypermetabolic lung nodules, axillary, mediastinal, hilar and abdominal lymph nodes.     17-17: Cycle #1-3 Paclitaxel/Cisplatin/Avastin.         Today she comes to clinic feeling generally well. She denies any vaginal bleeding, no changes in her bowel (has some constipation for which she takes Senna) or bladder habits, no emesis, no lower extremity edema, and no difficulties eating or sleeping. She denies any abdominal discomfort/bloating, no fevers or chills, and no chest pain or shortness of breath but does have CARMONA. She did have some nausea the first week after her treatment and took Compazine for this. She is also taking Ativan at bedtime  nightly. She has been consuming 1-2 cans of Boost glucose control every day. She does have some intermittent numbness in her hands. She does not need any medication refills. She has seen a PCP at [facility] but not recently. She does not have a BP cuff at home but reports she did take her Amlodipine this morning.         Review of Systems:    Systemic           no weight changes; no fever; no chills; no night sweats; no appetite changes  Skin           no rashes, or lesions  Eye           no irritation; no changes in vision  Tabby-Laryngeal           no dysphagia; no hoarseness   Pulmonary    no cough; no shortness of breath  Cardiovascular    no chest pain; no palpitations; + HTN  Gastrointestinal    no diarrhea; no constipation; no abdominal pain; no changes in bowel habits; no blood in stool  Genitourinary   no urinary frequency; no urinary urgency; no dysuria; no pain; no abnormal vaginal discharge; no abnormal vaginal bleeding  Breast    no breast discharge; no breast changes; no breast pain  Musculoskeletal    no myalgias; + arthralgias; + back pain  Psychiatric           no depressed mood; no anxiety    Hematologic              no tender lymph nodes; no noticeable swellings or lumps   Endocrine    no hot flashes; no heat/cold intolerance         Neurological   no tremor; + numbness and tingling; no headaches; no difficulty sleeping      Past Medical History:    Past Medical History:   Diagnosis Date     Cancer (H)     cervical     Hypertension          Past Surgical History:    Past Surgical History:   Procedure Laterality Date     BIOPSY/EXCISION LYMPH NODE(S), NEEDLE, SUPERFICIAL (CERVICAL/INGUINAL/AXILLARY) Right 9/21/2017    Procedure: BIOPSY/EXCISION LYMPH NODE(S), NEEDLE, SUPERFICIAL (CERVICAL/INGUINAL/AXILLARY);;  Surgeon: Sonu Denson PA-C;  Location:  OR     INSERT PORT VASCULAR ACCESS Right 9/21/2017    Procedure: INSERT PORT VASCULAR ACCESS;  Single Lumen Chest Power Port, Right  Axillary Lymph Node Biopsy;  Surgeon: Sonu Denson PA-C;  Location: [facility]     no previous surgery           Health Maintenance Due   Topic Date Due     TETANUS IMMUNIZATION (SYSTEM ASSIGNED)  05/10/1971     HEPATITIS C SCREENING  05/10/1971     LIPID SCREEN Q5 YR FEMALE (SYSTEM ASSIGNED)  05/10/1998     MAMMO SCREEN Q2 YR (SYSTEM ASSIGNED)  05/10/2003     COLON CANCER SCREEN (SYSTEM ASSIGNED)  05/10/2003     ADVANCE DIRECTIVE PLANNING Q5 YRS  05/10/2008     INFLUENZA VACCINE (SYSTEM ASSIGNED)  09/01/2017       Current Medications:     Current Outpatient Prescriptions   Medication Sig Dispense Refill     traMADol (ULTRAM) 50 MG tablet Take 2 tablets (100mg) every morning when you wake up, 2 tablets (100mg) in the middle of the day and 2 tablets (100mg) before bed.  Take 1-2 tablets once a day if needed for pain not controlled with the three scheduled doses. 240 tablet 0     amLODIPine (NORVASC) 2.5 MG tablet Take 1 tablet (2.5 mg) by mouth daily 30 tablet 1     senna-docusate (SENOKOT-S;PERICOLACE) 8.6-50 MG per tablet Take 2-4 tablets by mouth 2 times daily 100 tablet 1     polyethylene glycol (MIRALAX) powder Take 17-34 g (1-2 capfuls) by mouth daily 510 g 1     prochlorperazine (COMPAZINE) 10 MG tablet Take 1 tablet (10 mg) by mouth every 6 hours as needed (Nausea/Vomiting) 30 tablet 2     LORazepam (ATIVAN) 1 MG tablet Take 1 tablet (1 mg) by mouth every 6 hours as needed (Anxiety, Nausea/Vomiting or Sleep) 30 tablet 2     acetaminophen (TYLENOL) 325 MG tablet Take 3 tablets (975 mg) by mouth 3 times daily 100 tablet 0         Allergies:      No Known Allergies     Social History:     Social History   Substance Use Topics     Smoking status: Never Smoker     Smokeless tobacco: Never Used     Alcohol use No       History   Drug Use No         Family History:       No family history on file.      Physical Exam:     /78 (BP Location: Right arm, Patient Position: Sitting, Cuff Size: Adult Large)   Pulse 92  Temp 98.8  F (37.1  C) (Oral)  Resp 16  Wt 83.7 kg (184 lb 9.6 oz)  SpO2 96%  BMI 32.7 kg/m2  Body mass index is 32.7 kg/(m^2).    General Appearance: healthy and alert, no distress     HEENT: no thyromegaly, no palpable nodules or masses        Cardiovascular: regular rate and rhythm, no gallops, rubs or murmurs     Respiratory: lungs clear, no rales, rhonchi or wheezes, normal diaphragmatic excursion    Musculoskeletal: extremities non tender and without edema    Skin: no lesions or rashes     Neurological: normal gait, no gross defects     Psychiatric: appropriate mood and affect                               Hematological: normal cervical, supraclavicular lymph nodes     Gastrointestinal:       abdomen soft, non-tender, non-distended, no organomegaly or masses    Genitourinary: Not indicated      Assessment:    Ashvin Tiwari is a 62 year old woman with a diagnosis of stage IV poorly differentiated SCC cervix.   She is here today for follow up and disease management.      40 minutes were spent with this patient, over 50% of that time was spent in symptom management, treatment planning and in counseling and coordination of care.      Plan:     1.)        Ok to proceed with planned chemotherapy pending labs are WNL. Given her elevated BP (systolic 1809's in infusion) will not give her Avastin today and will given her Hydralazine in infusion. She will have imaging in ~3 weeks and see Dr. Soto for results and treatment planning. Reviewed signs and symptoms for when she should contact the clinic or seek additional care. Patient to contact the clinic with any questions or concerns in the interim.     2.) Genetic risk factors were assessed and the patient does not meet the qualifications for a referral.      3.) Labs and/or tests ordered include:  Chemo labs. PET CT.      4.) Health maintenance issues addressed today include annual health maintenance and non-gynecologic issues with PCP.    5.)         Continue to see Palliative Care as needed.     6.)        HTN: encouraged to monitor BP at home and outside locations as well as to see her PCP Chelsea Wren for management. BP recheck in clinic was improved at 160 but still higher than wanted while on Avastin. She will have this checked again in infusion.     7.)         Neuropathy: discussed trying B6 and L-glutamine and she was given written instructions for this.     8.)         Continue with additional nutritional supplement daily; continue to monitor her nutational status.     ROSA Tavera, WHNP-BC, ANP-BC  Women's Health Nurse Practitioner  Adult Nurse Pracitioner  Gynecologic Oncology          CC  Patient Care Team:  Chelsea Wren as PCP - General (Family Practice)  Sita Gallardo as Referring Physician (OB/Gyn)  Christy Gaffney APRN CNS as Nurse Practitioner (Nurse)  Rima Saunders, RN as Continuity Care Coordinator (Gyn-Onc)  Leia Caraballo, RN as Registered Nurse (Palliative)  CHELSEA WREN

## 2017-11-02 PROBLEM — I15.9 SECONDARY HYPERTENSION: Status: ACTIVE | Noted: 2017-01-01

## 2017-11-02 NOTE — NURSING NOTE
"Oncology Rooming Note    November 2, 2017 7:29 AM   Ashvin Tiwari is a 62 year old female who presents for:    Chief Complaint   Patient presents with     Port Draw     Labs drawn from port by RN. Line flushed with saline and heparin. Vs taken and pt checked in for appt     Initial Vitals: BP (!) 173/93 (BP Location: Right arm, Cuff Size: Adult Regular)  Pulse 104  Temp 98.8  F (37.1  C) (Oral)  Resp 16  Wt 83.7 kg (184 lb 9.6 oz)  SpO2 96%  BMI 32.7 kg/m2 Estimated body mass index is 32.7 kg/(m^2) as calculated from the following:    Height as of 10/24/17: 1.6 m (5' 3\").    Weight as of this encounter: 83.7 kg (184 lb 9.6 oz). Body surface area is 1.93 meters squared.  Moderate Pain (5) Comment: Data Unavailable   No LMP recorded. Patient is postmenopausal.  Allergies reviewed: Yes  Medications reviewed: Yes    Medications: Medication refills not needed today.  Pharmacy name entered into EPIC: [facility] (USE ONLY AT [facility]) - [place], [place] - [address] [place] [address]    Clinical concerns: Patient states there are no new concerns to discuss with provider.  Patient was offered a flu vaccine today but declined.   Rachelle Miranda was notified.    6 minutes for nursing intake (face to face time)     Yesy Chester CMA              "

## 2017-11-02 NOTE — PROGRESS NOTES
Care Coordinator Note   Patient seen in infusion, appointments for [place] made for Monday and Tuesday.   Gave her a written hand out of her BP since August. She will follow up with her primary regarding her BP.    Reviewed schedule with patient.         Patient verbalized back understanding of the above information discussed.   Face to face time spent with patient 10.  Rima MADSEN RN, OCN  Care Coordinator   Gynecologic Cancer   Office [phone]  Pager [phone] #[phone]

## 2017-11-02 NOTE — NURSING NOTE
"Chief Complaint   Patient presents with     Port Draw     Labs drawn from port by RN. Line flushed with saline and heparin. Vs taken and pt checked in for appt     Port accessed with 20g 3/4\" gripper needle by RN, labs collected, line flushed with saline and heparin.  Vitals taken. Pt checked in for appointment(s).    Namita Hewitt RN  "

## 2017-11-02 NOTE — MR AVS SNAPSHOT
After Visit Summary   11/2/2017    Ashvin Tiwari    MRN: [id number]           Patient Information     Date Of Birth          1955        Visit Information        Provider Department      11/2/2017 8:30 AM UC 19 ATC; UC ONCOLOGY INFUSION [facility]        Today's Diagnoses     Malignant neoplasm of cervix, unspecified site (H)    -  1      Care Instructions    Contact numbers:  Triage Main Line: [phone]  After hours: [phone]    Call with chills and/or temperature greater than or equal to 100.5 and questions or concerns.    If after hours, weekends, or holidays, call main hospital  at  [phone] and ask for Oncology doctor on call.           November 2017 Sunday Monday Tuesday Wednesday Thursday Friday Saturday                  1     2     [facility] LAB DRAW    7:00 AM   (15 min.)   [facility] LAB DRAW   [facility] Lab Draw     [facility] RETURN ACTIVE TREATMENT    7:15 AM   (30 min.)   Rachelle Miranda APRN CNP   [facility] ONC INFUSION 360    8:30 AM   (360 min.)    ONCOLOGY INFUSION   [facility] 3     4       5     6     7     P ONC INFUSION 120    8:00 AM   (120 min.)    ONCOLOGY INFUSION   [facility] 8     9     10     11       12     13     14     15     16     17     PET ONCOLOGY WHOLE BODY    7:30 AM   (45 min.)   UUPET1   [facility] [place] PET CT 18       19     20     21     [facility] RETURN   10:45 AM   (30 min.)   [facility]    2:15 PM   (30 min.)   Barbara Soto MD   [facility] 22     23     24     25       26     27     28     29     30 December 2017 Sunday Monday Tuesday Wednesday Thursday Friday Saturday                            1     2       3     4     5     6     7     8     9       10     11     12     13     14     15     16       17     18     19      20     21     22     23       24     25     26     27     28     29     30       31                                                Lab Results:  Recent Results (from the past 12 hour(s))   CBC with platelets differential    Collection Time: 11/02/17  7:13 AM   Result Value Ref Range    WBC 4.9 4.0 - 11.0 10e9/L    RBC Count 4.41 3.8 - 5.2 10e12/L    Hemoglobin 9.6 (L) 11.7 - 15.7 g/dL    Hematocrit 33.4 (L) 35.0 - 47.0 %    MCV 76 (L) 78 - 100 fl    MCH 21.8 (L) 26.5 - 33.0 pg    MCHC 28.7 (L) 31.5 - 36.5 g/dL    RDW 22.4 (H) 10.0 - 15.0 %    Platelet Count 303 150 - 450 10e9/L    Diff Method Automated Method     % Neutrophils 46.9 %    % Lymphocytes 39.5 %    % Monocytes 12.8 %    % Eosinophils 0.2 %    % Basophils 0.4 %    % Immature Granulocytes 0.2 %    Nucleated RBCs 0 0 /100    Absolute Neutrophil 2.3 1.6 - 8.3 10e9/L    Absolute Lymphocytes 1.9 0.8 - 5.3 10e9/L    Absolute Monocytes 0.6 0.0 - 1.3 10e9/L    Absolute Eosinophils 0.0 0.0 - 0.7 10e9/L    Absolute Basophils 0.0 0.0 - 0.2 10e9/L    Abs Immature Granulocytes 0.0 0 - 0.4 10e9/L    Absolute Nucleated RBC 0.0    Comprehensive metabolic panel    Collection Time: 11/02/17  7:13 AM   Result Value Ref Range    Sodium 137 133 - 144 mmol/L    Potassium 3.5 3.4 - 5.3 mmol/L    Chloride 102 94 - 109 mmol/L    Carbon Dioxide 24 20 - 32 mmol/L    Anion Gap 10 3 - 14 mmol/L    Glucose 134 (H) 70 - 99 mg/dL    Urea Nitrogen 11 7 - 30 mg/dL    Creatinine 0.84 0.52 - 1.04 mg/dL    GFR Estimate 68 >60 mL/min/1.7m2    GFR Estimate If Black 82 >60 mL/min/1.7m2    Calcium 8.7 8.5 - 10.1 mg/dL    Bilirubin Total 0.2 0.2 - 1.3 mg/dL    Albumin 3.2 (L) 3.4 - 5.0 g/dL    Protein Total 8.3 6.8 - 8.8 g/dL    Alkaline Phosphatase 110 40 - 150 U/L    ALT 22 0 - 50 U/L    AST 18 0 - 45 U/L   Magnesium    Collection Time: 11/02/17  7:13 AM   Result Value Ref Range    Magnesium 1.9 1.6 - 2.3 mg/dL   Protein qualitative urine    Collection Time: 11/02/17  7:19 AM   Result Value Ref  Range    Protein Albumin Urine Negative NEG^Negative mg/dL               Follow-ups after your visit        Your next 10 appointments already scheduled     Nov 07, 2017  8:00 AM CST   Infusion 120 with UC ONCOLOGY INFUSION, UC 20 ATC   [facility] ([facility])    [address] [place] [address]  [address] [facility] [id number] [phone]            Nov 17, 2017  7:30 AM CST   PET ONCOLOGY WHOLE BODY with UUPET1   [facility] PET CT ([facility])    [address] [facility] [id number] [phone]           Tell your doctor:   If there is any chance you may be pregnant or if you are breastfeeding.   If you have problems lying in small spaces (claustrophobia). If you do, your doctor may give you medicine to help you relax. If you have diabetes:   Have your exam early in the morning. Your blood glucose will go up as the day goes by.   Your glucose level must be 180 or less at the start of the exam. Please take any medicines you need to ensure this blood glucose level. 24 hours before your scan: Don t do any heavy exercise. (No jogging, aerobics or other workouts.) Exercise will make your pictures less accurate. 6 hours before your scan:   Stop all food and liquids (except water).   Do not chew gum or suck on mints.   If you need to take medicine with food, you may take it with a few crackers.  Please call your Imaging Department at your exam site with any questions.            Nov 21, 2017 11:00 AM CST   (Arrive by 10:45 AM)   Return Visit with Briana Medina MD   [facility] ([facility])    [address] [place] [id number] [phone]            Nov 21, 2017  2:30 PM CST   (Arrive by 2:15 PM)   Return Visit with Barbara Soto MD   [facility] ([facility])    [address] [place] [address]  "[facility] [address] [phone]              Future tests that were ordered for you today     Open Future Orders        Priority Expected Expires Ordered    PET Oncology Whole Body Routine  2018            Who to contact     If you have questions or need follow up information about today's clinic visit or your schedule please contact [facility] directly at [phone].  Normal or non-critical lab and imaging results will be communicated to you by "[employer]hart, letter or phone within 4 business days after the clinic has received the results. If you do not hear from us within 7 days, please contact the clinic through "[employer]hart or phone. If you have a critical or abnormal lab result, we will notify you by phone as soon as possible.  Submit refill requests through [employer] or call your pharmacy and they will forward the refill request to us. Please allow 3 business days for your refill to be completed.          Additional Information About Your Visit        "[employer] Information     [employer] lets you send messages to your doctor, view your test results, renew your prescriptions, schedule appointments and more. To sign up, go to [email or web address] . Click on \"Log in\" on the left side of the screen, which will take you to the Welcome page. Then click on \"Sign up Now\" on the right side of the page.     You will be asked to enter the access code listed below, as well as some personal information. Please follow the directions to create your username and password.     Your access code is: [email or web address]  Expires: 2017  6:30 AM     Your access code will  in 90 days. If you need help or a new code, please call your [facility] clinic or [phone].        Care EveryWhere ID     This is your Care EveryWhere ID. This could be used by other organizations to access your [facility] medical records  [id number]         Blood Pressure from Last 3 Encounters:   17 (!) 163/96 "   11/02/17 160/78   10/24/17 147/86    Weight from Last 3 Encounters:   11/02/17 83.7 kg (184 lb 9.6 oz)   10/24/17 83.6 kg (184 lb 6.4 oz)   10/12/17 84 kg (185 lb 3.2 oz)              We Performed the Following     CBC with platelets differential     Comprehensive metabolic panel     Magnesium     Protein qualitative urine        Primary Care Provider Office Phone # Fax #    Chelsea Wren [phone]       [facility] [address]  [facility] [address]        Equal Access to Services     [facility]: Hadii aad ku hadasho Soomaali, waaxda luqadaha, qaybta kaalmada adeegyada, elizabeth ga . [facility] [phone].    ATENCIÓN: Si habla español, tiene a mae disposición servicios gratuitos de asistencia lingüística. [facility] [phone].    We comply with applicable federal civil rights laws and [place] laws. We do not discriminate on the basis of race, color, national origin, age, disability, sex, sexual orientation, or gender identity.            Thank you!     Thank you for choosing [facility]  for your care. Our goal is always to provide you with excellent care. Hearing back from our patients is one way we can continue to improve our services. Please take a few minutes to complete the written survey that you may receive in the mail after your visit with us. Thank you!             Your Updated Medication List - Protect others around you: Learn how to safely use, store and throw away your medicines at www.disposemymeds.org.          This list is accurate as of: 11/2/17 10:51 AM.  Always use your most recent med list.                   Brand Name Dispense Instructions for use Diagnosis    acetaminophen 325 MG tablet    TYLENOL    100 tablet    Take 3 tablets (975 mg) by mouth 3 times daily    Cancer related pain       amLODIPine 2.5 MG tablet    NORVASC    30 tablet    Take 1 tablet (2.5 mg) by mouth daily    Essential hypertension        LORazepam 1 MG tablet    ATIVAN    30 tablet    Take 1 tablet (1 mg) by mouth every 6 hours as needed (Anxiety, Nausea/Vomiting or Sleep)    Malignant neoplasm of cervix, unspecified site (H)       polyethylene glycol powder    MIRALAX    510 g    Take 17-34 g (1-2 capfuls) by mouth daily    Cancer related pain, Generalized abdominal pain       prochlorperazine 10 MG tablet    COMPAZINE    30 tablet    Take 1 tablet (10 mg) by mouth every 6 hours as needed (Nausea/Vomiting)    Malignant neoplasm of cervix, unspecified site (H)       senna-docusate 8.6-50 MG per tablet    SENOKOT-S;PERICOLACE    100 tablet    Take 2-4 tablets by mouth 2 times daily    Cancer related pain       traMADol 50 MG tablet    ULTRAM    240 tablet    Take 2 tablets (100mg) every morning when you wake up, 2 tablets (100mg) in the middle of the day and 2 tablets (100mg) before bed.  Take 1-2 tablets once a day if needed for pain not controlled with the three scheduled doses.    Malignant neoplasm of cervix, unspecified site (H), Cancer related pain

## 2017-11-02 NOTE — LETTER
2017       RE: Ashvin Tiwari  [address]  [facility] [place] [address]     Dear Colleague,    Thank you for referring your patient, Ashvin Tiwari, to the [facility]. Please see a copy of my visit note below.                Follow Up Notes on Referred Patient    Date: 2017     Dr. Chelsea Wren  [facility] [address]  [address] [place] [address]       RE: Ashvin Tiwari  : 1955  DENIS: 2017    Dear Dr. Chelsea Wren:    Ashvin Tiwari is a 62 year old woman with a diagnosis of stage IV poorly differentiated SCC cervix.   She is here today for follow up and disease management.      Oncology history:   17:  Seen in Gyn Onc.  Exam concerning for at least a Stage IIB cervical cancer.  Biopsy obtained consistent with poorly differentiated squamous cell ca.  MRI ordered.      17: MRI Pelvis with 7 x 7cm mass at level of cervix, protrudes into upper third of vagina, bilateral parametrial extension, abuts bilateral ureters but no obstruction, abuts rectal wall with obliteration of fat plan but no mucosal invasion, no clear bladder wall invasion, suspicious lymph nodes left hemipelvis, peritoneal nodule versus lymph node.      17 to 17:  Patient admitted to hospital with pain. CT Chest PE was negative for PE but showed multiple pulmonary nodules consistent with metastatic disease.  CT A/P showed cervical mass with regional metastatic disease as well as peritoneal nodularity.      17:  PET scan with multiple metastatic hypermetabolic lung nodules, axillary, mediastinal, hilar and abdominal lymph nodes.     17-17: Cycle #1-3 Paclitaxel/Cisplatin/Avastin.     Today she comes to clinic feeling generally well. She denies any vaginal bleeding, no changes in her bowel (has some constipation for which she takes Senna) or bladder habits, no emesis, no lower extremity edema, and no difficulties eating or sleeping. She denies any  abdominal discomfort/bloating, no fevers or chills, and no chest pain or shortness of breath but does have CARMONA. She did have some nausea the first week after her treatment and took Compazine for this. She is also taking Ativan at bedtime nightly. She has been consuming 1-2 cans of Boost glucose control every day. She does have some intermittent numbness in her hands. She does not need any medication refills. She has seen a PCP at [facility] but not recently. She does not have a BP cuff at home but reports she did take her Amlodipine this morning.         Review of Systems:    Systemic           no weight changes; no fever; no chills; no night sweats; no appetite changes  Skin           no rashes, or lesions  Eye           no irritation; no changes in vision  Tabby-Laryngeal           no dysphagia; no hoarseness   Pulmonary    no cough; no shortness of breath  Cardiovascular    no chest pain; no palpitations; + HTN  Gastrointestinal    no diarrhea; no constipation; no abdominal pain; no changes in bowel habits; no blood in stool  Genitourinary   no urinary frequency; no urinary urgency; no dysuria; no pain; no abnormal vaginal discharge; no abnormal vaginal bleeding  Breast    no breast discharge; no breast changes; no breast pain  Musculoskeletal    no myalgias; + arthralgias; + back pain  Psychiatric           no depressed mood; no anxiety    Hematologic              no tender lymph nodes; no noticeable swellings or lumps   Endocrine    no hot flashes; no heat/cold intolerance         Neurological   no tremor; + numbness and tingling; no headaches; no difficulty sleeping      Past Medical History:    Past Medical History:   Diagnosis Date     Cancer (H)     cervical     Hypertension          Past Surgical History:    Past Surgical History:   Procedure Laterality Date     BIOPSY/EXCISION LYMPH NODE(S), NEEDLE, SUPERFICIAL (CERVICAL/INGUINAL/AXILLARY) Right 9/21/2017    Procedure: BIOPSY/EXCISION LYMPH NODE(S),  NEEDLE, SUPERFICIAL (CERVICAL/INGUINAL/AXILLARY);;  Surgeon: Sonu Denson PA-C;  [facility]     INSERT PORT VASCULAR ACCESS Right 9/21/2017    Procedure: INSERT PORT VASCULAR ACCESS;  Single Lumen Chest Power Port, Right Axillary Lymph Node Biopsy;  Surgeon: Sonu Denson PA-C;  [facility]     no previous surgery           Health Maintenance Due   Topic Date Due     TETANUS IMMUNIZATION (SYSTEM ASSIGNED)  05/10/1971     HEPATITIS C SCREENING  05/10/1971     LIPID SCREEN Q5 YR FEMALE (SYSTEM ASSIGNED)  05/10/1998     MAMMO SCREEN Q2 YR (SYSTEM ASSIGNED)  05/10/2003     COLON CANCER SCREEN (SYSTEM ASSIGNED)  05/10/2003     ADVANCE DIRECTIVE PLANNING Q5 YRS  05/10/2008     INFLUENZA VACCINE (SYSTEM ASSIGNED)  09/01/2017       Current Medications:     Current Outpatient Prescriptions   Medication Sig Dispense Refill     traMADol (ULTRAM) 50 MG tablet Take 2 tablets (100mg) every morning when you wake up, 2 tablets (100mg) in the middle of the day and 2 tablets (100mg) before bed.  Take 1-2 tablets once a day if needed for pain not controlled with the three scheduled doses. 240 tablet 0     amLODIPine (NORVASC) 2.5 MG tablet Take 1 tablet (2.5 mg) by mouth daily 30 tablet 1     senna-docusate (SENOKOT-S;PERICOLACE) 8.6-50 MG per tablet Take 2-4 tablets by mouth 2 times daily 100 tablet 1     polyethylene glycol (MIRALAX) powder Take 17-34 g (1-2 capfuls) by mouth daily 510 g 1     prochlorperazine (COMPAZINE) 10 MG tablet Take 1 tablet (10 mg) by mouth every 6 hours as needed (Nausea/Vomiting) 30 tablet 2     LORazepam (ATIVAN) 1 MG tablet Take 1 tablet (1 mg) by mouth every 6 hours as needed (Anxiety, Nausea/Vomiting or Sleep) 30 tablet 2     acetaminophen (TYLENOL) 325 MG tablet Take 3 tablets (975 mg) by mouth 3 times daily 100 tablet 0         Allergies:      No Known Allergies     Social History:     Social History   Substance Use Topics     Smoking status: Never Smoker      Smokeless tobacco: Never Used     Alcohol use No       History   Drug Use No         Family History:       No family history on file.      Physical Exam:     /78 (BP Location: Right arm, Patient Position: Sitting, Cuff Size: Adult Large)  Pulse 92  Temp 98.8  F (37.1  C) (Oral)  Resp 16  Wt 83.7 kg (184 lb 9.6 oz)  SpO2 96%  BMI 32.7 kg/m2  Body mass index is 32.7 kg/(m^2).    General Appearance: healthy and alert, no distress     HEENT: no thyromegaly, no palpable nodules or masses        Cardiovascular: regular rate and rhythm, no gallops, rubs or murmurs     Respiratory: lungs clear, no rales, rhonchi or wheezes, normal diaphragmatic excursion    Musculoskeletal: extremities non tender and without edema    Skin: no lesions or rashes     Neurological: normal gait, no gross defects     Psychiatric: appropriate mood and affect                               Hematological: normal cervical, supraclavicular lymph nodes     Gastrointestinal:       abdomen soft, non-tender, non-distended, no organomegaly or masses    Genitourinary: Not indicated      Assessment:    Ashvin Tiwari is a 62 year old woman with a diagnosis of stage IV poorly differentiated SCC cervix.   She is here today for follow up and disease management.      40 minutes were spent with this patient, over 50% of that time was spent in symptom management, treatment planning and in counseling and coordination of care.      Plan:     1.)        Ok to proceed with planned chemotherapy pending labs are WNL. Given her elevated BP (systolic 1809's in infusion) will not give her Avastin today and will given her Hydralazine in infusion. She will have imaging in ~3 weeks and see Dr. Soto for results and treatment planning. Reviewed signs and symptoms for when she should contact the clinic or seek additional care. Patient to contact the clinic with any questions or concerns in the interim.     2.) Genetic risk factors were assessed and the patient  does not meet the qualifications for a referral.      3.) Labs and/or tests ordered include:  Chemo labs. PET CT.      4.) Health maintenance issues addressed today include annual health maintenance and non-gynecologic issues with PCP.    5.)        Continue to see Palliative Care as needed.     6.)        HTN: encouraged to monitor BP at home and outside locations as well as to see her PCP Chelsea Wren for management. BP recheck in clinic was improved at 160 but still higher than wanted while on Avastin. She will have this checked again in infusion.     7.)         Neuropathy: discussed trying B6 and L-glutamine and she was given written instructions for this.     8.)         Continue with additional nutritional supplement daily; continue to monitor her nutational status.     ROSA Tavera, WHNP-BC, ANP-BC  Women's Health Nurse Practitioner  Adult Nurse Pracitioner  Gynecologic Oncology    CC  Patient Care Team:  Chelsea Wren as PCP - General (Family Practice)  Sita Gallardo as Referring Physician (OB/Gyn)  Christy Gaffney APRN CNS as Nurse Practitioner (Nurse)  Rima Saunders, RN as Continuity Care Coordinator (Gyn-Onc)  Leia Caraballo, RN as Registered Nurse (Palliative)

## 2017-11-02 NOTE — PROGRESS NOTES
Infusion Nursing Note:  Ashvin Tiwari presents for D1C3 Taxol, Carboplatin  Met with Mayra Miranda DNP before infusion.    Note:   TORB- Mayra Miranda DNP/Claudia Gomes RN.  --no avastin today due to hypertension  --pt to follow up with PCP for management of hypertension  --give 5mg IV hypertension if pt develops headache (BP rechecked and down to 163/97, pt denies headache and didn't want IV hydralazine)    Reinforced to pt how important it is to follow up with PCP soon to get BP under control, pt verbalized understanding    Treatment Conditions:  Lab Results   Component Value Date    HGB 9.6 11/02/2017     Lab Results   Component Value Date    WBC 4.9 11/02/2017      Lab Results   Component Value Date    ANEU 2.3 11/02/2017     Lab Results   Component Value Date     11/02/2017      Lab Results   Component Value Date     11/02/2017                   Lab Results   Component Value Date    POTASSIUM 3.5 11/02/2017           Lab Results   Component Value Date    MAG 1.9 11/02/2017            Lab Results   Component Value Date    CR 0.84 11/02/2017                   Lab Results   Component Value Date    BEATRIZ 8.7 11/02/2017                Lab Results   Component Value Date    BILITOTAL 0.2 11/02/2017           Lab Results   Component Value Date    ALBUMIN 3.2 11/02/2017                    Lab Results   Component Value Date    ALT 22 11/02/2017           Lab Results   Component Value Date    AST 18 11/02/2017     Results reviewed, labs MET treatment parameters, ok to proceed with treatment.    Intravenous Access:  Implanted Port.    Post Infusion Assessment:  Patient tolerated infusion without incident.  Blood return noted pre and post infusion.  No evidence of extravasations.  Access discontinued per protocol.    Discharge Plan:   Patient declined prescription refills.  Discharge instructions reviewed with: Patient and Family.  Patient and/or family verbalized understanding of discharge instructions  and all questions answered.  Copy of AVS reviewed with patient and/or family.  Patient will return 11/7 for next appointment.  Patient discharged in stable condition accompanied by: self and daughter.    Claudia Gomes RN

## 2017-11-02 NOTE — MR AVS SNAPSHOT
After Visit Summary   11/2/2017    Ashvin Tiwari    MRN: [id number]           Patient Information     Date Of Birth          1955        Visit Information        Provider Department      11/2/2017 7:30 AM Rachelle Miranda APRN CNP [facility]        Today's Diagnoses     Malignant neoplasm of cervix, unspecified site (H)    -  1    Encounter for antineoplastic chemotherapy        Secondary hypertension        Chemotherapy-induced peripheral neuropathy (H)           Follow-ups after your visit        Your next 10 appointments already scheduled     Nov 07, 2017  8:00 AM CST   Infusion 120 with UC ONCOLOGY INFUSION, UC 20 ATC   [facility] ([facility])    [address]  2nd Floor  [facility] [address] [phone]            Nov 17, 2017  7:30 AM CST   PET ONCOLOGY WHOLE BODY with UUPET1   [facility] [place] PET CT ([facility])    [address] [address] [phone]           Tell your doctor:   If there is any chance you may be pregnant or if you are breastfeeding.   If you have problems lying in small spaces (claustrophobia). If you do, your doctor may give you medicine to help you relax. If you have diabetes:   Have your exam early in the morning. Your blood glucose will go up as the day goes by.   Your glucose level must be 180 or less at the start of the exam. Please take any medicines you need to ensure this blood glucose level. 24 hours before your scan: Don t do any heavy exercise. (No jogging, aerobics or other workouts.) Exercise will make your pictures less accurate. 6 hours before your scan:   Stop all food and liquids (except water).   Do not chew gum or suck on mints.   If you need to take medicine with food, you may take it with a few crackers.  Please call your Imaging Department at your exam site with any questions.            Nov  "21, 2017 11:00 AM CST   (Arrive by 10:45 AM)   Return Visit with Briana Medina MD   [facility] ([facility])    [address] [place] [address] [phone]            Nov 21, 2017  2:30 PM CST   (Arrive by 2:15 PM)   Return Visit with Barbara Soto MD   [facility] ([facility])    [address] [place] [id number] [phone]              Future tests that were ordered for you today     Open Future Orders        Priority Expected Expires Ordered    PET Oncology Whole Body Routine  11/2/2018 11/2/2017            Who to contact     If you have questions or need follow up information about today's clinic visit or your schedule please contact [facility] directly at [phone].  Normal or non-critical lab and imaging results will be communicated to you by MyChart, letter or phone within 4 business days after the clinic has received the results. If you do not hear from us within 7 days, please contact the clinic through [employer] or phone. If you have a critical or abnormal lab result, we will notify you by phone as soon as possible.  Submit refill requests through [employer] or call your pharmacy and they will forward the refill request to us. Please allow 3 business days for your refill to be completed.          Additional Information About Your Visit        GeckoGoharGoToTags Information     [employer] lets you send messages to your doctor, view your test results, renew your prescriptions, schedule appointments and more. To sign up, go to [email or web address] . Click on \"Log in\" on the left side of the screen, which will take you to the Welcome page. Then click on \"Sign up Now\" on the right side of the page.     You will be asked to enter the access code listed below, as well as some personal information. Please follow the directions to create your username and " password.     Your access code is: [email or web address]  Expires: 2017  6:30 AM     Your access code will  in 90 days. If you need help or a new code, please call your [facility] or [phone].        Care EveryWhere ID     This is your Care EveryWhere ID. This could be used by other organizations to access your [facility] medical records  [id number]        Your Vitals Were     Pulse Temperature Respirations Pulse Oximetry BMI (Body Mass Index)       92 98.8  F (37.1  C) (Oral) 16 96% 32.7 kg/m2        Blood Pressure from Last 3 Encounters:   17 (!) 163/96   17 160/78   10/24/17 147/86    Weight from Last 3 Encounters:   17 83.7 kg (184 lb 9.6 oz)   10/24/17 83.6 kg (184 lb 6.4 oz)   10/12/17 84 kg (185 lb 3.2 oz)               Primary Care Provider Office Phone # Fax #    Chelsea Wren [phone]       [facility] [address] [address]        Equal Access to Services     [facility] : Hadii aad ku hadasho Soomaali, waaxda luqadaha, qaybta kaalmada adeegyada, elizabeth cheema. So [facility] [phone].    ATENCIÓN: Si habla español, tiene a mae disposición servicios gratuitos de asistencia lingüística. [facility] [phone].    We comply with applicable federal civil rights laws and [place] laws. We do not discriminate on the basis of race, color, national origin, age, disability, sex, sexual orientation, or gender identity.            Thank you!     Thank you for choosing [facility]  for your care. Our goal is always to provide you with excellent care. Hearing back from our patients is one way we can continue to improve our services. Please take a few minutes to complete the written survey that you may receive in the mail after your visit with us. Thank you!             Your Updated Medication List - Protect others around you: Learn how to safely use, store and throw away your medicines at  www.disposemymeds.org.          This list is accurate as of: 11/2/17 10:48 AM.  Always use your most recent med list.                   Brand Name Dispense Instructions for use Diagnosis    acetaminophen 325 MG tablet    TYLENOL    100 tablet    Take 3 tablets (975 mg) by mouth 3 times daily    Cancer related pain       amLODIPine 2.5 MG tablet    NORVASC    30 tablet    Take 1 tablet (2.5 mg) by mouth daily    Essential hypertension       LORazepam 1 MG tablet    ATIVAN    30 tablet    Take 1 tablet (1 mg) by mouth every 6 hours as needed (Anxiety, Nausea/Vomiting or Sleep)    Malignant neoplasm of cervix, unspecified site (H)       polyethylene glycol powder    MIRALAX    510 g    Take 17-34 g (1-2 capfuls) by mouth daily    Cancer related pain, Generalized abdominal pain       prochlorperazine 10 MG tablet    COMPAZINE    30 tablet    Take 1 tablet (10 mg) by mouth every 6 hours as needed (Nausea/Vomiting)    Malignant neoplasm of cervix, unspecified site (H)       senna-docusate 8.6-50 MG per tablet    SENOKOT-S;PERICOLACE    100 tablet    Take 2-4 tablets by mouth 2 times daily    Cancer related pain       traMADol 50 MG tablet    ULTRAM    240 tablet    Take 2 tablets (100mg) every morning when you wake up, 2 tablets (100mg) in the middle of the day and 2 tablets (100mg) before bed.  Take 1-2 tablets once a day if needed for pain not controlled with the three scheduled doses.    Malignant neoplasm of cervix, unspecified site (H), Cancer related pain

## 2017-11-02 NOTE — PATIENT INSTRUCTIONS
Contact numbers:  Triage Main Line: [phone]  After hours: [phone]    Call with chills and/or temperature greater than or equal to 100.5 and questions or concerns.    If after hours, weekends, or holidays, call main hospital  at  [phone] and ask for Oncology doctor on call.           November 2017 Sunday Monday Tuesday Wednesday Thursday Friday Saturday                  1     2     [facility] LAB DRAW    7:00 AM   (15 min.)   [facility] LAB DRAW   [facility] Lab Draw     [facility] RETURN ACTIVE TREATMENT    7:15 AM   (30 min.)   Rachelle Miranda APRN CNP   [facility] ONC INFUSION 360    8:30 AM   (360 min.)    ONCOLOGY INFUSION   [facility] 3     4       5     6     7     [facility] ONC INFUSION 120    8:00 AM   (120 min.)    ONCOLOGY INFUSION   [facility] 8     9     10     11       12     13     14     15     16     17     PET ONCOLOGY WHOLE BODY    7:30 AM   (45 min.)   UUPET1   [facility] PET CT 18       19     20     21     [facility] RETURN   10:45 AM   (30 min.)   Briana Medina MD   [facility] RETURN    2:15 PM   (30 min.)   Barbara Soto MD   [facility] 22     23     24     25       26     27     28     29     30                          December 2017 Sunday Monday Tuesday Wednesday Thursday Friday Saturday                            1     2       3     4     5     6     7     8     9       10     11     12     13     14     15     16       17     18     19     20     21     22     23       24     25     26     27     28     29     30       31                                                Lab Results:  Recent Results (from the past 12 hour(s))   CBC with platelets differential    Collection Time: 11/02/17  7:13 AM   Result Value Ref Range    WBC 4.9 4.0 - 11.0 10e9/L    RBC Count 4.41 3.8 - 5.2 10e12/L    Hemoglobin 9.6 (L) 11.7 - 15.7 g/dL    Hematocrit  33.4 (L) 35.0 - 47.0 %    MCV 76 (L) 78 - 100 fl    MCH 21.8 (L) 26.5 - 33.0 pg    MCHC 28.7 (L) 31.5 - 36.5 g/dL    RDW 22.4 (H) 10.0 - 15.0 %    Platelet Count 303 150 - 450 10e9/L    Diff Method Automated Method     % Neutrophils 46.9 %    % Lymphocytes 39.5 %    % Monocytes 12.8 %    % Eosinophils 0.2 %    % Basophils 0.4 %    % Immature Granulocytes 0.2 %    Nucleated RBCs 0 0 /100    Absolute Neutrophil 2.3 1.6 - 8.3 10e9/L    Absolute Lymphocytes 1.9 0.8 - 5.3 10e9/L    Absolute Monocytes 0.6 0.0 - 1.3 10e9/L    Absolute Eosinophils 0.0 0.0 - 0.7 10e9/L    Absolute Basophils 0.0 0.0 - 0.2 10e9/L    Abs Immature Granulocytes 0.0 0 - 0.4 10e9/L    Absolute Nucleated RBC 0.0    Comprehensive metabolic panel    Collection Time: 11/02/17  7:13 AM   Result Value Ref Range    Sodium 137 133 - 144 mmol/L    Potassium 3.5 3.4 - 5.3 mmol/L    Chloride 102 94 - 109 mmol/L    Carbon Dioxide 24 20 - 32 mmol/L    Anion Gap 10 3 - 14 mmol/L    Glucose 134 (H) 70 - 99 mg/dL    Urea Nitrogen 11 7 - 30 mg/dL    Creatinine 0.84 0.52 - 1.04 mg/dL    GFR Estimate 68 >60 mL/min/1.7m2    GFR Estimate If Black 82 >60 mL/min/1.7m2    Calcium 8.7 8.5 - 10.1 mg/dL    Bilirubin Total 0.2 0.2 - 1.3 mg/dL    Albumin 3.2 (L) 3.4 - 5.0 g/dL    Protein Total 8.3 6.8 - 8.8 g/dL    Alkaline Phosphatase 110 40 - 150 U/L    ALT 22 0 - 50 U/L    AST 18 0 - 45 U/L   Magnesium    Collection Time: 11/02/17  7:13 AM   Result Value Ref Range    Magnesium 1.9 1.6 - 2.3 mg/dL   Protein qualitative urine    Collection Time: 11/02/17  7:19 AM   Result Value Ref Range    Protein Albumin Urine Negative NEG^Negative mg/dL

## 2017-11-03 NOTE — TELEPHONE ENCOUNTER
Social Work Note: Telephone Call  Oncology Clinic    Data/Intervention:  Patient Name:  Ashvin Tiwari  /Age:  1955 (62 year old)    Call From:  Social work contacted patient by phone  Reason for Call:  Financial forms    Assessment:  Social work was paged by infusion RN during patient's infusion that she would like to speak with social work regarding financial forms.  Social work was not available to meet with patient during infusion and attempted to contact patient by phone to follow-up.  No response to attempted call, social work left voicemail message with SW contact information and request for return call.     Plan:  Social work will attempt again later and is available to assist with needs.    Soo Yeon Han LICSW  11/3/2017  Pager: [phone]  Phone Number: [phone]

## 2017-11-03 NOTE — TELEPHONE ENCOUNTER
ADDENDUM  Social work sent staff message to gyn onc RNCCs regarding patient's questions about form. Social work heard back from Svitlana Serrato RN who indicated forms had been completed and sent back to [facility]; however, one page was missing and [facility] was notified but has not responded.  Social work attempted to call patient to give this update. No response, voicemail message left with information and SW contact information.      Soo Yeon Han, [facility]  [phone]

## 2017-11-06 NOTE — PROGRESS NOTES
Here for IVF and antiemetic. Denies nausea but did want the zofran. Tolerated infusion. No emesis while here. Left ambulatory when discharged. To return prn.

## 2017-11-06 NOTE — MR AVS SNAPSHOT
After Visit Summary   11/6/2017    Ashvin Tiwari    MRN: [id number]           Patient Information     Date Of Birth          1955        Visit Information        Provider Department      11/6/2017 1:00 PM UR CH 01 [facility]        Today's Diagnoses     Nausea    -  1       Follow-ups after your visit        Your next 10 appointments already scheduled     Nov 07, 2017  1:00 PM CST   Level 2 with UR CH 02   [facility]fusion Services ([facility])    [address] [place] [address] S.  [address]  [facility] [address]   [phone]            Nov 17, 2017  7:30 AM CST   PET ONCOLOGY WHOLE BODY with UMPPET1   [facility] ([facility])    [address] [place] [address]   [phone]           Tell your doctor:   If there is any chance you may be pregnant or if you are breastfeeding.   If you have problems lying in small spaces (claustrophobia). If you do, your doctor may give you medicine to help you relax. If you have diabetes:   Have your exam early in the morning. Your blood glucose will go up as the day goes by.   Your glucose level must be 180 or less at the start of the exam. Please take any medicines you need to ensure this blood glucose level. 24 hours before your scan: Don t do any heavy exercise. (No jogging, aerobics or other workouts.) Exercise will make your pictures less accurate. 6 hours before your scan:   Stop all food and liquids (except water).   Do not chew gum or suck on mints.   If you need to take medicine with food, you may take it with a few crackers.  Please call your Imaging Department at your exam site with any questions.            Nov 21, 2017 11:00 AM CST   (Arrive by 10:45 AM)   Return Visit with Briana Medina MD   [facility] ([facility])    [address]  2nd Floor  [facility] [address]  "  [phone]            2017  2:30 PM CST   (Arrive by 2:15 PM)   Return Visit with Barbara Soto MD   [facility] ([facility])    [address] [address] [phone]              Who to contact     If you have questions or need follow up information about today's clinic visit or your schedule please contact [facility] directly at [phone].  Normal or non-critical lab and imaging results will be communicated to you by "[employer]hart, letter or phone within 4 business days after the clinic has received the results. If you do not hear from us within 7 days, please contact the clinic through [employer] or phone. If you have a critical or abnormal lab result, we will notify you by phone as soon as possible.  Submit refill requests through [employer] or call your pharmacy and they will forward the refill request to us. Please allow 3 business days for your refill to be completed.          Additional Information About Your Visit        [employer] Information     [employer] lets you send messages to your doctor, view your test results, renew your prescriptions, schedule appointments and more. To sign up, go to [email or web address] . Click on \"Log in\" on the left side of the screen, which will take you to the Welcome page. Then click on \"Sign up Now\" on the right side of the page.     You will be asked to enter the access code listed below, as well as some personal information. Please follow the directions to create your username and password.     Your access code is: [email or web address]  Expires: 2017  5:30 AM     Your access code will  in 90 days. If you need help or a new code, please call your [facility] clinic or [phone].        Care EveryWhere ID     This is your Care EveryWhere ID. This could be used by other organizations to access your [facility] medical records  [id number]        Your Vitals Were     " Pulse Temperature Respirations             95 98.1  F (36.7  C) (Oral) 18          Blood Pressure from Last 3 Encounters:   11/06/17 118/62   11/02/17 (!) 163/96   11/02/17 160/78    Weight from Last 3 Encounters:   11/02/17 83.7 kg (184 lb 9.6 oz)   10/24/17 83.6 kg (184 lb 6.4 oz)   10/12/17 84 kg (185 lb 3.2 oz)              Today, you had the following     No orders found for display       Primary Care Provider Office Phone # Fax #    Chelsea Wren [phone]       [facility] [address]  [facility] [address]        Equal Access to Services     LISSET LUGO : Hadii aad ku hadasho Soyoonali, waaxda luqadaha, qaybta kaalmada adeegyada, elizabeth ga . So [facility] [phone].    ATENCIÓN: Si habla español, tiene a mae disposición servicios gratuitos de asistencia lingüística. [facility] [phone].    We comply with applicable federal civil rights laws and [place] laws. We do not discriminate on the basis of race, color, national origin, age, disability, sex, sexual orientation, or gender identity.            Thank you!     Thank you for choosing [facility]  for your care. Our goal is always to provide you with excellent care. Hearing back from our patients is one way we can continue to improve our services. Please take a few minutes to complete the written survey that you may receive in the mail after your visit with us. Thank you!             Your Updated Medication List - Protect others around you: Learn how to safely use, store and throw away your medicines at www.disposemymeds.org.          This list is accurate as of: 11/6/17  4:24 PM.  Always use your most recent med list.                   Brand Name Dispense Instructions for use Diagnosis    acetaminophen 325 MG tablet    TYLENOL    100 tablet    Take 3 tablets (975 mg) by mouth 3 times daily    Cancer related pain       amLODIPine 2.5 MG tablet    NORVASC    30 tablet     Take 1 tablet (2.5 mg) by mouth daily    Essential hypertension       LORazepam 1 MG tablet    ATIVAN    30 tablet    Take 1 tablet (1 mg) by mouth every 6 hours as needed (Anxiety, Nausea/Vomiting or Sleep)    Malignant neoplasm of cervix, unspecified site (H)       polyethylene glycol powder    MIRALAX    510 g    Take 17-34 g (1-2 capfuls) by mouth daily    Cancer related pain, Generalized abdominal pain       prochlorperazine 10 MG tablet    COMPAZINE    30 tablet    Take 1 tablet (10 mg) by mouth every 6 hours as needed (Nausea/Vomiting)    Malignant neoplasm of cervix, unspecified site (H)       senna-docusate 8.6-50 MG per tablet    SENOKOT-S;PERICOLACE    100 tablet    Take 2-4 tablets by mouth 2 times daily    Cancer related pain       traMADol 50 MG tablet    ULTRAM    240 tablet    Take 2 tablets (100mg) every morning when you wake up, 2 tablets (100mg) in the middle of the day and 2 tablets (100mg) before bed.  Take 1-2 tablets once a day if needed for pain not controlled with the three scheduled doses.    Malignant neoplasm of cervix, unspecified site (H), Cancer related pain

## 2017-11-07 NOTE — PROGRESS NOTES
Pt here for fluid, zofran.  Denies N/V.  States infusion help her.  1 L NS infused via port over 2 hrs.  Tolerated well.  D/C to home w/ son.  RTC PRN.

## 2017-11-07 NOTE — MR AVS SNAPSHOT
After Visit Summary   11/7/2017    Ashvin Tiwari    MRN: [id number]           Patient Information     Date Of Birth          1955        Visit Information        Provider Department      11/7/2017 1:00 PM UR [facility] 02 [facility], [place], Infusion Services        Today's Diagnoses     Nausea    -  1       Follow-ups after your visit        Your next 10 appointments already scheduled     Nov 17, 2017  7:30 AM CST   PET ONCOLOGY WHOLE BODY with UMPPET1   [facility] ([facility])    [address] [address]   [phone]           Tell your doctor:   If there is any chance you may be pregnant or if you are breastfeeding.   If you have problems lying in small spaces (claustrophobia). If you do, your doctor may give you medicine to help you relax. If you have diabetes:   Have your exam early in the morning. Your blood glucose will go up as the day goes by.   Your glucose level must be 180 or less at the start of the exam. Please take any medicines you need to ensure this blood glucose level. 24 hours before your scan: Don t do any heavy exercise. (No jogging, aerobics or other workouts.) Exercise will make your pictures less accurate. 6 hours before your scan:   Stop all food and liquids (except water).   Do not chew gum or suck on mints.   If you need to take medicine with food, you may take it with a few crackers.  Please call your Imaging Department at your exam site with any questions.            Nov 21, 2017 11:00 AM CST   (Arrive by 10:45 AM)   Return Visit with Briana Medina MD   [facility] ([facility])    [address] [place] [id number] [phone]            Nov 21, 2017  2:30 PM CST   (Arrive by 2:15 PM)   Return Visit with Barbara Soto MD   [facility] ([facility])    [address] [place] [address]  [phone]              Who to contact     If you have questions or need follow up information about today's clinic visit or your schedule please contact [facility] directly at [phone].  Normal or non-critical lab and imaging results will be communicated to you by MyChart, letter or phone within 4 business days after the clinic has received the results. If you do not hear from us within 7 days, please contact the clinic through MyChart or phone. If you have a critical or abnormal lab result, we will notify you by phone as soon as possible.  Submit refill requests through [employer] or call your pharmacy and they will forward the refill request to us. Please allow 3 business days for your refill to be completed.          Additional Information About Your Visit        MyChart Information     [employer] lets you send messages to your doctor, view your test results, renew your prescriptions, schedule appointments and more. To sign up, go to [email or web address] . Click on \"Log in\" on the left side of the screen, which will take you to the Welcome page. Then click on \"Sign up Now\" on the right side of the page.     You will be asked to enter the access code listed below, as well as some personal information. Please follow the directions to create your username and password.     Your access code is: [email or web address]  Expires: 2017  5:30 AM     Your access code will  in 90 days. If you need help or a new code, please call your [facility] clinic or [phone].        Care EveryWhere ID     This is your Care EveryWhere ID. This could be used by other organizations to access your [facility] medical records  [id number]        Your Vitals Were     Pulse Temperature Respirations Pulse Oximetry          100 98.5  F (36.9  C) (Oral) 16 93%         Blood Pressure from Last 3 Encounters:   17 152/66   17 118/62   17 (!) 163/96    Weight from Last 3 Encounters: "   11/02/17 83.7 kg (184 lb 9.6 oz)   10/24/17 83.6 kg (184 lb 6.4 oz)   10/12/17 84 kg (185 lb 3.2 oz)              Today, you had the following     No orders found for display       Primary Care Provider Office Phone # Fax #    Chelsea Wren [phone]       [facility] [address]  [facility] [address]        Equal Access to Services     LISSET LUGO : Hadii aad ku hadasho Soomaali, waaxda luqadaha, qaybta kaalmada adeegyada, waxay idiin hayaan adeeg kharash la'aan . So [facility] [phone].    ATENCIÓN: Si habla español, tiene a mae disposición servicios gratuitos de asistencia lingüística. [facility] [phone].    We comply with applicable federal civil rights laws and [place] laws. We do not discriminate on the basis of race, color, national origin, age, disability, sex, sexual orientation, or gender identity.            Thank you!     Thank you for choosing [facility], [place], [facility]  for your care. Our goal is always to provide you with excellent care. Hearing back from our patients is one way we can continue to improve our services. Please take a few minutes to complete the written survey that you may receive in the mail after your visit with us. Thank you!             Your Updated Medication List - Protect others around you: Learn how to safely use, store and throw away your medicines at www.disposemymeds.org.          This list is accurate as of: 11/7/17  3:45 PM.  Always use your most recent med list.                   Brand Name Dispense Instructions for use Diagnosis    acetaminophen 325 MG tablet    TYLENOL    100 tablet    Take 3 tablets (975 mg) by mouth 3 times daily    Cancer related pain       amLODIPine 2.5 MG tablet    NORVASC    30 tablet    Take 1 tablet (2.5 mg) by mouth daily    Essential hypertension       LORazepam 1 MG tablet    ATIVAN    30 tablet    Take 1 tablet (1 mg) by mouth every 6 hours as needed (Anxiety, Nausea/Vomiting or Sleep)     Malignant neoplasm of cervix, unspecified site (H)       polyethylene glycol powder    MIRALAX    510 g    Take 17-34 g (1-2 capfuls) by mouth daily    Cancer related pain, Generalized abdominal pain       prochlorperazine 10 MG tablet    COMPAZINE    30 tablet    Take 1 tablet (10 mg) by mouth every 6 hours as needed (Nausea/Vomiting)    Malignant neoplasm of cervix, unspecified site (H)       senna-docusate 8.6-50 MG per tablet    SENOKOT-S;PERICOLACE    100 tablet    Take 2-4 tablets by mouth 2 times daily    Cancer related pain       traMADol 50 MG tablet    ULTRAM    240 tablet    Take 2 tablets (100mg) every morning when you wake up, 2 tablets (100mg) in the middle of the day and 2 tablets (100mg) before bed.  Take 1-2 tablets once a day if needed for pain not controlled with the three scheduled doses.    Malignant neoplasm of cervix, unspecified site (H), Cancer related pain

## 2017-11-10 NOTE — TELEPHONE ENCOUNTER
"Received call from patient's daughter Irasema. She reports that patient's mouth is really sore - \"everything burns.\" Last chemo was last week Thursday 11/2/2017. This pain started yesterday, eating and drinking has been tough. No lesions or specific spots that they see.     Spoke with ROSA Adamson with GYN ONC. Suspect oral mucositis. Patient should try salt and soda mouth swish and spit - 1/2 tsp salt, 1/2 tsp soda in 1L water up to QID PRN. Could also try magic mouth wash if needed.     Called patient's daughter back and advised of the above. They wanted to start with just salt and soda rinses to see if that works before getting a script for magic mouth wash. We discussed instructions on how to do the rinses, she had no questions. Encouraged her to call with an update to her chemo coordinator Rima as well as myself. Call with any worsening symptoms.   "

## 2017-11-21 NOTE — PROGRESS NOTES
Palliative Care Outpatient Clinic Progress Note    Patient Name:  Ashvin Tiwari  Primary Provider:  Chelsea Wren    Chief Complaint:   Metastatic cervical cancer  Anorexia  Weight loss  Nausea  Right hip pain     Interim History:  Ahsvin Tiwari 62 year old female returns to be seen by palliative care today.  Ashvin is feeling ok today. Her mouth soreness is gone.     She doesn't have an appetite at all. Forcing self to eat. Losing weight. Nothing appeals to her. Occasional nausea but more that no appetite. Trying to drink ensure and likes the ensure. Vanilla and strawberry. Taking compazine for nausea and taking 1 tablet three times a day, everyday.     Pain in rt hip which she feels when she stands from a sitting position but walking helps make the pain go away. Feels like a deep, cramping pain. Just in right hip and this pain started about a week ago.  Pain in abdomen and back of head has resolved. Taking tramadol 2 tablets 3-4 times a day. That makes the pain go away but it aways comes back. Pain not waking her from sleep but josh she gets up to go to the bathroom, will feel the pain. Hip pain for about the past week.  Stomach pain much improved.      Fingers are tingling now and itching. Started the B12; not taking L-gutamine but the B6 has helped to improve the pain.  Tingling seems a little better after starting the B12.  Not keeping her awake at night. Not interfering in her ability to touch objects or hold objects.        Review of Systems:  ROS: 10 point ROS neg other than the symptoms noted above in the HPI and here  No constipation - taking senna and miralax  Occasional SOB but sig improvement.       No Known Allergies  Current Outpatient Prescriptions   Medication Sig Dispense Refill     traMADol (ULTRAM) 50 MG tablet Take 2 tablets (100mg) every morning when you wake up, 2 tablets (100mg) in the middle of the day and 2 tablets (100mg) before bed.  Take 1-2 tablets once a day if needed for pain  "not controlled with the three scheduled doses. 240 tablet 0     amLODIPine (NORVASC) 2.5 MG tablet Take 1 tablet (2.5 mg) by mouth daily 30 tablet 1     senna-docusate (SENOKOT-S;PERICOLACE) 8.6-50 MG per tablet Take 2-4 tablets by mouth 2 times daily 100 tablet 1     polyethylene glycol (MIRALAX) powder Take 17-34 g (1-2 capfuls) by mouth daily 510 g 1     prochlorperazine (COMPAZINE) 10 MG tablet Take 1 tablet (10 mg) by mouth every 6 hours as needed (Nausea/Vomiting) 30 tablet 2     LORazepam (ATIVAN) 1 MG tablet Take 1 tablet (1 mg) by mouth every 6 hours as needed (Anxiety, Nausea/Vomiting or Sleep) 30 tablet 2     acetaminophen (TYLENOL) 325 MG tablet Take 3 tablets (975 mg) by mouth 3 times daily 100 tablet 0     Past Medical History:   Diagnosis Date     Cancer (H)     cervical     Hypertension      Past Surgical History:   Procedure Laterality Date     BIOPSY/EXCISION LYMPH NODE(S), NEEDLE, SUPERFICIAL (CERVICAL/INGUINAL/AXILLARY) Right 9/21/2017    Procedure: BIOPSY/EXCISION LYMPH NODE(S), NEEDLE, SUPERFICIAL (CERVICAL/INGUINAL/AXILLARY);;  Surgeon: Sonu Denson PA-C;  Location:  OR     INSERT PORT VASCULAR ACCESS Right 9/21/2017    Procedure: INSERT PORT VASCULAR ACCESS;  Single Lumen Chest Power Port, Right Axillary Lymph Node Biopsy;  Surgeon: Sonu Denson PA-C;  [facility]     no previous surgery             BP (!) 175/99  Pulse 107  Temp 98.9  F (37.2  C) (Tympanic)  Resp 18  Ht 1.6 m (5' 2.99\")  Wt 80.3 kg (177 lb)  SpO2 98%  BMI 31.36 kg/m2  General: well groomed, well nourished, appears comfortable, in NAD  Eyes: EOMI, sclera clear  HEENT: NC/AT; mucous membranes dry  Lungs: no increased work of breathing, speaking full sentences   Right hip: no pain to palpation, pain when switching from seated position to standing and initiating walking.   Neuro: A&O x 3; CN II-XII grossly intact;   Neuropsych: alert, good eye contact, affect full, engaged, sensorium " intact      Key Data Reviewed:  GFR 82  PET scan from 11/17 reviewed    Impression:     Metastatic cervical cancer, receiving chemotherapy.  Symptom management.     Recommendations & Counseling:    Finger tingling / neuropathy - Can continue taking B6 and add glutamine 500mg twice a day. Please let us know if the tingling worsens  - check B6 levels as increased levels can worsen neuropathy    Loss of appetite / nausea / weight loss:   - continue compazine (prochloroperazine) 10mg (1 tablet) three times a day   - Trial of starting Marinol (dronabinol) 5mg every morning for 3 days then if no side effects, increase to 1 tablet every morning and evening before dinner.   - we discussed trying another medication (zyprexa) if marinol does not help you.     Hip pain:  - continue taking tramadol 50-100mg (1-2 tabs) 3-4 times a day as needed or pain control  - try heat and ice on your hip  - stretches of your hip.   - please call if pain not controlled.     Follow up on December 19th at 2:30pm    Thank you for involving us in the patient's care. 40 minutes face time over half spent counseling.  Briana Medina MD / Palliative Medicine / Pager [phone] / After-Hours Answering Service [phone] / Main Palliative Clinic - [phone] / [facility] Inpatient Team Consult Pager [phone] (answered 8am-430pm M-F)

## 2017-11-21 NOTE — LETTER
2017       RE: Ashvin Tiwari  [address]  [place] [address]     Dear Colleague,    Thank you for referring your patient, Ashvin Tiwari, to the [facility]. Please see a copy of my visit note below.                            Consult Notes on Referred Patient    Date: 2017     The patient returns today for follow-up.    Her history is as follows:  Patient is a 62 year old A1 woman with no significant past medical history who presented with 3 weeks of vaginal spotting, lower abdominal pain that radiates to the lower back. TShe was treated for UTI with Amoxicillin, since her UA was suggestive of an infection. She didn't feel any improvement after the treatment and underwent pelvic exam that revealed cervical mass. She was referred to gynecology at [facility] for biopsy however that was not done in the office due to concern of ongoing bleeding. Patient never had PAP smear done in the past, never had mammogram or colonoscopy.      17:  Seen in Gyn Onc.  Exam concerning for at least a Stage IIB cervical cancer.  Biopsy obtained consistent with poorly differentiated squamous cell ca.  MRI ordered.     17: MRI Pelvis with 7 x 7cm mass at level of cervix, protrudes into upper third of vagina, bilateral parametrial extension, abuts bilateral ureters but no obstruction, abuts rectal wall with obliteration of fat plan but no mucosal invasion, no clear bladder wall invasion, suspicious lymph nodes left hemipelvis, peritoneal nodule versus lymph node.     17 to 17:  Patient admitted to hospital with pain. CT Chest PE was negative for PE but showed multiple pulmonary nodules consistent with metastatic disease.  CT A/P showed cervical mass with regional metastatic disease as well as peritoneal nodularity.     17:  PET scan with multiple metastatic hypermetabolic lung nodules, axillary, mediastinal, hilar and abdominal lymph nodes.     17:  Seen in clinic.   Recommended treatment for Stage IV poorly differentiated squamous cell ca with , substituting Carbo for Cisplatin.     9/21/17-11/2/17: Cycle #1-3 Paclitaxel/C arboplatin/Avastin.   Avastin held with Cycle #3 due to HTN.    11/17/17:  PET/CT with positive response to therapy.  Decrease in size of cervical mass, decreased pulmonary and metastatic lymphadenopathy.      The patient returns today for follow-up.  She has seen her PCP regarding her BP.  She has no specific complaints today and is tolerating chemo well.       Past Medical History:    Past Medical History:   Diagnosis Date     Cancer (H)     cervical     Hypertension          Past Surgical History:    Past Surgical History:   Procedure Laterality Date     BIOPSY/EXCISION LYMPH NODE(S), NEEDLE, SUPERFICIAL (CERVICAL/INGUINAL/AXILLARY) Right 9/21/2017    Procedure: BIOPSY/EXCISION LYMPH NODE(S), NEEDLE, SUPERFICIAL (CERVICAL/INGUINAL/AXILLARY);;  Surgeon: Sonu Denson PA-C;  [facility]     INSERT PORT VASCULAR ACCESS Right 9/21/2017    Procedure: INSERT PORT VASCULAR ACCESS;  Single Lumen Chest Power Port, Right Axillary Lymph Node Biopsy;  Surgeon: Sonu Denson PA-C;  [facility]: [facility]     no previous surgery           Health Maintenance:  Health Maintenance Due   Topic Date Due     TETANUS IMMUNIZATION (SYSTEM ASSIGNED)  05/10/1971     HEPATITIS C SCREENING  05/10/1971     LIPID SCREEN Q5 YR FEMALE (SYSTEM ASSIGNED)  05/10/1998     MAMMO SCREEN Q2 YR (SYSTEM ASSIGNED)  05/10/2003     COLON CANCER SCREEN (SYSTEM ASSIGNED)  05/10/2003     ADVANCE DIRECTIVE PLANNING Q5 YRS  05/10/2008     INFLUENZA VACCINE (SYSTEM ASSIGNED)  09/01/2017       Current Medications:     has a current medication list which includes the following prescription(s): tramadol, amlodipine, senna-docusate, polyethylene glycol, prochlorperazine, lorazepam, acetaminophen, dronabinol, and glutamine, and the following Facility-Administered Medications:  "PACLitaxel (TAXOL) 345 mg in NaCl 0.9 % 608 mL CHEMOTHERAPY, CARBOplatin 620 mg in NaCl 0.9 % 337 mL CHEMOTHERAPY, and bevacizumab (AVASTIN) 1,300 mg in NaCl 0.9 % 162 mL infusion.       Allergies:     [unfilled]        Social History:     Social History   Substance Use Topics     Smoking status: Never Smoker     Smokeless tobacco: Never Used     Alcohol use No       History   Drug Use No           Family History:     The patient's family history is notable for :    No family history on file.      Physical Exam:     BP (!) 184/104  Pulse 106  Temp 98.9  F (37.2  C) (Tympanic)  Resp 18  Ht 1.6 m (5' 2.99\")  Wt 80.3 kg (177 lb)  SpO2 98%  BMI 31.36 kg/m2  Body mass index is 31.36 kg/(m^2).    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  She is tolerating treatment well and would recommend continued palliative chemotherapy with  regimen substituting Carboplatin for Cisplatin    Plan three additional cycles with repeat imaging.       2.)                   HTN:  Continue to follow-up with her PCP.           Barbara Soto MD  Gynecologic Oncology  [facility] " Physicians  CC  Patient Care Team:  Chelsea Wren as PCP - General (Family Practice)  Sita Gallardo as Referring Physician (OB/Gyn)  Rima Saunders, RN as Continuity Care Coordinator (Gyn-Onc)  Leia Caraballo, RN as Registered Nurse (Palliative)  CHELSEA WREN     Result forwarded to Dr. Medina for Review.

## 2017-11-21 NOTE — MR AVS SNAPSHOT
After Visit Summary   11/21/2017    Ashvin Tiwari    MRN: [id number]           Patient Information     Date Of Birth          1955        Visit Information        Provider Department      11/21/2017 2:30 PM Barbara Soto MD [facility]         Follow-ups after your visit        Follow-up notes from your care team     Return in about 10 days (around 12/1/2017).      Your next 10 appointments already scheduled     Dec 01, 2017  9:30 AM CST   Masonic Lab Draw with [facility] LAB DRAW   [facility]    [address] [place] [address]   [phone]            Dec 01, 2017 10:00 AM CST   Infusion 360 with UC ONCOLOGY INFUSION, UC 17 ATC   [facility] ([facility])    [address] [place] [address]   [phone]            Dec 19, 2017  2:30 PM CST   (Arrive by 2:15 PM)   Return Visit with Briana Medina MD   [facility] ([facility])    [address] [place] [address]   [phone]            Dec 21, 2017  9:30 AM CST   Masonic Lab Draw with [facility] MASONIC LAB DRAW   [facility] Masonic Lab Draw ([facility])    [address] [place] [address]   [phone]            Dec 21, 2017 10:00 AM CST   (Arrive by 9:45 AM)   Return Visit with ROSA Mendoza CNP   [facility] ([facility])    [address] [place] [address]   [phone]            Dec 21, 2017 11:00 AM CST   Infusion 360 with UC ONCOLOGY INFUSION, UC 18 ATC   [facility])    [address] [place] [address]   [phone]            Jan 11, 2018 10:00 AM CST   Masonic Lab Draw with UC  "[facility] LAB DRAW   [facility] Lab Draw ([facility])    [address] [address] [phone]            2018 10:30 AM CST   (Arrive by 10:15 AM)   Return Visit with ROSA Mendoza CNP   [facility] ([facility])    [address] [place] [address] [phone]              Who to contact     If you have questions or need follow up information about today's clinic visit or your schedule please contact [facility] directly at [phone].  Normal or non-critical lab and imaging results will be communicated to you by MyChart, letter or phone within 4 business days after the clinic has received the results. If you do not hear from us within 7 days, please contact the clinic through MyChart or phone. If you have a critical or abnormal lab result, we will notify you by phone as soon as possible.  Submit refill requests through Get Me Listed or call your pharmacy and they will forward the refill request to us. Please allow 3 business days for your refill to be completed.          Additional Information About Your Visit        TouchPalhart Information     Get Me Listed lets you send messages to your doctor, view your test results, renew your prescriptions, schedule appointments and more. To sign up, go to [email or web address] . Click on \"Log in\" on the left side of the screen, which will take you to the Welcome page. Then click on \"Sign up Now\" on the right side of the page.     You will be asked to enter the access code listed below, as well as some personal information. Please follow the directions to create your username and password.     Your access code is: [email or web address]  Expires: 2017  5:30 AM     Your access code will  in 90 days. If you need help or a new code, please call your [place] clinic or [phone].        Care EveryWhere ID     This " "is your Care EveryWhere ID. This could be used by other organizations to access your [facility] medical records  [id number]        Your Vitals Were     Pulse Temperature Respirations Height Pulse Oximetry BMI (Body Mass Index)    106 98.9  F (37.2  C) (Tympanic) 18 1.6 m (5' 2.99\") 98% 31.36 kg/m2       Blood Pressure from Last 3 Encounters:   11/21/17 (!) 184/104   11/21/17 (!) 175/99   11/07/17 152/66    Weight from Last 3 Encounters:   11/21/17 80.3 kg (177 lb)   11/21/17 80.3 kg (177 lb)   11/02/17 83.7 kg (184 lb 9.6 oz)              Today, you had the following     No orders found for display         Today's Medication Changes          These changes are accurate as of: 11/21/17  3:23 PM.  If you have any questions, ask your nurse or doctor.               Start taking these medicines.        Dose/Directions    dronabinol 5 MG capsule   Commonly known as:  MARINOL   Used for:  Anorexia, Malignant neoplasm of cervix, unspecified site (H)   Started by:  Briana Medina MD        Dose:  5 mg   Take 1 capsule (5 mg) by mouth 2 times daily (before meals)   Quantity:  60 capsule   Refills:  0       glutamine 500 MG Tabs   Used for:  Drug-induced polyneuropathy (H)   Started by:  Briana Medina MD        Dose:  500 mg   Take 500 mg by mouth 2 times daily   Quantity:  120 tablet   Refills:  3            Where to get your medicines      These medications were sent to [facility] [address] [address]    Hours:  TRANSPLANT PHONE NUMBER [phone] Phone:  [phone]     glutamine 500 MG Tabs         Some of these will need a paper prescription and others can be bought over the counter.  Ask your nurse if you have questions.     Bring a paper prescription for each of these medications     dronabinol 5 MG capsule                Primary Care Provider Office Phone # Fax #    Chelsea Siena [phone] " [phone]       [facility] [address]  [facility] [address]        Equal Access to Services     LISSET LUGO : Hadii aad ku hadoscarmylene Hugh, wamaryurida luqrefugioha, qamohsenta karonitda sofiemary grace, elizabeth alinain hayaarenetta carrizalesrodolfo worthy laLloydchris cheema. So [facility] [phone].    ATENCIÓN: Si habla español, tiene a mae disposición servicios gratuitos de asistencia lingüística. [place] al [phone].    We comply with applicable federal civil rights laws and [place] laws. We do not discriminate on the basis of race, color, national origin, age, disability, sex, sexual orientation, or gender identity.            Thank you!     Thank you for choosing [facility] CLINIC  for your care. Our goal is always to provide you with excellent care. Hearing back from our patients is one way we can continue to improve our services. Please take a few minutes to complete the written survey that you may receive in the mail after your visit with us. Thank you!             Your Updated Medication List - Protect others around you: Learn how to safely use, store and throw away your medicines at www.disposemymeds.org.          This list is accurate as of: 11/21/17  3:23 PM.  Always use your most recent med list.                   Brand Name Dispense Instructions for use Diagnosis    acetaminophen 325 MG tablet    TYLENOL    100 tablet    Take 3 tablets (975 mg) by mouth 3 times daily    Cancer related pain       amLODIPine 2.5 MG tablet    NORVASC    30 tablet    Take 1 tablet (2.5 mg) by mouth daily    Essential hypertension       dronabinol 5 MG capsule    MARINOL    60 capsule    Take 1 capsule (5 mg) by mouth 2 times daily (before meals)    Anorexia, Malignant neoplasm of cervix, unspecified site (H)       glutamine 500 MG Tabs     120 tablet    Take 500 mg by mouth 2 times daily    Drug-induced polyneuropathy (H)       LORazepam 1 MG tablet    ATIVAN    30 tablet    Take 1 tablet (1 mg) by mouth every 6 hours as needed  (Anxiety, Nausea/Vomiting or Sleep)    Malignant neoplasm of cervix, unspecified site (H)       polyethylene glycol powder    MIRALAX    510 g    Take 17-34 g (1-2 capfuls) by mouth daily    Cancer related pain, Generalized abdominal pain       prochlorperazine 10 MG tablet    COMPAZINE    30 tablet    Take 1 tablet (10 mg) by mouth every 6 hours as needed (Nausea/Vomiting)    Malignant neoplasm of cervix, unspecified site (H)       senna-docusate 8.6-50 MG per tablet    SENOKOT-S;PERICOLACE    100 tablet    Take 2-4 tablets by mouth 2 times daily    Cancer related pain       traMADol 50 MG tablet    ULTRAM    240 tablet    Take 2 tablets (100mg) every morning when you wake up, 2 tablets (100mg) in the middle of the day and 2 tablets (100mg) before bed.  Take 1-2 tablets once a day if needed for pain not controlled with the three scheduled doses.    Malignant neoplasm of cervix, unspecified site (H), Cancer related pain

## 2017-11-21 NOTE — MR AVS SNAPSHOT
After Visit Summary   11/21/2017    Ashvin Tiwari    MRN: [id number]           Patient Information     Date Of Birth          1955        Visit Information        Provider Department      11/21/2017 11:00 AM Briana Medina MD [facility]        Today's Diagnoses     Drug-induced polyneuropathy (H)    -  1    Anorexia        Malignant neoplasm of cervix, unspecified site (H)          Care Instructions    Finger tingling / neuropathy - Can continue taking B6 and add glutamine 500mg twice a day. Please let us know if the tingling worsens    Loss of appetite / nausea / weight loss:   - continue compazine (prochloroperazine) 10mg (1 tablet) three times a day   - Trial of starting Marinol (dronabinol) 5mg every morning for 3 days then if no side effects, increase to 1 tablet every morning and evening before dinner.   - we discussed trying another medication (zyprexa) if marinol does not help you.     Hip pain:  - continue taking tramadol 50-100mg (1-2 tabs) 3-4 times a day as needed or pain control  - try heat and ice on your hip  - stretches of your hip.   - please call if pain not controlled.     Follow up on December 19th at 2:30pm          Follow-ups after your visit        Your next 10 appointments already scheduled     Nov 21, 2017  2:30 PM CST   (Arrive by 2:15 PM)   Return Visit with Barbara Soto MD   [facility] ([facility])    [address] [address] [phone]            Dec 19, 2017  2:30 PM CST   (Arrive by 2:15 PM)   Return Visit with Briana Medina MD   [facility] ([facility])    [address] [id number] [phone]              Who to contact     If you have questions or need follow up information about today's clinic visit or your schedule please contact [facility]CER [facility]  "directly at [phone].  Normal or non-critical lab and imaging results will be communicated to you by eGifterhart, letter or phone within 4 business days after the clinic has received the results. If you do not hear from us within 7 days, please contact the clinic through eGifterhart or phone. If you have a critical or abnormal lab result, we will notify you by phone as soon as possible.  Submit refill requests through MeeWee or call your pharmacy and they will forward the refill request to us. Please allow 3 business days for your refill to be completed.          Additional Information About Your Visit        eGifterharWebmedx Information     MeeWee lets you send messages to your doctor, view your test results, renew your prescriptions, schedule appointments and more. To sign up, go to [email or web address] . Click on \"Log in\" on the left side of the screen, which will take you to the Welcome page. Then click on \"Sign up Now\" on the right side of the page.     You will be asked to enter the access code listed below, as well as some personal information. Please follow the directions to create your username and password.     Your access code is: [email or web address]  Expires: 2017  5:30 AM     Your access code will  in 90 days. If you need help or a new code, please call your [place] clinic or [phone].        Care EveryWhere ID     This is your Care EveryWhere ID. This could be used by other organizations to access your [place] medical records  [id number]        Your Vitals Were     Pulse Temperature Respirations Height Pulse Oximetry BMI (Body Mass Index)    107 98.9  F (37.2  C) (Tympanic) 18 1.6 m (5' 2.99\") 98% 31.36 kg/m2       Blood Pressure from Last 3 Encounters:   17 (!) 175/99   17 152/66   17 118/62    Weight from Last 3 Encounters:   17 80.3 kg (177 lb)   17 83.7 kg (184 lb 9.6 oz)   10/24/17 83.6 kg (184 lb 6.4 oz)              Today, you had the following     No orders " found for display         Today's Medication Changes          These changes are accurate as of: 11/21/17 12:16 PM.  If you have any questions, ask your nurse or doctor.               Start taking these medicines.        Dose/Directions    dronabinol 5 MG capsule   Commonly known as:  MARINOL   Used for:  Anorexia, Malignant neoplasm of cervix, unspecified site (H)   Started by:  Briana Medina MD        Dose:  5 mg   Take 1 capsule (5 mg) by mouth 2 times daily (before meals)   Quantity:  60 capsule   Refills:  0       glutamine 500 MG Tabs   Used for:  Drug-induced polyneuropathy (H)   Started by:  Briana Medina MD        Dose:  500 mg   Take 500 mg by mouth 2 times daily   Quantity:  120 tablet   Refills:  3            Where to get your medicines      These medications were sent to [facility] [address] [facility] [address] [place] [address] [facility] [address]    Hours:  TRANSPLANT PHONE NUMBER [phone] Phone:  [phone]     glutamine 500 MG Tabs         Some of these will need a paper prescription and others can be bought over the counter.  Ask your nurse if you have questions.     Bring a paper prescription for each of these medications     dronabinol 5 MG capsule                Primary Care Provider Office Phone # Fax #    Chelsea Wern [phone]       [facility] [address]  [facility] [address]        Equal Access to Services     LISSET LUGO : Hadii elvia sifuenteso Sosyd, waaxda luqadaha, qaybta kaalmada adeegyada, elizabeth ga . So [facility] [phone].    ATENCIÓN: Si habla español, tiene a mae disposición servicios gratuitos de asistencia lingüística. Llame al [phone].    We comply with applicable federal civil rights laws and [place] laws. We do not discriminate on the basis of race, color, national origin, age, disability, sex, sexual orientation, or gender  identity.            Thank you!     Thank you for choosing [facility] CLIN[facility]  for your care. Our goal is always to provide you with excellent care. Hearing back from our patients is one way we can continue to improve our services. Please take a few minutes to complete the written survey that you may receive in the mail after your visit with us. Thank you!             Your Updated Medication List - Protect others around you: Learn how to safely use, store and throw away your medicines at www.disposemymeds.org.          This list is accurate as of: 11/21/17 12:16 PM.  Always use your most recent med list.                   Brand Name Dispense Instructions for use Diagnosis    acetaminophen 325 MG tablet    TYLENOL    100 tablet    Take 3 tablets (975 mg) by mouth 3 times daily    Cancer related pain       amLODIPine 2.5 MG tablet    NORVASC    30 tablet    Take 1 tablet (2.5 mg) by mouth daily    Essential hypertension       dronabinol 5 MG capsule    MARINOL    60 capsule    Take 1 capsule (5 mg) by mouth 2 times daily (before meals)    Anorexia, Malignant neoplasm of cervix, unspecified site (H)       glutamine 500 MG Tabs     120 tablet    Take 500 mg by mouth 2 times daily    Drug-induced polyneuropathy (H)       LORazepam 1 MG tablet    ATIVAN    30 tablet    Take 1 tablet (1 mg) by mouth every 6 hours as needed (Anxiety, Nausea/Vomiting or Sleep)    Malignant neoplasm of cervix, unspecified site (H)       polyethylene glycol powder    MIRALAX    510 g    Take 17-34 g (1-2 capfuls) by mouth daily    Cancer related pain, Generalized abdominal pain       prochlorperazine 10 MG tablet    COMPAZINE    30 tablet    Take 1 tablet (10 mg) by mouth every 6 hours as needed (Nausea/Vomiting)    Malignant neoplasm of cervix, unspecified site (H)       senna-docusate 8.6-50 MG per tablet    SENOKOT-S;PERICOLACE    100 tablet    Take 2-4 tablets by mouth 2 times daily    Cancer related pain       traMADol 50 MG  tablet    ULTRAM    240 tablet    Take 2 tablets (100mg) every morning when you wake up, 2 tablets (100mg) in the middle of the day and 2 tablets (100mg) before bed.  Take 1-2 tablets once a day if needed for pain not controlled with the three scheduled doses.    Malignant neoplasm of cervix, unspecified site (H), Cancer related pain

## 2017-11-21 NOTE — NURSING NOTE
"Oncology Rooming Note    November 21, 2017 10:48 AM   Ashvin Tiwari is a 62 year old female who presents for:    Chief Complaint   Patient presents with     Oncology Clinic Visit     Return visit related to Cervix Cancer     Initial Vitals: BP (!) 175/99  Pulse 107  Temp 98.9  F (37.2  C) (Tympanic)  Resp 18  Ht 1.6 m (5' 2.99\")  Wt 80.3 kg (177 lb)  SpO2 98%  BMI 31.36 kg/m2 Estimated body mass index is 31.36 kg/(m^2) as calculated from the following:    Height as of this encounter: 1.6 m (5' 2.99\").    Weight as of this encounter: 80.3 kg (177 lb). Body surface area is 1.89 meters squared.  Extreme Pain (8) Comment: Right Hip   No LMP recorded. Patient is postmenopausal.  Allergies reviewed: Yes  Medications reviewed: Yes    Medications: MEDICATION REFILLS NEEDED TODAY. Provider was notified.  Pharmacy name entered into EPIC: [facility] (USE ONLY AT [facility]) - [place], [place] - [address] [place] [address]    Clinical concerns: Refills needed for Amlodipine Besylate 5 MG tablet, Tramadol HCL 50 MG tablets, Prochlorperazine Maleate 10 MG, Lorazepam 1 MG tablets. Provider was notified.    10 minutes for nursing intake (face to face time)     Leana Ramos LPN            "

## 2017-11-21 NOTE — LETTER
11/21/2017       RE: Ashvin Tiwari  [address]  [facility] [address]     Dear Colleague,    Thank you for referring your patient, Ashvin Tiwari, to the [facility]IC at [facility]. Please see a copy of my visit note below.    Palliative Care Outpatient Clinic Progress Note    Patient Name:  Ashvin Tiwari  Primary Provider:  Chelsea Wren    Chief Complaint:   Metastatic cervical cancer  Anorexia  Weight loss  Nausea  Right hip pain     Interim History:  Ashvin Tiwari 62 year old female returns to be seen by palliative care today.  Ashvin is feeling ok today. Her mouth soreness is gone.     She doesn't have an appetite at all. Forcing self to eat. Losing weight. Nothing appeals to her. Occasional nausea but more that no appetite. Trying to drink ensure and likes the ensure. Vanilla and strawberry. Taking compazine for nausea and taking 1 tablet three times a day, everyday.     Pain in rt hip which she feels when she stands from a sitting position but walking helps make the pain go away. Feels like a deep, cramping pain. Just in right hip and this pain started about a week ago.  Pain in abdomen and back of head has resolved. Taking tramadol 2 tablets 3-4 times a day. That makes the pain go away but it aways comes back. Pain not waking her from sleep but josh she gets up to go to the bathroom, will feel the pain. Hip pain for about the past week.  Stomach pain much improved.      Fingers are tingling now and itching. Started the B12; not taking L-gutamine but the B6 has helped to improve the pain.  Tingling seems a little better after starting the B12.  Not keeping her awake at night. Not interfering in her ability to touch objects or hold objects.        Review of Systems:  ROS: 10 point ROS neg other than the symptoms noted above in the HPI and here  No constipation - taking senna and miralax  Occasional SOB but sig improvement.       No  "Known Allergies  Current Outpatient Prescriptions   Medication Sig Dispense Refill     traMADol (ULTRAM) 50 MG tablet Take 2 tablets (100mg) every morning when you wake up, 2 tablets (100mg) in the middle of the day and 2 tablets (100mg) before bed.  Take 1-2 tablets once a day if needed for pain not controlled with the three scheduled doses. 240 tablet 0     amLODIPine (NORVASC) 2.5 MG tablet Take 1 tablet (2.5 mg) by mouth daily 30 tablet 1     senna-docusate (SENOKOT-S;PERICOLACE) 8.6-50 MG per tablet Take 2-4 tablets by mouth 2 times daily 100 tablet 1     polyethylene glycol (MIRALAX) powder Take 17-34 g (1-2 capfuls) by mouth daily 510 g 1     prochlorperazine (COMPAZINE) 10 MG tablet Take 1 tablet (10 mg) by mouth every 6 hours as needed (Nausea/Vomiting) 30 tablet 2     LORazepam (ATIVAN) 1 MG tablet Take 1 tablet (1 mg) by mouth every 6 hours as needed (Anxiety, Nausea/Vomiting or Sleep) 30 tablet 2     acetaminophen (TYLENOL) 325 MG tablet Take 3 tablets (975 mg) by mouth 3 times daily 100 tablet 0     Past Medical History:   Diagnosis Date     Cancer (H)     cervical     Hypertension      Past Surgical History:   Procedure Laterality Date     BIOPSY/EXCISION LYMPH NODE(S), NEEDLE, SUPERFICIAL (CERVICAL/INGUINAL/AXILLARY) Right 9/21/2017    Procedure: BIOPSY/EXCISION LYMPH NODE(S), NEEDLE, SUPERFICIAL (CERVICAL/INGUINAL/AXILLARY);;  Surgeon: Sonu Denson PA-C;  Location:  OR     INSERT PORT VASCULAR ACCESS Right 9/21/2017    Procedure: INSERT PORT VASCULAR ACCESS;  Single Lumen Chest Power Port, Right Axillary Lymph Node Biopsy;  Surgeon: Sonu Denson PA-C;  [facility]     no previous surgery             BP (!) 175/99  Pulse 107  Temp 98.9  F (37.2  C) (Tympanic)  Resp 18  Ht 1.6 m (5' 2.99\")  Wt 80.3 kg (177 lb)  SpO2 98%  BMI 31.36 kg/m2  General: well groomed, well nourished, appears comfortable, in NAD  Eyes: EOMI, sclera clear  HEENT: NC/AT; mucous membranes " dry  Lungs: no increased work of breathing, speaking full sentences   Right hip: no pain to palpation, pain when switching from seated position to standing and initiating walking.   Neuro: A&O x 3; CN II-XII grossly intact;   Neuropsych: alert, good eye contact, affect full, engaged, sensorium intact      Key Data Reviewed:  GFR 82  PET scan from 11/17 reviewed    Impression:     Metastatic cervical cancer, receiving chemotherapy.  Symptom management.     Recommendations & Counseling:    Finger tingling / neuropathy - Can continue taking B6 and add glutamine 500mg twice a day. Please let us know if the tingling worsens  - check B6 levels as increased levels can worsen neuropathy    Loss of appetite / nausea / weight loss:   - continue compazine (prochloroperazine) 10mg (1 tablet) three times a day   - Trial of starting Marinol (dronabinol) 5mg every morning for 3 days then if no side effects, increase to 1 tablet every morning and evening before dinner.   - we discussed trying another medication (zyprexa) if marinol does not help you.     Hip pain:  - continue taking tramadol 50-100mg (1-2 tabs) 3-4 times a day as needed or pain control  - try heat and ice on your hip  - stretches of your hip.   - please call if pain not controlled.     Follow up on December 19th at 2:30pm    Thank you for involving us in the patient's care. 40 minutes face time over half spent counseling.  Briana Medina MD / Palliative Medicine / Pager [phone] / After-Hours Answering Service [phone] / Main Palliative Clinic - [phone] / [facility] Inpatient Team Consult Pager [phone] (answered 8am-430pm M-F)

## 2017-11-21 NOTE — TELEPHONE ENCOUNTER
Oncology Distress Screening   Clinical Nutrition  [facility]     Identified Concern and Score From Distress Screening: Request to speak with a dietitian.  RD off site today in [facility].       Date of Distress Screenin17     Data: Per MD: Ashvin Tiwari is a 62 year old woman with a diagnosis of stage IV poorly differentiated SCC cervix.  No appetite at all. Forcing self to eat. Losing weight. Nothing appeals to her. Occasional nausea but more that no appetite. Trying to drink ensure and likes the ensure. Vanilla and strawberry. Taking compazine for nausea and taking 1 tablet three times a day, everyday.      Intervention: Called Ashvin to follow-up on her request to speak with a dietitian.  VM reached.  Left message for pt to return call at her convenience.    RD contact information provided.            Tabitha Millan RD, LD  Oncology Dietitian  [phone]  Pager: [phone]

## 2017-11-21 NOTE — PATIENT INSTRUCTIONS
Finger tingling / neuropathy - Can continue taking B6 and add glutamine 500mg twice a day. Please let us know if the tingling worsens    Loss of appetite / nausea / weight loss:   - continue compazine (prochloroperazine) 10mg (1 tablet) three times a day   - Trial of starting Marinol (dronabinol) 5mg every morning for 3 days then if no side effects, increase to 1 tablet every morning and evening before dinner.   - we discussed trying another medication (zyprexa) if marinol does not help you.     Hip pain:  - continue taking tramadol 50-100mg (1-2 tabs) 3-4 times a day as needed or pain control  - try heat and ice on your hip  - stretches of your hip.   - please call if pain not controlled.     Follow up on December 19th at 2:30pm

## 2017-11-21 NOTE — NURSING NOTE
"Oncology Rooming Note    November 21, 2017 2:29 PM   Ashvin Tiwari is a 62 year old female who presents for:    Chief Complaint   Patient presents with     Oncology Clinic Visit     CT results and plan of care, Cervix cancer (H)     Initial Vitals: BP (!) 184/104  Pulse 106  Temp 98.9  F (37.2  C) (Tympanic)  Resp 18  Ht 1.6 m (5' 2.99\")  Wt 80.3 kg (177 lb)  SpO2 98%  BMI 31.36 kg/m2 Estimated body mass index is 31.36 kg/(m^2) as calculated from the following:    Height as of this encounter: 1.6 m (5' 2.99\").    Weight as of this encounter: 80.3 kg (177 lb). Body surface area is 1.89 meters squared.  Data Unavailable Comment: Data Unavailable   No LMP recorded. Patient is postmenopausal.  Allergies reviewed: Yes  Medications reviewed: Yes    Medications: Medication refills not needed today.  Pharmacy name entered into EPIC: [facility] (USE ONLY AT [facility]) - [place], [place] - [address] [place] [address]    Clinical concerns: Blood pressure high Yes  Dr Soto was notified.    7 minutes for nursing intake (face to face time)     ATIF LITTLE LPN            "

## 2017-11-22 NOTE — PROGRESS NOTES
Care Coordinator Note  Reviewed plan of care with patient and her family. Plan 3 more cycles then to do a Scan and see Dr Soto.   Very pleased to know that the CT scan looks better.   Sent basket message for SW to follow up on work and financial issues.     Patient will go in this week before chemo to have her BP checked and see her primary MD. PT is on Avastin      Patient verbalized back understanding of the above information discussed.   Face to face time spent with patient  10 minutes.   Rima MADSEN RN, OCN  Care Coordinator   Gynecologic Cancer   Office [phone]  Pager [phone]

## 2017-11-22 NOTE — TELEPHONE ENCOUNTER
Oncology Distress Screening Follow-up  Clinical Social Work  [facility]    Identified Concern and Score From Distress Screening: How concerned are you about work and home life issues that may be affected by your cancer? 10; How concerned are you about knowing what resources are available to help you? 10; If you want to be contacted by one of our professionals, I can send a message to them right now. 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 Per chart review Ashvin has interacted with Devora SAMANO in the [facility] several times between mid October and early November of this year. They have talked about resources, coping and finances/insurance.     Intervention: Attempted to reach patient by phone today and she was not available - message left with contact information for Devora SAMANO in [facility].     Education Provided: How to reach [facility]     Follow-up Required: Will await return call.     Paloma SAMANO  2017  Pager: [phone]  Phone Number: [phone]

## 2017-11-28 NOTE — TELEPHONE ENCOUNTER
Oncology Distress Screening   Clinical Nutrition  [facility]     Identified Concern and Score From Distress Screening: request to speak with RD.      Date of Distress Screenin17     Data: Pt returned RD phone call today.  She reports that she has not appetite at all and has been losing weight, amount unknown.  She has been drinking 1-2 Ensure Original daily.  She wasn't aware of ONS with more calories such as ensure plus or ensure enlive or boost plus.  She expresses that she will switch to a higher calorie shake.       Intervention: discussed ways to increase calories in diet. Suggested she drink higher calorie formula (listed above).  Discussed calorie and protein needs.   Mailed patient education materials, recipe booklets and coupons.   She reports that she has completed and mailed [employer] referral form.  She has received a call from intake, however, has not returned their call.    RD called [facility] today, they report that her application is still valid and have advised me to have patient follow up with them to set up a time for delivery.  Informed patient to call [employer] and provided her with their contact information.      Education Provided: Tips to increase Calories in Diet, Six Small Meals a Day, High Calorie High Protein Recipes     Follow-up Required: saundra Millan RD, LD  Oncology Dietitian  [phone]  Pager: [phone]

## 2017-11-28 NOTE — TELEPHONE ENCOUNTER
Oncology Distress Screening Follow-up  Clinical Social Work  [facility]    Identified Concern and Score From Distress Screening: How concerned are you about work and home life issues that may be affected by your cancer? 10; How concerned are you about knowing what resources are available to help you? 10; If you want to be contacted by one of our professionals, I can send a message to them right now. 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 Per chart review Ashvin has interacted with Devora SAMANO in the [facility] several times between mid October and early November of this year. They have talked about resources, coping and finances/insurance.     Received return call from patient today re: message left for her on 17.     Intervention: Ashvin did not recall completing the questionnaire but when writer read the areas of concern she had noted Ashvin recalled having the concerns. Vinod shared that she is having increasing difficulty walking and may require an adaptive device - she believes that she may require a walker. She also noted that she needs to wait for her daughter to return home from work to assist her with taking a bath - writer asked if there was anyone else who could assist her with this. Vinod indicated that she has a 20+ year old granddaughter who lives close by and if she was not working the granddaughter may be able to assist her with a bath. We talked about how losing independence can be difficult - having to change our schedule to fit when someone else could assist us with the task. Writer asked if a meal delivery program would assist her - Ashvin indicated she prefers to eat what her daughter is cooking for her. We talked a little more about the meal option and she was willing to consider this.     It would appear  from charting from Financial Services that patient met with patient on 9.20.17:   [facility] was able to meet with Ms. Tiwari on 9/19/2017. Patient has  indicated that she's recently paid her MA premium and is waiting on her  insurance card - no indication of what MA product is being issued. A self pay  waiver for consultation services was collected by Mindi at the time of  Encounter.    Once her [place] product is determined then we can see if she would have transportation resources available to her.    Education Provided: Meal program, Cancer Rehabilitation Program, problem solving who could assist her with needs, and common emotional responses to loss of independence    Follow-up Required: Will mail [place] information on the Cancer Rehabilitation Program, an application for [employer], and In Basket message sent to her RN CC in Gyn Onc asking for an MD order for Cancer Rehabilitation Program. Will ask Devora SCHNEIDER [facility]  to stop back on Friday 12.1.17 to follow-up with her.     Paloma SCHNEIDER LICSW  11/28/2017    For follow-up: [facility]  - Devora SCHNEIDER LICSW [phone]

## 2017-11-29 NOTE — PROGRESS NOTES
Paloma Saleem, [facility]  Rima Saunders, TIMOTHY Cc: Han, Soo Yeon, MSW                   Rima -     I talked with Vinod Tiwari today (she had a positive Oncology Distress Screening and we are assisting Devora Pinzon MSW with these while they only have 1  in [facility]) about the Cancer Rehab program. She is having difficulty walking and is in agreement with a referral for PT and OT through Cancer Rehab. All that is required is an MD order for the Cancer Rehabilitation Program. Would you be the correct person to request assistance from?  I am also sending her the application for [employer] for assistance with food delivery.     Thank you,   Paloma   BMT        The order has been placed.

## 2017-12-01 NOTE — NURSING NOTE
Chief Complaint   Patient presents with     Port Draw     labs drawn via port by RN     /90 (BP Location: Right arm, Patient Position: Chair, Cuff Size: Adult Large)  Pulse 102  Temp 98.4  F (36.9  C)  Wt 80.1 kg (176 lb 9.6 oz)  SpO2 99%  BMI 31.29 kg/m2    Vitals taken. Port accessed by RN. Labs collected and sent. Line flushed with NS & Heparin. Pt tolerated well. Pt checked in for next appointment.    Constance Chavez RN

## 2017-12-01 NOTE — PROGRESS NOTES
Infusion Nursing Note:  Ashvin Tiwari presents today for cycle 4, day 1 Avastin, Carboplatin, and Taxol.    Patient seen by provider today: No   present during visit today: Not Applicable.    Note: Patient ambulated to infusion center today. Patient complaining of exertional dyspnea that resolved with rest, numbness and tingling to fingertips that has improved, and some pain to bilateral knees that she is taking tramadol for. Denies complaints of chest pain or pressure, dizziness, nausea, constipation, and diarrhea at this time.     BP in lab: 157/90    TORB per Rachelle Miranda NPon 12/1/2017 at 0941:  -Recheck BP  -Notify Rachelle Miranda NP if SBP >150    BP recheck: 146/86    Intravenous Access:  Implanted Port.    Treatment Conditions:  Lab Results   Component Value Date    HGB 10.2 12/01/2017     Lab Results   Component Value Date    WBC 5.0 12/01/2017      Lab Results   Component Value Date    ANEU 2.6 12/01/2017     Lab Results   Component Value Date     12/01/2017      Lab Results   Component Value Date     12/01/2017                   Lab Results   Component Value Date    POTASSIUM 3.9 12/01/2017           Lab Results   Component Value Date    MAG 2.0 12/01/2017            Lab Results   Component Value Date    CR 0.80 12/01/2017                   Lab Results   Component Value Date    BEATRIZ 9.3 12/01/2017                Lab Results   Component Value Date    BILITOTAL 0.2 12/01/2017           Lab Results   Component Value Date    ALBUMIN 3.2 12/01/2017                    Lab Results   Component Value Date    ALT 21 12/01/2017           Lab Results   Component Value Date    AST 18 12/01/2017     Results reviewed, labs MET treatment parameters, ok to proceed with treatment.  Urine protein: Trace.          Post Infusion Assessment:  Patient tolerated infusion without incident.  Blood return noted pre and post infusion.  Site patent and intact, free from redness, edema or discomfort.  No  evidence of extravasations.  Access discontinued per protocol.    Discharge Plan:   Patient declined prescription refills.  Discharge instructions reviewed with: Patient.  Patient and/or family verbalized understanding of discharge instructions and all questions answered.  Copy of AVS reviewed with patient and/or family.  Patient will return 12/5 for next appointment.  Patient discharged in stable condition accompanied by: sister.  Departure Mode: Ambulatory.    Seymour Riggins RN

## 2017-12-01 NOTE — PATIENT INSTRUCTIONS
Contact Numbers    [facility] Main Line: [phone]  [facility] Triage:  [phone]    Call triage with chills and/or temperature greater than or equal to 100.5, uncontrolled nausea/vomiting, diarrhea, constipation, dizziness, shortness of breath, chest pain, bleeding, unexplained bruising, or any new/concerning symptoms, questions/concerns.     If you are having any concerning symptoms or wish to speak to a provider before your next infusion visit, please call your care coordinator or triage to notify them so we can adequately serve you.       After Hours: [phone]    If after hours, weekends, or holidays, call main hospital  and ask for Oncology doctor on call.           December 2017 Sunday Monday Tuesday Wednesday Thursday Friday Saturday                            1     [facility] DRAW    9:30 AM   (15 min.)    [facility] DRAW   [facility] Lab Draw     [facility] ONC INFUSION 360   10:00 AM   (360 min.)   [facility]   [facility] 2       3     4     5     [facility] INFUSION 120    1:00 PM   (120 min.)    ONCOLOGY INFUSION   [facility] 6     7     8     9       10     11     12     13     EVALUATION   10:45 AM   (60 min.)   Dwayne Barbour PT   [facility] 14     15     16       17     18     19     [facility] RETURN    2:15 PM   (30 min.)   Briana Medina MD   [facility] 20     21     [facility] DRAW    9:30 AM   (15 min.)    [facility] DRAW   [facility] Lab Draw     [facility] RETURN    9:45 AM   (30 min.)   Rachelle Miranda APRN CNP   [facility]     [facility] ONC INFUSION 360   11:00 AM   (360 min.)    ONCOLOGY INFUSION   [facility] 22     23       24     25     26     27     28     29     30       31 January 2018 Sunday Monday Tuesday Wednesday Thursday Friday Saturday        1     2     3     4     5     6       7     8      9     10     11     [facility] LAB DRAW   10:00 AM   (15 min.)   [facility] LAB DRAW   [facility] Lab Draw     [facility] RETURN   10:15 AM   (30 min.)   Rachelle Miranda APRN CNP   [facility] ONC INFUSION 360   11:30 AM   (360 min.)    ONCOLOGY INFUSION   [facility] 12     13       14     15     16     17     18     19     20       21     22     23     24     25     26     27       28     29     30     31                                 Lab Results:  Recent Results (from the past 12 hour(s))   CBC with platelets differential    Collection Time: 12/01/17  9:30 AM   Result Value Ref Range    WBC 5.0 4.0 - 11.0 10e9/L    RBC Count 4.39 3.8 - 5.2 10e12/L    Hemoglobin 10.2 (L) 11.7 - 15.7 g/dL    Hematocrit 34.4 (L) 35.0 - 47.0 %    MCV 78 78 - 100 fl    MCH 23.2 (L) 26.5 - 33.0 pg    MCHC 29.7 (L) 31.5 - 36.5 g/dL    RDW 24.2 (H) 10.0 - 15.0 %    Platelet Count 177 150 - 450 10e9/L    Diff Method Automated Method     % Neutrophils 53.1 %    % Lymphocytes 33.7 %    % Monocytes 11.8 %    % Eosinophils 1.0 %    % Basophils 0.2 %    % Immature Granulocytes 0.2 %    Nucleated RBCs 0 0 /100    Absolute Neutrophil 2.6 1.6 - 8.3 10e9/L    Absolute Lymphocytes 1.7 0.8 - 5.3 10e9/L    Absolute Monocytes 0.6 0.0 - 1.3 10e9/L    Absolute Eosinophils 0.1 0.0 - 0.7 10e9/L    Absolute Basophils 0.0 0.0 - 0.2 10e9/L    Abs Immature Granulocytes 0.0 0 - 0.4 10e9/L    Absolute Nucleated RBC 0.0    Comprehensive metabolic panel    Collection Time: 12/01/17  9:30 AM   Result Value Ref Range    Sodium 140 133 - 144 mmol/L    Potassium 3.9 3.4 - 5.3 mmol/L    Chloride 106 94 - 109 mmol/L    Carbon Dioxide 26 20 - 32 mmol/L    Anion Gap 8 3 - 14 mmol/L    Glucose 127 (H) 70 - 99 mg/dL    Urea Nitrogen 12 7 - 30 mg/dL    Creatinine 0.80 0.52 - 1.04 mg/dL    GFR Estimate 73 >60 mL/min/1.7m2    GFR Estimate If Black 88 >60 mL/min/1.7m2    Calcium 9.3 8.5 - 10.1 mg/dL    Bilirubin Total  0.2 0.2 - 1.3 mg/dL    Albumin 3.2 (L) 3.4 - 5.0 g/dL    Protein Total 8.6 6.8 - 8.8 g/dL    Alkaline Phosphatase 86 40 - 150 U/L    ALT 21 0 - 50 U/L    AST 18 0 - 45 U/L   Magnesium    Collection Time: 12/01/17  9:30 AM   Result Value Ref Range    Magnesium 2.0 1.6 - 2.3 mg/dL   Protein qualitative urine    Collection Time: 12/01/17  9:36 AM   Result Value Ref Range    Protein Albumin Urine Trace (A) NEG^Negative mg/dL

## 2017-12-01 NOTE — MR AVS SNAPSHOT
After Visit Summary   12/1/2017    Ashvin Tiwari    MRN: [id number]           Patient Information     Date Of Birth          1955        Visit Information        Provider Department      12/1/2017 10:00 AM  17 ATC;  ONCOLOGY INFUSION [facility]        Today's Diagnoses     Malignant neoplasm of cervix, unspecified site (H)    -  1      Care Instructions    Contact Numbers    [facility] Main Line: [phone]  [facility] Triage:  [phone]    Call triage with chills and/or temperature greater than or equal to 100.5, uncontrolled nausea/vomiting, diarrhea, constipation, dizziness, shortness of breath, chest pain, bleeding, unexplained bruising, or any new/concerning symptoms, questions/concerns.     If you are having any concerning symptoms or wish to speak to a provider before your next infusion visit, please call your care coordinator or triage to notify them so we can adequately serve you.       After Hours: [phone]    If after hours, weekends, or holidays, call main hospital  and ask for Oncology doctor on call.           December 2017 Sunday Monday Tuesday Wednesday Thursday Friday Saturday                            1     [facility]    9:30 AM   (15 min.)    [facility] DRAW   [facility] Lab Draw     [facility] ONC INFUSION 360   10:00 AM   (360 min.)    ONCOLOGY INFUSION   [facility] 2       3     4     5     [facility] ONC INFUSION 120    1:00 PM   (120 min.)    ONCOLOGY INFUSION   [facility] 6     7     8     9       10     11     12     13     EVALUATION   10:45 AM   (60 min.)   Dwayne Barbour, PT   [facility] 14     15     16       17     18     19     [facility] RETURN    2:15 PM   (30 min.)   Briana Medina MD   [facility] 20     21     [facility]    9:30 AM   (15 min.)    [facility] DRAW   [facility] Lab Draw     [facility] RETURN    9:45 AM   (30 min.)   Ruth  ROSA Wong CNP   [facility] ONC INFUSION 360   11:00 AM   (360 min.)   UC ONCOLOGY INFUSION   [facility] 22     23       24     25     26     27     28     29     30       31                                              January 2018 Sunday Monday Tuesday Wednesday Thursday Friday Saturday        1     2     3     4     5     6       7     8     9     10     11     [facility] DRAW   10:00 AM   (15 min.)    [facility] DRAW   [facility] Lab Draw     P RETURN   10:15 AM   (30 min.)   Rachelle Miranda APRN CNP   [facility] ONC INFUSION 360   11:30 AM   (360 min.)    ONCOLOGY INFUSION   [facility] 12     13       14     15     16     17     18     19     20       21     22     23     24     25     26     27       28     29     30     31                                 Lab Results:  Recent Results (from the past 12 hour(s))   CBC with platelets differential    Collection Time: 12/01/17  9:30 AM   Result Value Ref Range    WBC 5.0 4.0 - 11.0 10e9/L    RBC Count 4.39 3.8 - 5.2 10e12/L    Hemoglobin 10.2 (L) 11.7 - 15.7 g/dL    Hematocrit 34.4 (L) 35.0 - 47.0 %    MCV 78 78 - 100 fl    MCH 23.2 (L) 26.5 - 33.0 pg    MCHC 29.7 (L) 31.5 - 36.5 g/dL    RDW 24.2 (H) 10.0 - 15.0 %    Platelet Count 177 150 - 450 10e9/L    Diff Method Automated Method     % Neutrophils 53.1 %    % Lymphocytes 33.7 %    % Monocytes 11.8 %    % Eosinophils 1.0 %    % Basophils 0.2 %    % Immature Granulocytes 0.2 %    Nucleated RBCs 0 0 /100    Absolute Neutrophil 2.6 1.6 - 8.3 10e9/L    Absolute Lymphocytes 1.7 0.8 - 5.3 10e9/L    Absolute Monocytes 0.6 0.0 - 1.3 10e9/L    Absolute Eosinophils 0.1 0.0 - 0.7 10e9/L    Absolute Basophils 0.0 0.0 - 0.2 10e9/L    Abs Immature Granulocytes 0.0 0 - 0.4 10e9/L    Absolute Nucleated RBC 0.0    Comprehensive metabolic panel    Collection Time: 12/01/17  9:30 AM   Result Value Ref  Range    Sodium 140 133 - 144 mmol/L    Potassium 3.9 3.4 - 5.3 mmol/L    Chloride 106 94 - 109 mmol/L    Carbon Dioxide 26 20 - 32 mmol/L    Anion Gap 8 3 - 14 mmol/L    Glucose 127 (H) 70 - 99 mg/dL    Urea Nitrogen 12 7 - 30 mg/dL    Creatinine 0.80 0.52 - 1.04 mg/dL    GFR Estimate 73 >60 mL/min/1.7m2    GFR Estimate If Black 88 >60 mL/min/1.7m2    Calcium 9.3 8.5 - 10.1 mg/dL    Bilirubin Total 0.2 0.2 - 1.3 mg/dL    Albumin 3.2 (L) 3.4 - 5.0 g/dL    Protein Total 8.6 6.8 - 8.8 g/dL    Alkaline Phosphatase 86 40 - 150 U/L    ALT 21 0 - 50 U/L    AST 18 0 - 45 U/L   Magnesium    Collection Time: 12/01/17  9:30 AM   Result Value Ref Range    Magnesium 2.0 1.6 - 2.3 mg/dL   Protein qualitative urine    Collection Time: 12/01/17  9:36 AM   Result Value Ref Range    Protein Albumin Urine Trace (A) NEG^Negative mg/dL               Follow-ups after your visit        Your next 10 appointments already scheduled     Dec 05, 2017  1:00 PM CST   Infusion 120 with UC ONCOLOGY INFUSION, UC 18 ATC   [facility] Cancer [facility])    [address] [place] [address] [id number] [phone]            Dec 13, 2017 11:00 AM CST   (Arrive by 10:45 AM)   Evaluation with Dwayne Barbour PT   [facility] ([facility])    [address] [place] [address]  4th [facility] [id number] [phone]            Dec 19, 2017  2:30 PM CST   (Arrive by 2:15 PM)   Return Visit with Briana Medina MD   [facility] ([facility])    [address] [place] [address] [id number] [phone]            Dec 21, 2017  9:30 AM CST   [facility] Draw with  [facility] DRAW   [facility] Lab Draw ([facility])    [address] [place] [address] [id number] [phone]            Dec 21, 2017 10:00 AM CST   (Arrive by 9:45 AM)   Return Visit with Rachelle Melendez  "ROSA Miranda CNP   [facility] ([facility])    [address] [address]   [phone]            Dec 21, 2017 11:00 AM CST   Infusion 360 with UC ONCOLOGY INFUSION, UC 18 ATC   [facility] ([facility])    [address] [place] [address] [id number] [phone]            Jan 11, 2018 10:00 AM CST   [facility] Lab Draw with  [facility] LAB DRAW   [facility] Lab Draw ([facility])    [address] [place] [address] [id number] [phone]            Jan 11, 2018 10:30 AM CST   (Arrive by 10:15 AM)   Return Visit with ROSA Mendoza CNP   [facility] ([facility])    [address] [place] [id number] [phone]              Who to contact     If you have questions or need follow up information about today's clinic visit or your schedule please contact [facility] directly at [phone].  Normal or non-critical lab and imaging results will be communicated to you by MyChart, letter or phone within 4 business days after the clinic has received the results. If you do not hear from us within 7 days, please contact the clinic through [employer] or phone. If you have a critical or abnormal lab result, we will notify you by phone as soon as possible.  Submit refill requests through [employer] or call your pharmacy and they will forward the refill request to us. Please allow 3 business days for your refill to be completed.          Additional Information About Your Visit        [employer] Information     [employer] lets you send messages to your doctor, view your test results, renew your prescriptions, schedule appointments and more. To sign up, go to [email or web address] . Click on \"Log in\" on the left side of the screen, which will take you to the " "Welcome page. Then click on \"Sign up Now\" on the right side of the page.     You will be asked to enter the access code listed below, as well as some personal information. Please follow the directions to create your username and password.     Your access code is: [email or web address]  Expires: 2018  6:30 AM     Your access code will  in 90 days. If you need help or a new code, please call your [place] clinic or [phone].        Care EveryWhere ID     This is your Care EveryWhere ID. This could be used by other organizations to access your [place] medical records  [id number]        Your Vitals Were     Pulse Temperature Pulse Oximetry BMI (Body Mass Index)          98 98.4  F (36.9  C) 99% 31.29 kg/m2         Blood Pressure from Last 3 Encounters:   17 146/86   17 (!) 184/104   17 (!) 175/99    Weight from Last 3 Encounters:   17 80.1 kg (176 lb 9.6 oz)   17 80.3 kg (177 lb)   17 80.3 kg (177 lb)              We Performed the Following     CBC with platelets differential     Comprehensive metabolic panel     Magnesium     Protein qualitative urine        Primary Care Provider Office Phone # Fax #    Chelsea Wren [phone]       [facility] [address] [facility] [address]        Equal Access to Services     [facility] : Hadii aad ku hadasho Soomaali, waaxda luqadaha, qaybta kaalmada adeegyada, elizabeth ga . So [facility] [phone].    ATENCIÓN: Si habla español, tiene a mae disposición servicios gratuitos de asistencia lingüística. [place] al [phone].    We comply with applicable federal civil rights laws and [place] laws. We do not discriminate on the basis of race, color, national origin, age, disability, sex, sexual orientation, or gender identity.            Thank you!     Thank you for choosing [facility]  for your care. Our goal is always to provide you with excellent care. " Hearing back from our patients is one way we can continue to improve our services. Please take a few minutes to complete the written survey that you may receive in the mail after your visit with us. Thank you!             Your Updated Medication List - Protect others around you: Learn how to safely use, store and throw away your medicines at www.disposemymeds.org.          This list is accurate as of: 12/1/17  3:27 PM.  Always use your most recent med list.                   Brand Name Dispense Instructions for use Diagnosis    acetaminophen 325 MG tablet    TYLENOL    100 tablet    Take 3 tablets (975 mg) by mouth 3 times daily    Cancer related pain       amLODIPine 2.5 MG tablet    NORVASC    30 tablet    Take 1 tablet (2.5 mg) by mouth daily    Essential hypertension       dronabinol 5 MG capsule    MARINOL    60 capsule    Take 1 capsule (5 mg) by mouth 2 times daily (before meals)    Anorexia, Malignant neoplasm of cervix, unspecified site (H)       glutamine 500 MG Tabs     120 tablet    Take 500 mg by mouth 2 times daily    Drug-induced polyneuropathy (H)       LORazepam 1 MG tablet    ATIVAN    30 tablet    Take 1 tablet (1 mg) by mouth every 6 hours as needed (Anxiety, Nausea/Vomiting or Sleep)    Malignant neoplasm of cervix, unspecified site (H)       polyethylene glycol powder    MIRALAX    510 g    Take 17-34 g (1-2 capfuls) by mouth daily    Cancer related pain, Generalized abdominal pain       prochlorperazine 10 MG tablet    COMPAZINE    30 tablet    Take 1 tablet (10 mg) by mouth every 6 hours as needed (Nausea/Vomiting)    Malignant neoplasm of cervix, unspecified site (H)       senna-docusate 8.6-50 MG per tablet    SENOKOT-S;PERICOLACE    100 tablet    Take 2-4 tablets by mouth 2 times daily    Cancer related pain       traMADol 50 MG tablet    ULTRAM    240 tablet    Take 2 tablets (100mg) every morning when you wake up, 2 tablets (100mg) in the middle of the day and 2 tablets (100mg) before  bed.  Take 1-2 tablets once a day if needed for pain not controlled with the three scheduled doses.    Malignant neoplasm of cervix, unspecified site (H), Cancer related pain

## 2017-12-05 NOTE — TELEPHONE ENCOUNTER
Oncology Distress Screening Follow-up  Clinical Social Work  [facility]      Data: Writer had mailed information to patient (to her daughter's home in [place] where patient was staying) and received it back Return To Sender.    Intervention: Phone call to patient and message left indicating that the envelope had been returned and writer will send to patient's address in [place].     Education Provided: Resending the information on Cancer [facility] Program and [employer] meal program application.     Follow-up Required: None at this time.     Paloma Saleem [facility] LICSW  12/5/2017    For follow-up: [facility]  - Devora Pinzon [facility] LICSW [phone]

## 2017-12-05 NOTE — PATIENT INSTRUCTIONS
[facility] Main Line: [phone]    Call triage nurse with chills and/or temperature greater than or equal to 100.4, uncontrolled nausea/vomiting, diarrhea, constipation, dizziness, shortness of breath, chest pain, bleeding, unexplained bruising, or any new/concerning symptoms, questions/concerns.   If you are having any concerning symptoms or wish to speak to a provider before your next infusion visit, please call your care coordinator or triage to notify them so we can adequately serve you.   Triage Nurse Line: [phone]    If after hours, weekends, or holidays, call main hospital  and ask for Oncology doctor on call @ [phone]             December 2017 Sunday Monday Tuesday Wednesday Thursday Friday Saturday                            1     [facility] D[facility]    9:30 AM   (15 min.)    [facility] DRAW   [facility] Lab Draw     [facility] ONC INFUSION 360   10:00 AM   (360 min.)    ONCOLOGY INFUSION   [facility] 2       3     4     5     [facility] ONC INFUSION 120    1:00 PM   (120 min.)    ONCOLOGY INFUSION   [facility] 6     7     8     9       10     11     12     13     EVALUATION   10:45 AM   (60 min.)   Dwayne Barbour PT   [facility] 14     15     16       17     18     19     [facility] RETURN    2:15 PM   (30 min.)   Briana Medina MD   [facility] 20     21     [facility] DRAW    9:30 AM   (15 min.)    [facility] DRAW   [facility] Lab Draw     [facility] RETURN    9:45 AM   (30 min.)   Rachelle Miranda APRN CNP   [facility]     [facility] ONC INFUSION 360   11:00 AM   (360 min.)    ONCOLOGY INFUSION   [facility] 22     23       24     25     26     27     28     29     30       31 January 2018 Sunday Monday Tuesday Wednesday Thursday Friday Saturday        1     2     3     4     5     6       7     8     9      10     11     [facility] LAB DRAW   10:00 AM   (15 min.)   [facility] LAB DRAW   [facility] Lab Draw     [facility] RETURN   10:15 AM   (30 min.)   Rachelle Miranda APRN CNP   [facility] ONC INFUSION 360   11:30 AM   (360 min.)    ONCOLOGY INFUSION   [facility] 12     13       14     15     16     17     18     19     20       21     22     23     24     25     26     27       28     29     30     31                                 Lab Results:  No results found for this or any previous visit (from the past 12 hour(s)).

## 2017-12-05 NOTE — PROGRESS NOTES
Infusion Nursing Note:  Ashvin Tiwari presents today for IV fluids, IV Zofran.    Patient seen by provider today: No   present during visit today: Not Applicable.    Note: Zofran given today for c/o slight nausea.    Intravenous Access:  Implanted Port.    Treatment Conditions:  Not Applicable.      Post Infusion Assessment:  Patient tolerated infusion without incident.  Blood return noted pre and post infusion.  Site patent and intact, free from redness, edema or discomfort.  No evidence of extravasations.  Access discontinued per protocol.    Discharge Plan:   Patient declined prescription refills.  Copy of AVS reviewed with patient and/or family.  Patient will return 12/21/17 labs, NP, chemo for next appointment.  Patient discharged in stable condition accompanied by: family.  Departure Mode: Ambulatory.  Face to Face time: 0.    Alyx Ritchie RN

## 2017-12-19 NOTE — PATIENT INSTRUCTIONS
Finger tingling / neuropathy   - Continue taking B6  - re-check B6 levels in January     Loss of appetite / nausea / weight loss: Improved with Marinol   - continue compazine (prochloroperazine) 10mg (1 tablet) three times a day   - Continue Marinol (dronabinol) 5mg every morning and evening before dinner.      Hip pain:  - continue taking tramadol 50-100mg (1-2 tabs) 3-4 times a day as needed or pain control  - try heat and ice on your hip  - stretches of your hip.     Knee pain:   - consider starting physical therapy for knee pain  - tylenol 1000mg three times a day if needed for knee pain      Follow up January 30th at 1pm

## 2017-12-19 NOTE — MR AVS SNAPSHOT
After Visit Summary   12/19/2017    Ashvin Tiwari    MRN: [id number]           Patient Information     Date Of Birth          1955        Visit Information        Provider Department      12/19/2017 2:30 PM Briana Medina MD [facility]        Care Instructions    Finger tingling / neuropathy   - Continue taking B6  - re-check B6 levels in January     Loss of appetite / nausea / weight loss: Improved with Marinol   - continue compazine (prochloroperazine) 10mg (1 tablet) three times a day   - Continue Marinol (dronabinol) 5mg every morning and evening before dinner.      Hip pain:  - continue taking tramadol 50-100mg (1-2 tabs) 3-4 times a day as needed or pain control  - try heat and ice on your hip  - stretches of your hip.     Knee pain:   - consider starting physical therapy for knee pain  - tylenol 1000mg three times a day if needed for knee pain      Follow up January 30th at 1pm          Follow-ups after your visit        Your next 10 appointments already scheduled     Dec 21, 2017  9:30 AM CST   [facility] Lab Draw with  MASONIC LAB DRAW   [facility] ([facility])    [address] [place] [id number]   [phone]            Dec 21, 2017 10:00 AM CST   (Arrive by 9:45 AM)   Return Visit with ROSA Mendoza CNP   [facility])    [address] [place] [id number]   [phone]            Dec 21, 2017 11:00 AM CST   Infusion 360 with  ONCOLOGY INFUSION, UC 18 ATC   [facility] ([facility])    [address] [place] [id number]   [phone]            Jan 11, 2018 10:00 AM CST   Masonic Lab Draw with  MASONIC LAB DRAW   [facility] ([facility])    [address] [place] [address]  "[id number]   [phone]            Jan 11, 2018 10:30 AM CST   (Arrive by 10:15 AM)   Return Visit with ROSA Mendoza CNP   [facility] ([facility])    [address] [place] [address]   [phone]            Jan 11, 2018 11:30 AM CST   Infusion 360 with UC ONCOLOGY INFUSION, UC 13 ATC   [facility] ([facility])    [address] [place] [address]   [phone]            Jan 30, 2018  1:00 PM CST   (Arrive by 12:45 PM)   Return Visit with Briana Medina MD   [facility] ([facility])    [address] [place] [id number] [phone]              Who to contact     If you have questions or need follow up information about today's clinic visit or your schedule please contact [facility] directly at [phone].  Normal or non-critical lab and imaging results will be communicated to you by [employer], letter or phone within 4 business days after the clinic has received the results. If you do not hear from us within 7 days, please contact the clinic through [employer] or phone. If you have a critical or abnormal lab result, we will notify you by phone as soon as possible.  Submit refill requests through [employer] or call your pharmacy and they will forward the refill request to us. Please allow 3 business days for your refill to be completed.          Additional Information About Your Visit        [employer] Information     [employer] lets you send messages to your doctor, view your test results, renew your prescriptions, schedule appointments and more. To sign up, go to [email or web address] . Click on \"Log in\" on the left side of the screen, which will take you to the Welcome page. Then click on \"Sign up Now\" on the right side of the page.     You will be asked to enter the " "access code listed below, as well as some personal information. Please follow the directions to create your username and password.     Your access code is: [email or web address]  Expires: 2018  6:30 AM     Your access code will  in 90 days. If you need help or a new code, please call your [facility] clinic or [phone].        Care EveryWhere ID     This is your Care EveryWhere ID. This could be used by other organizations to access your [facility] medical records  [id number]        Your Vitals Were     Pulse Temperature Respirations Height Pulse Oximetry BMI (Body Mass Index)    115 98.7  F (37.1  C) (Oral) 16 1.6 m (5' 3\") 99% 31.63 kg/m2       Blood Pressure from Last 3 Encounters:   17 (!) 156/92   17 156/90   17 146/86    Weight from Last 3 Encounters:   17 81 kg (178 lb 9 oz)   17 80.1 kg (176 lb 9.6 oz)   17 80.3 kg (177 lb)              Today, you had the following     No orders found for display       Primary Care Provider Office Phone # Fax #    Chelsea Wren [phone]       [facility] [address] [address]        Equal Access to Services     LISSET LUGO : Hadii elvia mccurdy hadasho Sosyd, waaxda luqadaha, qaybta kaalmada aderodolfoyada, elizabeth ga . So [facility] [phone].    ATENCIÓN: Si habla español, tiene a mae disposición servicios gratuitos de asistencia lingüística. Llame al [phone].    We comply with applicable federal civil rights laws and [place] laws. We do not discriminate on the basis of race, color, national origin, age, disability, sex, sexual orientation, or gender identity.            Thank you!     Thank you for choosing [facility] [place]  for your care. Our goal is always to provide you with excellent care. Hearing back from our patients is one way we can continue to improve our services. Please take a few minutes to complete the written survey that you may " receive in the mail after your visit with us. Thank you!             Your Updated Medication List - Protect others around you: Learn how to safely use, store and throw away your medicines at www.disposemymeds.org.          This list is accurate as of: 12/19/17  2:33 PM.  Always use your most recent med list.                   Brand Name Dispense Instructions for use Diagnosis    acetaminophen 325 MG tablet    TYLENOL    100 tablet    Take 3 tablets (975 mg) by mouth 3 times daily    Cancer related pain       amLODIPine 2.5 MG tablet    NORVASC    30 tablet    Take 1 tablet (2.5 mg) by mouth daily    Essential hypertension       dronabinol 5 MG capsule    MARINOL    60 capsule    Take 1 capsule (5 mg) by mouth 2 times daily (before meals)    Anorexia, Malignant neoplasm of cervix, unspecified site (H)       glutamine 500 MG Tabs     120 tablet    Take 500 mg by mouth 2 times daily    Drug-induced polyneuropathy (H)       LORazepam 1 MG tablet    ATIVAN    30 tablet    Take 1 tablet (1 mg) by mouth every 6 hours as needed (Anxiety, Nausea/Vomiting or Sleep)    Malignant neoplasm of cervix, unspecified site (H)       polyethylene glycol powder    MIRALAX    510 g    Take 17-34 g (1-2 capfuls) by mouth daily    Cancer related pain, Generalized abdominal pain       prochlorperazine 10 MG tablet    COMPAZINE    30 tablet    Take 1 tablet (10 mg) by mouth every 6 hours as needed (Nausea/Vomiting)    Malignant neoplasm of cervix, unspecified site (H)       senna-docusate 8.6-50 MG per tablet    SENOKOT-S;PERICOLACE    100 tablet    Take 2-4 tablets by mouth 2 times daily    Cancer related pain       traMADol 50 MG tablet    ULTRAM    240 tablet    Take 2 tablets (100mg) every morning when you wake up, 2 tablets (100mg) in the middle of the day and 2 tablets (100mg) before bed.  Take 1-2 tablets once a day if needed for pain not controlled with the three scheduled doses.    Malignant neoplasm of cervix, unspecified site (H),  Cancer related pain

## 2017-12-19 NOTE — NURSING NOTE
"Oncology Rooming Note    December 19, 2017 2:08 PM   Ashvin Tiwari is a 62 year old female who presents for:    Chief Complaint   Patient presents with     Oncology Clinic Visit     Return: 1 mo palliative     Initial Vitals: BP (!) 156/92 (BP Location: Right arm, Patient Position: Chair, Cuff Size: Adult Large)  Pulse 115  Temp 98.7  F (37.1  C) (Oral)  Resp 16  Ht 1.6 m (5' 3\")  Wt 81 kg (178 lb 9 oz)  SpO2 99%  BMI 31.63 kg/m2 Estimated body mass index is 31.63 kg/(m^2) as calculated from the following:    Height as of this encounter: 1.6 m (5' 3\").    Weight as of this encounter: 81 kg (178 lb 9 oz). Body surface area is 1.9 meters squared.  Severe Pain (6) Comment: throat   No LMP recorded. Patient is postmenopausal.  Allergies reviewed: Yes  Medications reviewed: Yes    Medications: Medication refills not needed today.  Pharmacy name entered into EPIC:    [facility] (USE ONLY AT [facility]) - [place], [place] - [address] [facility] - [place], [place] - [address] [facility]    Clinical concerns: new concerns are:  Throat feels like she has something stuck in it, and her abdomen from the belly button down is painful, registers a 6 on the pain scale. Dr. Medina was NOT notified.    10 minutes for nursing intake (face to face time)     Ivory Eric CMA              "

## 2017-12-19 NOTE — LETTER
12/19/2017       RE: Ashvin Tiwari  [address]  [facility] [address]     Dear Colleague,    Thank you for referring your patient, Ashvin Tiwari, to the [facility] at [facility]. Please see a copy of my visit note below.    Palliative Care Outpatient Clinic Progress Note    Patient Name:  Ashvin Tiwari  Primary Provider:  Chelsea Wren    Chief Complaint:   Metastatic cervical cancer  Anorexia  Weight loss  Nausea  Right hip pain   Knee pain    Interim History:  Ashvin Tiwari 62 year old female returns to be seen by palliative care today.  Pain in the hip and right leg is improving.     Does have pain in abdomen radiating down into the pelvis. This pain also seems to be improving and can lie down and get up now without sig pain.     Went to PCP for pain in knees - took xrays - long standing pain but pain gradually getting worse to the point that it is getting harder to walk. Dragging left leg. Xrays showed sig DJD. Recommended increase activity and tylenol and can consider steroid injections in the future. She feels like the pain medications she is taking helps the knee pain somewhat but it is still present.      No nausea.  Marinol helping. Appetite is good according to patient but not good according to her daughter.   No constipation, no diarrhea - takes medication when feeling constipated - miralax    No SOB like before - minimal now.     Feels weak and sleeping a lot. When does not have doc appointment, sleeps a lot during the day - on the couch, not in her bed. Usually wakes up about 11am and goes to sleep around 11pm. May sleep 3-4 hours during the day.     Tingling in finers is sig improved. She really does not feel it anymore.         No Known Allergies  Current Outpatient Prescriptions   Medication Sig Dispense Refill     dronabinol (MARINOL) 5 MG capsule Take 1 capsule (5 mg) by mouth 2 times daily (before meals) 60 capsule 0  "    glutamine 500 MG TABS Take 500 mg by mouth 2 times daily 120 tablet 3     traMADol (ULTRAM) 50 MG tablet Take 2 tablets (100mg) every morning when you wake up, 2 tablets (100mg) in the middle of the day and 2 tablets (100mg) before bed.  Take 1-2 tablets once a day if needed for pain not controlled with the three scheduled doses. 240 tablet 0     amLODIPine (NORVASC) 2.5 MG tablet Take 1 tablet (2.5 mg) by mouth daily 30 tablet 1     senna-docusate (SENOKOT-S;PERICOLACE) 8.6-50 MG per tablet Take 2-4 tablets by mouth 2 times daily 100 tablet 1     polyethylene glycol (MIRALAX) powder Take 17-34 g (1-2 capfuls) by mouth daily 510 g 1     prochlorperazine (COMPAZINE) 10 MG tablet Take 1 tablet (10 mg) by mouth every 6 hours as needed (Nausea/Vomiting) 30 tablet 2     LORazepam (ATIVAN) 1 MG tablet Take 1 tablet (1 mg) by mouth every 6 hours as needed (Anxiety, Nausea/Vomiting or Sleep) 30 tablet 2     acetaminophen (TYLENOL) 325 MG tablet Take 3 tablets (975 mg) by mouth 3 times daily 100 tablet 0     Past Medical History:   Diagnosis Date     Cancer (H)     cervical     Hypertension      Past Surgical History:   Procedure Laterality Date     BIOPSY/EXCISION LYMPH NODE(S), NEEDLE, SUPERFICIAL (CERVICAL/INGUINAL/AXILLARY) Right 9/21/2017    Procedure: BIOPSY/EXCISION LYMPH NODE(S), NEEDLE, SUPERFICIAL (CERVICAL/INGUINAL/AXILLARY);;  Surgeon: Sonu Denson PA-C;  Location:  OR     INSERT PORT VASCULAR ACCESS Right 9/21/2017    Procedure: INSERT PORT VASCULAR ACCESS;  Single Lumen Chest Power Port, Right Axillary Lymph Node Biopsy;  Surgeon: Sonu Denson PA-C;  [facility]     no previous surgery         BP (!) 156/92 (BP Location: Right arm, Patient Position: Chair, Cuff Size: Adult Large)  Pulse 115  Temp 98.7  F (37.1  C) (Oral)  Resp 16  Ht 1.6 m (5' 3\")  Wt 81 kg (178 lb 9 oz)  SpO2 99%  BMI 31.63 kg/m2    General: well groomed, well nourished, appears comfortable, in " NAD  Eyes: EOMI, sclera clear  HEENT: NC/AT; mucous membranes moist  Lungs: no increased work of breathing, speaking full sentences   Neuro: A&O x 3; CN II-XII grossly intact;   Neuropsych: alert, good eye contact, engaged, pleasant, sensorium intact      Key Data Reviewed:  B6 15 (low)   GFR 88; platelets 177      Impression:     Metastatic cervical cancer, receiving chemotherapy. Symptom management.     Recommendations & Counseling:    Finger tingling / neuropathy - resolved - Continue taking B6  - re-check B6 levels in January     Loss of appetite / nausea / weight loss: Improved with Marinol - weight stable past 2 visits  - continue compazine (prochloroperazine) 10mg (1 tablet) three times a day   - Continue Marinol (dronabinol) 5mg every morning and evening before dinner.      Hip pain:  - continue taking tramadol 50-100mg (1-2 tabs) 3-4 times a day as needed or pain control  - try heat and ice on your hip  - stretches of your hip.     Knee pain:   - consider starting physical therapy for knee pain  - tylenol 1000mg three times a day if needed for knee pain      Follow up January 30th at 1pm      Thank you for involving us in the patient's care. 30 minutes face time over half spent counseling.  Briana Medina MD / Palliative Medicine / Pager [phone] / After-Hours Answering Service [phone] / Main Palliative Clinic - [phone] / [facility] Inpatient Team Consult Pager [phone] (answered 8am-430pm M-F)

## 2017-12-19 NOTE — PROGRESS NOTES
Palliative Care Outpatient Clinic Progress Note    Patient Name:  Ashvin Tiwari  Primary Provider:  Chelsea Wren    Chief Complaint:   Metastatic cervical cancer  Anorexia  Weight loss  Nausea  Right hip pain   Knee pain    Interim History:  Ashvin Tiwari 62 year old female returns to be seen by palliative care today.  Pain in the hip and right leg is improving.     Does have pain in abdomen radiating down into the pelvis. This pain also seems to be improving and can lie down and get up now without sig pain.     Went to PCP for pain in knees - took xrays - long standing pain but pain gradually getting worse to the point that it is getting harder to walk. Dragging left leg. Xrays showed sig DJD. Recommended increase activity and tylenol and can consider steroid injections in the future. She feels like the pain medications she is taking helps the knee pain somewhat but it is still present.      No nausea.  Marinol helping. Appetite is good according to patient but not good according to her daughter.   No constipation, no diarrhea - takes medication when feeling constipated - miralax    No SOB like before - minimal now.     Feels weak and sleeping a lot. When does not have doc appointment, sleeps a lot during the day - on the couch, not in her bed. Usually wakes up about 11am and goes to sleep around 11pm. May sleep 3-4 hours during the day.     Tingling in finers is sig improved. She really does not feel it anymore.         No Known Allergies  Current Outpatient Prescriptions   Medication Sig Dispense Refill     dronabinol (MARINOL) 5 MG capsule Take 1 capsule (5 mg) by mouth 2 times daily (before meals) 60 capsule 0     glutamine 500 MG TABS Take 500 mg by mouth 2 times daily 120 tablet 3     traMADol (ULTRAM) 50 MG tablet Take 2 tablets (100mg) every morning when you wake up, 2 tablets (100mg) in the middle of the day and 2 tablets (100mg) before bed.  Take 1-2 tablets once a day if needed for pain not  "controlled with the three scheduled doses. 240 tablet 0     amLODIPine (NORVASC) 2.5 MG tablet Take 1 tablet (2.5 mg) by mouth daily 30 tablet 1     senna-docusate (SENOKOT-S;PERICOLACE) 8.6-50 MG per tablet Take 2-4 tablets by mouth 2 times daily 100 tablet 1     polyethylene glycol (MIRALAX) powder Take 17-34 g (1-2 capfuls) by mouth daily 510 g 1     prochlorperazine (COMPAZINE) 10 MG tablet Take 1 tablet (10 mg) by mouth every 6 hours as needed (Nausea/Vomiting) 30 tablet 2     LORazepam (ATIVAN) 1 MG tablet Take 1 tablet (1 mg) by mouth every 6 hours as needed (Anxiety, Nausea/Vomiting or Sleep) 30 tablet 2     acetaminophen (TYLENOL) 325 MG tablet Take 3 tablets (975 mg) by mouth 3 times daily 100 tablet 0     Past Medical History:   Diagnosis Date     Cancer (H)     cervical     Hypertension      Past Surgical History:   Procedure Laterality Date     BIOPSY/EXCISION LYMPH NODE(S), NEEDLE, SUPERFICIAL (CERVICAL/INGUINAL/AXILLARY) Right 9/21/2017    Procedure: BIOPSY/EXCISION LYMPH NODE(S), NEEDLE, SUPERFICIAL (CERVICAL/INGUINAL/AXILLARY);;  Surgeon: Sonu Denson PA-C;  Location:  OR     INSERT PORT VASCULAR ACCESS Right 9/21/2017    Procedure: INSERT PORT VASCULAR ACCESS;  Single Lumen Chest Power Port, Right Axillary Lymph Node Biopsy;  Surgeon: Sonu Denson PA-C;  [facility]     no previous surgery         BP (!) 156/92 (BP Location: Right arm, Patient Position: Chair, Cuff Size: Adult Large)  Pulse 115  Temp 98.7  F (37.1  C) (Oral)  Resp 16  Ht 1.6 m (5' 3\")  Wt 81 kg (178 lb 9 oz)  SpO2 99%  BMI 31.63 kg/m2    General: well groomed, well nourished, appears comfortable, in NAD  Eyes: EOMI, sclera clear  HEENT: NC/AT; mucous membranes moist  Lungs: no increased work of breathing, speaking full sentences   Neuro: A&O x 3; CN II-XII grossly intact;   Neuropsych: alert, good eye contact, engaged, pleasant, sensorium intact      Key Data Reviewed:  B6 15 (low)   GFR 88; " platelets 177      Impression:     Metastatic cervical cancer, receiving chemotherapy. Symptom management.     Recommendations & Counseling:    Finger tingling / neuropathy - resolved - Continue taking B6  - re-check B6 levels in January     Loss of appetite / nausea / weight loss: Improved with Marinol - weight stable past 2 visits  - continue compazine (prochloroperazine) 10mg (1 tablet) three times a day   - Continue Marinol (dronabinol) 5mg every morning and evening before dinner.      Hip pain:  - continue taking tramadol 50-100mg (1-2 tabs) 3-4 times a day as needed or pain control  - try heat and ice on your hip  - stretches of your hip.     Knee pain:   - consider starting physical therapy for knee pain  - tylenol 1000mg three times a day if needed for knee pain      Follow up January 30th at 1pm      Thank you for involving us in the patient's care. 30 minutes face time over half spent counseling.  Briana Medina MD / Palliative Medicine / Pager [phone] / After-Hours Answering Service [phone] / Main Palliative Clinic - [phone] / [facility] Inpatient Team Consult Pager [phone] (answered 8am-430pm M-F)

## 2017-12-20 NOTE — TELEPHONE ENCOUNTER
Social Work Note: Telephone Call  Oncology Clinic    Data/Intervention:  Patient Name:  Ashvin Tiwari  /Age:  1955 (62 year old)    Call From:  Social work attempted to call patient and then daughterIrasema  Reason for Call:  Social work received referral from palliative care RNCC indicating patient and family had questions about insurance renewal and Psychiatric hospital case management.    Assessment:  Social work attempted to call patient at listed home phone number.  No response, voicemail message left with  contact information and request for return phone call.     Social work also attempted to call patient's daughter, Irasema, at documented contact number.  No response, voicemail message left with  contact information and request for return phone call.    Plan:  Social work will attempt again later to patient/family by phone regarding questions.  Social work is available to assist with questions, concerns and needs.    Soo Yeon Han LICSW  2017  Pager: [phone]  Phone Number: [phone]

## 2017-12-21 NOTE — PROGRESS NOTES
Follow Up Notes on Referred Patient    Date: 2017       Dr. Chelsea Wren  [facility] [address]  [address] [place] [address]       RE: Ashvin Tiwari  : 1955  DENIS: 2017    Dear Dr. Chelsea Wren:    Ashvin Tiwari is a 62 year old woman with a diagnosis of stage IV poorly differentiated SCC cervix.   She is here today for follow up and disease management.       Oncology history:   17:  Seen in Gyn Onc.  Exam concerning for at least a Stage IIB cervical cancer.  Biopsy obtained consistent with poorly differentiated squamous cell ca.  MRI ordered.      17: MRI Pelvis with 7 x 7cm mass at level of cervix, protrudes into upper third of vagina, bilateral parametrial extension, abuts bilateral ureters but no obstruction, abuts rectal wall with obliteration of fat plan but no mucosal invasion, no clear bladder wall invasion, suspicious lymph nodes left hemipelvis, peritoneal nodule versus lymph node.      17 to 17:  Patient admitted to hospital with pain. CT Chest PE was negative for PE but showed multiple pulmonary nodules consistent with metastatic disease.  CT A/P showed cervical mass with regional metastatic disease as well as peritoneal nodularity.      17:  PET scan with multiple metastatic hypermetabolic lung nodules, axillary, mediastinal, hilar and abdominal lymph nodes.      17-17: Cycle #1-3 Paclitaxel/Cisplatin/Avastin.   17: PET CTIMPRESSION:   1. In this patient with a history of stage IV poorly differentiated squamous cell carcinoma of the cervix, there has been a positive response to therapy. The primary cervical mass is significantly decreased in size and demonstrates decreased FDG uptake. Similarly, the metastatic pulmonary nodules and metastatic lymphadenopathy has significantly improved.     2. Bilateral hypermetabolic level 2A lymph nodes which have slightly increased in size, of uncertain clinical significance  given the positive response in the remainder of the body. Enlargement may be related to the patient's dental and sinus disease. Recommend close attention on follow-up imaging.     3. Periapical lucencies with FDG uptake and dental caries involving multiple upper teeth. Recommend follow-up with dentistry.     4. Asymmetric radiotracer uptake in the left prevertebral soft tissues without discernible mass. Recommend close attention on follow-up imaging.     5. Small bilateral pleural effusions.    11/21/17-12/21/17: Cycle #4-5 Paclitaxel/Cisplatin/Avastin.         Today she comes to clinic feeling well.She denies any vaginal bleeding, no changes in her bowel or bladder habits, no nausea/emesis, no lower extremity edema, and no difficulties eating or sleeping. She denies any abdominal discomfort/bloating, no fevers or chills, and no chest pain or shortness of breath but does have CARMONA. She is taking her Marinol daily as well as taking her antiemetics as needed. She does not need any medication refills. Her PCP started her on Norvasc 4 days ago and she reports she checked her BP this morning and it was 147/81. She does have some knee pain and did have an xray taken earlier this month. She is consuming 3 cans of Ensure a day.         Review of Systems:    Systemic           no weight changes; no fever; no chills; no night sweats; no appetite changes  Skin           no rashes, or lesions  Eye           no irritation; no changes in vision  Tabby-Laryngeal           no dysphagia; no hoarseness   Pulmonary    no cough; no shortness of breath  Cardiovascular    no chest pain; no palpitations; + HTN  Gastrointestinal    no diarrhea; no constipation; no abdominal pain; no changes in bowel habits; no blood in stool  Genitourinary   no urinary frequency; no urinary urgency; no dysuria; no pain; no abnormal vaginal discharge; no abnormal vaginal bleeding  Breast    no breast discharge; no breast changes; no breast  pain  Musculoskeletal    no myalgias; + arthralgias; no back pain  Psychiatric           no depressed mood; no anxiety    Hematologic               no tender lymph nodes; no noticeable swellings or lumps   Endocrine    no hot flashes; no heat/cold intolerance         Neurological   no tremor; no numbness and tingling; no headaches; no difficulty sleeping      Past Medical History:    Past Medical History:   Diagnosis Date     Cancer (H)     cervical     Hypertension          Past Surgical History:    Past Surgical History:   Procedure Laterality Date     BIOPSY/EXCISION LYMPH NODE(S), NEEDLE, SUPERFICIAL (CERVICAL/INGUINAL/AXILLARY) Right 9/21/2017    Procedure: BIOPSY/EXCISION LYMPH NODE(S), NEEDLE, SUPERFICIAL (CERVICAL/INGUINAL/AXILLARY);;  Surgeon: Sonu Denson PA-C;  [facility]     INSERT PORT VASCULAR ACCESS Right 9/21/2017    Procedure: INSERT PORT VASCULAR ACCESS;  Single Lumen Chest Power Port, Right Axillary Lymph Node Biopsy;  Surgeon: Sonu Denson PA-C;  Location: [facility] OR     no previous surgery           Health Maintenance Due   Topic Date Due     TETANUS IMMUNIZATION (SYSTEM ASSIGNED)  05/10/1971     HEPATITIS C SCREENING  05/10/1971     LIPID SCREEN Q5 YR FEMALE (SYSTEM ASSIGNED)  05/10/1998     MAMMO SCREEN Q2 YR (SYSTEM ASSIGNED)  05/10/2003     COLON CANCER SCREEN (SYSTEM ASSIGNED)  05/10/2003     ADVANCE DIRECTIVE PLANNING Q5 YRS  05/10/2008     INFLUENZA VACCINE (SYSTEM ASSIGNED)  09/01/2017       Current Medications:     Current Outpatient Prescriptions   Medication Sig Dispense Refill     dronabinol (MARINOL) 5 MG capsule Take 1 capsule (5 mg) by mouth 2 times daily (before meals) 60 capsule 0     glutamine 500 MG TABS Take 500 mg by mouth 2 times daily 120 tablet 3     traMADol (ULTRAM) 50 MG tablet Take 2 tablets (100mg) every morning when you wake up, 2 tablets (100mg) in the middle of the day and 2 tablets (100mg) before bed.  Take 1-2 tablets once a day if  needed for pain not controlled with the three scheduled doses. 240 tablet 0     amLODIPine (NORVASC) 2.5 MG tablet Take 1 tablet (2.5 mg) by mouth daily 30 tablet 1     senna-docusate (SENOKOT-S;PERICOLACE) 8.6-50 MG per tablet Take 2-4 tablets by mouth 2 times daily 100 tablet 1     polyethylene glycol (MIRALAX) powder Take 17-34 g (1-2 capfuls) by mouth daily 510 g 1     prochlorperazine (COMPAZINE) 10 MG tablet Take 1 tablet (10 mg) by mouth every 6 hours as needed (Nausea/Vomiting) 30 tablet 2     LORazepam (ATIVAN) 1 MG tablet Take 1 tablet (1 mg) by mouth every 6 hours as needed (Anxiety, Nausea/Vomiting or Sleep) 30 tablet 2     acetaminophen (TYLENOL) 325 MG tablet Take 3 tablets (975 mg) by mouth 3 times daily 100 tablet 0         Allergies:      No Known Allergies     Social History:     Social History   Substance Use Topics     Smoking status: Never Smoker     Smokeless tobacco: Never Used     Alcohol use No       History   Drug Use No         Family History:       No family history on file.      Physical Exam:     /88 (BP Location: Left arm, Patient Position: Sitting, Cuff Size: Adult Large)  Pulse 110  Temp 99  F (37.2  C) (Oral)  Resp 16  Wt 80.7 kg (177 lb 14.4 oz)  SpO2 96%  BMI 31.51 kg/m2  Body mass index is 31.51 kg/(m^2).    General Appearance: healthy and alert, no distress     HEENT: no thyromegaly, no palpable nodules or masses        Cardiovascular: regular rate and rhythm, no gallops, rubs or murmurs     Respiratory: lungs clear, no rales, rhonchi or wheezes, normal diaphragmatic excursion    Musculoskeletal: extremities non tender and without edema    Skin: no lesions or rashes     Neurological: normal gait, no gross defects     Psychiatric: appropriate mood and affect                               Hematological: normal cervical, supraclavicular  lymph nodes     Gastrointestinal:       abdomen soft, non-tender, non-distended, no organomegaly or masses    Genitourinary: Not  indicated    Assessment:    Ashvin Tiwari is a 62 year old woman with a diagnosis of stage IV poorly differentiated SCC cervix.   She is here today for follow up and disease management.     25 minutes were spent with this patient, over 50% of that time was spent in symptom management, treatment planning and in counseling and coordination of care.      Plan:     1.)        Ok to proceed with planned chemotherapy pending labs are WNL. Patient to have one more cycle after today and then have imaging and see Dr. Soto. Reviewed signs and symptoms for when she should contact the clinic or seek additional care. Patient to contact the clinic with any questions or concerns in the interim.     2.) Genetic risk factors were assessed and the patient does not meet the qualifications for a referral.      3.) Labs and/or tests ordered include:  Chemo labs.     4.) Health maintenance issues addressed today include annual health maintenance and non-gynecologic issues with PCP.    5.)         HTN: recently started on Norvasc by PCP (4 days ago); patient reported morning BP of 147/81. Continue to monitor BP at home and contact PCP if readings remain >150. She will be seeing her PCP again in one month also for a recheck.     ROSA Tavera, WHNP-BC, ANP-BC  Women's Health Nurse Practitioner  Adult Nurse Pracitioner  Division of Gynecologic Oncology          CC  Patient Care Team:  Chelsea Wren as PCP - General (Family Practice)  Sita Gallardo as Referring Physician (OB/Gyn)  Rima Saunders, RN as Continuity Care Coordinator (Gyn-Onc)  Leia Caraballo, RN as Registered Nurse (Palliative)  CHELSEA WREN

## 2017-12-21 NOTE — PROGRESS NOTES
Infusion Nursing Note:  Ashvin Tiwari presents today for Cycle 5 Day 1 Taxol, Carboplatin, Avastin.   Patient seen by provider today: Yes: Rachelle Miranda NP    Note: Patient presents to infusion with no complaints.    Intravenous Access:  Implanted Port.    Treatment Conditions:  Lab Results   Component Value Date    HGB 10.9 12/21/2017     Lab Results   Component Value Date    WBC 4.1 12/21/2017      Lab Results   Component Value Date    ANEU 1.6 12/21/2017     Lab Results   Component Value Date     12/21/2017      Lab Results   Component Value Date     12/21/2017                   Lab Results   Component Value Date    POTASSIUM 3.8 12/21/2017           Lab Results   Component Value Date    MAG 1.8 12/21/2017            Lab Results   Component Value Date    CR 0.73 12/21/2017                   Lab Results   Component Value Date    BEATRIZ 8.8 12/21/2017                Lab Results   Component Value Date    BILITOTAL 0.2 12/21/2017           Lab Results   Component Value Date    ALBUMIN 3.2 12/21/2017                    Lab Results   Component Value Date    ALT 22 12/21/2017           Lab Results   Component Value Date    AST 22 12/21/2017     Results reviewed, labs MET treatment parameters, ok to proceed with treatment.  Urine Protein Negative.  /72 on recheck      Post Infusion Assessment:  Patient tolerated infusion without incident.  Blood return noted pre and post infusion.  Access discontinued per protocol.    Discharge Plan:   Patient declined prescription refills.  Discharge instructions reviewed with: Patient.  Patient and family verbalized understanding of discharge instructions and all questions answered.  Copy of AVS reviewed with patient and family.  Patient will return 12/28/17 for next appointment.  Patient discharged in stable condition accompanied by: daughter.  Face to Face time: 0.    ASHELY CONROY RN

## 2017-12-21 NOTE — MR AVS SNAPSHOT
After Visit Summary   12/21/2017    Ashvin Tiwari    MRN: [id number]           Patient Information     Date Of Birth          1955        Visit Information        Provider Department      12/21/2017 10:00 AM Rachelle Miranda APRN CNP [facility]        Today's Diagnoses     Malignant neoplasm of cervix, unspecified site (H)    -  1    Encounter for antineoplastic chemotherapy        Secondary hypertension           Follow-ups after your visit        Follow-up notes from your care team     Return if symptoms worsen or fail to improve.      Your next 10 appointments already scheduled     Dec 21, 2017 11:00 AM CST   Infusion 360 with [facility] ON[facility] INF[facility]ION, [facility] 18 ATC   [facility] ([facility])    [address] [facility] [address]   [phone]            Dec 28, 2017  3:30 PM CST   Infusion 120 with [facility] ONCOLOGY INFUSION, UC 16 ATC   [facility] ([facility])    [address] [place] [address]   [phone]            Jan 11, 2018 10:00 AM CST   [facility] Lab Draw with  [facility] DRAW   [facility] Lab Draw ([facility])    [address]  [address] [facility] [address]   [phone]            Jan 11, 2018 10:30 AM CST   (Arrive by 10:15 AM)   Return Visit with ROSA Mendoza CNP   [facility] ([facility])    [address]  [address] [facility] [address]   [phone]            Jan 11, 2018 11:30 AM CST   Infusion 360 with [facility] ONCOLOGY INFUSION, UC 13 ATC   [facility] ([facility])    [address]  [address] [facility] [address]   [phone]            Jan 30, 2018  1:00 PM CST   (Arrive by 12:45 PM)   Return Visit with Briana Medina MD   [facility]  "Cancer Clinic ([facility])    [address]  [address]  [facility] [address] [phone]              Who to contact     If you have questions or need follow up information about today's clinic visit or your schedule please contact [facility] directly at [phone].  Normal or non-critical lab and imaging results will be communicated to you by MyChart, letter or phone within 4 business days after the clinic has received the results. If you do not hear from us within 7 days, please contact the clinic through MyChart or phone. If you have a critical or abnormal lab result, we will notify you by phone as soon as possible.  Submit refill requests through [employer] or call your pharmacy and they will forward the refill request to us. Please allow 3 business days for your refill to be completed.          Additional Information About Your Visit        [facility] Information     [employer] lets you send messages to your doctor, view your test results, renew your prescriptions, schedule appointments and more. To sign up, go to [email or web address] . Click on \"Log in\" on the left side of the screen, which will take you to the Welcome page. Then click on \"Sign up Now\" on the right side of the page.     You will be asked to enter the access code listed below, as well as some personal information. Please follow the directions to create your username and password.     Your access code is: [email or web address]  Expires: 2018  6:30 AM     Your access code will  in 90 days. If you need help or a new code, please call your [facility] clinic or [phone].        Care EveryWhere ID     This is your Care EveryWhere ID. This could be used by other organizations to access your [facility] medical records  [id number]        Your Vitals Were     Pulse Temperature Respirations Pulse Oximetry BMI (Body Mass Index)       110 99  F (37.2  C) (Oral) 16 96% 31.51 kg/m2        Blood " Pressure from Last 3 Encounters:   12/21/17 154/88   12/19/17 (!) 156/92   12/05/17 156/90    Weight from Last 3 Encounters:   12/21/17 80.7 kg (177 lb 14.4 oz)   12/19/17 81 kg (178 lb 9 oz)   12/01/17 80.1 kg (176 lb 9.6 oz)              Today, you had the following     No orders found for display       Primary Care Provider Office Phone # Fax #    Chelsea Wren [phone]       [facility] [address]  [facility] [address]        Equal Access to Services     [facility]: Hadii elvia mccurdy hadasho Sosyd, waaxda luqadaha, qaybta kaalmada aderodolfoyada, elizabeth [place] . So [facility] [phone].    ATENCIÓN: Si habla español, tiene a mae disposición servicios gratuitos de asistencia lingüística. [facility] [phone].    We comply with applicable federal civil rights laws and [place] laws. We do not discriminate on the basis of race, color, national origin, age, disability, sex, sexual orientation, or gender identity.            Thank you!     Thank you for choosing [facility]  for your care. Our goal is always to provide you with excellent care. Hearing back from our patients is one way we can continue to improve our services. Please take a few minutes to complete the written survey that you may receive in the mail after your visit with us. Thank you!             Your Updated Medication List - Protect others around you: Learn how to safely use, store and throw away your medicines at www.disposemymeds.org.          This list is accurate as of: 12/21/17 10:27 AM.  Always use your most recent med list.                   Brand Name Dispense Instructions for use Diagnosis    acetaminophen 325 MG tablet    TYLENOL    100 tablet    Take 3 tablets (975 mg) by mouth 3 times daily    Cancer related pain       amLODIPine 2.5 MG tablet    NORVASC    30 tablet    Take 1 tablet (2.5 mg) by mouth daily    Essential hypertension       dronabinol 5 MG capsule     MARINOL    60 capsule    Take 1 capsule (5 mg) by mouth 2 times daily (before meals)    Anorexia, Malignant neoplasm of cervix, unspecified site (H)       glutamine 500 MG Tabs     120 tablet    Take 500 mg by mouth 2 times daily    Drug-induced polyneuropathy (H)       LORazepam 1 MG tablet    ATIVAN    30 tablet    Take 1 tablet (1 mg) by mouth every 6 hours as needed (Anxiety, Nausea/Vomiting or Sleep)    Malignant neoplasm of cervix, unspecified site (H)       polyethylene glycol powder    MIRALAX    510 g    Take 17-34 g (1-2 capfuls) by mouth daily    Cancer related pain, Generalized abdominal pain       prochlorperazine 10 MG tablet    COMPAZINE    30 tablet    Take 1 tablet (10 mg) by mouth every 6 hours as needed (Nausea/Vomiting)    Malignant neoplasm of cervix, unspecified site (H)       senna-docusate 8.6-50 MG per tablet    SENOKOT-S;PERICOLACE    100 tablet    Take 2-4 tablets by mouth 2 times daily    Cancer related pain       traMADol 50 MG tablet    ULTRAM    240 tablet    Take 2 tablets (100mg) every morning when you wake up, 2 tablets (100mg) in the middle of the day and 2 tablets (100mg) before bed.  Take 1-2 tablets once a day if needed for pain not controlled with the three scheduled doses.    Malignant neoplasm of cervix, unspecified site (H), Cancer related pain

## 2017-12-21 NOTE — MR AVS SNAPSHOT
After Visit Summary   12/21/2017    Ashvin Tiwari    MRN: [id number]           Patient Information     Date Of Birth          1955        Visit Information        Provider Department      12/21/2017 11:00 AM UC 18 ATC; [facility] ONCOLOGY INFUSION [facility]        Today's Diagnoses     Malignant neoplasm of cervix, unspecified site (H)    -  1      Care Instructions    Contact Numbers    [facility] [place]: [phone]  [facility] [place]:  [phone]    Call triage with chills and/or temperature greater than or equal to 100.5, uncontrolled nausea/vomiting, diarrhea, constipation, dizziness, shortness of breath, chest pain, bleeding, unexplained bruising, or any new/concerning symptoms, questions/concerns.     If you are having any concerning symptoms or wish to speak to a provider before your next infusion visit, please call your care coordinator or triage to notify them so we can adequately serve you.       After Hours: [phone]    If after hours, weekends, or holidays, call main hospital  and ask for Oncology doctor on call.         December 2017 Sunday Monday Tuesday Wednesday Thursday Friday Saturday                            1     [facility]    9:30 AM   (15 min.)    [facility]RAW   [facility] Lab Draw     [facility] ONC INFUSION 360   10:00 AM   (360 min.)    ONCOLOGY INFUSION   [facility] 2       3     4     5     [facility] ONC INFUSION 120    1:00 PM   (120 min.)    ONCOLOGY INFUSION   [facility] 6     7     8     9       10     11     12     13     EVALUATION   10:45 AM   (60 min.)   Dwayne Barbour, PT   [facility] 14     15     16       17     18     19     [facility] RETURN    2:15 PM   (30 min.)   Briana Medina MD   [facility] 20     21     [facility]    9:30 AM   (15 min.)    [facility] DRAW   [facility] Lab Draw     [facility] RETURN    9:45 AM   (30 min.)   Ruth  ROSA Wong CNP   [facility]     [facility] ONC INFUSION 360   11:00 AM   (360 min.)   [facility] ONCOLOGY INFUSION   [facility] 22     23       24     25     26     27     28     [facility] ONC INFUSION 120    3:30 PM   (120 min.)   [facility] ONCOLOGY INFUSION   [facility] 29     30       31 January 2018 Sunday Monday Tuesday Wednesday Thursday Friday Saturday        1     2     3     4     5     6       7     8     9     10     11     [facility] DRAW   10:00 AM   (15 min.)    [facility] DRAW   [facility] Lab Draw     [facility] RETURN   10:15 AM   (30 min.)   Rachelle Miranda APRN CNP   [facility]     [facility] ONC INFUSION 360   11:30 AM   (360 min.)   [facility] ONCOLOGY INFUSION   [facility] 12     13       14     15     16     17     18     19     20       21     22     23     24     25     26     27       28     29     30     [facility] RETURN   12:45 PM   (30 min.)   Briana Medina MD   [facility] 31                                Recent Results (from the past 24 hour(s))   CBC with platelets differential    Collection Time: 12/21/17  9:21 AM   Result Value Ref Range    WBC 4.1 4.0 - 11.0 10e9/L    RBC Count 4.42 3.8 - 5.2 10e12/L    Hemoglobin 10.9 (L) 11.7 - 15.7 g/dL    Hematocrit 36.3 35.0 - 47.0 %    MCV 82 78 - 100 fl    MCH 24.7 (L) 26.5 - 33.0 pg    MCHC 30.0 (L) 31.5 - 36.5 g/dL    RDW 24.1 (H) 10.0 - 15.0 %    Platelet Count 224 150 - 450 10e9/L    Diff Method Automated Method     % Neutrophils 39.9 %    % Lymphocytes 45.7 %    % Monocytes 14.0 %    % Eosinophils 0.2 %    % Basophils 0.2 %    % Immature Granulocytes 0.0 %    Nucleated RBCs 0 0 /100    Absolute Neutrophil 1.6 1.6 - 8.3 10e9/L    Absolute Lymphocytes 1.9 0.8 - 5.3 10e9/L    Absolute Monocytes 0.6 0.0 - 1.3 10e9/L    Absolute Eosinophils 0.0 0.0 - 0.7 10e9/L    Absolute Basophils 0.0 0.0 -  0.2 10e9/L    Abs Immature Granulocytes 0.0 0 - 0.4 10e9/L    Absolute Nucleated RBC 0.0    Comprehensive metabolic panel    Collection Time: 12/21/17  9:21 AM   Result Value Ref Range    Sodium 136 133 - 144 mmol/L    Potassium 3.8 3.4 - 5.3 mmol/L    Chloride 102 94 - 109 mmol/L    Carbon Dioxide 25 20 - 32 mmol/L    Anion Gap 9 3 - 14 mmol/L    Glucose 91 70 - 99 mg/dL    Urea Nitrogen 11 7 - 30 mg/dL    Creatinine 0.73 0.52 - 1.04 mg/dL    GFR Estimate 80 >60 mL/min/1.7m2    GFR Estimate If Black >90 >60 mL/min/1.7m2    Calcium 8.8 8.5 - 10.1 mg/dL    Bilirubin Total 0.2 0.2 - 1.3 mg/dL    Albumin 3.2 (L) 3.4 - 5.0 g/dL    Protein Total 8.6 6.8 - 8.8 g/dL    Alkaline Phosphatase 102 40 - 150 U/L    ALT 22 0 - 50 U/L    AST 22 0 - 45 U/L   Magnesium    Collection Time: 12/21/17  9:21 AM   Result Value Ref Range    Magnesium 1.8 1.6 - 2.3 mg/dL   Protein qualitative urine    Collection Time: 12/21/17  9:30 AM   Result Value Ref Range    Protein Albumin Urine Negative NEG^Negative mg/dL                 Follow-ups after your visit        Your next 10 appointments already scheduled     Dec 28, 2017  3:30 PM CST   Infusion 120 with UC ONCOLOGY INFUSION, UC 16 ATC   [facility])    [address] [place] [address]  2nd [facility] [id number] [phone]            Jan 11, 2018 10:00 AM CST   [facility] Draw with  [facility] DRAW   [facility] Lab Draw ([facility])    [address] [place] [id number] [phone]            Jan 11, 2018 10:30 AM CST   (Arrive by 10:15 AM)   Return Visit with ROSA Mendoza CNP   [facility] ([facility])    [address] [place] [address]  2nd [facility] [id number] [phone]            Jan 11, 2018 11:30 AM CST   Infusion 360 with UC ONCOLOGY INFUSION,  13 [facility]  "[facility])    [address] [place] [id number] [phone]            2018  1:00 PM CST   (Arrive by 12:45 PM)   Return Visit with Briana Medina MD   [facility] ([facility])    [address] [place] [id number] [phone]              Who to contact     If you have questions or need follow up information about today's clinic visit or your schedule please contact [facility] directly at [phone].  Normal or non-critical lab and imaging results will be communicated to you by MyChart, letter or phone within 4 business days after the clinic has received the results. If you do not hear from us within 7 days, please contact the clinic through MyChart or phone. If you have a critical or abnormal lab result, we will notify you by phone as soon as possible.  Submit refill requests through [employer] or call your pharmacy and they will forward the refill request to us. Please allow 3 business days for your refill to be completed.          Additional Information About Your Visit        Research for [employer] Information     [employer] lets you send messages to your doctor, view your test results, renew your prescriptions, schedule appointments and more. To sign up, go to [email or web address] . Click on \"Log in\" on the left side of the screen, which will take you to the Welcome page. Then click on \"Sign up Now\" on the right side of the page.     You will be asked to enter the access code listed below, as well as some personal information. Please follow the directions to create your username and password.     Your access code is: [email or web address]  Expires: 2018  6:30 AM     Your access code will  in 90 days. If you need help or a new code, please call your [place] clinic or [phone].        Care EveryWhere ID     This is your Care EveryWhere ID. This could be used by other " organizations to access your [facility] medical records  [id number]        Your Vitals Were     Pulse                   98            Blood Pressure from Last 3 Encounters:   12/21/17 127/72   12/21/17 154/88   12/19/17 (!) 156/92    Weight from Last 3 Encounters:   12/21/17 80.7 kg (177 lb 14.4 oz)   12/19/17 81 kg (178 lb 9 oz)   12/01/17 80.1 kg (176 lb 9.6 oz)              We Performed the Following     CBC with platelets differential     Comprehensive metabolic panel     Magnesium     Protein qualitative urine        Primary Care Provider Office Phone # Fax #    Chelsea Wren [phone]       [facility] [address]  [facility] [address]        Equal Access to Services     LISSET LUGO : Hadii aad ku hadasho Soomaali, waaxda luqadaha, qaybta kaalmada adeegyada, waxay idiin haychris cheema. So [facility] [phone].    ATENCIÓN: Si habla español, tiene a mae disposición servicios gratuitos de asistencia lingüística. [facility] [phone].    We comply with applicable federal civil rights laws and [place] laws. We do not discriminate on the basis of race, color, national origin, age, disability, sex, sexual orientation, or gender identity.            Thank you!     Thank you for choosing [facility]  for your care. Our goal is always to provide you with excellent care. Hearing back from our patients is one way we can continue to improve our services. Please take a few minutes to complete the written survey that you may receive in the mail after your visit with us. Thank you!             Your Updated Medication List - Protect others around you: Learn how to safely use, store and throw away your medicines at www.disposemymeds.org.          This list is accurate as of: 12/21/17 11:54 AM.  Always use your most recent med list.                   Brand Name Dispense Instructions for use Diagnosis    acetaminophen 325 MG tablet    TYLENOL    100 tablet     Take 3 tablets (975 mg) by mouth 3 times daily    Cancer related pain       amLODIPine 2.5 MG tablet    NORVASC    30 tablet    Take 1 tablet (2.5 mg) by mouth daily    Essential hypertension       dronabinol 5 MG capsule    MARINOL    60 capsule    Take 1 capsule (5 mg) by mouth 2 times daily (before meals)    Anorexia, Malignant neoplasm of cervix, unspecified site (H)       glutamine 500 MG Tabs     120 tablet    Take 500 mg by mouth 2 times daily    Drug-induced polyneuropathy (H)       LORazepam 1 MG tablet    ATIVAN    30 tablet    Take 1 tablet (1 mg) by mouth every 6 hours as needed (Anxiety, Nausea/Vomiting or Sleep)    Malignant neoplasm of cervix, unspecified site (H)       polyethylene glycol powder    MIRALAX    510 g    Take 17-34 g (1-2 capfuls) by mouth daily    Cancer related pain, Generalized abdominal pain       prochlorperazine 10 MG tablet    COMPAZINE    30 tablet    Take 1 tablet (10 mg) by mouth every 6 hours as needed (Nausea/Vomiting)    Malignant neoplasm of cervix, unspecified site (H)       senna-docusate 8.6-50 MG per tablet    SENOKOT-S;PERICOLACE    100 tablet    Take 2-4 tablets by mouth 2 times daily    Cancer related pain       traMADol 50 MG tablet    ULTRAM    240 tablet    Take 2 tablets (100mg) every morning when you wake up, 2 tablets (100mg) in the middle of the day and 2 tablets (100mg) before bed.  Take 1-2 tablets once a day if needed for pain not controlled with the three scheduled doses.    Malignant neoplasm of cervix, unspecified site (H), Cancer related pain

## 2017-12-21 NOTE — PROGRESS NOTES
Consult Notes on Referred Patient    Date: 2017     The patient returns today for follow-up.    Her history is as follows:  Patient is a 62 year old A1 woman with no significant past medical history who presented with 3 weeks of vaginal spotting, lower abdominal pain that radiates to the lower back. TShe was treated for UTI with Amoxicillin, since her UA was suggestive of an infection. She didn't feel any improvement after the treatment and underwent pelvic exam that revealed cervical mass. [place] [facility] that was not done in the office due to concern of ongoing bleeding. Patient never had PAP smear done in the past, never had mammogram or colonoscopy.      17:  Seen in Gyn Onc.  Exam concerning for at least a Stage IIB cervical cancer.  Biopsy obtained consistent with poorly differentiated squamous cell ca.  MRI ordered.     17: MRI Pelvis with 7 x 7cm mass at level of cervix, protrudes into upper third of vagina, bilateral parametrial extension, abuts bilateral ureters but no obstruction, abuts rectal wall with obliteration of fat plan but no mucosal invasion, no clear bladder wall invasion, suspicious lymph nodes left hemipelvis, peritoneal nodule versus lymph node.     17 to 17:  Patient admitted to hospital with pain. CT Chest PE was negative for PE but showed multiple pulmonary nodules consistent with metastatic disease.  CT A/P showed cervical mass with regional metastatic disease as well as peritoneal nodularity.     17:  PET scan with multiple metastatic hypermetabolic lung nodules, axillary, mediastinal, hilar and abdominal lymph nodes.     17:  Seen in clinic.  Recommended treatment for Stage IV poorly differentiated squamous cell ca with , substituting Carbo for Cisplatin.     17-17: Cycle #1-3 Paclitaxel/Carboplatin/Avastin.   Avastin held with Cycle #3 due to HTN.    17:  PET/CT  with positive response to therapy.  Decrease in size of cervical mass, decreased pulmonary and metastatic lymphadenopathy.      The patient returns today for follow-up.  She has seen her PCP regarding her BP.  She has no specific complaints today and is tolerating chemo well.       Past Medical History:    Past Medical History:   Diagnosis Date     Cancer (H)     cervical     Hypertension          Past Surgical History:    Past Surgical History:   Procedure Laterality Date     BIOPSY/EXCISION LYMPH NODE(S), NEEDLE, SUPERFICIAL (CERVICAL/INGUINAL/AXILLARY) Right 9/21/2017    Procedure: BIOPSY/EXCISION LYMPH NODE(S), NEEDLE, SUPERFICIAL (CERVICAL/INGUINAL/AXILLARY);;  Surgeon: Sonu Denson PA-C;  [facility] OR     INSERT PORT VASCULAR ACCESS Right 9/21/2017    Procedure: INSERT PORT VASCULAR ACCESS;  Single Lumen Chest Power Port, Right Axillary Lymph Node Biopsy;  Surgeon: Sonu Denson PA-C;  Location:  OR     no previous surgery           Health Maintenance:  Health Maintenance Due   Topic Date Due     TETANUS IMMUNIZATION (SYSTEM ASSIGNED)  05/10/1971     HEPATITIS C SCREENING  05/10/1971     LIPID SCREEN Q5 YR FEMALE (SYSTEM ASSIGNED)  05/10/1998     MAMMO SCREEN Q2 YR (SYSTEM ASSIGNED)  05/10/2003     COLON CANCER SCREEN (SYSTEM ASSIGNED)  05/10/2003     ADVANCE DIRECTIVE PLANNING Q5 YRS  05/10/2008     INFLUENZA VACCINE (SYSTEM ASSIGNED)  09/01/2017       Current Medications:     has a current medication list which includes the following prescription(s): tramadol, amlodipine, senna-docusate, polyethylene glycol, prochlorperazine, lorazepam, acetaminophen, dronabinol, and glutamine, and the following Facility-Administered Medications: PACLitaxel (TAXOL) 345 mg in NaCl 0.9 % 608 mL CHEMOTHERAPY, CARBOplatin 620 mg in NaCl 0.9 % 337 mL CHEMOTHERAPY, and bevacizumab (AVASTIN) 1,300 mg in NaCl 0.9 % 162 mL infusion.       Allergies:     [unfilled]        Social History:     Social  "History   Substance Use Topics     Smoking status: Never Smoker     Smokeless tobacco: Never Used     Alcohol use No       History   Drug Use No           Family History:     The patient's family history is notable for :    No family history on file.      Physical Exam:     BP (!) 184/104  Pulse 106  Temp 98.9  F (37.2  C) (Tympanic)  Resp 18  Ht 1.6 m (5' 2.99\")  Wt 80.3 kg (177 lb)  SpO2 98%  BMI 31.36 kg/m2  Body mass index is 31.36 kg/(m^2).    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  She is tolerating treatment well and would recommend continued palliative chemotherapy with  regimen substituting Carboplatin for Cisplatin    Plan three additional cycles with repeat imaging.       2.)                  HTN:  Continue to follow-up with her PCP.           Barbara Soto MD  Gynecologic Oncology  [facility]  CC  Patient Care Team:  Chelsea Wren as PCP - General (Family Practice)  Sita Gallardo as Referring Physician (OB/Gyn)  Rima Saunders, TIMOTHY as Continuity Care Coordinator (Gyn-Onc)  Leia Caraballo RN as Registered Nurse " (Palliative)  CHRISTOPHE MANZANO

## 2017-12-21 NOTE — LETTER
2017       RE: Ashvin Tiwari  [address]  [facility] [place] [address]     Dear Colleague,    Thank you for referring your patient, Ashvin Tiwari, to the [facility]. Please see a copy of my visit note below.                Follow Up Notes on Referred Patient    Date: 2017       Dr. Chelsea Wren  [facility] [address]  [address] [place] [address]       RE: Ashvin Tiwari  : 1955  DENIS: 2017    Dear Dr. Chelsea Wren:    Ashvin Tiwari is a 62 year old woman with a diagnosis of stage IV poorly differentiated SCC cervix.   She is here today for follow up and disease management.       Oncology history:   17:  Seen in Gyn Onc.  Exam concerning for at least a Stage IIB cervical cancer.  Biopsy obtained consistent with poorly differentiated squamous cell ca.  MRI ordered.      17: MRI Pelvis with 7 x 7cm mass at level of cervix, protrudes into upper third of vagina, bilateral parametrial extension, abuts bilateral ureters but no obstruction, abuts rectal wall with obliteration of fat plan but no mucosal invasion, no clear bladder wall invasion, suspicious lymph nodes left hemipelvis, peritoneal nodule versus lymph node.      17 to 17:  Patient admitted to hospital with pain. CT Chest PE was negative for PE but showed multiple pulmonary nodules consistent with metastatic disease.  CT A/P showed cervical mass with regional metastatic disease as well as peritoneal nodularity.      17:  PET scan with multiple metastatic hypermetabolic lung nodules, axillary, mediastinal, hilar and abdominal lymph nodes.      17-17: Cycle #1-3 Paclitaxel/Cisplatin/Avastin.    17: PET CTIMPRESSION:   1. In this patient with a history of stage IV poorly differentiated squamous cell carcinoma of the cervix, there has been a positive response to therapy. The primary cervical mass is significantly decreased in size and demonstrates  decreased FDG uptake. Similarly, the metastatic pulmonary nodules and metastatic lymphadenopathy has significantly improved.     2. Bilateral hypermetabolic level 2A lymph nodes which have slightly increased in size, of uncertain clinical significance given the positive response in the remainder of the body. Enlargement may be related to the patient's dental and sinus disease. Recommend close attention on follow-up imaging.     3. Periapical lucencies with FDG uptake and dental caries involving multiple upper teeth. Recommend follow-up with dentistry.     4. Asymmetric radiotracer uptake in the left prevertebral soft tissues without discernible mass. Recommend close attention on follow-up imaging.     5. Small bilateral pleural effusions.    11/21/17-12/21/17: Cycle #4-5 Paclitaxel/Cisplatin/Avastin.         Today she comes to clinic feeling well.She denies any vaginal bleeding, no changes in her bowel or bladder habits, no nausea/emesis, no lower extremity edema, and no difficulties eating or sleeping. She denies any abdominal discomfort/bloating, no fevers or chills, and no chest pain or shortness of breath but does have CARMONA. She is taking her Marinol daily as well as taking her antiemetics as needed. She does not need any medication refills. Her PCP started her on Norvasc 4 days ago and she reports she checked her BP this morning and it was 147/81. She does have some knee pain and did have an xray taken earlier this month. She is consuming 3 cans of Ensure a day.         Review of Systems:    Systemic           no weight changes; no fever; no chills; no night sweats; no appetite changes  Skin           no rashes, or lesions  Eye           no irritation; no changes in vision  Tabby-Laryngeal           no dysphagia; no hoarseness   Pulmonary    no cough; no shortness of breath  Cardiovascular    no chest pain; no palpitations; + HTN  Gastrointestinal    no diarrhea; no constipation; no abdominal pain; no changes in  bowel habits; no blood in stool  Genitourinary   no urinary frequency; no urinary urgency; no dysuria; no pain; no abnormal vaginal discharge; no abnormal vaginal bleeding  Breast    no breast discharge; no breast changes; no breast pain  Musculoskeletal    no myalgias; + arthralgias; no back pain  Psychiatric           no depressed mood; no anxiety    Hematologic               no tender lymph nodes; no noticeable swellings or lumps   Endocrine    no hot flashes; no heat/cold intolerance         Neurological   no tremor; no numbness and tingling; no headaches; no difficulty sleeping      Past Medical History:    Past Medical History:   Diagnosis Date     Cancer (H)     cervical     Hypertension          Past Surgical History:    Past Surgical History:   Procedure Laterality Date     BIOPSY/EXCISION LYMPH NODE(S), NEEDLE, SUPERFICIAL (CERVICAL/INGUINAL/AXILLARY) Right 9/21/2017    Procedure: BIOPSY/EXCISION LYMPH NODE(S), NEEDLE, SUPERFICIAL (CERVICAL/INGUINAL/AXILLARY);;  Surgeon: Sonu Denson PA-C;  [facility]: [facility]     INSERT PORT VASCULAR ACCESS Right 9/21/2017    Procedure: INSERT PORT VASCULAR ACCESS;  Single Lumen Chest Power Port, Right Axillary Lymph Node Biopsy;  Surgeon: Sonu Denson PA-C;  Location: [facility] OR     no previous surgery           Health Maintenance Due   Topic Date Due     TETANUS IMMUNIZATION (SYSTEM ASSIGNED)  05/10/1971     HEPATITIS C SCREENING  05/10/1971     LIPID SCREEN Q5 YR FEMALE (SYSTEM ASSIGNED)  05/10/1998     MAMMO SCREEN Q2 YR (SYSTEM ASSIGNED)  05/10/2003     COLON CANCER SCREEN (SYSTEM ASSIGNED)  05/10/2003     ADVANCE DIRECTIVE PLANNING Q5 YRS  05/10/2008     INFLUENZA VACCINE (SYSTEM ASSIGNED)  09/01/2017       Current Medications:     Current Outpatient Prescriptions   Medication Sig Dispense Refill     dronabinol (MARINOL) 5 MG capsule Take 1 capsule (5 mg) by mouth 2 times daily (before meals) 60 capsule 0     glutamine 500 MG TABS Take 500 mg by  mouth 2 times daily 120 tablet 3     traMADol (ULTRAM) 50 MG tablet Take 2 tablets (100mg) every morning when you wake up, 2 tablets (100mg) in the middle of the day and 2 tablets (100mg) before bed.  Take 1-2 tablets once a day if needed for pain not controlled with the three scheduled doses. 240 tablet 0     amLODIPine (NORVASC) 2.5 MG tablet Take 1 tablet (2.5 mg) by mouth daily 30 tablet 1     senna-docusate (SENOKOT-S;PERICOLACE) 8.6-50 MG per tablet Take 2-4 tablets by mouth 2 times daily 100 tablet 1     polyethylene glycol (MIRALAX) powder Take 17-34 g (1-2 capfuls) by mouth daily 510 g 1     prochlorperazine (COMPAZINE) 10 MG tablet Take 1 tablet (10 mg) by mouth every 6 hours as needed (Nausea/Vomiting) 30 tablet 2     LORazepam (ATIVAN) 1 MG tablet Take 1 tablet (1 mg) by mouth every 6 hours as needed (Anxiety, Nausea/Vomiting or Sleep) 30 tablet 2     acetaminophen (TYLENOL) 325 MG tablet Take 3 tablets (975 mg) by mouth 3 times daily 100 tablet 0         Allergies:      No Known Allergies     Social History:     Social History   Substance Use Topics     Smoking status: Never Smoker     Smokeless tobacco: Never Used     Alcohol use No       History   Drug Use No         Family History:       No family history on file.      Physical Exam:     /88 (BP Location: Left arm, Patient Position: Sitting, Cuff Size: Adult Large)  Pulse 110  Temp 99  F (37.2  C) (Oral)  Resp 16  Wt 80.7 kg (177 lb 14.4 oz)  SpO2 96%  BMI 31.51 kg/m2  Body mass index is 31.51 kg/(m^2).    General Appearance: healthy and alert, no distress     HEENT: no thyromegaly, no palpable nodules or masses        Cardiovascular: regular rate and rhythm, no gallops, rubs or murmurs     Respiratory: lungs clear, no rales, rhonchi or wheezes, normal diaphragmatic excursion    Musculoskeletal: extremities non tender and without edema    Skin: no lesions or rashes     Neurological: normal gait, no gross  defects     Psychiatric: appropriate mood and affect                               Hematological: normal cervical, supraclavicular  lymph nodes     Gastrointestinal:       abdomen soft, non-tender, non-distended, no organomegaly or masses    Genitourinary: Not indicated    Assessment:    Ashvin Tiwari is a 62 year old woman with a diagnosis of stage IV poorly differentiated SCC cervix.   She is here today for follow up and disease management.     25 minutes were spent with this patient, over 50% of that time was spent in symptom management, treatment planning and in counseling and coordination of care.      Plan:     1.)        Ok to proceed with planned chemotherapy pending labs are WNL. Patient to have one more cycle after today and then have imaging and see Dr. Soto. Reviewed signs and symptoms for when she should contact the clinic or seek additional care. Patient to contact the clinic with any questions or concerns in the interim.     2.) Genetic risk factors were assessed and the patient does not meet the qualifications for a referral.      3.) Labs and/or tests ordered include:  Chemo labs.     4.) Health maintenance issues addressed today include annual health maintenance and non-gynecologic issues with PCP.    5.)         HTN: recently started on Norvasc by PCP (4 days ago); patient reported morning BP of 147/81. Continue to monitor BP at home and contact PCP if readings remain >150. She will be seeing her PCP again in one month also for a recheck.     ROSA Tavera, WHNP-BC, ANP-BC  Women's Health Nurse Practitioner  Adult Nurse Pracitioner  Division of Gynecologic Oncology      CC  Patient Care Team:  Chelsea Wren as PCP - General (Family Practice)  Sita Gallardo as Referring Physician (OB/Gyn)  Rima Saunders, TIMOTHY as Continuity Care Coordinator (Gyn-Onc)  Leia Caraballo RN as Registered Nurse (Palliative)

## 2017-12-21 NOTE — PATIENT INSTRUCTIONS
Contact Numbers    [facility] Main Line: [phone]  [facility] Triage:  [phone]    Call triage with chills and/or temperature greater than or equal to 100.5, uncontrolled nausea/vomiting, diarrhea, constipation, dizziness, shortness of breath, chest pain, bleeding, unexplained bruising, or any new/concerning symptoms, questions/concerns.     If you are having any concerning symptoms or wish to speak to a provider before your next infusion visit, please call your care coordinator or triage to notify them so we can adequately serve you.       After Hours: [phone]    If after hours, weekends, or holidays, call main hospital  and ask for Oncology doctor on call.         December 2017 Sunday Monday Tuesday Wednesday Thursday Friday Saturday                            1     [facility]    9:30 AM   (15 min.)    [facility]   [facility] Draw     [facility] ONC INFUSION 360   10:00 AM   (360 min.)   [facility]   [facility] 2       3     4     5     [facility] ON[facility] INFUSION 120    1:00 PM   (120 min.)    ONCOLOGY INFUSION   [facility] 6     7     8     9       10     11     12     13     EVALUATION   10:45 AM   (60 min.)   Dwayne Barbour PT   [facility] 14     15     16       17     18     19     [facility] RETURN    2:15 PM   (30 min.)   Briana Medina MD   [facility] 20     21     [facility] DRAW    9:30 AM   (15 min.)    [facility] DRAW   [facility] Draw     [facility] RETURN    9:45 AM   (30 min.)   Rachelle Miranda APRN CNP   [facility]     [facility] ONC INFUSION 360   11:00 AM   (360 min.)    ONCOLOGY INFUSION   [facility] 22     23       24     25     26     27     28     [facility] INFUSION 120    3:30 PM   (120 min.)    ONCOLOGY INFUSION   [facility] 29 30 31 January 2018 Sunday Monday  Tuesday Wednesday Thursday Friday Saturday        1     2     3     4     5     6       7     8     9     10     11     [facility] DRAW   10:00 AM   (15 min.)   [facility] D[facility]   [facility] Draw     [facility] RETURN   10:15 AM   (30 min.)   Rachelle Miranda APRN CNP   [facility]     [facility] ONC INFUSION 360   11:30 AM   (360 min.)    ONCOLOGY INFUSION   [facility] 12     13       14     15     16     17     18     19     20       21     22     23     24     25     26     27       28     29     30     [facility] RETURN   12:45 PM   (30 min.)   Briana Medina MD   [facility] 31                                Recent Results (from the past 24 hour(s))   CBC with platelets differential    Collection Time: 12/21/17  9:21 AM   Result Value Ref Range    WBC 4.1 4.0 - 11.0 10e9/L    RBC Count 4.42 3.8 - 5.2 10e12/L    Hemoglobin 10.9 (L) 11.7 - 15.7 g/dL    Hematocrit 36.3 35.0 - 47.0 %    MCV 82 78 - 100 fl    MCH 24.7 (L) 26.5 - 33.0 pg    MCHC 30.0 (L) 31.5 - 36.5 g/dL    RDW 24.1 (H) 10.0 - 15.0 %    Platelet Count 224 150 - 450 10e9/L    Diff Method Automated Method     % Neutrophils 39.9 %    % Lymphocytes 45.7 %    % Monocytes 14.0 %    % Eosinophils 0.2 %    % Basophils 0.2 %    % Immature Granulocytes 0.0 %    Nucleated RBCs 0 0 /100    Absolute Neutrophil 1.6 1.6 - 8.3 10e9/L    Absolute Lymphocytes 1.9 0.8 - 5.3 10e9/L    Absolute Monocytes 0.6 0.0 - 1.3 10e9/L    Absolute Eosinophils 0.0 0.0 - 0.7 10e9/L    Absolute Basophils 0.0 0.0 - 0.2 10e9/L    Abs Immature Granulocytes 0.0 0 - 0.4 10e9/L    Absolute Nucleated RBC 0.0    Comprehensive metabolic panel    Collection Time: 12/21/17  9:21 AM   Result Value Ref Range    Sodium 136 133 - 144 mmol/L    Potassium 3.8 3.4 - 5.3 mmol/L    Chloride 102 94 - 109 mmol/L    Carbon Dioxide 25 20 - 32 mmol/L    Anion Gap 9 3 - 14 mmol/L    Glucose 91 70 - 99 mg/dL    Urea Nitrogen 11 7 - 30 mg/dL     Creatinine 0.73 0.52 - 1.04 mg/dL    GFR Estimate 80 >60 mL/min/1.7m2    GFR Estimate If Black >90 >60 mL/min/1.7m2    Calcium 8.8 8.5 - 10.1 mg/dL    Bilirubin Total 0.2 0.2 - 1.3 mg/dL    Albumin 3.2 (L) 3.4 - 5.0 g/dL    Protein Total 8.6 6.8 - 8.8 g/dL    Alkaline Phosphatase 102 40 - 150 U/L    ALT 22 0 - 50 U/L    AST 22 0 - 45 U/L   Magnesium    Collection Time: 12/21/17  9:21 AM   Result Value Ref Range    Magnesium 1.8 1.6 - 2.3 mg/dL   Protein qualitative urine    Collection Time: 12/21/17  9:30 AM   Result Value Ref Range    Protein Albumin Urine Negative NEG^Negative mg/dL

## 2017-12-21 NOTE — NURSING NOTE
"Chief Complaint   Patient presents with     Port Draw     port accessed and labs drawn by rn.  vs taken.     Port accessed with 20g 3/4\" gripper needle, labs drawn, port flushed with saline and heparin, vitals checked.  Cindy Keenan RN    "

## 2017-12-27 NOTE — TELEPHONE ENCOUNTER
Clinical   Oncology Service    Reason for Intervention: Insurance Renewal question    Data: Ashvin is a patient in the Oncology Clinic      Intervention: Patient called asking for assistance with her renewal for insurance. She received a letter from the state [facility] indicating that she needed to renew. She will be in the clinic tomorrow and would be willing to come early to see someone who could help her.     Spoke with [facility] pager [phone] and they indicated they are not certified with [place] [employer] but they will ask [facility] to see if they can help patient. Otherwise patient could call a [place] Sure Navigator - [facility] indicated that they will call [facility] to see if they can help and, if not, they will call patient and give her the number for the [place] [employer] Navigator.     Education Provided: Devora SAMANO is out of the office and writer will call the FC and ask them to help patient.     Follow-up Required: FC will follow-up with patient.     Encouraged patient to reach out as questions or concerns arise.     Paloma SAMANO  12/27/2017  Coverage for Devora SAMANO pager [phone]

## 2017-12-28 NOTE — MR AVS SNAPSHOT
After Visit Summary   12/28/2017    Ashvin Tiwari    MRN: [id number]           Patient Information     Date Of Birth          1955        Visit Information        Provider Department      12/28/2017 3:30 PM  16 ATC;  ONCOLOGY INFUSION [facility]        Today's Diagnoses     Nausea    -  1      Care Instructions    Contact Numbers    [facility] [place] Main Line: [phone]  [facility] [place] Triage:  [phone]    Call triage with chills and/or temperature greater than or equal to 100.5, uncontrolled nausea/vomiting, diarrhea, constipation, dizziness, shortness of breath, chest pain, bleeding, unexplained bruising, or any new/concerning symptoms, questions/concerns.     If you are having any concerning symptoms or wish to speak to a provider before your next infusion visit, please call your care coordinator or triage to notify them so we can adequately serve you.       After Hours: [phone]    If after hours, weekends, or holidays, call main hospital  and ask for Oncology doctor on call.         December 2017 Sunday Monday Tuesday Wednesday Thursday Friday Saturday                            1     [facility]    9:30 AM   (15 min.)    [facility] LAB DRAW   [facility] Lab Draw     [facility] ONC INFUSION 360   10:00 AM   (360 min.)    ONCOLOGY INFUSION   [facility] 2       3     4     5     [facility] ONC INFUSION 120    1:00 PM   (120 min.)    ONCOLOGY INFUSION   [facility] 6     7     8     9       10     11     12     13     EVALUATION   10:45 AM   (60 min.)   Dwayne Barbour PT   [facility] 14     15     16       17     18     19     [facility] RETURN    2:15 PM   (30 min.)   Briana Medina MD   [facility] 20     21     [facility] DRAW    9:30 AM   (15 min.)    [facility] LAB DRAW   [facility] Lab Draw     [facility] RETURN    9:45 AM   (30 min.)   Rachelle Miranda APRN CNP   [facility]  [facility]     [facility] ONC INFUSION 360   11:00 AM   (360 min.)   [facility] ONCOLOGY INFUSION   [facility] 22     23       24     25     26     27     28     [facility] ONC INFUSION 120    3:30 PM   (120 min.)   [facility] ONCOLOGY INFUSION   [facility] 29     30 31 January 2018 Sunday Monday Tuesday Wednesday Thursday Friday Saturday        1     2     3     4     5     6       7     8     9     10     11     [facility] DRAW   10:00 AM   (15 min.)   [facility] DRAW   [facility] Lab Draw     [facility] RETURN   10:15 AM   (30 min.)   Rachelle Miranda APRN CNP   [facility]     [facility] ONC INFUSION 360   11:30 AM   (360 min.)   [facility] ONCOLOGY INFUSION   [facility] 12     13       14     15     16     17     18     19     20       21     22     23     24     25     26     27       28     29     30     [facility] RETURN   12:45 PM   (30 min.)   Briana Medina MD   [facility] 31                                 Lab Results:  No results found for this or any previous visit (from the past 12 hour(s)).            Follow-ups after your visit        Your next 10 appointments already scheduled     Jan 11, 2018 10:00 AM CST   [facility] Lab Draw with  [facility] LAB DRAW   [facility] Lab Draw ([facility])    [address] [place] [id number]   [phone]            Jan 11, 2018 10:30 AM CST   (Arrive by 10:15 AM)   Return Visit with ROSA Mendoza CNP   [facility] ([facility])    [address] [place] [address]   [phone]            Jan 11, 2018 11:30 AM CST   Infusion 360 with UC ONCOLOGY INFUSION, UC 13 ATC   [facility] ([facility])    [address] [address]   [phone]          "   2018  1:00 PM CST   (Arrive by 12:45 PM)   Return Visit with Briana Medina MD   [facility] ([facility])    [address] [address] [phone]              Who to contact     If you have questions or need follow up information about today's clinic visit or your schedule please contact [facility] directly at [phone].  Normal or non-critical lab and imaging results will be communicated to you by MyChart, letter or phone within 4 business days after the clinic has received the results. If you do not hear from us within 7 days, please contact the clinic through [employer] or phone. If you have a critical or abnormal lab result, we will notify you by phone as soon as possible.  Submit refill requests through [employer] or call your pharmacy and they will forward the refill request to us. Please allow 3 business days for your refill to be completed.          Additional Information About Your Visit        [employer] Information     [employer] lets you send messages to your doctor, view your test results, renew your prescriptions, schedule appointments and more. To sign up, go to [email or web address] . Click on \"Log in\" on the left side of the screen, which will take you to the Welcome page. Then click on \"Sign up Now\" on the right side of the page.     You will be asked to enter the access code listed below, as well as some personal information. Please follow the directions to create your username and password.     Your access code is: [email or web address]  Expires: 2018  6:30 AM     Your access code will  in 90 days. If you need help or a new code, please call your [facility] clinic or [phone].        Care EveryWhere ID     This is your Care EveryWhere ID. This could be used by other organizations to access your [facility] medical records  [id number]        Your Vitals Were     Pulse Temperature " Respirations Pulse Oximetry          97 98.6  F (37  C) (Oral) 18 100%         Blood Pressure from Last 3 Encounters:   12/28/17 150/83   12/21/17 127/72   12/21/17 154/88    Weight from Last 3 Encounters:   12/21/17 80.7 kg (177 lb 14.4 oz)   12/19/17 81 kg (178 lb 9 oz)   12/01/17 80.1 kg (176 lb 9.6 oz)              Today, you had the following     No orders found for display       Primary Care Provider Office Phone # Fax #    Chelsea Wren [phone]       [facility] [place] [address]  [facility] [address]        Equal Access to Services     LISSET LUGO : Sundar Reese, wamirian altamiranoadaha, qaybta kaalmada marquisyafernando, elizabeth cheema. So [facility] [phone].    ATENCIÓN: Si habla español, tiene a mae disposición servicios gratuitos de asistencia lingüística. Llame al [phone].    We comply with applicable federal civil rights laws and [place] laws. We do not discriminate on the basis of race, color, national origin, age, disability, sex, sexual orientation, or gender identity.            Thank you!     Thank you for choosing [facility] CANCER [facility]  for your care. Our goal is always to provide you with excellent care. Hearing back from our patients is one way we can continue to improve our services. Please take a few minutes to complete the written survey that you may receive in the mail after your visit with us. Thank you!             Your Updated Medication List - Protect others around you: Learn how to safely use, store and throw away your medicines at www.disposemymeds.org.          This list is accurate as of: 12/28/17  4:44 PM.  Always use your most recent med list.                   Brand Name Dispense Instructions for use Diagnosis    acetaminophen 325 MG tablet    TYLENOL    100 tablet    Take 3 tablets (975 mg) by mouth 3 times daily    Cancer related pain       amLODIPine 2.5 MG tablet    NORVASC    30 tablet    Take 1 tablet  (2.5 mg) by mouth daily    Essential hypertension       dronabinol 5 MG capsule    MARINOL    60 capsule    Take 1 capsule (5 mg) by mouth 2 times daily (before meals)    Anorexia, Malignant neoplasm of cervix, unspecified site (H)       glutamine 500 MG Tabs     120 tablet    Take 500 mg by mouth 2 times daily    Drug-induced polyneuropathy (H)       LORazepam 1 MG tablet    ATIVAN    30 tablet    Take 1 tablet (1 mg) by mouth every 6 hours as needed (Anxiety, Nausea/Vomiting or Sleep)    Malignant neoplasm of cervix, unspecified site (H)       polyethylene glycol powder    MIRALAX    510 g    Take 17-34 g (1-2 capfuls) by mouth daily    Cancer related pain, Generalized abdominal pain       prochlorperazine 10 MG tablet    COMPAZINE    30 tablet    Take 1 tablet (10 mg) by mouth every 6 hours as needed (Nausea/Vomiting)    Malignant neoplasm of cervix, unspecified site (H)       senna-docusate 8.6-50 MG per tablet    SENOKOT-S;PERICOLACE    100 tablet    Take 2-4 tablets by mouth 2 times daily    Cancer related pain       traMADol 50 MG tablet    ULTRAM    240 tablet    Take 2 tablets (100mg) every morning when you wake up, 2 tablets (100mg) in the middle of the day and 2 tablets (100mg) before bed.  Take 1-2 tablets once a day if needed for pain not controlled with the three scheduled doses.    Malignant neoplasm of cervix, unspecified site (H), Cancer related pain

## 2017-12-28 NOTE — PATIENT INSTRUCTIONS
Contact Numbers    [facility] [place] [place] Main Line: [phone]  [facility] [place] [place] Triage:  [phone]    Call triage with chills and/or temperature greater than or equal to 100.5, uncontrolled nausea/vomiting, diarrhea, constipation, dizziness, shortness of breath, chest pain, bleeding, unexplained bruising, or any new/concerning symptoms, questions/concerns.     If you are having any concerning symptoms or wish to speak to a provider before your next infusion visit, please call your care coordinator or triage to notify them so we can adequately serve you.       After Hours: [phone]    If after hours, weekends, or holidays, call main hospital  and ask for Oncology doctor on call.         December 2017 Sunday Monday Tuesday Wednesday Thursday Friday Saturday                            1     [facility] DRAW    9:30 AM   (15 min.)    [facility] LAB DRAW   [facility] Lab Draw     [facility] ONC INFUSION 360   10:00 AM   (360 min.)   UC ONCOLOGY INFUSION   [facility] 2       3     4     5     [facility] ONC INFUSION 120    1:00 PM   (120 min.)    ONCOLOGY INFUSION   [facility] 6     7     8     9       10     11     12     13     EVALUATION   10:45 AM   (60 min.)   Dwayne Barbour PT   [facility] 14     15     16       17     18     19     [facility] RETURN    2:15 PM   (30 min.)   Briana Medina MD   [facility] 20     21     [facility] DRAW    9:30 AM   (15 min.)    [facility] LAB DRAW   [facility] Lab Draw     [facility] RETURN    9:45 AM   (30 min.)   Rachelle Miranda APRN CNP   [facility]     [facility] ONC INFUSION 360   11:00 AM   (360 min.)    ONCOLOGY INFUSION   [facility] 22     23       24     25     26     27     28     [facility] ONC INFUSION 120    3:30 PM   (120 min.)    ONCOLOGY INFUSION   [facility] 29 30 31 January 2018 Sunday Monday  Tuesday Wednesday Thursday Friday Saturday        1     2     3     4     5     6       7     8     9     10     11     [facility]   10:00 AM   (15 min.)   [facility] LAB DRAW   [facility] Lab Draw     [facility] RETURN   10:15 AM   (30 min.)   Rachelle Miranda APRN CNP   [facility]     [facility] ONC INFUSION 360   11:30 AM   (360 min.)    ONCOLOGY INFUSION   [facility] 12     13       14     15     16     17     18     19     20       21     22     23     24     25     26     27       28     29     30     [facility] RETURN   12:45 PM   (30 min.)   Briana Medina MD   [facility] 31                                 Lab Results:  No results found for this or any previous visit (from the past 12 hour(s)).

## 2018-01-01 ENCOUNTER — HOME INFUSION (PRE-WILLOW HOME INFUSION) (OUTPATIENT)
Dept: PHARMACY | Facility: CLINIC | Age: 63
End: 2018-01-01

## 2018-01-01 ENCOUNTER — TELEPHONE (OUTPATIENT)
Dept: INTERVENTIONAL RADIOLOGY/VASCULAR | Facility: CLINIC | Age: 63
End: 2018-01-01

## 2018-01-01 ENCOUNTER — CARE COORDINATION (OUTPATIENT)
Dept: ONCOLOGY | Facility: CLINIC | Age: 63
End: 2018-01-01

## 2018-01-01 ENCOUNTER — APPOINTMENT (OUTPATIENT)
Dept: GENERAL RADIOLOGY | Facility: CLINIC | Age: 63
End: 2018-01-01
Attending: EMERGENCY MEDICINE
Payer: MEDICARE

## 2018-01-01 ENCOUNTER — APPOINTMENT (OUTPATIENT)
Dept: INTERVENTIONAL RADIOLOGY/VASCULAR | Facility: CLINIC | Age: 63
End: 2018-01-01
Attending: OBSTETRICS & GYNECOLOGY
Payer: COMMERCIAL

## 2018-01-01 ENCOUNTER — ONCOLOGY VISIT (OUTPATIENT)
Dept: ONCOLOGY | Facility: CLINIC | Age: 63
End: 2018-01-01
Attending: NURSE PRACTITIONER
Payer: MEDICARE

## 2018-01-01 ENCOUNTER — PRE VISIT (OUTPATIENT)
Dept: INTERVENTIONAL RADIOLOGY/VASCULAR | Facility: CLINIC | Age: 63
End: 2018-01-01

## 2018-01-01 ENCOUNTER — HOSPITAL ENCOUNTER (OUTPATIENT)
Facility: CLINIC | Age: 63
Discharge: HOME | End: 2018-05-25
Attending: OBSTETRICS & GYNECOLOGY | Admitting: OBSTETRICS & GYNECOLOGY
Payer: COMMERCIAL

## 2018-01-01 ENCOUNTER — INFUSION THERAPY VISIT (OUTPATIENT)
Dept: ONCOLOGY | Facility: CLINIC | Age: 63
End: 2018-01-01
Attending: OBSTETRICS & GYNECOLOGY
Payer: COMMERCIAL

## 2018-01-01 ENCOUNTER — APPOINTMENT (OUTPATIENT)
Dept: LAB | Facility: CLINIC | Age: 63
End: 2018-01-01
Attending: OBSTETRICS & GYNECOLOGY
Payer: COMMERCIAL

## 2018-01-01 ENCOUNTER — HOSPITAL ENCOUNTER (OUTPATIENT)
Facility: AMBULATORY SURGERY CENTER | Age: 63
End: 2018-06-13
Attending: PHYSICIAN ASSISTANT
Payer: MEDICAID

## 2018-01-01 ENCOUNTER — SURGERY (OUTPATIENT)
Age: 63
End: 2018-01-01

## 2018-01-01 ENCOUNTER — HOSPITAL ENCOUNTER (OUTPATIENT)
Facility: CLINIC | Age: 63
Setting detail: OBSERVATION
Discharge: HOME | End: 2018-06-02
Attending: EMERGENCY MEDICINE | Admitting: OBSTETRICS & GYNECOLOGY
Payer: MEDICARE

## 2018-01-01 ENCOUNTER — APPOINTMENT (OUTPATIENT)
Dept: GENERAL RADIOLOGY | Facility: CLINIC | Age: 63
End: 2018-01-01
Attending: EMERGENCY MEDICINE
Payer: COMMERCIAL

## 2018-01-01 ENCOUNTER — OFFICE VISIT (OUTPATIENT)
Dept: PALLIATIVE CARE | Facility: CLINIC | Age: 63
End: 2018-01-01
Attending: INTERNAL MEDICINE
Payer: COMMERCIAL

## 2018-01-01 ENCOUNTER — HOSPITAL ENCOUNTER (OUTPATIENT)
Facility: AMBULATORY SURGERY CENTER | Age: 63
End: 2018-06-01
Attending: PHYSICIAN ASSISTANT
Payer: MEDICAID

## 2018-01-01 ENCOUNTER — INFUSION THERAPY VISIT (OUTPATIENT)
Dept: ONCOLOGY | Facility: CLINIC | Age: 63
End: 2018-01-01
Attending: OBSTETRICS & GYNECOLOGY
Payer: MEDICARE

## 2018-01-01 ENCOUNTER — TRANSFERRED RECORDS (OUTPATIENT)
Dept: HEALTH INFORMATION MANAGEMENT | Facility: CLINIC | Age: 63
End: 2018-01-01

## 2018-01-01 ENCOUNTER — ONCOLOGY VISIT (OUTPATIENT)
Dept: ONCOLOGY | Facility: CLINIC | Age: 63
End: 2018-01-01
Attending: NURSE PRACTITIONER
Payer: MEDICAID

## 2018-01-01 ENCOUNTER — APPOINTMENT (OUTPATIENT)
Dept: MEDSURG UNIT | Facility: CLINIC | Age: 63
End: 2018-01-01
Attending: OBSTETRICS & GYNECOLOGY
Payer: COMMERCIAL

## 2018-01-01 ENCOUNTER — RADIANT APPOINTMENT (OUTPATIENT)
Dept: PET IMAGING | Facility: CLINIC | Age: 63
End: 2018-01-01
Attending: OBSTETRICS & GYNECOLOGY
Payer: MEDICAID

## 2018-01-01 ENCOUNTER — THERAPY VISIT (OUTPATIENT)
Dept: PHYSICAL THERAPY | Facility: CLINIC | Age: 63
End: 2018-01-01
Payer: MEDICAID

## 2018-01-01 ENCOUNTER — RADIANT APPOINTMENT (OUTPATIENT)
Dept: PET IMAGING | Facility: CLINIC | Age: 63
End: 2018-01-01
Payer: COMMERCIAL

## 2018-01-01 ENCOUNTER — THERAPY VISIT (OUTPATIENT)
Dept: PHYSICAL THERAPY | Facility: CLINIC | Age: 63
End: 2018-01-01
Payer: COMMERCIAL

## 2018-01-01 ENCOUNTER — APPOINTMENT (OUTPATIENT)
Dept: LAB | Facility: CLINIC | Age: 63
End: 2018-01-01
Attending: OBSTETRICS & GYNECOLOGY
Payer: MEDICAID

## 2018-01-01 ENCOUNTER — OFFICE VISIT (OUTPATIENT)
Dept: PALLIATIVE CARE | Facility: CLINIC | Age: 63
End: 2018-01-01
Attending: INTERNAL MEDICINE
Payer: MEDICARE

## 2018-01-01 ENCOUNTER — RADIANT APPOINTMENT (OUTPATIENT)
Dept: RADIOLOGY | Facility: AMBULATORY SURGERY CENTER | Age: 63
End: 2018-01-01
Attending: NURSE PRACTITIONER
Payer: COMMERCIAL

## 2018-01-01 ENCOUNTER — HOSPITAL ENCOUNTER (OUTPATIENT)
Facility: AMBULATORY SURGERY CENTER | Age: 63
End: 2018-04-18
Attending: PHYSICIAN ASSISTANT
Payer: COMMERCIAL

## 2018-01-01 ENCOUNTER — ONCOLOGY VISIT (OUTPATIENT)
Dept: ONCOLOGY | Facility: CLINIC | Age: 63
End: 2018-01-01
Attending: NURSE PRACTITIONER
Payer: COMMERCIAL

## 2018-01-01 ENCOUNTER — TELEPHONE (OUTPATIENT)
Dept: ONCOLOGY | Facility: CLINIC | Age: 63
End: 2018-01-01

## 2018-01-01 ENCOUNTER — ONCOLOGY VISIT (OUTPATIENT)
Dept: ONCOLOGY | Facility: CLINIC | Age: 63
End: 2018-01-01
Attending: DIETITIAN, REGISTERED
Payer: COMMERCIAL

## 2018-01-01 ENCOUNTER — APPOINTMENT (OUTPATIENT)
Dept: LAB | Facility: CLINIC | Age: 63
End: 2018-01-01
Attending: OBSTETRICS & GYNECOLOGY
Payer: MEDICARE

## 2018-01-01 ENCOUNTER — OFFICE VISIT (OUTPATIENT)
Dept: PALLIATIVE CARE | Facility: CLINIC | Age: 63
End: 2018-01-01
Attending: INTERNAL MEDICINE
Payer: MEDICAID

## 2018-01-01 ENCOUNTER — HOSPITAL ENCOUNTER (EMERGENCY)
Facility: CLINIC | Age: 63
Discharge: HOME | End: 2018-05-08
Attending: EMERGENCY MEDICINE | Admitting: EMERGENCY MEDICINE
Payer: COMMERCIAL

## 2018-01-01 ENCOUNTER — INFUSION THERAPY VISIT (OUTPATIENT)
Dept: ONCOLOGY | Facility: CLINIC | Age: 63
End: 2018-01-01
Attending: OBSTETRICS & GYNECOLOGY
Payer: MEDICAID

## 2018-01-01 ENCOUNTER — RADIANT APPOINTMENT (OUTPATIENT)
Dept: RADIOLOGY | Facility: AMBULATORY SURGERY CENTER | Age: 63
End: 2018-01-01
Attending: OBSTETRICS & GYNECOLOGY
Payer: MEDICAID

## 2018-01-01 ENCOUNTER — TELEPHONE (OUTPATIENT)
Dept: PALLIATIVE CARE | Facility: CLINIC | Age: 63
End: 2018-01-01

## 2018-01-01 ENCOUNTER — ALLIED HEALTH/NURSE VISIT (OUTPATIENT)
Dept: ONCOLOGY | Facility: CLINIC | Age: 63
End: 2018-01-01

## 2018-01-01 ENCOUNTER — HOSPITAL ENCOUNTER (OUTPATIENT)
Facility: AMBULATORY SURGERY CENTER | Age: 63
End: 2018-05-21
Attending: PHYSICIAN ASSISTANT
Payer: MEDICAID

## 2018-01-01 ENCOUNTER — APPOINTMENT (OUTPATIENT)
Dept: GENERAL RADIOLOGY | Facility: CLINIC | Age: 63
End: 2018-01-01
Payer: MEDICARE

## 2018-01-01 ENCOUNTER — RADIANT APPOINTMENT (OUTPATIENT)
Dept: PET IMAGING | Facility: CLINIC | Age: 63
End: 2018-01-01
Attending: OBSTETRICS & GYNECOLOGY
Payer: COMMERCIAL

## 2018-01-01 VITALS
RESPIRATION RATE: 16 BRPM | HEIGHT: 63 IN | BODY MASS INDEX: 30.87 KG/M2 | HEART RATE: 120 BPM | SYSTOLIC BLOOD PRESSURE: 142 MMHG | TEMPERATURE: 98.5 F | DIASTOLIC BLOOD PRESSURE: 90 MMHG | OXYGEN SATURATION: 92 % | WEIGHT: 174.2 LBS

## 2018-01-01 VITALS
SYSTOLIC BLOOD PRESSURE: 151 MMHG | TEMPERATURE: 98.5 F | HEART RATE: 89 BPM | RESPIRATION RATE: 18 BRPM | DIASTOLIC BLOOD PRESSURE: 88 MMHG | OXYGEN SATURATION: 98 %

## 2018-01-01 VITALS
RESPIRATION RATE: 16 BRPM | OXYGEN SATURATION: 98 % | TEMPERATURE: 99.4 F | SYSTOLIC BLOOD PRESSURE: 149 MMHG | WEIGHT: 177.3 LBS | HEART RATE: 122 BPM | DIASTOLIC BLOOD PRESSURE: 93 MMHG | BODY MASS INDEX: 31.42 KG/M2

## 2018-01-01 VITALS
HEART RATE: 106 BPM | DIASTOLIC BLOOD PRESSURE: 83 MMHG | TEMPERATURE: 97.4 F | OXYGEN SATURATION: 96 % | RESPIRATION RATE: 20 BRPM | WEIGHT: 175.2 LBS | SYSTOLIC BLOOD PRESSURE: 150 MMHG | BODY MASS INDEX: 31.04 KG/M2

## 2018-01-01 VITALS
RESPIRATION RATE: 18 BRPM | TEMPERATURE: 98.3 F | DIASTOLIC BLOOD PRESSURE: 85 MMHG | WEIGHT: 176.7 LBS | SYSTOLIC BLOOD PRESSURE: 151 MMHG | HEART RATE: 117 BPM | OXYGEN SATURATION: 93 % | BODY MASS INDEX: 31.31 KG/M2

## 2018-01-01 VITALS
SYSTOLIC BLOOD PRESSURE: 146 MMHG | DIASTOLIC BLOOD PRESSURE: 85 MMHG | OXYGEN SATURATION: 100 % | TEMPERATURE: 98.1 F | RESPIRATION RATE: 18 BRPM | HEART RATE: 106 BPM

## 2018-01-01 VITALS
SYSTOLIC BLOOD PRESSURE: 140 MMHG | BODY MASS INDEX: 31.24 KG/M2 | DIASTOLIC BLOOD PRESSURE: 87 MMHG | WEIGHT: 176.3 LBS | HEART RATE: 119 BPM | RESPIRATION RATE: 16 BRPM | TEMPERATURE: 99.8 F | OXYGEN SATURATION: 100 %

## 2018-01-01 VITALS
HEART RATE: 120 BPM | TEMPERATURE: 98.5 F | SYSTOLIC BLOOD PRESSURE: 142 MMHG | BODY MASS INDEX: 30.83 KG/M2 | DIASTOLIC BLOOD PRESSURE: 90 MMHG | WEIGHT: 174 LBS | HEIGHT: 63 IN

## 2018-01-01 VITALS
DIASTOLIC BLOOD PRESSURE: 76 MMHG | OXYGEN SATURATION: 95 % | TEMPERATURE: 98.7 F | SYSTOLIC BLOOD PRESSURE: 142 MMHG | RESPIRATION RATE: 16 BRPM | HEART RATE: 105 BPM

## 2018-01-01 VITALS — SYSTOLIC BLOOD PRESSURE: 143 MMHG | DIASTOLIC BLOOD PRESSURE: 83 MMHG | HEART RATE: 98 BPM

## 2018-01-01 VITALS
OXYGEN SATURATION: 98 % | TEMPERATURE: 98.7 F | DIASTOLIC BLOOD PRESSURE: 91 MMHG | HEART RATE: 104 BPM | SYSTOLIC BLOOD PRESSURE: 152 MMHG

## 2018-01-01 VITALS
SYSTOLIC BLOOD PRESSURE: 130 MMHG | DIASTOLIC BLOOD PRESSURE: 72 MMHG | BODY MASS INDEX: 30.31 KG/M2 | WEIGHT: 171.1 LBS | RESPIRATION RATE: 18 BRPM | OXYGEN SATURATION: 94 % | TEMPERATURE: 97.5 F

## 2018-01-01 VITALS
HEART RATE: 125 BPM | HEIGHT: 63 IN | TEMPERATURE: 98.5 F | DIASTOLIC BLOOD PRESSURE: 79 MMHG | SYSTOLIC BLOOD PRESSURE: 146 MMHG | WEIGHT: 177 LBS | RESPIRATION RATE: 16 BRPM | OXYGEN SATURATION: 93 % | BODY MASS INDEX: 31.36 KG/M2

## 2018-01-01 VITALS
HEART RATE: 107 BPM | HEIGHT: 63 IN | OXYGEN SATURATION: 96 % | BODY MASS INDEX: 31.01 KG/M2 | RESPIRATION RATE: 18 BRPM | DIASTOLIC BLOOD PRESSURE: 105 MMHG | TEMPERATURE: 98.4 F | SYSTOLIC BLOOD PRESSURE: 161 MMHG | WEIGHT: 175 LBS

## 2018-01-01 VITALS
DIASTOLIC BLOOD PRESSURE: 82 MMHG | OXYGEN SATURATION: 93 % | SYSTOLIC BLOOD PRESSURE: 124 MMHG | RESPIRATION RATE: 20 BRPM | HEART RATE: 107 BPM

## 2018-01-01 VITALS
SYSTOLIC BLOOD PRESSURE: 140 MMHG | OXYGEN SATURATION: 97 % | HEART RATE: 95 BPM | RESPIRATION RATE: 20 BRPM | TEMPERATURE: 98.1 F | DIASTOLIC BLOOD PRESSURE: 86 MMHG

## 2018-01-01 VITALS
TEMPERATURE: 98.6 F | DIASTOLIC BLOOD PRESSURE: 80 MMHG | OXYGEN SATURATION: 96 % | SYSTOLIC BLOOD PRESSURE: 148 MMHG | HEART RATE: 95 BPM

## 2018-01-01 VITALS
WEIGHT: 178.3 LBS | RESPIRATION RATE: 18 BRPM | DIASTOLIC BLOOD PRESSURE: 83 MMHG | TEMPERATURE: 98.8 F | OXYGEN SATURATION: 100 % | SYSTOLIC BLOOD PRESSURE: 145 MMHG | HEART RATE: 94 BPM | BODY MASS INDEX: 31.59 KG/M2

## 2018-01-01 VITALS
BODY MASS INDEX: 31.27 KG/M2 | TEMPERATURE: 98.4 F | DIASTOLIC BLOOD PRESSURE: 92 MMHG | SYSTOLIC BLOOD PRESSURE: 151 MMHG | RESPIRATION RATE: 16 BRPM | HEART RATE: 85 BPM | OXYGEN SATURATION: 100 % | WEIGHT: 176.5 LBS

## 2018-01-01 VITALS
OXYGEN SATURATION: 98 % | TEMPERATURE: 98.1 F | HEART RATE: 92 BPM | DIASTOLIC BLOOD PRESSURE: 87 MMHG | SYSTOLIC BLOOD PRESSURE: 145 MMHG | RESPIRATION RATE: 18 BRPM

## 2018-01-01 VITALS
SYSTOLIC BLOOD PRESSURE: 136 MMHG | RESPIRATION RATE: 16 BRPM | HEIGHT: 63 IN | DIASTOLIC BLOOD PRESSURE: 85 MMHG | BODY MASS INDEX: 31.36 KG/M2 | TEMPERATURE: 99.4 F | WEIGHT: 177 LBS | OXYGEN SATURATION: 94 %

## 2018-01-01 VITALS
BODY MASS INDEX: 32.09 KG/M2 | WEIGHT: 181.1 LBS | OXYGEN SATURATION: 98 % | TEMPERATURE: 98 F | HEIGHT: 63 IN | DIASTOLIC BLOOD PRESSURE: 78 MMHG | HEART RATE: 114 BPM | SYSTOLIC BLOOD PRESSURE: 156 MMHG | RESPIRATION RATE: 17 BRPM

## 2018-01-01 VITALS
OXYGEN SATURATION: 94 % | TEMPERATURE: 98.6 F | RESPIRATION RATE: 18 BRPM | SYSTOLIC BLOOD PRESSURE: 144 MMHG | HEART RATE: 122 BPM | WEIGHT: 172.84 LBS | BODY MASS INDEX: 30.63 KG/M2 | DIASTOLIC BLOOD PRESSURE: 91 MMHG

## 2018-01-01 VITALS
TEMPERATURE: 98 F | SYSTOLIC BLOOD PRESSURE: 154 MMHG | RESPIRATION RATE: 22 BRPM | OXYGEN SATURATION: 98 % | HEART RATE: 107 BPM | BODY MASS INDEX: 30.12 KG/M2 | DIASTOLIC BLOOD PRESSURE: 90 MMHG | WEIGHT: 170 LBS | HEIGHT: 63 IN

## 2018-01-01 VITALS
TEMPERATURE: 99 F | DIASTOLIC BLOOD PRESSURE: 85 MMHG | OXYGEN SATURATION: 97 % | WEIGHT: 179.4 LBS | BODY MASS INDEX: 31.79 KG/M2 | HEIGHT: 63 IN | SYSTOLIC BLOOD PRESSURE: 146 MMHG | HEART RATE: 112 BPM | RESPIRATION RATE: 18 BRPM

## 2018-01-01 VITALS
TEMPERATURE: 97.9 F | HEIGHT: 63 IN | SYSTOLIC BLOOD PRESSURE: 136 MMHG | BODY MASS INDEX: 30.3 KG/M2 | HEART RATE: 102 BPM | WEIGHT: 171 LBS | OXYGEN SATURATION: 97 % | DIASTOLIC BLOOD PRESSURE: 88 MMHG | RESPIRATION RATE: 16 BRPM

## 2018-01-01 VITALS
TEMPERATURE: 98.5 F | OXYGEN SATURATION: 98 % | BODY MASS INDEX: 31.25 KG/M2 | WEIGHT: 176.4 LBS | RESPIRATION RATE: 18 BRPM | HEART RATE: 89 BPM | SYSTOLIC BLOOD PRESSURE: 151 MMHG | HEIGHT: 63 IN | DIASTOLIC BLOOD PRESSURE: 88 MMHG

## 2018-01-01 VITALS
BODY MASS INDEX: 30.29 KG/M2 | WEIGHT: 171 LBS | DIASTOLIC BLOOD PRESSURE: 85 MMHG | RESPIRATION RATE: 16 BRPM | HEART RATE: 127 BPM | SYSTOLIC BLOOD PRESSURE: 142 MMHG | OXYGEN SATURATION: 93 % | TEMPERATURE: 99.4 F

## 2018-01-01 VITALS
SYSTOLIC BLOOD PRESSURE: 126 MMHG | WEIGHT: 177 LBS | HEIGHT: 63 IN | DIASTOLIC BLOOD PRESSURE: 86 MMHG | BODY MASS INDEX: 31.36 KG/M2 | HEART RATE: 107 BPM | RESPIRATION RATE: 18 BRPM | TEMPERATURE: 97.4 F | OXYGEN SATURATION: 95 %

## 2018-01-01 VITALS
OXYGEN SATURATION: 94 % | DIASTOLIC BLOOD PRESSURE: 88 MMHG | TEMPERATURE: 98.5 F | RESPIRATION RATE: 18 BRPM | SYSTOLIC BLOOD PRESSURE: 138 MMHG

## 2018-01-01 DIAGNOSIS — C53.9 CERVIX CANCER (H): ICD-10-CM

## 2018-01-01 DIAGNOSIS — D70.1 CHEMOTHERAPY-INDUCED NEUTROPENIA (H): Primary | ICD-10-CM

## 2018-01-01 DIAGNOSIS — R06.02 SOB (SHORTNESS OF BREATH): ICD-10-CM

## 2018-01-01 DIAGNOSIS — R63.0 ANOREXIA: ICD-10-CM

## 2018-01-01 DIAGNOSIS — C53.9: ICD-10-CM

## 2018-01-01 DIAGNOSIS — C53.9 CERVIX CANCER (H): Primary | ICD-10-CM

## 2018-01-01 DIAGNOSIS — R53.0 NEOPLASTIC MALIGNANT RELATED FATIGUE: ICD-10-CM

## 2018-01-01 DIAGNOSIS — R26.9 GAIT DISTURBANCE: Primary | ICD-10-CM

## 2018-01-01 DIAGNOSIS — R11.0 NAUSEA: ICD-10-CM

## 2018-01-01 DIAGNOSIS — C53.9 MALIGNANT NEOPLASM OF CERVIX, UNSPECIFIED SITE (H): ICD-10-CM

## 2018-01-01 DIAGNOSIS — C53.9 MALIGNANT NEOPLASM OF CERVIX, UNSPECIFIED SITE (H): Primary | ICD-10-CM

## 2018-01-01 DIAGNOSIS — J90 PLEURAL EFFUSION: ICD-10-CM

## 2018-01-01 DIAGNOSIS — C53.9 CERVICAL CANCER (H): ICD-10-CM

## 2018-01-01 DIAGNOSIS — K21.9 GASTROESOPHAGEAL REFLUX DISEASE, ESOPHAGITIS PRESENCE NOT SPECIFIED: ICD-10-CM

## 2018-01-01 DIAGNOSIS — D70.1 CHEMOTHERAPY-INDUCED NEUTROPENIA (H): ICD-10-CM

## 2018-01-01 DIAGNOSIS — Z71.9 VISIT FOR COUNSELING: Primary | ICD-10-CM

## 2018-01-01 DIAGNOSIS — G89.3 CANCER RELATED PAIN: ICD-10-CM

## 2018-01-01 DIAGNOSIS — C53.9 CERVICAL CANCER (H): Primary | ICD-10-CM

## 2018-01-01 DIAGNOSIS — T45.1X5A CHEMOTHERAPY-INDUCED NEUTROPENIA (H): Primary | ICD-10-CM

## 2018-01-01 DIAGNOSIS — I10 ESSENTIAL HYPERTENSION: ICD-10-CM

## 2018-01-01 DIAGNOSIS — I15.9 SECONDARY HYPERTENSION: ICD-10-CM

## 2018-01-01 DIAGNOSIS — Z51.11 ENCOUNTER FOR ANTINEOPLASTIC CHEMOTHERAPY: ICD-10-CM

## 2018-01-01 DIAGNOSIS — J90 PLEURAL EFFUSION: Primary | ICD-10-CM

## 2018-01-01 DIAGNOSIS — R11.0 NAUSEA: Primary | ICD-10-CM

## 2018-01-01 DIAGNOSIS — R06.00 DYSPNEA, UNSPECIFIED TYPE: ICD-10-CM

## 2018-01-01 DIAGNOSIS — T45.1X5A CHEMOTHERAPY-INDUCED NEUTROPENIA (H): ICD-10-CM

## 2018-01-01 DIAGNOSIS — J93.83 PNEUMOTHORAX, ACUTE: ICD-10-CM

## 2018-01-01 DIAGNOSIS — R91.8 PULMONARY NODULES: ICD-10-CM

## 2018-01-01 LAB
ALBUMIN SERPL-MCNC: 2.8 G/DL (ref 3.4–5)
ALBUMIN SERPL-MCNC: 3.2 G/DL (ref 3.4–5)
ALBUMIN SERPL-MCNC: 3.2 G/DL (ref 3.4–5)
ALBUMIN SERPL-MCNC: 3.3 G/DL (ref 3.4–5)
ALBUMIN SERPL-MCNC: 3.3 G/DL (ref 3.4–5)
ALBUMIN UR-MCNC: 10 MG/DL
ALBUMIN UR-MCNC: 30 MG/DL
ALBUMIN UR-MCNC: ABNORMAL MG/DL
ALBUMIN UR-MCNC: NEGATIVE MG/DL
ALP SERPL-CCNC: 113 U/L (ref 40–150)
ALP SERPL-CCNC: 88 U/L (ref 40–150)
ALP SERPL-CCNC: 93 U/L (ref 40–150)
ALP SERPL-CCNC: 95 U/L (ref 40–150)
ALP SERPL-CCNC: 96 U/L (ref 40–150)
ALT SERPL W P-5'-P-CCNC: 18 U/L (ref 0–50)
ALT SERPL W P-5'-P-CCNC: 18 U/L (ref 0–50)
ALT SERPL W P-5'-P-CCNC: 20 U/L (ref 0–50)
ALT SERPL W P-5'-P-CCNC: 21 U/L (ref 0–50)
ALT SERPL W P-5'-P-CCNC: 22 U/L (ref 0–50)
ANION GAP SERPL CALCULATED.3IONS-SCNC: 11 MMOL/L (ref 3–14)
ANION GAP SERPL CALCULATED.3IONS-SCNC: 6 MMOL/L (ref 3–14)
ANION GAP SERPL CALCULATED.3IONS-SCNC: 6 MMOL/L (ref 3–14)
ANION GAP SERPL CALCULATED.3IONS-SCNC: 7 MMOL/L (ref 3–14)
ANION GAP SERPL CALCULATED.3IONS-SCNC: 7 MMOL/L (ref 3–14)
ANION GAP SERPL CALCULATED.3IONS-SCNC: 8 MMOL/L (ref 3–14)
AST SERPL W P-5'-P-CCNC: 18 U/L (ref 0–45)
AST SERPL W P-5'-P-CCNC: 20 U/L (ref 0–45)
AST SERPL W P-5'-P-CCNC: 23 U/L (ref 0–45)
AST SERPL W P-5'-P-CCNC: 24 U/L (ref 0–45)
AST SERPL W P-5'-P-CCNC: 30 U/L (ref 0–45)
BASOPHILS # BLD AUTO: 0 10E9/L (ref 0–0.2)
BASOPHILS NFR BLD AUTO: 0.2 %
BASOPHILS NFR BLD AUTO: 0.3 %
BASOPHILS NFR BLD AUTO: 0.5 %
BASOPHILS NFR BLD AUTO: 0.5 %
BILIRUB SERPL-MCNC: 0.2 MG/DL (ref 0.2–1.3)
BILIRUB SERPL-MCNC: 0.3 MG/DL (ref 0.2–1.3)
BILIRUB SERPL-MCNC: 0.3 MG/DL (ref 0.2–1.3)
BUN SERPL-MCNC: 12 MG/DL (ref 7–30)
BUN SERPL-MCNC: 13 MG/DL (ref 7–30)
BUN SERPL-MCNC: 15 MG/DL (ref 7–30)
BUN SERPL-MCNC: 6 MG/DL (ref 7–30)
BUN SERPL-MCNC: 7 MG/DL (ref 7–30)
BUN SERPL-MCNC: 9 MG/DL (ref 7–30)
CALCIUM SERPL-MCNC: 8.6 MG/DL (ref 8.5–10.1)
CALCIUM SERPL-MCNC: 8.8 MG/DL (ref 8.5–10.1)
CALCIUM SERPL-MCNC: 9 MG/DL (ref 8.5–10.1)
CALCIUM SERPL-MCNC: 9.2 MG/DL (ref 8.5–10.1)
CALCIUM SERPL-MCNC: 9.3 MG/DL (ref 8.5–10.1)
CALCIUM SERPL-MCNC: 9.5 MG/DL (ref 8.5–10.1)
CHLORIDE SERPL-SCNC: 101 MMOL/L (ref 94–109)
CHLORIDE SERPL-SCNC: 102 MMOL/L (ref 94–109)
CHLORIDE SERPL-SCNC: 104 MMOL/L (ref 94–109)
CHLORIDE SERPL-SCNC: 105 MMOL/L (ref 94–109)
CHLORIDE SERPL-SCNC: 106 MMOL/L (ref 94–109)
CHLORIDE SERPL-SCNC: 98 MMOL/L (ref 94–109)
CO2 SERPL-SCNC: 25 MMOL/L (ref 20–32)
CO2 SERPL-SCNC: 26 MMOL/L (ref 20–32)
CO2 SERPL-SCNC: 27 MMOL/L (ref 20–32)
CO2 SERPL-SCNC: 28 MMOL/L (ref 20–32)
COPATH REPORT: NORMAL
CREAT SERPL-MCNC: 0.69 MG/DL (ref 0.52–1.04)
CREAT SERPL-MCNC: 0.74 MG/DL (ref 0.52–1.04)
CREAT SERPL-MCNC: 0.76 MG/DL (ref 0.52–1.04)
CREAT SERPL-MCNC: 0.81 MG/DL (ref 0.52–1.04)
CREAT SERPL-MCNC: 0.81 MG/DL (ref 0.52–1.04)
CREAT SERPL-MCNC: 0.82 MG/DL (ref 0.52–1.04)
CREAT SERPL-MCNC: 1.2 MG/DL (ref 0.52–1.04)
DIFFERENTIAL METHOD BLD: ABNORMAL
EOSINOPHIL # BLD AUTO: 0 10E9/L (ref 0–0.7)
EOSINOPHIL # BLD AUTO: 0.1 10E9/L (ref 0–0.7)
EOSINOPHIL NFR BLD AUTO: 0.2 %
EOSINOPHIL NFR BLD AUTO: 0.3 %
EOSINOPHIL NFR BLD AUTO: 0.3 %
EOSINOPHIL NFR BLD AUTO: 0.5 %
EOSINOPHIL NFR BLD AUTO: 0.6 %
EOSINOPHIL NFR BLD AUTO: 1.3 %
ERYTHROCYTE [DISTWIDTH] IN BLOOD BY AUTOMATED COUNT: 15.9 % (ref 10–15)
ERYTHROCYTE [DISTWIDTH] IN BLOOD BY AUTOMATED COUNT: 16 % (ref 10–15)
ERYTHROCYTE [DISTWIDTH] IN BLOOD BY AUTOMATED COUNT: 16.4 % (ref 10–15)
ERYTHROCYTE [DISTWIDTH] IN BLOOD BY AUTOMATED COUNT: 16.8 % (ref 10–15)
ERYTHROCYTE [DISTWIDTH] IN BLOOD BY AUTOMATED COUNT: 17.1 % (ref 10–15)
ERYTHROCYTE [DISTWIDTH] IN BLOOD BY AUTOMATED COUNT: 17.4 % (ref 10–15)
ERYTHROCYTE [DISTWIDTH] IN BLOOD BY AUTOMATED COUNT: 17.7 % (ref 10–15)
ERYTHROCYTE [DISTWIDTH] IN BLOOD BY AUTOMATED COUNT: 18.2 % (ref 10–15)
ERYTHROCYTE [DISTWIDTH] IN BLOOD BY AUTOMATED COUNT: 19.5 % (ref 10–15)
ERYTHROCYTE [DISTWIDTH] IN BLOOD BY AUTOMATED COUNT: 23.1 % (ref 10–15)
GFR SERPL CREATININE-BSD FRML MDRD: 45 ML/MIN/1.7M2
GFR SERPL CREATININE-BSD FRML MDRD: 70 ML/MIN/1.7M2
GFR SERPL CREATININE-BSD FRML MDRD: 71 ML/MIN/1.7M2
GFR SERPL CREATININE-BSD FRML MDRD: 71 ML/MIN/1.7M2
GFR SERPL CREATININE-BSD FRML MDRD: 76 ML/MIN/1.7M2
GFR SERPL CREATININE-BSD FRML MDRD: 79 ML/MIN/1.7M2
GFR SERPL CREATININE-BSD FRML MDRD: 86 ML/MIN/1.7M2
GLUCOSE SERPL-MCNC: 101 MG/DL (ref 70–99)
GLUCOSE SERPL-MCNC: 102 MG/DL (ref 70–99)
GLUCOSE SERPL-MCNC: 104 MG/DL (ref 70–99)
GLUCOSE SERPL-MCNC: 107 MG/DL (ref 70–99)
GLUCOSE SERPL-MCNC: 108 MG/DL (ref 70–99)
GLUCOSE SERPL-MCNC: 116 MG/DL (ref 70–99)
GLUCOSE SERPL-MCNC: 128 MG/DL (ref 70–99)
GLUCOSE SERPL-MCNC: 139 MG/DL (ref 70–99)
GLUCOSE SERPL-MCNC: 92 MG/DL (ref 70–99)
GLUCOSE SERPL-MCNC: 93 MG/DL (ref 70–99)
HCT VFR BLD AUTO: 35.1 % (ref 35–47)
HCT VFR BLD AUTO: 35.7 % (ref 35–47)
HCT VFR BLD AUTO: 35.7 % (ref 35–47)
HCT VFR BLD AUTO: 35.8 % (ref 35–47)
HCT VFR BLD AUTO: 36 % (ref 35–47)
HCT VFR BLD AUTO: 36 % (ref 35–47)
HCT VFR BLD AUTO: 37.5 % (ref 35–47)
HCT VFR BLD AUTO: 37.7 % (ref 35–47)
HCT VFR BLD AUTO: 38.3 % (ref 35–47)
HCT VFR BLD AUTO: 39.8 % (ref 35–47)
HGB BLD-MCNC: 11.1 G/DL (ref 11.7–15.7)
HGB BLD-MCNC: 11.3 G/DL (ref 11.7–15.7)
HGB BLD-MCNC: 11.3 G/DL (ref 11.7–15.7)
HGB BLD-MCNC: 11.4 G/DL (ref 11.7–15.7)
HGB BLD-MCNC: 11.5 G/DL (ref 11.7–15.7)
HGB BLD-MCNC: 11.5 G/DL (ref 11.7–15.7)
HGB BLD-MCNC: 11.7 G/DL (ref 11.7–15.7)
HGB BLD-MCNC: 12 G/DL (ref 11.7–15.7)
HGB BLD-MCNC: 12.2 G/DL (ref 11.7–15.7)
HGB BLD-MCNC: 12.6 G/DL (ref 11.7–15.7)
IMM GRANULOCYTES # BLD: 0 10E9/L (ref 0–0.4)
IMM GRANULOCYTES NFR BLD: 0.1 %
IMM GRANULOCYTES NFR BLD: 0.1 %
IMM GRANULOCYTES NFR BLD: 0.2 %
IMM GRANULOCYTES NFR BLD: 0.3 %
IMM GRANULOCYTES NFR BLD: 0.3 %
IMM GRANULOCYTES NFR BLD: 0.5 %
INR PPP: 0.92 (ref 0.86–1.14)
INR PPP: 0.99 (ref 0.86–1.14)
INR PPP: 1.03 (ref 0.86–1.14)
INR PPP: 1.05 (ref 0.86–1.14)
INTERPRETATION ECG - MUSE: NORMAL
INTERPRETATION ECG - MUSE: NORMAL
LAB SCANNED RESULT: NORMAL
LAB SCANNED RESULT: NORMAL
LACTATE BLD-SCNC: 1 MMOL/L (ref 0.4–1.9)
LYMPHOCYTES # BLD AUTO: 1.2 10E9/L (ref 0.8–5.3)
LYMPHOCYTES # BLD AUTO: 1.4 10E9/L (ref 0.8–5.3)
LYMPHOCYTES # BLD AUTO: 1.5 10E9/L (ref 0.8–5.3)
LYMPHOCYTES # BLD AUTO: 1.6 10E9/L (ref 0.8–5.3)
LYMPHOCYTES # BLD AUTO: 1.8 10E9/L (ref 0.8–5.3)
LYMPHOCYTES # BLD AUTO: 2.4 10E9/L (ref 0.8–5.3)
LYMPHOCYTES NFR BLD AUTO: 19.5 %
LYMPHOCYTES NFR BLD AUTO: 22 %
LYMPHOCYTES NFR BLD AUTO: 24.7 %
LYMPHOCYTES NFR BLD AUTO: 28.1 %
LYMPHOCYTES NFR BLD AUTO: 28.5 %
LYMPHOCYTES NFR BLD AUTO: 29.5 %
LYMPHOCYTES NFR BLD AUTO: 31.2 %
LYMPHOCYTES NFR BLD AUTO: 36 %
LYMPHOCYTES NFR BLD AUTO: 36.7 %
MAGNESIUM SERPL-MCNC: 1.7 MG/DL (ref 1.6–2.3)
MAGNESIUM SERPL-MCNC: 1.7 MG/DL (ref 1.6–2.3)
MAGNESIUM SERPL-MCNC: 1.8 MG/DL (ref 1.6–2.3)
MAGNESIUM SERPL-MCNC: 1.9 MG/DL (ref 1.6–2.3)
MAGNESIUM SERPL-MCNC: 1.9 MG/DL (ref 1.6–2.3)
MAGNESIUM SERPL-MCNC: 2 MG/DL (ref 1.6–2.3)
MCH RBC QN AUTO: 27 PG (ref 26.5–33)
MCH RBC QN AUTO: 28.4 PG (ref 26.5–33)
MCH RBC QN AUTO: 28.5 PG (ref 26.5–33)
MCH RBC QN AUTO: 29.4 PG (ref 26.5–33)
MCH RBC QN AUTO: 29.6 PG (ref 26.5–33)
MCH RBC QN AUTO: 29.8 PG (ref 26.5–33)
MCH RBC QN AUTO: 29.9 PG (ref 26.5–33)
MCH RBC QN AUTO: 29.9 PG (ref 26.5–33)
MCH RBC QN AUTO: 30 PG (ref 26.5–33)
MCH RBC QN AUTO: 30.3 PG (ref 26.5–33)
MCHC RBC AUTO-ENTMCNC: 30.7 G/DL (ref 31.5–36.5)
MCHC RBC AUTO-ENTMCNC: 31.1 G/DL (ref 31.5–36.5)
MCHC RBC AUTO-ENTMCNC: 31.6 G/DL (ref 31.5–36.5)
MCHC RBC AUTO-ENTMCNC: 31.7 G/DL (ref 31.5–36.5)
MCHC RBC AUTO-ENTMCNC: 31.8 G/DL (ref 31.5–36.5)
MCHC RBC AUTO-ENTMCNC: 31.9 G/DL (ref 31.5–36.5)
MCHC RBC AUTO-ENTMCNC: 31.9 G/DL (ref 31.5–36.5)
MCHC RBC AUTO-ENTMCNC: 33.3 G/DL (ref 31.5–36.5)
MCV RBC AUTO: 85 FL (ref 78–100)
MCV RBC AUTO: 91 FL (ref 78–100)
MCV RBC AUTO: 91 FL (ref 78–100)
MCV RBC AUTO: 92 FL (ref 78–100)
MCV RBC AUTO: 93 FL (ref 78–100)
MCV RBC AUTO: 94 FL (ref 78–100)
MONOCYTES # BLD AUTO: 0.5 10E9/L (ref 0–1.3)
MONOCYTES # BLD AUTO: 0.6 10E9/L (ref 0–1.3)
MONOCYTES # BLD AUTO: 1 10E9/L (ref 0–1.3)
MONOCYTES # BLD AUTO: 1.1 10E9/L (ref 0–1.3)
MONOCYTES NFR BLD AUTO: 11.9 %
MONOCYTES NFR BLD AUTO: 12.3 %
MONOCYTES NFR BLD AUTO: 12.6 %
MONOCYTES NFR BLD AUTO: 12.9 %
MONOCYTES NFR BLD AUTO: 13 %
MONOCYTES NFR BLD AUTO: 15.1 %
MONOCYTES NFR BLD AUTO: 15.9 %
MONOCYTES NFR BLD AUTO: 6.7 %
MONOCYTES NFR BLD AUTO: 8.8 %
NEUTROPHILS # BLD AUTO: 1.8 10E9/L (ref 1.6–8.3)
NEUTROPHILS # BLD AUTO: 2.1 10E9/L (ref 1.6–8.3)
NEUTROPHILS # BLD AUTO: 2.5 10E9/L (ref 1.6–8.3)
NEUTROPHILS # BLD AUTO: 2.6 10E9/L (ref 1.6–8.3)
NEUTROPHILS # BLD AUTO: 2.9 10E9/L (ref 1.6–8.3)
NEUTROPHILS # BLD AUTO: 3.9 10E9/L (ref 1.6–8.3)
NEUTROPHILS # BLD AUTO: 4.1 10E9/L (ref 1.6–8.3)
NEUTROPHILS # BLD AUTO: 4.7 10E9/L (ref 1.6–8.3)
NEUTROPHILS # BLD AUTO: 6.7 10E9/L (ref 1.6–8.3)
NEUTROPHILS NFR BLD AUTO: 47.1 %
NEUTROPHILS NFR BLD AUTO: 50.5 %
NEUTROPHILS NFR BLD AUTO: 56.3 %
NEUTROPHILS NFR BLD AUTO: 57.3 %
NEUTROPHILS NFR BLD AUTO: 57.9 %
NEUTROPHILS NFR BLD AUTO: 58.3 %
NEUTROPHILS NFR BLD AUTO: 58.4 %
NEUTROPHILS NFR BLD AUTO: 67.4 %
NEUTROPHILS NFR BLD AUTO: 73 %
NRBC # BLD AUTO: 0 10*3/UL
NRBC BLD AUTO-RTO: 0 /100
PLATELET # BLD AUTO: 176 10E9/L (ref 150–450)
PLATELET # BLD AUTO: 189 10E9/L (ref 150–450)
PLATELET # BLD AUTO: 242 10E9/L (ref 150–450)
PLATELET # BLD AUTO: 243 10E9/L (ref 150–450)
PLATELET # BLD AUTO: 253 10E9/L (ref 150–450)
PLATELET # BLD AUTO: 258 10E9/L (ref 150–450)
PLATELET # BLD AUTO: 260 10E9/L (ref 150–450)
PLATELET # BLD AUTO: 279 10E9/L (ref 150–450)
PLATELET # BLD AUTO: 306 10E9/L (ref 150–450)
PLATELET # BLD AUTO: 324 10E9/L (ref 150–450)
PLATELET # BLD EST: ABNORMAL 10*3/UL
POTASSIUM SERPL-SCNC: 3.4 MMOL/L (ref 3.4–5.3)
POTASSIUM SERPL-SCNC: 3.6 MMOL/L (ref 3.4–5.3)
POTASSIUM SERPL-SCNC: 3.7 MMOL/L (ref 3.4–5.3)
POTASSIUM SERPL-SCNC: 3.7 MMOL/L (ref 3.4–5.3)
POTASSIUM SERPL-SCNC: 3.9 MMOL/L (ref 3.4–5.3)
POTASSIUM SERPL-SCNC: 4 MMOL/L (ref 3.4–5.3)
POTASSIUM SERPL-SCNC: 4 MMOL/L (ref 3.4–5.3)
POTASSIUM SERPL-SCNC: 4.1 MMOL/L (ref 3.4–5.3)
POTASSIUM SERPL-SCNC: 4.2 MMOL/L (ref 3.4–5.3)
POTASSIUM SERPL-SCNC: 4.3 MMOL/L (ref 3.4–5.3)
PROT SERPL-MCNC: 8 G/DL (ref 6.8–8.8)
PROT SERPL-MCNC: 8 G/DL (ref 6.8–8.8)
PROT SERPL-MCNC: 8.2 G/DL (ref 6.8–8.8)
PROT SERPL-MCNC: 8.4 G/DL (ref 6.8–8.8)
PROT SERPL-MCNC: 8.5 G/DL (ref 6.8–8.8)
RADIOLOGIST FLAGS: ABNORMAL
RADIOLOGIST FLAGS: NORMAL
RBC # BLD AUTO: 3.78 10E12/L (ref 3.8–5.2)
RBC # BLD AUTO: 3.82 10E12/L (ref 3.8–5.2)
RBC # BLD AUTO: 3.84 10E12/L (ref 3.8–5.2)
RBC # BLD AUTO: 3.86 10E12/L (ref 3.8–5.2)
RBC # BLD AUTO: 3.91 10E12/L (ref 3.8–5.2)
RBC # BLD AUTO: 4.04 10E12/L (ref 3.8–5.2)
RBC # BLD AUTO: 4.06 10E12/L (ref 3.8–5.2)
RBC # BLD AUTO: 4.08 10E12/L (ref 3.8–5.2)
RBC # BLD AUTO: 4.22 10E12/L (ref 3.8–5.2)
RBC # BLD AUTO: 4.23 10E12/L (ref 3.8–5.2)
SODIUM SERPL-SCNC: 134 MMOL/L (ref 133–144)
SODIUM SERPL-SCNC: 135 MMOL/L (ref 133–144)
SODIUM SERPL-SCNC: 137 MMOL/L (ref 133–144)
SODIUM SERPL-SCNC: 137 MMOL/L (ref 133–144)
SODIUM SERPL-SCNC: 138 MMOL/L (ref 133–144)
SODIUM SERPL-SCNC: 139 MMOL/L (ref 133–144)
SODIUM SERPL-SCNC: 139 MMOL/L (ref 133–144)
SODIUM SERPL-SCNC: 140 MMOL/L (ref 133–144)
TROPONIN I SERPL-MCNC: <0.015 UG/L (ref 0–0.04)
VIT B6 SERPL-MCNC: 204.8 NMOL/L (ref 20–125)
WBC # BLD AUTO: 3.9 10E9/L (ref 4–11)
WBC # BLD AUTO: 4.1 10E9/L (ref 4–11)
WBC # BLD AUTO: 4.4 10E9/L (ref 4–11)
WBC # BLD AUTO: 4.5 10E9/L (ref 4–11)
WBC # BLD AUTO: 4.9 10E9/L (ref 4–11)
WBC # BLD AUTO: 5.9 10E9/L (ref 4–11)
WBC # BLD AUTO: 6.1 10E9/L (ref 4–11)
WBC # BLD AUTO: 6.7 10E9/L (ref 4–11)
WBC # BLD AUTO: 8.2 10E9/L (ref 4–11)
WBC # BLD AUTO: 9.1 10E9/L (ref 4–11)

## 2018-01-01 PROCEDURE — 32555 ASPIRATE PLEURA W/ IMAGING: CPT | Performed by: EMERGENCY MEDICINE

## 2018-01-01 PROCEDURE — 96368 THER/DIAG CONCURRENT INF: CPT

## 2018-01-01 PROCEDURE — 96413 CHEMO IV INFUSION 1 HR: CPT

## 2018-01-01 PROCEDURE — 99215 OFFICE O/P EST HI 40 MIN: CPT | Performed by: INTERNAL MEDICINE

## 2018-01-01 PROCEDURE — 80053 COMPREHEN METABOLIC PANEL: CPT | Performed by: OBSTETRICS & GYNECOLOGY

## 2018-01-01 PROCEDURE — 25000128 H RX IP 250 OP 636: Performed by: OBSTETRICS & GYNECOLOGY

## 2018-01-01 PROCEDURE — 99214 OFFICE O/P EST MOD 30 MIN: CPT | Mod: ZP | Performed by: OBSTETRICS & GYNECOLOGY

## 2018-01-01 PROCEDURE — 99285 EMERGENCY DEPT VISIT HI MDM: CPT | Mod: Z6 | Performed by: EMERGENCY MEDICINE

## 2018-01-01 PROCEDURE — 81003 URINALYSIS AUTO W/O SCOPE: CPT | Performed by: NURSE PRACTITIONER

## 2018-01-01 PROCEDURE — 80048 BASIC METABOLIC PNL TOTAL CA: CPT | Performed by: EMERGENCY MEDICINE

## 2018-01-01 PROCEDURE — G0463 HOSPITAL OUTPT CLINIC VISIT: HCPCS | Mod: ZF

## 2018-01-01 PROCEDURE — 96375 TX/PRO/DX INJ NEW DRUG ADDON: CPT

## 2018-01-01 PROCEDURE — 85025 COMPLETE CBC W/AUTO DIFF WBC: CPT | Performed by: NURSE PRACTITIONER

## 2018-01-01 PROCEDURE — 80053 COMPREHEN METABOLIC PANEL: CPT | Performed by: NURSE PRACTITIONER

## 2018-01-01 PROCEDURE — 71045 X-RAY EXAM CHEST 1 VIEW: CPT

## 2018-01-01 PROCEDURE — 96374 THER/PROPH/DIAG INJ IV PUSH: CPT | Performed by: EMERGENCY MEDICINE

## 2018-01-01 PROCEDURE — 83735 ASSAY OF MAGNESIUM: CPT | Performed by: NURSE PRACTITIONER

## 2018-01-01 PROCEDURE — 25000128 H RX IP 250 OP 636: Mod: ZF | Performed by: NURSE PRACTITIONER

## 2018-01-01 PROCEDURE — 36593 DECLOT VASCULAR DEVICE: CPT

## 2018-01-01 PROCEDURE — 96360 HYDRATION IV INFUSION INIT: CPT

## 2018-01-01 PROCEDURE — 71046 X-RAY EXAM CHEST 2 VIEWS: CPT

## 2018-01-01 PROCEDURE — 96361 HYDRATE IV INFUSION ADD-ON: CPT

## 2018-01-01 PROCEDURE — 25000128 H RX IP 250 OP 636: Performed by: PHYSICIAN ASSISTANT

## 2018-01-01 PROCEDURE — 25000132 ZZH RX MED GY IP 250 OP 250 PS 637: Mod: ZF | Performed by: NURSE PRACTITIONER

## 2018-01-01 PROCEDURE — 25000132 ZZH RX MED GY IP 250 OP 250 PS 637: Performed by: NURSE PRACTITIONER

## 2018-01-01 PROCEDURE — 80048 BASIC METABOLIC PNL TOTAL CA: CPT | Performed by: NURSE PRACTITIONER

## 2018-01-01 PROCEDURE — 99214 OFFICE O/P EST MOD 30 MIN: CPT | Mod: ZP | Performed by: NURSE PRACTITIONER

## 2018-01-01 PROCEDURE — 96367 TX/PROPH/DG ADDL SEQ IV INF: CPT

## 2018-01-01 PROCEDURE — 36591 DRAW BLOOD OFF VENOUS DEVICE: CPT

## 2018-01-01 PROCEDURE — G0463 HOSPITAL OUTPT CLINIC VISIT: HCPCS

## 2018-01-01 PROCEDURE — 83735 ASSAY OF MAGNESIUM: CPT | Performed by: OBSTETRICS & GYNECOLOGY

## 2018-01-01 PROCEDURE — G0463 HOSPITAL OUTPT CLINIC VISIT: HCPCS | Mod: 25

## 2018-01-01 PROCEDURE — 88307 TISSUE EXAM BY PATHOLOGIST: CPT | Performed by: OBSTETRICS & GYNECOLOGY

## 2018-01-01 PROCEDURE — 25000125 ZZHC RX 250: Mod: ZF | Performed by: NURSE PRACTITIONER

## 2018-01-01 PROCEDURE — 99285 EMERGENCY DEPT VISIT HI MDM: CPT | Mod: 25 | Performed by: EMERGENCY MEDICINE

## 2018-01-01 PROCEDURE — 25000125 ZZHC RX 250: Performed by: PHYSICIAN ASSISTANT

## 2018-01-01 PROCEDURE — 25000128 H RX IP 250 OP 636: Mod: ZF | Performed by: OBSTETRICS & GYNECOLOGY

## 2018-01-01 PROCEDURE — 25000128 H RX IP 250 OP 636: Performed by: EMERGENCY MEDICINE

## 2018-01-01 PROCEDURE — 36592 COLLECT BLOOD FROM PICC: CPT | Mod: 59 | Performed by: NURSE PRACTITIONER

## 2018-01-01 PROCEDURE — A9270 NON-COVERED ITEM OR SERVICE: HCPCS | Mod: GY | Performed by: NURSE PRACTITIONER

## 2018-01-01 PROCEDURE — 85025 COMPLETE CBC W/AUTO DIFF WBC: CPT | Performed by: EMERGENCY MEDICINE

## 2018-01-01 PROCEDURE — 25000132 ZZH RX MED GY IP 250 OP 250 PS 637: Mod: GY | Performed by: STUDENT IN AN ORGANIZED HEALTH CARE EDUCATION/TRAINING PROGRAM

## 2018-01-01 PROCEDURE — 85025 COMPLETE CBC W/AUTO DIFF WBC: CPT | Performed by: OBSTETRICS & GYNECOLOGY

## 2018-01-01 PROCEDURE — 84484 ASSAY OF TROPONIN QUANT: CPT | Performed by: EMERGENCY MEDICINE

## 2018-01-01 PROCEDURE — 93005 ELECTROCARDIOGRAM TRACING: CPT | Performed by: EMERGENCY MEDICINE

## 2018-01-01 PROCEDURE — 99214 OFFICE O/P EST MOD 30 MIN: CPT | Mod: ZP | Performed by: INTERNAL MEDICINE

## 2018-01-01 PROCEDURE — 81003 URINALYSIS AUTO W/O SCOPE: CPT | Performed by: OBSTETRICS & GYNECOLOGY

## 2018-01-01 PROCEDURE — 96417 CHEMO IV INFUS EACH ADDL SEQ: CPT

## 2018-01-01 PROCEDURE — 47000 NEEDLE BIOPSY OF LIVER PERQ: CPT

## 2018-01-01 PROCEDURE — 85610 PROTHROMBIN TIME: CPT | Performed by: EMERGENCY MEDICINE

## 2018-01-01 PROCEDURE — 96361 HYDRATE IV INFUSION ADD-ON: CPT | Performed by: EMERGENCY MEDICINE

## 2018-01-01 PROCEDURE — 84132 ASSAY OF SERUM POTASSIUM: CPT | Performed by: OBSTETRICS & GYNECOLOGY

## 2018-01-01 PROCEDURE — 96375 TX/PRO/DX INJ NEW DRUG ADDON: CPT | Performed by: EMERGENCY MEDICINE

## 2018-01-01 PROCEDURE — 25000125 ZZHC RX 250: Mod: ZF | Performed by: OBSTETRICS & GYNECOLOGY

## 2018-01-01 PROCEDURE — 25800025 ZZH RX 258: Mod: ZF | Performed by: NURSE PRACTITIONER

## 2018-01-01 PROCEDURE — G0378 HOSPITAL OBSERVATION PER HR: HCPCS

## 2018-01-01 PROCEDURE — 96377 APPLICATON ON-BODY INJECTOR: CPT | Mod: 59

## 2018-01-01 PROCEDURE — 83605 ASSAY OF LACTIC ACID: CPT | Performed by: NURSE PRACTITIONER

## 2018-01-01 PROCEDURE — 25000128 H RX IP 250 OP 636: Performed by: STUDENT IN AN ORGANIZED HEALTH CARE EDUCATION/TRAINING PROGRAM

## 2018-01-01 PROCEDURE — 71046 X-RAY EXAM CHEST 2 VIEWS: CPT | Mod: 77

## 2018-01-01 PROCEDURE — 82565 ASSAY OF CREATININE: CPT | Performed by: OBSTETRICS & GYNECOLOGY

## 2018-01-01 PROCEDURE — 93010 ELECTROCARDIOGRAM REPORT: CPT | Mod: Z6 | Performed by: EMERGENCY MEDICINE

## 2018-01-01 PROCEDURE — 25000132 ZZH RX MED GY IP 250 OP 250 PS 637: Mod: ZF | Performed by: OBSTETRICS & GYNECOLOGY

## 2018-01-01 PROCEDURE — 40000929 ZZHCL STATISTIC FOUNDATION ONE GENE PANEL: Performed by: OBSTETRICS & GYNECOLOGY

## 2018-01-01 PROCEDURE — 40000168 ZZH STATISTIC PP CARE STAGE 3

## 2018-01-01 PROCEDURE — 88342 IMHCHEM/IMCYTCHM 1ST ANTB: CPT | Performed by: OBSTETRICS & GYNECOLOGY

## 2018-01-01 PROCEDURE — 99153 MOD SED SAME PHYS/QHP EA: CPT

## 2018-01-01 PROCEDURE — 99215 OFFICE O/P EST HI 40 MIN: CPT | Mod: ZP | Performed by: INTERNAL MEDICINE

## 2018-01-01 PROCEDURE — 25000128 H RX IP 250 OP 636: Performed by: RADIOLOGY

## 2018-01-01 PROCEDURE — 96415 CHEMO IV INFUSION ADDL HR: CPT

## 2018-01-01 PROCEDURE — 36592 COLLECT BLOOD FROM PICC: CPT | Performed by: NURSE PRACTITIONER

## 2018-01-01 PROCEDURE — 99152 MOD SED SAME PHYS/QHP 5/>YRS: CPT

## 2018-01-01 PROCEDURE — 97802 MEDICAL NUTRITION INDIV IN: CPT | Mod: ZF | Performed by: DIETITIAN, REGISTERED

## 2018-01-01 PROCEDURE — A9270 NON-COVERED ITEM OR SERVICE: HCPCS | Mod: GY | Performed by: STUDENT IN AN ORGANIZED HEALTH CARE EDUCATION/TRAINING PROGRAM

## 2018-01-01 PROCEDURE — 88333 PATH CONSLTJ SURG CYTO XM 1: CPT | Performed by: OBSTETRICS & GYNECOLOGY

## 2018-01-01 PROCEDURE — 84207 ASSAY OF VITAMIN B-6: CPT | Performed by: INTERNAL MEDICINE

## 2018-01-01 PROCEDURE — 25800025 ZZH RX 258: Mod: ZF | Performed by: OBSTETRICS & GYNECOLOGY

## 2018-01-01 PROCEDURE — 32555 ASPIRATE PLEURA W/ IMAGING: CPT | Performed by: PEDIATRICS

## 2018-01-01 RX ORDER — METHYLPHENIDATE HYDROCHLORIDE 5 MG/1
TABLET ORAL
Qty: 30 TABLET | Refills: 0 | Status: SHIPPED | OUTPATIENT
Start: 2018-01-01

## 2018-01-01 RX ORDER — HEPARIN SODIUM (PORCINE) LOCK FLUSH IV SOLN 100 UNIT/ML 100 UNIT/ML
500 SOLUTION INTRAVENOUS
Status: CANCELLED
Start: 2018-01-01

## 2018-01-01 RX ORDER — ALBUTEROL SULFATE 90 UG/1
1-2 AEROSOL, METERED RESPIRATORY (INHALATION)
Status: CANCELLED
Start: 2018-01-01

## 2018-01-01 RX ORDER — DIPHENHYDRAMINE HYDROCHLORIDE 50 MG/ML
50 INJECTION INTRAMUSCULAR; INTRAVENOUS
Status: CANCELLED
Start: 2018-01-01

## 2018-01-01 RX ORDER — HEPARIN SODIUM (PORCINE) LOCK FLUSH IV SOLN 100 UNIT/ML 100 UNIT/ML
500 SOLUTION INTRAVENOUS ONCE
Status: CANCELLED
Start: 2018-01-01 | End: 2018-01-01

## 2018-01-01 RX ORDER — PALONOSETRON 0.05 MG/ML
0.25 INJECTION, SOLUTION INTRAVENOUS ONCE
Status: CANCELLED
Start: 2018-01-01

## 2018-01-01 RX ORDER — DIPHENHYDRAMINE HCL 25 MG
25 CAPSULE ORAL ONCE
Status: CANCELLED
Start: 2018-01-01

## 2018-01-01 RX ORDER — MEPERIDINE HYDROCHLORIDE 25 MG/ML
25 INJECTION INTRAMUSCULAR; INTRAVENOUS; SUBCUTANEOUS EVERY 30 MIN PRN
Status: CANCELLED | OUTPATIENT
Start: 2018-01-01

## 2018-01-01 RX ORDER — ALBUTEROL SULFATE 0.83 MG/ML
2.5 SOLUTION RESPIRATORY (INHALATION)
Status: CANCELLED | OUTPATIENT
Start: 2018-01-01

## 2018-01-01 RX ORDER — SODIUM CHLORIDE 9 MG/ML
1000 INJECTION, SOLUTION INTRAVENOUS CONTINUOUS PRN
Status: CANCELLED
Start: 2018-01-01

## 2018-01-01 RX ORDER — HEPARIN SODIUM (PORCINE) LOCK FLUSH IV SOLN 100 UNIT/ML 100 UNIT/ML
5 SOLUTION INTRAVENOUS
Status: DISCONTINUED | OUTPATIENT
Start: 2018-01-01 | End: 2018-01-01 | Stop reason: HOSPADM

## 2018-01-01 RX ORDER — DRONABINOL 2.5 MG/1
2.5 CAPSULE ORAL
Status: DISCONTINUED | OUTPATIENT
Start: 2018-01-01 | End: 2018-01-01 | Stop reason: HOSPADM

## 2018-01-01 RX ORDER — METHYLPREDNISOLONE SODIUM SUCCINATE 125 MG/2ML
125 INJECTION, POWDER, LYOPHILIZED, FOR SOLUTION INTRAMUSCULAR; INTRAVENOUS
Status: CANCELLED
Start: 2018-01-01

## 2018-01-01 RX ORDER — DEXTROSE, SODIUM CHLORIDE, AND POTASSIUM CHLORIDE 5; .45; .15 G/100ML; G/100ML; G/100ML
2000 INJECTION INTRAVENOUS ONCE
Status: COMPLETED | OUTPATIENT
Start: 2018-01-01 | End: 2018-01-01

## 2018-01-01 RX ORDER — DRONABINOL 10 MG/1
10 CAPSULE ORAL
Qty: 60 CAPSULE | Refills: 3 | Status: SHIPPED | OUTPATIENT
Start: 2018-01-01 | End: 2018-01-01

## 2018-01-01 RX ORDER — HEPARIN SODIUM,PORCINE 10 UNIT/ML
5-10 VIAL (ML) INTRAVENOUS
Status: DISCONTINUED | OUTPATIENT
Start: 2018-01-01 | End: 2018-01-01 | Stop reason: HOSPADM

## 2018-01-01 RX ORDER — SODIUM CHLORIDE 9 MG/ML
INJECTION, SOLUTION INTRAVENOUS CONTINUOUS
Status: DISCONTINUED | OUTPATIENT
Start: 2018-01-01 | End: 2018-01-01 | Stop reason: HOSPADM

## 2018-01-01 RX ORDER — TRAMADOL HYDROCHLORIDE 50 MG/1
100 TABLET ORAL
Status: DISCONTINUED | OUTPATIENT
Start: 2018-01-01 | End: 2018-01-01 | Stop reason: HOSPADM

## 2018-01-01 RX ORDER — HEPARIN SODIUM (PORCINE) LOCK FLUSH IV SOLN 100 UNIT/ML 100 UNIT/ML
500 SOLUTION INTRAVENOUS ONCE
Status: COMPLETED | OUTPATIENT
Start: 2018-01-01 | End: 2018-01-01

## 2018-01-01 RX ORDER — LORAZEPAM 2 MG/ML
1 INJECTION INTRAMUSCULAR EVERY 6 HOURS PRN
Status: CANCELLED
Start: 2018-01-01

## 2018-01-01 RX ORDER — LIDOCAINE HYDROCHLORIDE 10 MG/ML
INJECTION, SOLUTION INFILTRATION; PERINEURAL PRN
Status: DISCONTINUED | OUTPATIENT
Start: 2018-01-01 | End: 2018-01-01 | Stop reason: HOSPADM

## 2018-01-01 RX ORDER — LIDOCAINE 40 MG/G
CREAM TOPICAL
Status: DISCONTINUED | OUTPATIENT
Start: 2018-01-01 | End: 2018-01-01 | Stop reason: HOSPADM

## 2018-01-01 RX ORDER — MORPHINE SULFATE 4 MG/ML
4 INJECTION, SOLUTION INTRAMUSCULAR; INTRAVENOUS
Status: DISCONTINUED | OUTPATIENT
Start: 2018-01-01 | End: 2018-01-01

## 2018-01-01 RX ORDER — HEPARIN SODIUM,PORCINE 10 UNIT/ML
5-10 VIAL (ML) INTRAVENOUS EVERY 24 HOURS
Status: DISCONTINUED | OUTPATIENT
Start: 2018-01-01 | End: 2018-01-01 | Stop reason: HOSPADM

## 2018-01-01 RX ORDER — EPINEPHRINE 1 MG/ML
0.3 INJECTION, SOLUTION, CONCENTRATE INTRAVENOUS EVERY 5 MIN PRN
Status: CANCELLED | OUTPATIENT
Start: 2018-01-01

## 2018-01-01 RX ORDER — EPINEPHRINE 1 MG/ML
0.3 INJECTION, SOLUTION INTRAMUSCULAR; SUBCUTANEOUS EVERY 5 MIN PRN
Status: CANCELLED | OUTPATIENT
Start: 2018-01-01

## 2018-01-01 RX ORDER — ACETAMINOPHEN 500 MG
1000 TABLET ORAL EVERY 8 HOURS PRN
Status: DISCONTINUED | OUTPATIENT
Start: 2018-01-01 | End: 2018-01-01 | Stop reason: HOSPADM

## 2018-01-01 RX ORDER — EPINEPHRINE 0.3 MG/.3ML
0.3 INJECTION SUBCUTANEOUS EVERY 5 MIN PRN
Status: CANCELLED | OUTPATIENT
Start: 2018-01-01

## 2018-01-01 RX ORDER — ACETAMINOPHEN 325 MG/1
650 TABLET ORAL ONCE
Status: COMPLETED | OUTPATIENT
Start: 2018-01-01 | End: 2018-01-01

## 2018-01-01 RX ORDER — FUROSEMIDE 10 MG/ML
40 INJECTION INTRAMUSCULAR; INTRAVENOUS ONCE
Status: COMPLETED | OUTPATIENT
Start: 2018-01-01 | End: 2018-01-01

## 2018-01-01 RX ORDER — DEXAMETHASONE SODIUM PHOSPHATE 10 MG/ML
20 INJECTION, SOLUTION INTRAMUSCULAR; INTRAVENOUS ONCE
Status: CANCELLED | OUTPATIENT
Start: 2018-01-01 | End: 2018-01-01

## 2018-01-01 RX ORDER — HEPARIN SODIUM (PORCINE) LOCK FLUSH IV SOLN 100 UNIT/ML 100 UNIT/ML
5 SOLUTION INTRAVENOUS EVERY 8 HOURS PRN
Status: DISCONTINUED | OUTPATIENT
Start: 2018-01-01 | End: 2018-01-01 | Stop reason: HOSPADM

## 2018-01-01 RX ORDER — AMLODIPINE BESYLATE 10 MG/1
10 TABLET ORAL DAILY
Status: DISCONTINUED | OUTPATIENT
Start: 2018-01-01 | End: 2018-01-01 | Stop reason: HOSPADM

## 2018-01-01 RX ORDER — POLYETHYLENE GLYCOL 3350 17 G/17G
17-34 POWDER, FOR SOLUTION ORAL DAILY
Status: DISCONTINUED | OUTPATIENT
Start: 2018-01-01 | End: 2018-01-01 | Stop reason: HOSPADM

## 2018-01-01 RX ORDER — TRAMADOL HYDROCHLORIDE 50 MG/1
TABLET ORAL
Qty: 240 TABLET | Refills: 0 | Status: SHIPPED | OUTPATIENT
Start: 2018-01-01 | End: 2018-01-01

## 2018-01-01 RX ORDER — TRAMADOL HYDROCHLORIDE 50 MG/1
TABLET ORAL
Qty: 240 TABLET | Refills: 0 | Status: SHIPPED | OUTPATIENT
Start: 2018-01-01

## 2018-01-01 RX ORDER — LORAZEPAM 1 MG/1
1 TABLET ORAL EVERY 6 HOURS PRN
Qty: 30 TABLET | Refills: 2 | Status: SHIPPED | OUTPATIENT
Start: 2018-01-01 | End: 2018-09-11

## 2018-01-01 RX ORDER — AMOXICILLIN 250 MG
2-4 CAPSULE ORAL 2 TIMES DAILY
Status: DISCONTINUED | OUTPATIENT
Start: 2018-01-01 | End: 2018-01-01 | Stop reason: HOSPADM

## 2018-01-01 RX ORDER — PROCHLORPERAZINE MALEATE 10 MG
10 TABLET ORAL 3 TIMES DAILY
Qty: 120 TABLET | Refills: 2 | Status: SHIPPED | OUTPATIENT
Start: 2018-01-01

## 2018-01-01 RX ORDER — LORAZEPAM 2 MG/ML
1 INJECTION INTRAMUSCULAR EVERY 6 HOURS PRN
Status: CANCELLED | OUTPATIENT
Start: 2018-01-01

## 2018-01-01 RX ORDER — HEPARIN SODIUM (PORCINE) LOCK FLUSH IV SOLN 100 UNIT/ML 100 UNIT/ML
500 SOLUTION INTRAVENOUS EVERY 8 HOURS
Status: CANCELLED | OUTPATIENT
Start: 2018-01-01

## 2018-01-01 RX ORDER — OXYCODONE HYDROCHLORIDE 5 MG/1
5 TABLET ORAL EVERY 4 HOURS PRN
Status: DISCONTINUED | OUTPATIENT
Start: 2018-01-01 | End: 2018-01-01 | Stop reason: HOSPADM

## 2018-01-01 RX ORDER — AMOXICILLIN 250 MG
2 CAPSULE ORAL 2 TIMES DAILY PRN
Status: DISCONTINUED | OUTPATIENT
Start: 2018-01-01 | End: 2018-01-01 | Stop reason: HOSPADM

## 2018-01-01 RX ORDER — HEPARIN SODIUM (PORCINE) LOCK FLUSH IV SOLN 100 UNIT/ML 100 UNIT/ML
5 SOLUTION INTRAVENOUS ONCE
Status: COMPLETED | OUTPATIENT
Start: 2018-01-01 | End: 2018-01-01

## 2018-01-01 RX ORDER — NALOXONE HYDROCHLORIDE 0.4 MG/ML
.1-.4 INJECTION, SOLUTION INTRAMUSCULAR; INTRAVENOUS; SUBCUTANEOUS
Status: DISCONTINUED | OUTPATIENT
Start: 2018-01-01 | End: 2018-01-01 | Stop reason: HOSPADM

## 2018-01-01 RX ORDER — DEXTROSE, SODIUM CHLORIDE, AND POTASSIUM CHLORIDE 5; .45; .15 G/100ML; G/100ML; G/100ML
2000 INJECTION INTRAVENOUS ONCE
Status: CANCELLED
Start: 2018-01-01 | End: 2018-01-01

## 2018-01-01 RX ORDER — PROCHLORPERAZINE MALEATE 5 MG
10 TABLET ORAL 3 TIMES DAILY
Status: DISCONTINUED | OUTPATIENT
Start: 2018-01-01 | End: 2018-01-01 | Stop reason: HOSPADM

## 2018-01-01 RX ORDER — HEPARIN SODIUM (PORCINE) LOCK FLUSH IV SOLN 100 UNIT/ML 100 UNIT/ML
5 SOLUTION INTRAVENOUS EVERY 8 HOURS
Status: DISCONTINUED | OUTPATIENT
Start: 2018-01-01 | End: 2018-01-01 | Stop reason: HOSPADM

## 2018-01-01 RX ORDER — PROCHLORPERAZINE MALEATE 5 MG
5 TABLET ORAL EVERY 6 HOURS PRN
Qty: 30 TABLET | Refills: 1 | Status: SHIPPED | OUTPATIENT
Start: 2018-01-01 | End: 2018-09-11

## 2018-01-01 RX ORDER — PALONOSETRON 0.05 MG/ML
0.25 INJECTION, SOLUTION INTRAVENOUS ONCE
Status: COMPLETED | OUTPATIENT
Start: 2018-01-01 | End: 2018-01-01

## 2018-01-01 RX ORDER — OLANZAPINE 5 MG/1
5 TABLET, ORALLY DISINTEGRATING ORAL AT BEDTIME
Qty: 30 TABLET | Refills: 3 | Status: SHIPPED | OUTPATIENT
Start: 2018-01-01

## 2018-01-01 RX ORDER — EPINEPHRINE 0.3 MG/.3ML
INJECTION SUBCUTANEOUS
Status: DISPENSED
Start: 2018-01-01 | End: 2018-01-01

## 2018-01-01 RX ORDER — HEPARIN SODIUM (PORCINE) LOCK FLUSH IV SOLN 100 UNIT/ML 100 UNIT/ML
500 SOLUTION INTRAVENOUS
Status: COMPLETED | OUTPATIENT
Start: 2018-01-01 | End: 2018-01-01

## 2018-01-01 RX ORDER — PROCHLORPERAZINE MALEATE 10 MG
10 TABLET ORAL EVERY 6 HOURS PRN
Qty: 30 TABLET | Refills: 2 | Status: SHIPPED | OUTPATIENT
Start: 2018-01-01 | End: 2018-01-01

## 2018-01-01 RX ORDER — ONDANSETRON 4 MG/1
4 TABLET, ORALLY DISINTEGRATING ORAL EVERY 6 HOURS PRN
Status: DISCONTINUED | OUTPATIENT
Start: 2018-01-01 | End: 2018-01-01 | Stop reason: HOSPADM

## 2018-01-01 RX ORDER — FLUMAZENIL 0.1 MG/ML
0.2 INJECTION, SOLUTION INTRAVENOUS
Status: DISCONTINUED | OUTPATIENT
Start: 2018-01-01 | End: 2018-01-01 | Stop reason: HOSPADM

## 2018-01-01 RX ORDER — AMLODIPINE BESYLATE 2.5 MG/1
2.5 TABLET ORAL DAILY
Qty: 30 TABLET | Refills: 1 | Status: SHIPPED | OUTPATIENT
Start: 2018-01-01 | End: 2018-01-01

## 2018-01-01 RX ORDER — IOPAMIDOL 755 MG/ML
60 INJECTION, SOLUTION INTRAVASCULAR ONCE
Status: COMPLETED | OUTPATIENT
Start: 2018-01-01 | End: 2018-01-01

## 2018-01-01 RX ORDER — HEPARIN SODIUM (PORCINE) LOCK FLUSH IV SOLN 100 UNIT/ML 100 UNIT/ML
5 SOLUTION INTRAVENOUS
Status: COMPLETED | OUTPATIENT
Start: 2018-01-01 | End: 2018-01-01

## 2018-01-01 RX ORDER — LIDOCAINE HYDROCHLORIDE 10 MG/ML
INJECTION, SOLUTION EPIDURAL; INFILTRATION; INTRACAUDAL; PERINEURAL
Status: DISCONTINUED
Start: 2018-01-01 | End: 2018-01-01 | Stop reason: HOSPADM

## 2018-01-01 RX ORDER — OXYCODONE HYDROCHLORIDE 5 MG/1
2.5-5 TABLET ORAL EVERY 4 HOURS PRN
Qty: 60 TABLET | Refills: 0 | Status: SHIPPED | OUTPATIENT
Start: 2018-01-01

## 2018-01-01 RX ORDER — POLYETHYLENE GLYCOL 3350 17 G/17G
17 POWDER, FOR SOLUTION ORAL ONCE
Status: COMPLETED | OUTPATIENT
Start: 2018-01-01 | End: 2018-01-01

## 2018-01-01 RX ORDER — DIPHENHYDRAMINE HCL 25 MG
50 CAPSULE ORAL ONCE
Status: COMPLETED | OUTPATIENT
Start: 2018-01-01 | End: 2018-01-01

## 2018-01-01 RX ORDER — PALONOSETRON 0.05 MG/ML
0.25 INJECTION, SOLUTION INTRAVENOUS ONCE
Status: CANCELLED | OUTPATIENT
Start: 2018-01-01

## 2018-01-01 RX ORDER — TRAMADOL HYDROCHLORIDE 50 MG/1
100 TABLET ORAL ONCE
Status: DISCONTINUED | OUTPATIENT
Start: 2018-01-01 | End: 2018-01-01 | Stop reason: HOSPADM

## 2018-01-01 RX ORDER — LIDOCAINE HYDROCHLORIDE 10 MG/ML
10 INJECTION, SOLUTION EPIDURAL; INFILTRATION; INTRACAUDAL; PERINEURAL ONCE
Status: DISCONTINUED | OUTPATIENT
Start: 2018-01-01 | End: 2018-01-01 | Stop reason: HOSPADM

## 2018-01-01 RX ORDER — FENTANYL CITRATE 50 UG/ML
25-50 INJECTION, SOLUTION INTRAMUSCULAR; INTRAVENOUS EVERY 5 MIN PRN
Status: DISCONTINUED | OUTPATIENT
Start: 2018-01-01 | End: 2018-01-01 | Stop reason: HOSPADM

## 2018-01-01 RX ORDER — HEPARIN SODIUM (PORCINE) LOCK FLUSH IV SOLN 100 UNIT/ML 100 UNIT/ML
500 SOLUTION INTRAVENOUS EVERY 8 HOURS
Status: DISCONTINUED | OUTPATIENT
Start: 2018-01-01 | End: 2018-01-01 | Stop reason: HOSPADM

## 2018-01-01 RX ORDER — DIPHENHYDRAMINE HCL 25 MG
50 CAPSULE ORAL ONCE
Status: CANCELLED
Start: 2018-01-01

## 2018-01-01 RX ORDER — AMLODIPINE BESYLATE 10 MG/1
10 TABLET ORAL DAILY
Qty: 30 TABLET | Refills: 3 | COMMUNITY
Start: 2018-01-01

## 2018-01-01 RX ORDER — DRONABINOL 2.5 MG/1
2.5 CAPSULE ORAL
Qty: 60 CAPSULE | Refills: 1 | Status: SHIPPED | OUTPATIENT
Start: 2018-01-01

## 2018-01-01 RX ORDER — LORAZEPAM 1 MG/1
1 TABLET ORAL EVERY 6 HOURS PRN
Qty: 30 TABLET | Refills: 2 | Status: SHIPPED | OUTPATIENT
Start: 2018-01-01

## 2018-01-01 RX ORDER — HEPARIN SODIUM (PORCINE) LOCK FLUSH IV SOLN 100 UNIT/ML 100 UNIT/ML
100 SOLUTION INTRAVENOUS ONCE
Status: COMPLETED | OUTPATIENT
Start: 2018-01-01 | End: 2018-01-01

## 2018-01-01 RX ORDER — ONDANSETRON 2 MG/ML
4 INJECTION INTRAMUSCULAR; INTRAVENOUS EVERY 6 HOURS PRN
Status: DISCONTINUED | OUTPATIENT
Start: 2018-01-01 | End: 2018-01-01 | Stop reason: HOSPADM

## 2018-01-01 RX ORDER — KETOROLAC TROMETHAMINE 15 MG/ML
15 INJECTION, SOLUTION INTRAMUSCULAR; INTRAVENOUS ONCE
Status: COMPLETED | OUTPATIENT
Start: 2018-01-01 | End: 2018-01-01

## 2018-01-01 RX ORDER — OLANZAPINE 5 MG/1
5 TABLET, ORALLY DISINTEGRATING ORAL AT BEDTIME
Status: DISCONTINUED | OUTPATIENT
Start: 2018-01-01 | End: 2018-01-01 | Stop reason: HOSPADM

## 2018-01-01 RX ORDER — LORAZEPAM 0.5 MG/1
0.5 TABLET ORAL EVERY 6 HOURS PRN
Status: DISCONTINUED | OUTPATIENT
Start: 2018-01-01 | End: 2018-01-01 | Stop reason: HOSPADM

## 2018-01-01 RX ORDER — AMOXICILLIN 250 MG
1 CAPSULE ORAL 2 TIMES DAILY PRN
Status: DISCONTINUED | OUTPATIENT
Start: 2018-01-01 | End: 2018-01-01 | Stop reason: HOSPADM

## 2018-01-01 RX ORDER — ACETAMINOPHEN 325 MG/1
975 TABLET ORAL 3 TIMES DAILY PRN
Qty: 100 TABLET | Refills: 0 | COMMUNITY
Start: 2018-01-01

## 2018-01-01 RX ORDER — DIPHENHYDRAMINE HCL 25 MG
25 CAPSULE ORAL ONCE
Status: COMPLETED | OUTPATIENT
Start: 2018-01-01 | End: 2018-01-01

## 2018-01-01 RX ORDER — OXYCODONE HYDROCHLORIDE 5 MG/1
5 TABLET ORAL EVERY 4 HOURS PRN
Qty: 6 TABLET | Refills: 0 | Status: SHIPPED | OUTPATIENT
Start: 2018-01-01 | End: 2018-01-01

## 2018-01-01 RX ADMIN — SODIUM CHLORIDE, PRESERVATIVE FREE 500 UNITS: 5 INJECTION INTRAVENOUS at 16:12

## 2018-01-01 RX ADMIN — FAMOTIDINE 40 MG: 10 INJECTION INTRAVENOUS at 13:08

## 2018-01-01 RX ADMIN — CISPLATIN 90 MG: 1 INJECTION, SOLUTION INTRAVENOUS at 13:59

## 2018-01-01 RX ADMIN — LIDOCAINE HYDROCHLORIDE 10 ML: 10 INJECTION, SOLUTION INFILTRATION; PERINEURAL at 14:06

## 2018-01-01 RX ADMIN — POLYETHYLENE GLYCOL 3350 17 G: 17 POWDER, FOR SOLUTION ORAL at 16:30

## 2018-01-01 RX ADMIN — PEGFILGRASTIM 6 MG: KIT SUBCUTANEOUS at 13:08

## 2018-01-01 RX ADMIN — TOPOTECAN 1.4 MG: 1 INJECTION, SOLUTION, CONCENTRATE INTRAVENOUS at 15:40

## 2018-01-01 RX ADMIN — TRAMADOL HYDROCHLORIDE 100 MG: 50 TABLET, COATED ORAL at 11:55

## 2018-01-01 RX ADMIN — TRAMADOL HYDROCHLORIDE 100 MG: 50 TABLET, COATED ORAL at 16:30

## 2018-01-01 RX ADMIN — SODIUM CHLORIDE 1000 ML: 9 INJECTION, SOLUTION INTRAVENOUS at 14:40

## 2018-01-01 RX ADMIN — PALONOSETRON HYDROCHLORIDE 0.25 MG: 0.25 INJECTION INTRAVENOUS at 13:07

## 2018-01-01 RX ADMIN — SODIUM CHLORIDE: 9 INJECTION, SOLUTION INTRAVENOUS at 10:13

## 2018-01-01 RX ADMIN — LIDOCAINE HYDROCHLORIDE 10 ML: 10 INJECTION, SOLUTION EPIDURAL; INFILTRATION; INTRACAUDAL; PERINEURAL at 12:26

## 2018-01-01 RX ADMIN — SODIUM CHLORIDE, PRESERVATIVE FREE 500 UNITS: 5 INJECTION INTRAVENOUS at 15:24

## 2018-01-01 RX ADMIN — MIDAZOLAM 1 MG: 1 INJECTION INTRAMUSCULAR; INTRAVENOUS at 12:25

## 2018-01-01 RX ADMIN — TRAMADOL HYDROCHLORIDE 100 MG: 50 TABLET, COATED ORAL at 08:35

## 2018-01-01 RX ADMIN — HEPARIN SODIUM (PORCINE) LOCK FLUSH IV SOLN 100 UNIT/ML 5 ML: 100 SOLUTION at 07:51

## 2018-01-01 RX ADMIN — POTASSIUM CHLORIDE, DEXTROSE MONOHYDRATE AND SODIUM CHLORIDE 2000 ML: 150; 5; 450 INJECTION, SOLUTION INTRAVENOUS at 10:19

## 2018-01-01 RX ADMIN — SODIUM CHLORIDE 250 ML: 9 INJECTION, SOLUTION INTRAVENOUS at 13:57

## 2018-01-01 RX ADMIN — SODIUM CHLORIDE, PRESERVATIVE FREE 500 UNITS: 5 INJECTION INTRAVENOUS at 16:56

## 2018-01-01 RX ADMIN — SODIUM CHLORIDE 250 ML: 9 INJECTION, SOLUTION INTRAVENOUS at 13:54

## 2018-01-01 RX ADMIN — ALTEPLASE 2 MG: 2.2 INJECTION, POWDER, LYOPHILIZED, FOR SOLUTION INTRAVENOUS at 09:14

## 2018-01-01 RX ADMIN — CISPLATIN 76 MG: 1 INJECTION, SOLUTION INTRAVENOUS at 13:33

## 2018-01-01 RX ADMIN — SODIUM CHLORIDE, PRESERVATIVE FREE 500 UNITS: 5 INJECTION INTRAVENOUS at 16:01

## 2018-01-01 RX ADMIN — BEVACIZUMAB 1200 MG: 400 INJECTION, SOLUTION INTRAVENOUS at 16:24

## 2018-01-01 RX ADMIN — SENNOSIDES AND DOCUSATE SODIUM 2 TABLET: 8.6; 5 TABLET ORAL at 08:35

## 2018-01-01 RX ADMIN — FUROSEMIDE 40 MG: 10 INJECTION INTRAMUSCULAR; INTRAVENOUS at 08:55

## 2018-01-01 RX ADMIN — DEXAMETHASONE SODIUM PHOSPHATE 20 MG: 10 INJECTION, SOLUTION INTRAMUSCULAR; INTRAVENOUS at 13:11

## 2018-01-01 RX ADMIN — FENTANYL CITRATE 50 MCG: 50 INJECTION, SOLUTION INTRAMUSCULAR; INTRAVENOUS at 12:25

## 2018-01-01 RX ADMIN — SODIUM CHLORIDE, PRESERVATIVE FREE 500 UNITS: 5 INJECTION INTRAVENOUS at 14:47

## 2018-01-01 RX ADMIN — Medication 500 MG: at 20:59

## 2018-01-01 RX ADMIN — FAMOTIDINE 40 MG: 10 INJECTION INTRAVENOUS at 11:32

## 2018-01-01 RX ADMIN — PACLITAXEL 151 MG: 6 INJECTION, SOLUTION INTRAVENOUS at 12:03

## 2018-01-01 RX ADMIN — DEXAMETHASONE SODIUM PHOSPHATE 12 MG: 10 INJECTION, SOLUTION INTRAMUSCULAR; INTRAVENOUS at 13:57

## 2018-01-01 RX ADMIN — SODIUM CHLORIDE, PRESERVATIVE FREE 5 ML: 5 INJECTION INTRAVENOUS at 10:58

## 2018-01-01 RX ADMIN — ACETAMINOPHEN 650 MG: 325 TABLET ORAL at 08:45

## 2018-01-01 RX ADMIN — PALONOSETRON HYDROCHLORIDE 0.25 MG: 0.25 INJECTION INTRAVENOUS at 11:30

## 2018-01-01 RX ADMIN — SODIUM CHLORIDE 250 ML: 9 INJECTION, SOLUTION INTRAVENOUS at 12:34

## 2018-01-01 RX ADMIN — Medication 10 ML: at 07:16

## 2018-01-01 RX ADMIN — SODIUM CHLORIDE, PRESERVATIVE FREE 5 ML: 5 INJECTION INTRAVENOUS at 08:59

## 2018-01-01 RX ADMIN — TRAMADOL HYDROCHLORIDE 100 MG: 50 TABLET, COATED ORAL at 22:26

## 2018-01-01 RX ADMIN — SODIUM CHLORIDE 250 ML: 9 INJECTION, SOLUTION INTRAVENOUS at 13:06

## 2018-01-01 RX ADMIN — SODIUM CHLORIDE, PRESERVATIVE FREE 5 ML: 5 INJECTION INTRAVENOUS at 07:12

## 2018-01-01 RX ADMIN — DEXAMETHASONE SODIUM PHOSPHATE 12 MG: 10 INJECTION, SOLUTION INTRAMUSCULAR; INTRAVENOUS at 15:35

## 2018-01-01 RX ADMIN — SODIUM CHLORIDE 250 ML: 9 INJECTION, SOLUTION INTRAVENOUS at 14:22

## 2018-01-01 RX ADMIN — SODIUM CHLORIDE, PRESERVATIVE FREE 500 UNITS: 5 INJECTION INTRAVENOUS at 16:02

## 2018-01-01 RX ADMIN — DIPHENHYDRAMINE HYDROCHLORIDE 25 MG: 50 INJECTION INTRAMUSCULAR; INTRAVENOUS at 08:28

## 2018-01-01 RX ADMIN — SODIUM CHLORIDE 250 ML: 9 INJECTION, SOLUTION INTRAVENOUS at 11:32

## 2018-01-01 RX ADMIN — SODIUM CHLORIDE 250 ML: 9 INJECTION, SOLUTION INTRAVENOUS at 08:02

## 2018-01-01 RX ADMIN — POLYETHYLENE GLYCOL 3350 17 G: 17 POWDER, FOR SOLUTION ORAL at 11:56

## 2018-01-01 RX ADMIN — SODIUM CHLORIDE: 9 INJECTION, SOLUTION INTRAVENOUS at 16:30

## 2018-01-01 RX ADMIN — BEVACIZUMAB 1300 MG: 400 INJECTION, SOLUTION INTRAVENOUS at 17:56

## 2018-01-01 RX ADMIN — Medication 500 MG: at 08:35

## 2018-01-01 RX ADMIN — DIPHENHYDRAMINE HYDROCHLORIDE 25 MG: 25 CAPSULE ORAL at 11:32

## 2018-01-01 RX ADMIN — HEPARIN SODIUM (PORCINE) LOCK FLUSH IV SOLN 100 UNIT/ML 500 UNITS: 100 SOLUTION at 10:20

## 2018-01-01 RX ADMIN — SODIUM CHLORIDE, PRESERVATIVE FREE 500 UNITS: 5 INJECTION INTRAVENOUS at 12:05

## 2018-01-01 RX ADMIN — HEPARIN 5 ML: 100 SYRINGE at 14:21

## 2018-01-01 RX ADMIN — PALONOSETRON HYDROCHLORIDE 0.25 MG: 0.25 INJECTION INTRAVENOUS at 12:16

## 2018-01-01 RX ADMIN — PACLITAXEL 345 MG: 6 INJECTION, SOLUTION INTRAVENOUS at 14:24

## 2018-01-01 RX ADMIN — SODIUM CHLORIDE, PRESERVATIVE FREE 5 ML: 5 INJECTION INTRAVENOUS at 07:16

## 2018-01-01 RX ADMIN — TOPOTECAN 1.4 MG: 1 INJECTION, SOLUTION, CONCENTRATE INTRAVENOUS at 14:18

## 2018-01-01 RX ADMIN — SODIUM CHLORIDE, PRESERVATIVE FREE 500 UNITS: 5 INJECTION INTRAVENOUS at 14:49

## 2018-01-01 RX ADMIN — DRONABINOL 2.5 MG: 2.5 CAPSULE ORAL at 16:29

## 2018-01-01 RX ADMIN — HEPARIN 100 UNITS: 100 SYRINGE at 18:21

## 2018-01-01 RX ADMIN — Medication 5 ML: at 08:53

## 2018-01-01 RX ADMIN — DEXAMETHASONE SODIUM PHOSPHATE: 10 INJECTION, SOLUTION INTRAMUSCULAR; INTRAVENOUS at 12:21

## 2018-01-01 RX ADMIN — ACETAMINOPHEN 650 MG: 325 TABLET ORAL at 10:28

## 2018-01-01 RX ADMIN — RANITIDINE 75 MG: 75 TABLET, FILM COATED ORAL at 20:59

## 2018-01-01 RX ADMIN — SODIUM CHLORIDE: 9 INJECTION, SOLUTION INTRAVENOUS at 04:14

## 2018-01-01 RX ADMIN — TOPOTECAN 1.4 MG: 1 INJECTION, SOLUTION, CONCENTRATE INTRAVENOUS at 14:37

## 2018-01-01 RX ADMIN — SODIUM CHLORIDE, PRESERVATIVE FREE 500 UNITS: 5 INJECTION INTRAVENOUS at 16:13

## 2018-01-01 RX ADMIN — CARBOPLATIN 605 MG: 10 INJECTION, SOLUTION INTRAVENOUS at 17:21

## 2018-01-01 RX ADMIN — SODIUM CHLORIDE, PRESERVATIVE FREE 500 UNITS: 5 INJECTION INTRAVENOUS at 10:18

## 2018-01-01 RX ADMIN — ALTEPLASE 2 MG: 2.2 INJECTION, POWDER, LYOPHILIZED, FOR SOLUTION INTRAVENOUS at 17:26

## 2018-01-01 RX ADMIN — SODIUM CHLORIDE, PRESERVATIVE FREE 500 UNITS: 5 INJECTION INTRAVENOUS at 18:38

## 2018-01-01 RX ADMIN — Medication 20 ML: at 09:00

## 2018-01-01 RX ADMIN — DEXAMETHASONE SODIUM PHOSPHATE 12 MG: 10 INJECTION, SOLUTION INTRAMUSCULAR; INTRAVENOUS at 11:05

## 2018-01-01 RX ADMIN — KETOROLAC TROMETHAMINE 15 MG: 15 INJECTION, SOLUTION INTRAMUSCULAR; INTRAVENOUS at 13:10

## 2018-01-01 RX ADMIN — SODIUM CHLORIDE 250 ML: 9 INJECTION, SOLUTION INTRAVENOUS at 11:05

## 2018-01-01 RX ADMIN — HEPARIN SODIUM (PORCINE) LOCK FLUSH IV SOLN 100 UNIT/ML 5 ML: 100 SOLUTION at 09:00

## 2018-01-01 RX ADMIN — DEXAMETHASONE SODIUM PHOSPHATE 12 MG: 10 INJECTION, SOLUTION INTRAMUSCULAR; INTRAVENOUS at 11:36

## 2018-01-01 RX ADMIN — TOPOTECAN 1.4 MG: 1 INJECTION, SOLUTION, CONCENTRATE INTRAVENOUS at 14:17

## 2018-01-01 RX ADMIN — DIPHENHYDRAMINE HYDROCHLORIDE 50 MG: 25 CAPSULE ORAL at 13:07

## 2018-01-01 RX ADMIN — PROCHLORPERAZINE MALEATE 10 MG: 5 TABLET, FILM COATED ORAL at 16:30

## 2018-01-01 RX ADMIN — SODIUM CHLORIDE, PRESERVATIVE FREE 500 UNITS: 5 INJECTION INTRAVENOUS at 16:35

## 2018-01-01 RX ADMIN — FUROSEMIDE 40 MG: 10 INJECTION INTRAMUSCULAR; INTRAVENOUS at 10:18

## 2018-01-01 RX ADMIN — SODIUM CHLORIDE, PRESERVATIVE FREE 5 ML: 5 INJECTION INTRAVENOUS at 10:17

## 2018-01-01 RX ADMIN — DEXAMETHASONE SODIUM PHOSPHATE 12 MG: 10 INJECTION, SOLUTION INTRAMUSCULAR; INTRAVENOUS at 08:04

## 2018-01-01 RX ADMIN — SODIUM CHLORIDE 250 ML: 9 INJECTION, SOLUTION INTRAVENOUS at 15:49

## 2018-01-01 RX ADMIN — OLANZAPINE 5 MG: 5 TABLET, ORALLY DISINTEGRATING ORAL at 22:27

## 2018-01-01 RX ADMIN — AMLODIPINE BESYLATE 10 MG: 10 TABLET ORAL at 08:35

## 2018-01-01 RX ADMIN — DEXAMETHASONE SODIUM PHOSPHATE 12 MG: 10 INJECTION, SOLUTION INTRAMUSCULAR; INTRAVENOUS at 12:34

## 2018-01-01 RX ADMIN — PEGFILGRASTIM 6 MG: KIT SUBCUTANEOUS at 14:36

## 2018-01-01 RX ADMIN — SODIUM CHLORIDE 1000 ML: 9 INJECTION, SOLUTION INTRAVENOUS at 14:44

## 2018-01-01 RX ADMIN — SODIUM CHLORIDE, PRESERVATIVE FREE 5 ML: 5 INJECTION INTRAVENOUS at 10:24

## 2018-01-01 RX ADMIN — SODIUM CHLORIDE: 9 INJECTION, SOLUTION INTRAVENOUS at 10:00

## 2018-01-01 RX ADMIN — DEXAMETHASONE SODIUM PHOSPHATE 12 MG: 10 INJECTION, SOLUTION INTRAMUSCULAR; INTRAVENOUS at 13:59

## 2018-01-01 RX ADMIN — POLYETHYLENE GLYCOL 3350 17 G: 17 POWDER, FOR SOLUTION ORAL at 08:35

## 2018-01-01 RX ADMIN — PACLITAXEL 151 MG: 6 INJECTION, SOLUTION INTRAVENOUS at 09:06

## 2018-01-01 RX ADMIN — BEVACIZUMAB 1200 MG: 400 INJECTION, SOLUTION INTRAVENOUS at 16:02

## 2018-01-01 RX ADMIN — DEXAMETHASONE SODIUM PHOSPHATE 150 MG: 10 INJECTION, SOLUTION INTRAMUSCULAR; INTRAVENOUS at 13:27

## 2018-01-01 RX ADMIN — PROCHLORPERAZINE MALEATE 10 MG: 5 TABLET, FILM COATED ORAL at 08:35

## 2018-01-01 RX ADMIN — IOPAMIDOL 60 ML: 755 INJECTION, SOLUTION INTRAVENOUS at 11:12

## 2018-01-01 RX ADMIN — BEVACIZUMAB 1200 MG: 400 INJECTION, SOLUTION INTRAVENOUS at 14:55

## 2018-01-01 RX ADMIN — RANITIDINE 75 MG: 75 TABLET, FILM COATED ORAL at 08:35

## 2018-01-01 RX ADMIN — POTASSIUM CHLORIDE, DEXTROSE MONOHYDRATE AND SODIUM CHLORIDE 2000 ML: 150; 5; 450 INJECTION, SOLUTION INTRAVENOUS at 12:26

## 2018-01-01 RX ADMIN — PALONOSETRON HYDROCHLORIDE 0.25 MG: 0.25 INJECTION INTRAVENOUS at 13:23

## 2018-01-01 RX ADMIN — HEPARIN SODIUM (PORCINE) LOCK FLUSH IV SOLN 100 UNIT/ML 5 ML: 100 SOLUTION at 14:55

## 2018-01-01 RX ADMIN — SENNOSIDES AND DOCUSATE SODIUM 2 TABLET: 8.6; 5 TABLET ORAL at 21:01

## 2018-01-01 RX ADMIN — CISPLATIN 57 MG: 1 INJECTION, SOLUTION INTRAVENOUS at 12:02

## 2018-01-01 RX ADMIN — SODIUM CHLORIDE 1000 ML: 9 INJECTION, SOLUTION INTRAVENOUS at 13:10

## 2018-01-01 RX ADMIN — DEXAMETHASONE SODIUM PHOSPHATE: 10 INJECTION, SOLUTION INTRAMUSCULAR; INTRAVENOUS at 11:32

## 2018-01-01 RX ADMIN — SODIUM CHLORIDE 250 ML: 9 INJECTION, SOLUTION INTRAVENOUS at 15:23

## 2018-01-01 RX ADMIN — POTASSIUM CHLORIDE, DEXTROSE MONOHYDRATE AND SODIUM CHLORIDE 2000 ML: 150; 5; 450 INJECTION, SOLUTION INTRAVENOUS at 11:55

## 2018-01-01 RX ADMIN — PROCHLORPERAZINE MALEATE 10 MG: 5 TABLET, FILM COATED ORAL at 21:01

## 2018-01-01 RX ADMIN — TOPOTECAN 1.4 MG: 1 INJECTION, SOLUTION, CONCENTRATE INTRAVENOUS at 15:53

## 2018-01-01 RX ADMIN — DRONABINOL 2.5 MG: 2.5 CAPSULE ORAL at 08:35

## 2018-01-01 RX ADMIN — TOPOTECAN 1.1 MG: 1 INJECTION, SOLUTION, CONCENTRATE INTRAVENOUS at 11:22

## 2018-01-01 RX ADMIN — SODIUM CHLORIDE 250 ML: 9 INJECTION, SOLUTION INTRAVENOUS at 15:15

## 2018-01-01 RX ADMIN — DEXAMETHASONE SODIUM PHOSPHATE 12 MG: 10 INJECTION, SOLUTION INTRAMUSCULAR; INTRAVENOUS at 15:15

## 2018-01-01 RX ADMIN — TOPOTECAN 1.4 MG: 1 INJECTION, SOLUTION, CONCENTRATE INTRAVENOUS at 15:31

## 2018-01-01 RX ADMIN — TOPOTECAN 1.1 MG: 1 INJECTION, SOLUTION, CONCENTRATE INTRAVENOUS at 14:25

## 2018-01-01 RX ADMIN — TOPOTECAN 1.1 MG: 1 INJECTION, SOLUTION, CONCENTRATE INTRAVENOUS at 12:48

## 2018-01-01 RX ADMIN — MORPHINE SULFATE 4 MG: 4 INJECTION INTRAVENOUS at 13:11

## 2018-01-01 RX ADMIN — FAMOTIDINE 40 MG: 10 INJECTION, SOLUTION INTRAVENOUS at 08:51

## 2018-01-01 RX ADMIN — SODIUM CHLORIDE, PRESERVATIVE FREE 500 UNITS: 5 INJECTION INTRAVENOUS at 13:28

## 2018-01-01 ASSESSMENT — ENCOUNTER SYMPTOMS
MYALGIAS: 0
SHORTNESS OF BREATH: 1
COUGH: 0
CARDIOVASCULAR NEGATIVE: 1
GASTROINTESTINAL NEGATIVE: 1
SHORTNESS OF BREATH: 1
FEVER: 0

## 2018-01-01 ASSESSMENT — PAIN SCALES - GENERAL
PAINLEVEL: EXTREME PAIN (8)
PAINLEVEL: SEVERE PAIN (7)
PAINLEVEL: NO PAIN (0)
PAINLEVEL: MODERATE PAIN (5)
PAINLEVEL: SEVERE PAIN (7)
PAINLEVEL: SEVERE PAIN (6)
PAINLEVEL: EXTREME PAIN (8)
PAINLEVEL: SEVERE PAIN (6)
PAINLEVEL: SEVERE PAIN (7)
PAINLEVEL: EXTREME PAIN (8)
PAINLEVEL: SEVERE PAIN (6)
PAINLEVEL: SEVERE PAIN (7)
PAINLEVEL: SEVERE PAIN (7)
PAINLEVEL: MODERATE PAIN (5)
PAINLEVEL: SEVERE PAIN (7)
PAINLEVEL: EXTREME PAIN (9)
PAINLEVEL: SEVERE PAIN (6)
PAINLEVEL: SEVERE PAIN (7)
PAINLEVEL: EXTREME PAIN (9)
PAINLEVEL: EXTREME PAIN (8)
PAINLEVEL: NO PAIN (0)

## 2018-01-01 ASSESSMENT — PAIN DESCRIPTION - DESCRIPTORS: DESCRIPTORS: ACHING

## 2018-01-03 NOTE — PROGRESS NOTES
01/03/18 1600   Quick Adds   Type of Visit Initial OP PT Evaluation       Present No   General Information   Start of Care Date 01/03/18   Referring Physician Barbara Soto MD   Orders Evaluate and Treat as Indicated   Medical Diagnosis Gait disturbance   Pertinent history of current problem (include personal factors and/or comorbidities that impact the POC) patient with cervix CA and B knee OA with increased pain L knee.  has been getting OP chemotherapy since August with planned ending this month, they will reevaluate and decide if chemo needs to continue.  she states primary MD thinks knee pain is OA but her primary MD is in different system and I do not have access to those clinic notes.  She states chemo related fatigue has exacerbated her ability to remain active.  She denies falls   Fall Risk Screen   Have you fallen 2 or more times in the past year? No   Have you fallen and had an injury in the past year? No   Range of Motion (ROM)   ROM Comment B LE grossly WNL   Strength   Strength Comments B LE grossly at least 4+/5, pain with resistance to L knee extension   Gait   Gait Comments walks slowly into exam room with antalgic gait, decreased L knee flexion and decreased weight bearing in L stance leading to decreased R step length.    Gait Special Tests 25 Foot Timed Walk   Seconds 14   Steps 20 Steps   Comments without AD   Sensory Examination   Sensory Perception Comments monofilament 5.07 mildly impaired B toes.    Planned Therapy Interventions   Planned Therapy Interventions gait training;other (see comments)  (fatigue managment, structured increase in activity level. )   Clinical Impression   Criteria for Skilled Therapeutic Interventions Met yes, treatment indicated   PT Diagnosis impaired gait   Influenced by the following impairments pain, fatigue related to chemotherapy, decreased functional endurance   Functional limitations due to impairments impaired gait   Clinical  Presentation Evolving/Changing   Clinical Presentation Rationale comorbidities, FACIT fatigue scale, 25' timed walk   Clinical Decision Making (Complexity) Moderate complexity   Therapy Frequency other (see comments)  (6 visits)   Predicted Duration of Therapy Intervention (days/wks) 12 weeks   Risk & Benefits of therapy have been explained Yes   Patient, Family & other staff in agreement with plan of care Yes   Clinical Impression Comments patient cervix CA undergoing chemotherapy with probable L knee OA making walking for exercise more difficult.  Would benefit from use of cane in R hand and structured increase in activity level to potentially improve fatigue and allow easier community ambulation.    Goal 1   Goal Identifier AD   Goal Description patient to identify and obtain appropriate AD for community ambulation   Target Date 03/28/18   Goal 2   Goal Identifier fatigue   Goal Description improve FACIT score >20   Target Date 03/28/18   Goal 3   Goal Identifier endurance   Goal Description patient to tolerate walking or other aerobic activity x 20 minutes   Target Date 03/28/18   Total Evaluation Time   Total Evaluation Time (Minutes) 35

## 2018-01-03 NOTE — MR AVS SNAPSHOT
After Visit Summary   1/3/2018    Ashvin Tiwari    MRN: [id number]           Patient Information     Date Of Birth          1955        Visit Information        Provider Department      1/3/2018 4:00 PM Dwayne Barbour PT [facility]        Today's Diagnoses     Gait disturbance    -  1       Follow-ups after your visit        Your next 10 appointments already scheduled     Jan 11, 2018 10:00 AM CST   [facility] Lab Draw with  [facility] LAB DRAW   [facility] ([facility])    [address] [place] [address]  [address]  [facility] [address]   [phone]            Jan 11, 2018 10:30 AM CST   (Arrive by 10:15 AM)   Return Visit with ROSA Mendoza CNP   [facility] ([facility])    [address] [place] [address]  [address]  [facility] [address] [phone]            Jan 11, 2018 11:30 AM CST   Infusion 360 with  ONCOLOGY INFUSION, UC 13 ATC   [facility] ([facility])    [address] [place] [address]  [address]  [facility] [address] [phone]            Jan 17, 2018  4:30 PM CST   Treatment 60 with [facility] ([facility])    [address] [place] [address]  [address]th Floor  [facility] [address] [phone]            Jan 30, 2018  1:00 PM CST   (Arrive by 12:45 PM)   Return Visit with Briana Medina MD   [facility] ([facility])    [address] [place] [address]  [address]  [facility] [address] [phone]              Who to contact     Please call your clinic at [phone] to:    Ask questions about your health    Make or cancel appointments    Discuss your medicines    Learn about your test results    Speak to your doctor   If you have compliments or concerns about an experience at your clinic, or if you wish to file a complaint, please contact [facility]  [place] [facility] Patient Relations at [phone] or email us at [email or web address]         Additional Information About Your Visit        [employer] Information     [employer] is an electronic gateway that provides easy, online access to your medical records. With [employer], you can request a clinic appointment, read your test results, renew a prescription or communicate with your care team.     To sign up for [employer] visit the website at [email or web address]   You will be asked to enter the access code listed below, as well as some personal information. Please follow the directions to create your username and password.     Your access code is: [email or web address]  Expires: 2018  6:30 AM     Your access code will  in 90 days. If you need help or a new code, please contact your [facility] or call [phone] for assistance.        Care EveryWhere ID     This is your Care EveryWhere ID. This could be used by other organizations to access your [facility] medical records  [id number]         Blood Pressure from Last 3 Encounters:   17 150/83   17 127/72   17 154/88    Weight from Last 3 Encounters:   17 80.7 kg (177 lb 14.4 oz)   17 81 kg (178 lb 9 oz)   17 80.1 kg (176 lb 9.6 oz)              Today, you had the following     No orders found for display       Primary Care Provider Office Phone # Fax #    Chelsea Wren [phone]       [facility] [address] [facility] [address]        Equal Access to Services     LISSET LUGO AH: Hadii elvia lackey Sosyd, waaxda luqadaha, qaybta kaalmada elizabeth wray. So [facility] [phone].    ATENCIÓN: Si habla español, tiene a mae disposición servicios gratuitos de asistencia lingüística. [place] al [phone].    We comply with applicable federal civil rights laws and [place] laws. We do not discriminate on the basis  of race, color, national origin, age, disability, sex, sexual orientation, or gender identity.            Thank you!     Thank you for choosing [facility]  for your care. Our goal is always to provide you with excellent care. Hearing back from our patients is one way we can continue to improve our services. Please take a few minutes to complete the written survey that you may receive in the mail after your visit with us. Thank you!             Your Updated Medication List - Protect others around you: Learn how to safely use, store and throw away your medicines at www.disposemymeds.org.          This list is accurate as of: 1/3/18  5:39 PM.  Always use your most recent med list.                   Brand Name Dispense Instructions for use Diagnosis    acetaminophen 325 MG tablet    TYLENOL    100 tablet    Take 3 tablets (975 mg) by mouth 3 times daily    Cancer related pain       amLODIPine 2.5 MG tablet    NORVASC    30 tablet    Take 1 tablet (2.5 mg) by mouth daily    Essential hypertension       dronabinol 5 MG capsule    MARINOL    60 capsule    Take 1 capsule (5 mg) by mouth 2 times daily (before meals)    Anorexia, Malignant neoplasm of cervix, unspecified site (H)       glutamine 500 MG Tabs     120 tablet    Take 500 mg by mouth 2 times daily    Drug-induced polyneuropathy (H)       LORazepam 1 MG tablet    ATIVAN    30 tablet    Take 1 tablet (1 mg) by mouth every 6 hours as needed (Anxiety, Nausea/Vomiting or Sleep)    Malignant neoplasm of cervix, unspecified site (H)       polyethylene glycol powder    MIRALAX    510 g    Take 17-34 g (1-2 capfuls) by mouth daily    Cancer related pain, Generalized abdominal pain       prochlorperazine 10 MG tablet    COMPAZINE    30 tablet    Take 1 tablet (10 mg) by mouth every 6 hours as needed (Nausea/Vomiting)    Malignant neoplasm of cervix, unspecified site (H)       senna-docusate 8.6-50 MG per tablet    SENOKOT-S;PERICOLACE    100 tablet    Take 2-4 tablets  by mouth 2 times daily    Cancer related pain       traMADol 50 MG tablet    ULTRAM    240 tablet    Take 2 tablets (100mg) every morning when you wake up, 2 tablets (100mg) in the middle of the day and 2 tablets (100mg) before bed.  Take 1-2 tablets once a day if needed for pain not controlled with the three scheduled doses.    Malignant neoplasm of cervix, unspecified site (H), Cancer related pain

## 2018-01-11 NOTE — PROGRESS NOTES
Follow Up Notes on Referred Patient    Date: 2018       Dr. Chelsea Wren  [facility] [address]  [address] [place] [address]       RE: Ashvin Tiwari  : 1955  DENIS: 2018    Dear Dr. Chelsea Wren:    Ashvin Tiwari is a 62 year old woman with a diagnosis of stage IV poorly differentiated SCC cervix.   She is here today for follow up and disease management.       Oncology history:   17:  Seen in Gyn Onc.  Exam concerning for at least a Stage IIB cervical cancer.  Biopsy obtained consistent with poorly differentiated squamous cell ca.  MRI ordered.      17: MRI Pelvis with 7 x 7cm mass at level of cervix, protrudes into upper third of vagina, bilateral parametrial extension, abuts bilateral ureters but no obstruction, abuts rectal wall with obliteration of fat plan but no mucosal invasion, no clear bladder wall invasion, suspicious lymph nodes left hemipelvis, peritoneal nodule versus lymph node.      17 to 17:  Patient admitted to hospital with pain. CT Chest PE was negative for PE but showed multiple pulmonary nodules consistent with metastatic disease.  CT A/P showed cervical mass with regional metastatic disease as well as peritoneal nodularity.      17:  PET scan with multiple metastatic hypermetabolic lung nodules, axillary, mediastinal, hilar and abdominal lymph nodes.      17-17: Cycle #1-3 Paclitaxel/Carboplatin/Avastin.   17: PET CTIMPRESSION:   1. In this patient with a history of stage IV poorly differentiated squamous cell carcinoma of the cervix, there has been a positive response to therapy. The primary cervical mass is significantly decreased in size and demonstrates decreased FDG uptake. Similarly, the metastatic pulmonary nodules and metastatic lymphadenopathy has significantly improved.      2. Bilateral hypermetabolic level 2A lymph nodes which have slightly increased in size, of uncertain clinical significance  given the positive response in the remainder of the body. Enlargement may be related to the patient's dental and sinus disease. Recommend close attention on follow-up imaging.      3. Periapical lucencies with FDG uptake and dental caries involving multiple upper teeth. Recommend follow-up with dentistry.      4. Asymmetric radiotracer uptake in the left prevertebral soft tissues without discernible mass. Recommend close attention on follow-up imaging.      5. Small bilateral pleural effusions.     11/21/17-1/11/18: Cycle #4-6 Paclitaxel/Carboplatin/Avastin.           Today she comes to clinic feeling well. She denies any new issues/concerns. She denies any vaginal bleeding, no changes in her bowel or bladder habits, no nausea/emesis, no lower extremity edema, and no difficulties eating or sleeping. She denies any abdominal discomfort/bloating, no fevers or chills, and no chest pain or shortness of breath but does have CARMONA. She has been taking Compazine BID every day. She monitors her BP at home and reports readings of 130-150's/80's. She is taking her Norvasc. She does have neuropathy in her fingers and toes but states this is a little better since starting B6 and L-glutamine. She is drinking about 3 bottles of water per day. She would like to have one more time of additional iv fluids.        Review of Systems:    Systemic           no weight changes; no fever; no chills; no night sweats; no appetite changes  Skin           no rashes, or lesions  Eye           no irritation; no changes in vision  Tabby-Laryngeal           no dysphagia; no hoarseness   Pulmonary    no cough; no shortness of breath  Cardiovascular    no chest pain; no palpitations  Gastrointestinal    no diarrhea; no constipation; no abdominal pain; no changes in bowel habits; no blood in stool  Genitourinary   no urinary frequency; no urinary urgency; no dysuria; no pain; no abnormal vaginal discharge; no abnormal vaginal bleeding  Breast    no  breast discharge; no breast changes; no breast pain  Musculoskeletal    no myalgias; no arthralgias; no back pain  Psychiatric           no depressed mood; no anxiety    Hematologic               no tender lymph nodes; no noticeable swellings or lumps   Endocrine    no hot flashes; no heat/cold intolerance         Neurological   no tremor; no numbness and tingling; no headaches; no difficulty sleeping      Past Medical History:    Past Medical History:   Diagnosis Date     Cancer (H)     cervical     Hypertension          Past Surgical History:    Past Surgical History:   Procedure Laterality Date     BIOPSY/EXCISION LYMPH NODE(S), NEEDLE, SUPERFICIAL (CERVICAL/INGUINAL/AXILLARY) Right 9/21/2017    Procedure: BIOPSY/EXCISION LYMPH NODE(S), NEEDLE, SUPERFICIAL (CERVICAL/INGUINAL/AXILLARY);;  Surgeon: Sonu Denson PA-C;  [facility]     INSERT PORT VASCULAR ACCESS Right 9/21/2017    Procedure: INSERT PORT VASCULAR ACCESS;  Single Lumen Chest Power Port, Right Axillary Lymph Node Biopsy;  Surgeon: Sonu Denson PA-C;  Location: [facility] OR     no previous surgery           Health Maintenance Due   Topic Date Due     TETANUS IMMUNIZATION (SYSTEM ASSIGNED)  05/10/1971     HEPATITIS C SCREENING  05/10/1971     LIPID SCREEN Q5 YR FEMALE (SYSTEM ASSIGNED)  05/10/1998     MAMMO SCREEN Q2 YR (SYSTEM ASSIGNED)  05/10/2003     COLON CANCER SCREEN (SYSTEM ASSIGNED)  05/10/2003     ADVANCE DIRECTIVE PLANNING Q5 YRS  05/10/2008     INFLUENZA VACCINE (SYSTEM ASSIGNED)  09/01/2017       Current Medications:     Current Outpatient Prescriptions   Medication Sig Dispense Refill     dronabinol (MARINOL) 5 MG capsule Take 1 capsule (5 mg) by mouth 2 times daily (before meals) 60 capsule 0     glutamine 500 MG TABS Take 500 mg by mouth 2 times daily 120 tablet 3     traMADol (ULTRAM) 50 MG tablet Take 2 tablets (100mg) every morning when you wake up, 2 tablets (100mg) in the middle of the day and 2 tablets  "(100mg) before bed.  Take 1-2 tablets once a day if needed for pain not controlled with the three scheduled doses. 240 tablet 0     amLODIPine (NORVASC) 2.5 MG tablet Take 1 tablet (2.5 mg) by mouth daily 30 tablet 1     senna-docusate (SENOKOT-S;PERICOLACE) 8.6-50 MG per tablet Take 2-4 tablets by mouth 2 times daily 100 tablet 1     polyethylene glycol (MIRALAX) powder Take 17-34 g (1-2 capfuls) by mouth daily 510 g 1     LORazepam (ATIVAN) 1 MG tablet Take 1 tablet (1 mg) by mouth every 6 hours as needed (Anxiety, Nausea/Vomiting or Sleep) 30 tablet 2     acetaminophen (TYLENOL) 325 MG tablet Take 3 tablets (975 mg) by mouth 3 times daily 100 tablet 0     prochlorperazine (COMPAZINE) 10 MG tablet Take 1 tablet (10 mg) by mouth every 6 hours as needed (Nausea/Vomiting) (Patient not taking: Reported on 1/11/2018) 30 tablet 2         Allergies:      No Known Allergies     Social History:     Social History   Substance Use Topics     Smoking status: Never Smoker     Smokeless tobacco: Never Used     Alcohol use No       History   Drug Use No         Family History:       No family history on file.      Physical Exam:     /78  Pulse 114  Temp 98  F (36.7  C) (Oral)  Resp 17  Ht 1.6 m (5' 3\")  Wt 82.1 kg (181 lb 1.6 oz)  SpO2 98%  Breastfeeding? No  BMI 32.08 kg/m2  Body mass index is 32.08 kg/(m^2).    General Appearance: healthy and alert, no distress     HEENT: no thyromegaly, no palpable nodules or masses        Cardiovascular: regular rate and rhythm, no gallops, rubs or murmurs     Respiratory: lungs clear, no rales, rhonchi or wheezes, normal diaphragmatic excursion    Musculoskeletal: extremities non tender and without edema    Skin: no lesions or rashes     Neurological: normal gait, no gross defects     Psychiatric: appropriate mood and affect                               Hematological: normal cervical, supraclavicular lymph nodes     Gastrointestinal:       abdomen soft, non-tender, " non-distended, no organomegaly or masses    Genitourinary: not indicated      Assessment:    Ashvin Tiwari is a 62 year old woman with a diagnosis of stage IV poorly differentiated SCC cervix.   She is here today for follow up and disease management.      30 minutes were spent with this patient, over 50% of that time was spent in symptom management, treatment planning and in counseling and coordination of care.      Plan:     1.)        Ok to proceed with planned chemotherapy pending labs are WNL--urine protein is 30 but given this is her last cycle will not do a 24 h urine. She will have imaging and then see Dr. Soto. Reviewed appropriate use of prn medication--to be used as needed and not on a scheduled basis. Reviewed signs and symptoms for when she should contact the clinic or seek additional care. Patient to contact the clinic with any questions or concerns in the interim. Briefly discussed care of chest port post treatment. Rima BULLOCK, chemo RN, to see her and arrange iv hydration next week per patient request.      2.) Genetic risk factors were assessed and the patient does not meet the qualifications for a referral.      3.) Labs and/or tests ordered include:  Chemo labs.      4.) Health maintenance issues addressed today include annual health maintenance and non-gynecologic issues with PCP. Encouraged follow up with PCP regarding HTN. BP to be rechecked while in infusion (Cindy AGUIRRE aware).     5.)         Continue to see Palliative Care as needed.         ROSA Tavera, WHNP-BC, ANP-BC  Women's Health Nurse Practitioner  Adult Nurse Pracitioner  Division of Gynecologic Oncology          CC  Patient Care Team:  Chelsea Wren as PCP - General (Family Practice)  Sita Gallardo as Referring Physician (OB/Gyn)  Rima Saunders, RN as Continuity Care Coordinator (Gyn-Onc)  Leia Caraballo RN as Registered Nurse (Palliative)  CHELSEA WREN

## 2018-01-11 NOTE — PATIENT INSTRUCTIONS
Contact Numbers  [facility]: [phone]    After Hours:  [phone]  Triage: [phone]    Please call the [facility] Triage line if you experience a temperature greater than or equal to 100.5, shaking chills, have uncontrolled nausea, vomiting and/or diarrhea, dizziness, shortness of breath, chest pain, bleeding, unexplained bruising, or if you have any other new/concerning symptoms, questions or concerns.     If it is after hours, weekends, or holidays, please call the main hospital  at  [phone] and ask to speak to the Oncology doctor on call.     If you are having any concerning symptoms or wish to speak to a provider before your next infusion visit, please call your care coordinator or triage to notify them so we can adequately serve you.     If you need a refill on a narcotic prescription or other medication, please call triage before your infusion appointment.           January 2018 Sunday Monday Tuesday Wednesday Thursday Friday Saturday        1     2     3     EVALUATION    3:45 PM   (60 min.)   Dwayne Barbour, PT   [facility] 4     5     6       7     8     9     10     11     [facility] DRAW   10:00 AM   (15 min.)   [facility]   [facility] Lab Draw     [facility] RETURN   10:15 AM   (30 min.)   Rachelle Miranda APRN CNP   [facility]     [facility] ONC INFUSION 360   11:30 AM   (360 min.)    ONCOLOGY INFUSION   [facility] 12     13       14     15     16     17     [facility] ONC INFUSION 120    2:30 PM   (120 min.)    ONCOLOGY INFUSION   [facility]     TREATMENT 60    4:30 PM   (60 min.)   Dwayne Barbour, PT   [facility] 18     19     20       21     22     23     24     25     26     27       28     29     30     [facility] RETURN   12:45 PM   (30 min.)   Briana Medina MD   [facility] 31 February 2018 Sunday Monday Tuesday Wednesday Thursday  Friday Saturday                       1     2     3       4     5     6     7     8     9     10       11     12     13     14     PET ONCOLOGY WHOLE BODY    8:45 AM   (120 min.)   PPET1   [facility] 15     16     17       18     19     20     [facility] RETURN ACTIVE TREATMENT    8:45 AM   (30 min.)   Barbara Soto MD   [facility] 21     22     23     24       25     26     27     28                                 Lab Results:  Recent Results (from the past 12 hour(s))   CBC with platelets differential    Collection Time: 01/11/18 11:34 AM   Result Value Ref Range    WBC 3.9 (L) 4.0 - 11.0 10e9/L    RBC Count 4.22 3.8 - 5.2 10e12/L    Hemoglobin 11.4 (L) 11.7 - 15.7 g/dL    Hematocrit 36.0 35.0 - 47.0 %    MCV 85 78 - 100 fl    MCH 27.0 26.5 - 33.0 pg    MCHC 31.7 31.5 - 36.5 g/dL    RDW 23.1 (H) 10.0 - 15.0 %    Platelet Count 176 150 - 450 10e9/L    Diff Method Automated Method     % Neutrophils 47.1 %    % Lymphocytes 36.7 %    % Monocytes 15.1 %    % Eosinophils 0.3 %    % Basophils 0.5 %    % Immature Granulocytes 0.3 %    Nucleated RBCs 0 0 /100    Absolute Neutrophil 1.8 1.6 - 8.3 10e9/L    Absolute Lymphocytes 1.4 0.8 - 5.3 10e9/L    Absolute Monocytes 0.6 0.0 - 1.3 10e9/L    Absolute Eosinophils 0.0 0.0 - 0.7 10e9/L    Absolute Basophils 0.0 0.0 - 0.2 10e9/L    Abs Immature Granulocytes 0.0 0 - 0.4 10e9/L    Absolute Nucleated RBC 0.0    Comprehensive metabolic panel    Collection Time: 01/11/18 11:34 AM   Result Value Ref Range    Sodium 139 133 - 144 mmol/L    Potassium 3.6 3.4 - 5.3 mmol/L    Chloride 106 94 - 109 mmol/L    Carbon Dioxide 27 20 - 32 mmol/L    Anion Gap 6 3 - 14 mmol/L    Glucose 139 (H) 70 - 99 mg/dL    Urea Nitrogen 9 7 - 30 mg/dL    Creatinine 0.76 0.52 - 1.04 mg/dL    GFR Estimate 76 >60 mL/min/1.7m2    GFR Estimate If Black >90 >60 mL/min/1.7m2    Calcium 8.8 8.5 - 10.1 mg/dL    Bilirubin Total 0.2 0.2 - 1.3 mg/dL    Albumin 3.3 (L) 3.4 -  5.0 g/dL    Protein Total 8.5 6.8 - 8.8 g/dL    Alkaline Phosphatase 95 40 - 150 U/L    ALT 20 0 - 50 U/L    AST 23 0 - 45 U/L   Magnesium    Collection Time: 01/11/18 11:34 AM   Result Value Ref Range    Magnesium 1.8 1.6 - 2.3 mg/dL   Protein qualitative urine    Collection Time: 01/11/18 12:08 PM   Result Value Ref Range    Protein Albumin Urine 30 (A) NEG^Negative mg/dL

## 2018-01-11 NOTE — MR AVS SNAPSHOT
After Visit Summary   1/11/2018    Ashvin Tiwari    MRN: [id number]           Patient Information     Date Of Birth          1955        Visit Information        Provider Department      1/11/2018 10:30 AM Rachelle Miranda APRN CNP [facility]        Today's Diagnoses     Malignant neoplasm of cervix, unspecified site (H)    -  1    Encounter for antineoplastic chemotherapy        Cervix cancer (H)           Follow-ups after your visit        Your next 10 appointments already scheduled     Jan 17, 2018  2:30 PM CST   Infusion 120 with [facility] ONCOL[facility]Y INFUSION, [facility] 19 ATC   [facility]    [address]  [address]  [facility] [address]   [phone]            Jan 17, 2018  4:30 PM CST   Treatment 60 with Dwayne Barbour PT   [facility] ([facility])    [address]  [address]  [facility] [address]   [phone]            Jan 30, 2018  1:00 PM CST   (Arrive by 12:45 PM)   Return Visit with Briana Medina MD   [facility]    [address]  [address]  [facility] [address]   [phone]            Feb 14, 2018  9:00 AM CST   (Arrive by 8:45 AM)   [facility] [place] [address] [facility] ([facility])    [address] [facility] [address]   [phone]           Tell your doctor:   If there is any chance you may be pregnant or if you are breastfeeding.   If you have problems lying in small spaces (claustrophobia). If you do, your doctor may give you medicine to help you relax. If you have diabetes:   Have your exam early in the morning. Your blood glucose will go up as the day goes by.   Your glucose level must be 180 or less at the start of the exam. Please take any medicines you need to ensure this blood glucose level. 24 hours before  "your scan: Don t do any heavy exercise. (No jogging, aerobics or other workouts.) Exercise will make your pictures less accurate. 6 hours before your scan:   Stop all food and liquids (except water).   Do not chew gum or suck on mints.   If you need to take medicine with food, you may take it with a few crackers.  Please call your Imaging Department at your exam site with any questions.            Feb 20, 2018  9:00 AM CST   (Arrive by 8:45 AM)   Return Active Treatment with Barbara Soto MD   [facility] ([facility])    [address]  [address]  [facility] [address] [phone]              Future tests that were ordered for you today     Open Future Orders        Priority Expected Expires Ordered    PET Oncology Whole Body Routine  1/10/2019 1/10/2018            Who to contact     If you have questions or need follow up information about today's clinic visit or your schedule please contact [facility] directly at [phone].  Normal or non-critical lab and imaging results will be communicated to you by MyChart, letter or phone within 4 business days after the clinic has received the results. If you do not hear from us within 7 days, please contact the clinic through MyChart or phone. If you have a critical or abnormal lab result, we will notify you by phone as soon as possible.  Submit refill requests through [employer] or call your pharmacy and they will forward the refill request to us. Please allow 3 business days for your refill to be completed.          Additional Information About Your Visit        [employer] Information     [employer] lets you send messages to your doctor, view your test results, renew your prescriptions, schedule appointments and more. To sign up, go to [email or web address] . Click on \"Log in\" on the left side of the screen, which will take you to the Welcome page. Then click on \"Sign up Now\" on the right side of " "the page.     You will be asked to enter the access code listed below, as well as some personal information. Please follow the directions to create your username and password.     Your access code is: [email or web address]  Expires: 2018  6:30 AM     Your access code will  in 90 days. If you need help or a new code, please call your [facility] or [phone].        Care EveryWhere ID     This is your Care EveryWhere ID. This could be used by other organizations to access your [place] medical records  [id number]        Your Vitals Were     Pulse Temperature Respirations Height Pulse Oximetry Breastfeeding?    114 98  F (36.7  C) (Oral) 17 1.6 m (5' 3\") 98% No    BMI (Body Mass Index)                   32.08 kg/m2            Blood Pressure from Last 3 Encounters:   18 143/83   18 156/78   17 150/83    Weight from Last 3 Encounters:   18 82.1 kg (181 lb 1.6 oz)   17 80.7 kg (177 lb 14.4 oz)   17 81 kg (178 lb 9 oz)              We Performed the Following     Vitamin B6        Primary Care Provider Office Phone # Fax #    Chelsea Wren [phone]       [facility] [address] [address]        Equal Access to Services     LISSET LUGO : Hadii elvia lackye Sosyd, waaxda luqadaha, qaybta kaalmada kamala, elizabeth ga . So [facility] [phone].    ATENCIÓN: Si habla español, tiene a mae disposición servicios gratuitos de asistencia lingüística. [place] al [phone].    We comply with applicable federal civil rights laws and [place] laws. We do not discriminate on the basis of race, color, national origin, age, disability, sex, sexual orientation, or gender identity.            Thank you!     Thank you for choosing [facility]  for your care. Our goal is always to provide you with excellent care. Hearing back from our patients is one way we can continue to improve our services. " Please take a few minutes to complete the written survey that you may receive in the mail after your visit with us. Thank you!             Your Updated Medication List - Protect others around you: Learn how to safely use, store and throw away your medicines at www.disposemymeds.org.          This list is accurate as of: 1/11/18  1:28 PM.  Always use your most recent med list.                   Brand Name Dispense Instructions for use Diagnosis    acetaminophen 325 MG tablet    TYLENOL    100 tablet    Take 3 tablets (975 mg) by mouth 3 times daily    Cancer related pain       amLODIPine 2.5 MG tablet    NORVASC    30 tablet    Take 1 tablet (2.5 mg) by mouth daily    Essential hypertension       dronabinol 5 MG capsule    MARINOL    60 capsule    Take 1 capsule (5 mg) by mouth 2 times daily (before meals)    Anorexia, Malignant neoplasm of cervix, unspecified site (H)       glutamine 500 MG Tabs     120 tablet    Take 500 mg by mouth 2 times daily    Drug-induced polyneuropathy (H)       LORazepam 1 MG tablet    ATIVAN    30 tablet    Take 1 tablet (1 mg) by mouth every 6 hours as needed (Anxiety, Nausea/Vomiting or Sleep)    Malignant neoplasm of cervix, unspecified site (H)       polyethylene glycol powder    MIRALAX    510 g    Take 17-34 g (1-2 capfuls) by mouth daily    Cancer related pain, Generalized abdominal pain       prochlorperazine 10 MG tablet    COMPAZINE    30 tablet    Take 1 tablet (10 mg) by mouth every 6 hours as needed (Nausea/Vomiting)    Malignant neoplasm of cervix, unspecified site (H)       senna-docusate 8.6-50 MG per tablet    SENOKOT-S;PERICOLACE    100 tablet    Take 2-4 tablets by mouth 2 times daily    Cancer related pain       traMADol 50 MG tablet    ULTRAM    240 tablet    Take 2 tablets (100mg) every morning when you wake up, 2 tablets (100mg) in the middle of the day and 2 tablets (100mg) before bed.  Take 1-2 tablets once a day if needed for pain not controlled with the three  scheduled doses.    Malignant neoplasm of cervix, unspecified site (H), Cancer related pain

## 2018-01-11 NOTE — LETTER
2018       RE: Ashvin Tiwari  [address]  [facility] [place] [address]     Dear Colleague,    Thank you for referring your patient, Ashvin Tiwari, to the [facility]. Please see a copy of my visit note below.                Follow Up Notes on Referred Patient    Date: 2018       Dr. Chelsea Wren  [facility] [address]  [address] [place] [address]       RE: Ashvin Tiwari  : 1955  DENIS: 2018    Dear Dr. Chelsea Wren:    Ashvin Tiwari is a 62 year old woman with a diagnosis of stage IV poorly differentiated SCC cervix.   She is here today for follow up and disease management.       Oncology history:   17:  Seen in Gyn Onc.  Exam concerning for at least a Stage IIB cervical cancer.  Biopsy obtained consistent with poorly differentiated squamous cell ca.  MRI ordered.      17: MRI Pelvis with 7 x 7cm mass at level of cervix, protrudes into upper third of vagina, bilateral parametrial extension, abuts bilateral ureters but no obstruction, abuts rectal wall with obliteration of fat plan but no mucosal invasion, no clear bladder wall invasion, suspicious lymph nodes left hemipelvis, peritoneal nodule versus lymph node.      17 to 17:  Patient admitted to hospital with pain. CT Chest PE was negative for PE but showed multiple pulmonary nodules consistent with metastatic disease.  CT A/P showed cervical mass with regional metastatic disease as well as peritoneal nodularity.      17:  PET scan with multiple metastatic hypermetabolic lung nodules, axillary, mediastinal, hilar and abdominal lymph nodes.      17-17: Cycle #1-3 Paclitaxel/Carboplatin/Avastin.   17: PET CTIMPRESSION:   1. In this patient with a history of stage IV poorly differentiated squamous cell carcinoma of the cervix, there has been a positive response to therapy. The primary cervical mass is significantly decreased in size and demonstrates  decreased FDG uptake. Similarly, the metastatic pulmonary nodules and metastatic lymphadenopathy has significantly improved.      2. Bilateral hypermetabolic level 2A lymph nodes which have slightly increased in size, of uncertain clinical significance given the positive response in the remainder of the body. Enlargement may be related to the patient's dental and sinus disease. Recommend close attention on follow-up imaging.      3. Periapical lucencies with FDG uptake and dental caries involving multiple upper teeth. Recommend follow-up with dentistry.      4. Asymmetric radiotracer uptake in the left prevertebral soft tissues without discernible mass. Recommend close attention on follow-up imaging.      5. Small bilateral pleural effusions.     11/21/17-1/11/18: Cycle #4-6 Paclitaxel/Carboplatin/Avastin.           Today she comes to clinic feeling well. She denies any new issues/concerns. She denies any vaginal bleeding, no changes in her bowel or bladder habits, no nausea/emesis, no lower extremity edema, and no difficulties eating or sleeping. She denies any abdominal discomfort/bloating, no fevers or chills, and no chest pain or shortness of breath but does have CARMONA. She has been taking Compazine BID every day. She monitors her BP at home and reports readings of 130-150's/80's. She is taking her Norvasc. She does have neuropathy in her fingers and toes but states this is a little better since starting B6 and L-glutamine. She is drinking about 3 bottles of water per day. She would like to have one more time of additional iv fluids.        Review of Systems:    Systemic           no weight changes; no fever; no chills; no night sweats; no appetite changes  Skin           no rashes, or lesions  Eye           no irritation; no changes in vision  Tabby-Laryngeal           no dysphagia; no hoarseness   Pulmonary    no cough; no shortness of breath  Cardiovascular    no chest pain; no palpitations  Gastrointestinal     no diarrhea; no constipation; no abdominal pain; no changes in bowel habits; no blood in stool  Genitourinary   no urinary frequency; no urinary urgency; no dysuria; no pain; no abnormal vaginal discharge; no abnormal vaginal bleeding  Breast    no breast discharge; no breast changes; no breast pain  Musculoskeletal    no myalgias; no arthralgias; no back pain  Psychiatric           no depressed mood; no anxiety    Hematologic               no tender lymph nodes; no noticeable swellings or lumps   Endocrine    no hot flashes; no heat/cold intolerance         Neurological   no tremor; no numbness and tingling; no headaches; no difficulty sleeping      Past Medical History:    Past Medical History:   Diagnosis Date     Cancer (H)     cervical     Hypertension          Past Surgical History:    Past Surgical History:   Procedure Laterality Date     BIOPSY/EXCISION LYMPH NODE(S), NEEDLE, SUPERFICIAL (CERVICAL/INGUINAL/AXILLARY) Right 9/21/2017    Procedure: BIOPSY/EXCISION LYMPH NODE(S), NEEDLE, SUPERFICIAL (CERVICAL/INGUINAL/AXILLARY);;  Surgeon: Sonu Denson PA-C;  Location: [facility] OR     INSERT PORT VASCULAR ACCESS Right 9/21/2017    Procedure: INSERT PORT VASCULAR ACCESS;  Single Lumen Chest Power Port, Right Axillary Lymph Node Biopsy;  Surgeon: Sonu Denson PA-C;  Location: [facility] OR     no previous surgery           Health Maintenance Due   Topic Date Due     TETANUS IMMUNIZATION (SYSTEM ASSIGNED)  05/10/1971     HEPATITIS C SCREENING  05/10/1971     LIPID SCREEN Q5 YR FEMALE (SYSTEM ASSIGNED)  05/10/1998     MAMMO SCREEN Q2 YR (SYSTEM ASSIGNED)  05/10/2003     COLON CANCER SCREEN (SYSTEM ASSIGNED)  05/10/2003     ADVANCE DIRECTIVE PLANNING Q5 YRS  05/10/2008     INFLUENZA VACCINE (SYSTEM ASSIGNED)  09/01/2017       Current Medications:     Current Outpatient Prescriptions   Medication Sig Dispense Refill     dronabinol (MARINOL) 5 MG capsule Take 1 capsule (5 mg) by mouth 2 times daily  "(before meals) 60 capsule 0     glutamine 500 MG TABS Take 500 mg by mouth 2 times daily 120 tablet 3     traMADol (ULTRAM) 50 MG tablet Take 2 tablets (100mg) every morning when you wake up, 2 tablets (100mg) in the middle of the day and 2 tablets (100mg) before bed.  Take 1-2 tablets once a day if needed for pain not controlled with the three scheduled doses. 240 tablet 0     amLODIPine (NORVASC) 2.5 MG tablet Take 1 tablet (2.5 mg) by mouth daily 30 tablet 1     senna-docusate (SENOKOT-S;PERICOLACE) 8.6-50 MG per tablet Take 2-4 tablets by mouth 2 times daily 100 tablet 1     polyethylene glycol (MIRALAX) powder Take 17-34 g (1-2 capfuls) by mouth daily 510 g 1     LORazepam (ATIVAN) 1 MG tablet Take 1 tablet (1 mg) by mouth every 6 hours as needed (Anxiety, Nausea/Vomiting or Sleep) 30 tablet 2     acetaminophen (TYLENOL) 325 MG tablet Take 3 tablets (975 mg) by mouth 3 times daily 100 tablet 0     prochlorperazine (COMPAZINE) 10 MG tablet Take 1 tablet (10 mg) by mouth every 6 hours as needed (Nausea/Vomiting) (Patient not taking: Reported on 1/11/2018) 30 tablet 2         Allergies:      No Known Allergies     Social History:     Social History   Substance Use Topics     Smoking status: Never Smoker     Smokeless tobacco: Never Used     Alcohol use No       History   Drug Use No         Family History:       No family history on file.      Physical Exam:     /78  Pulse 114  Temp 98  F (36.7  C) (Oral)  Resp 17  Ht 1.6 m (5' 3\")  Wt 82.1 kg (181 lb 1.6 oz)  SpO2 98%  Breastfeeding? No  BMI 32.08 kg/m2  Body mass index is 32.08 kg/(m^2).    General Appearance: healthy and alert, no distress     HEENT: no thyromegaly, no palpable nodules or masses        Cardiovascular: regular rate and rhythm, no gallops, rubs or murmurs     Respiratory: lungs clear, no rales, rhonchi or wheezes, normal diaphragmatic excursion    Musculoskeletal: extremities non tender and without edema    Skin: no lesions or " rashes     Neurological: normal gait, no gross defects     Psychiatric: appropriate mood and affect                               Hematological: normal cervical, supraclavicular lymph nodes     Gastrointestinal:       abdomen soft, non-tender, non-distended, no organomegaly or masses    Genitourinary: not indicated      Assessment:    Ashvin Tiwari is a 62 year old woman with a diagnosis of stage IV poorly differentiated SCC cervix.   She is here today for follow up and disease management.      30 minutes were spent with this patient, over 50% of that time was spent in symptom management, treatment planning and in counseling and coordination of care.      Plan:     1.)        Ok to proceed with planned chemotherapy pending labs are WNL--urine protein is 30 but given this is her last cycle will not do a 24 h urine. She will have imaging and then see Dr. Soto. Reviewed appropriate use of prn medication--to be used as needed and not on a scheduled basis. Reviewed signs and symptoms for when she should contact the clinic or seek additional care. Patient to contact the clinic with any questions or concerns in the interim. Briefly discussed care of chest port post treatment. Rima BULLOCK, chemo RN, to see her and arrange iv hydration next week per patient request.      2.) Genetic risk factors were assessed and the patient does not meet the qualifications for a referral.      3.) Labs and/or tests ordered include:  Chemo labs.      4.) Health maintenance issues addressed today include annual health maintenance and non-gynecologic issues with PCP. Encouraged follow up with PCP regarding HTN. BP to be rechecked while in infusion (Cindy AGUIRRE aware).     5.)         Continue to see Palliative Care as needed.         ROSA Tavera, WHNP-BC, ANP-BC  Women's Health Nurse Practitioner  Adult Nurse Pracitioner  Division of Gynecologic Oncology      CC  Patient Care Team:  Chelsea Wren PCP - General (Family  Practice)  Sita Gallardo as Referring Physician (OB/Gyn)  Rima Saunders, RN as Continuity Care Coordinator (Gyn-Onc)  Leia Caraballo, RN as Registered Nurse (Palliative)

## 2018-01-11 NOTE — NURSING NOTE
"Oncology Rooming Note    January 11, 2018 10:26 AM   Ashvin Tiwari is a 62 year old female who presents for:    Chief Complaint   Patient presents with     Oncology Clinic Visit     return patient visit for pre-infusion follow up related to Cervix cancer (H)     Initial Vitals: BP (!) 172/91  Pulse 119  Temp 98  F (36.7  C) (Oral)  Resp 17  Ht 1.6 m (5' 3\")  Wt 82.1 kg (181 lb 1.6 oz)  SpO2 98%  Breastfeeding? No  BMI 32.08 kg/m2 Estimated body mass index is 32.08 kg/(m^2) as calculated from the following:    Height as of this encounter: 1.6 m (5' 3\").    Weight as of this encounter: 82.1 kg (181 lb 1.6 oz). Body surface area is 1.91 meters squared.  Severe Pain (7) Comment: Data Unavailable   No LMP recorded. Patient is postmenopausal.  Allergies reviewed: Yes  Medications reviewed: Yes    Medications: Medication refills not needed today.  Pharmacy name entered into EPIC:    [facility] (USE ONLY [facility] [place] - [place], [place] - [address] [id number] - [place], [place] - [address] [facility]    Clinical concerns: neck and knee(s) pain - 7/10 - took tylenol for pain relief efren was notified.    6 minutes for nursing intake (face to face time)     Trey Aponte CMA              "

## 2018-01-11 NOTE — PROGRESS NOTES
Infusion Nursing Note:  Ashvin Tiwari presents today for C6D1 TAxol/Carboplatin/Avastin.    Patient seen by provider today: Yes: Rachelle RIOS   present during visit today: Not Applicable.    Note: Patient fell well today. No complaints made. Informed patient if experienced hypersensitivity reaction symptoms, such as SOB, chest discomfort, warm feeling, flushing, itchiness and anything he is not comfortable or unusual feeling to call us. Verbalized understanding.       Intravenous Access:  Implanted Port.    Treatment Conditions:  Lab Results   Component Value Date    HGB 11.4 01/11/2018     Lab Results   Component Value Date    WBC 3.9 01/11/2018      Lab Results   Component Value Date    ANEU 1.8 01/11/2018     Lab Results   Component Value Date     01/11/2018      Lab Results   Component Value Date     01/11/2018                   Lab Results   Component Value Date    POTASSIUM 3.6 01/11/2018           Lab Results   Component Value Date    MAG 1.8 01/11/2018            Lab Results   Component Value Date    CR 0.76 01/11/2018                   Lab Results   Component Value Date    BEATRIZ 8.8 01/11/2018                Lab Results   Component Value Date    BILITOTAL 0.2 01/11/2018           Lab Results   Component Value Date    ALBUMIN 3.3 01/11/2018                    Lab Results   Component Value Date    ALT 20 01/11/2018           Lab Results   Component Value Date    AST 23 01/11/2018     Results reviewed, labs MET treatment parameters, ok to proceed with treatment.    TORB: 1/11/18/1315H/Rachelle RIOS/Cindy Lund/ Protein Urine 30, To proceed with Avastin as ordered.No intervention needed at this time.      Post Infusion Assessment:  Patient tolerated infusion without incident.  Blood return noted pre and post infusion.  Site patent and intact, free from redness, edema or discomfort.  No evidence of extravasations.  Access discontinued per protocol.    Discharge Plan:    Patient declined prescription refills.  Discharge instructions reviewed with: Patient and Family.  Patient and/or family verbalized understanding of discharge instructions and all questions answered.  Copy of AVS reviewed with patient and/or family.  Patient will return 2/20/18 for next appointment.  Patient discharged in stable condition accompanied by: daughter.  Departure Mode: Ambulatory.    CARMITA LIANG RN

## 2018-01-11 NOTE — MR AVS SNAPSHOT
After Visit Summary   1/11/2018    Ashvin Tiwari    MRN: [id number]           Patient Information     Date Of Birth          1955        Visit Information        Provider Department      1/11/2018 11:30 AM  13 ATC;  ONCOLOGY INFUSION M [facility]        Today's Diagnoses     Malignant neoplasm of cervix, unspecified site (H)    -  1      Care Instructions    Contact Numbers  [facility]: [phone]    After Hours:  [phone]  Triage: [phone]    Please call the [facility] Triage line if you experience a temperature greater than or equal to 100.5, shaking chills, have uncontrolled nausea, vomiting and/or diarrhea, dizziness, shortness of breath, chest pain, bleeding, unexplained bruising, or if you have any other new/concerning symptoms, questions or concerns.     If it is after hours, weekends, or holidays, please call the main hospital  at  [phone] and ask to speak to the Oncology doctor on call.     If you are having any concerning symptoms or wish to speak to a provider before your next infusion visit, please call your care coordinator or triage to notify them so we can adequately serve you.     If you need a refill on a narcotic prescription or other medication, please call triage before your infusion appointment.           January 2018 Sunday Monday Tuesday Wednesday Thursday Friday Saturday        1     2     3     EVALUATION    3:45 PM   (60 min.)   Dwayne Barbour PT   [facility] 4     5     6       7     8     9     10     11     [facility] LAB DRAW   10:00 AM   (15 min.)   [facility] LAB DRAW   [facility] Lab Draw     P RETURN   10:15 AM   (30 min.)   Rachelle Miranda APRN CNP   [facility]   11:30 AM   (360 min.)    ONCOLOGY INFUSION   [facility] 12     13       14     15     16     17     [facility] ONC INFUSION 120    2:30 PM   (120 min.)     ONCOLOGY INFUSION   [facility]     TREATMENT 60    4:30 PM   (60 min.)   Dwayne Barboru PT   [facility] 18     19     20       21     22     23     24     25     26     27       28     29     30     [facility] RETURN   12:45 PM   (30 min.)   Briana Medina MD   [facility] 31 February 2018 Sunday Monday Tuesday Wednesday Thursday Friday Saturday                       1     2     3       4     5     6     7     8     9     10       11     12     13     14     PET ONCOLOGY WHOLE BODY    8:45 AM   (120 min.)   [facility] [place] [address] [facility] Imaging Research 15     16     17       18     19     20     [facility] RETURN ACTIVE TREATMENT    8:45 AM   (30 min.)   Barbara Soto MD   [facility] 21     22     23     24       25     26     27     28                                 Lab Results:  Recent Results (from the past 12 hour(s))   CBC with platelets differential    Collection Time: 01/11/18 11:34 AM   Result Value Ref Range    WBC 3.9 (L) 4.0 - 11.0 10e9/L    RBC Count 4.22 3.8 - 5.2 10e12/L    Hemoglobin 11.4 (L) 11.7 - 15.7 g/dL    Hematocrit 36.0 35.0 - 47.0 %    MCV 85 78 - 100 fl    MCH 27.0 26.5 - 33.0 pg    MCHC 31.7 31.5 - 36.5 g/dL    RDW 23.1 (H) 10.0 - 15.0 %    Platelet Count 176 150 - 450 10e9/L    Diff Method Automated Method     % Neutrophils 47.1 %    % Lymphocytes 36.7 %    % Monocytes 15.1 %    % Eosinophils 0.3 %    % Basophils 0.5 %    % Immature Granulocytes 0.3 %    Nucleated RBCs 0 0 /100    Absolute Neutrophil 1.8 1.6 - 8.3 10e9/L    Absolute Lymphocytes 1.4 0.8 - 5.3 10e9/L    Absolute Monocytes 0.6 0.0 - 1.3 10e9/L    Absolute Eosinophils 0.0 0.0 - 0.7 10e9/L    Absolute Basophils 0.0 0.0 - 0.2 10e9/L    Abs Immature Granulocytes 0.0 0 - 0.4 10e9/L    Absolute Nucleated RBC 0.0    Comprehensive metabolic panel    Collection Time: 01/11/18 11:34 AM   Result Value Ref Range    Sodium 139 133 - 144 mmol/L     Potassium 3.6 3.4 - 5.3 mmol/L    Chloride 106 94 - 109 mmol/L    Carbon Dioxide 27 20 - 32 mmol/L    Anion Gap 6 3 - 14 mmol/L    Glucose 139 (H) 70 - 99 mg/dL    Urea Nitrogen 9 7 - 30 mg/dL    Creatinine 0.76 0.52 - 1.04 mg/dL    GFR Estimate 76 >60 mL/min/1.7m2    GFR Estimate If Black >90 >60 mL/min/1.7m2    Calcium 8.8 8.5 - 10.1 mg/dL    Bilirubin Total 0.2 0.2 - 1.3 mg/dL    Albumin 3.3 (L) 3.4 - 5.0 g/dL    Protein Total 8.5 6.8 - 8.8 g/dL    Alkaline Phosphatase 95 40 - 150 U/L    ALT 20 0 - 50 U/L    AST 23 0 - 45 U/L   Magnesium    Collection Time: 01/11/18 11:34 AM   Result Value Ref Range    Magnesium 1.8 1.6 - 2.3 mg/dL   Protein qualitative urine    Collection Time: 01/11/18 12:08 PM   Result Value Ref Range    Protein Albumin Urine 30 (A) NEG^Negative mg/dL               Follow-ups after your visit        Your next 10 appointments already scheduled     Jan 17, 2018  2:30 PM CST   Infusion 120 with UC ONCOLOGY INFUSION, UC 19 ATC   [facility] ([facility])    [address] [place] [address]  [address] [place] [address] [id number] [phone]            Jan 17, 2018  4:30 PM CST   Treatment 60 with Dwayne Barbour PT   [facility] ([facility])    [address] [place] [address]  4th Floor  [facility] [id number] [phone]            Jan 30, 2018  1:00 PM CST   (Arrive by 12:45 PM)   Return Visit with Briana Medina MD   [facility] ([facility])    [address] [place] [address]  [address] [place] [address] [id number] [phone]            Feb 14, 2018  9:00 AM CST   (Arrive by 8:45 AM)   [facility]   [facility] ([facility]    [address] [facility] [address]   [phone]           Tell your doctor:   If there is any chance you may be pregnant or if you are breastfeeding.   If you have problems lying in small spaces  (claustrophobia). If you do, your doctor may give you medicine to help you relax. If you have diabetes:   Have your exam early in the morning. Your blood glucose will go up as the day goes by.   Your glucose level must be 180 or less at the start of the exam. Please take any medicines you need to ensure this blood glucose level. 24 hours before your scan: Don t do any heavy exercise. (No jogging, aerobics or other workouts.) Exercise will make your pictures less accurate. 6 hours before your scan:   Stop all food and liquids (except water).   Do not chew gum or suck on mints.   If you need to take medicine with food, you may take it with a few crackers.  Please call your Imaging Department at your exam site with any questions.            Feb 20, 2018  9:00 AM CST   (Arrive by 8:45 AM)   Return Active Treatment with Barbara Soto MD   [facility] ([facility])    [address] [place] [address]  [address] [place] [id number] [phone]              Future tests that were ordered for you today     Open Future Orders        Priority Expected Expires Ordered    PET Oncology Whole Body Routine  1/10/2019 1/10/2018            Who to contact     If you have questions or need follow up information about today's clinic visit or your schedule please contact [facility]CER [facility] directly at [phone].  Normal or non-critical lab and imaging results will be communicated to you by MyChart, letter or phone within 4 business days after the clinic has received the results. If you do not hear from us within 7 days, please contact the clinic through MyChart or phone. If you have a critical or abnormal lab result, we will notify you by phone as soon as possible.  Submit refill requests through [employer] or call your pharmacy and they will forward the refill request to us. Please allow 3 business days for your refill to be completed.          Additional Information About Your  "Visit        Comeethart Information     [employer] lets you send messages to your doctor, view your test results, renew your prescriptions, schedule appointments and more. To sign up, go to [email or web address] . Click on \"Log in\" on the left side of the screen, which will take you to the Welcome page. Then click on \"Sign up Now\" on the right side of the page.     You will be asked to enter the access code listed below, as well as some personal information. Please follow the directions to create your username and password.     Your access code is: [email or web address]  Expires: 2018  6:30 AM     Your access code will  in 90 days. If you need help or a new code, please call your [place] clinic or [phone].        Care EveryWhere ID     This is your Care EveryWhere ID. This could be used by other organizations to access your [place] medical records  [id number]        Your Vitals Were     Pulse                   98            Blood Pressure from Last 3 Encounters:   18 143/83   18 156/78   17 150/83    Weight from Last 3 Encounters:   18 82.1 kg (181 lb 1.6 oz)   17 80.7 kg (177 lb 14.4 oz)   17 81 kg (178 lb 9 oz)              We Performed the Following     CBC with platelets differential     Comprehensive metabolic panel     Magnesium     Protein qualitative urine        Primary Care Provider Office Phone # Fax #    Chelsea Wren [phone]       [facility] [address] [facility] [address]        Equal Access to Services     [facility]: Hadii elvia mccurdy hadasho Soyoonali, waaxda luqadaha, qaybta kaalmada kamala, elizabeth ga . So [facility] [phone].    ATENCIÓN: Si habla español, tiene a mae disposición servicios gratuitos de asistencia lingüística. Llame al [phone].    We comply with applicable federal civil rights laws and [place] laws. We do not discriminate on the basis of race, color, national origin, " age, disability, sex, sexual orientation, or gender identity.            Thank you!     Thank you for choosing [facility] CANCER CLINIC  for your care. Our goal is always to provide you with excellent care. Hearing back from our patients is one way we can continue to improve our services. Please take a few minutes to complete the written survey that you may receive in the mail after your visit with us. Thank you!             Your Updated Medication List - Protect others around you: Learn how to safely use, store and throw away your medicines at www.disposemymeds.org.          This list is accurate as of: 1/11/18  1:21 PM.  Always use your most recent med list.                   Brand Name Dispense Instructions for use Diagnosis    acetaminophen 325 MG tablet    TYLENOL    100 tablet    Take 3 tablets (975 mg) by mouth 3 times daily    Cancer related pain       amLODIPine 2.5 MG tablet    NORVASC    30 tablet    Take 1 tablet (2.5 mg) by mouth daily    Essential hypertension       dronabinol 5 MG capsule    MARINOL    60 capsule    Take 1 capsule (5 mg) by mouth 2 times daily (before meals)    Anorexia, Malignant neoplasm of cervix, unspecified site (H)       glutamine 500 MG Tabs     120 tablet    Take 500 mg by mouth 2 times daily    Drug-induced polyneuropathy (H)       LORazepam 1 MG tablet    ATIVAN    30 tablet    Take 1 tablet (1 mg) by mouth every 6 hours as needed (Anxiety, Nausea/Vomiting or Sleep)    Malignant neoplasm of cervix, unspecified site (H)       polyethylene glycol powder    MIRALAX    510 g    Take 17-34 g (1-2 capfuls) by mouth daily    Cancer related pain, Generalized abdominal pain       prochlorperazine 10 MG tablet    COMPAZINE    30 tablet    Take 1 tablet (10 mg) by mouth every 6 hours as needed (Nausea/Vomiting)    Malignant neoplasm of cervix, unspecified site (H)       senna-docusate 8.6-50 MG per tablet    SENOKOT-S;PERICOLACE    100 tablet    Take 2-4 tablets by mouth 2 times  daily    Cancer related pain       traMADol 50 MG tablet    ULTRAM    240 tablet    Take 2 tablets (100mg) every morning when you wake up, 2 tablets (100mg) in the middle of the day and 2 tablets (100mg) before bed.  Take 1-2 tablets once a day if needed for pain not controlled with the three scheduled doses.    Malignant neoplasm of cervix, unspecified site (H), Cancer related pain

## 2018-01-17 NOTE — PROGRESS NOTES
Infusion Nursing Note:  Ashvin Tiwari presents for IVF bolus    Note: Patient is feeling pretty good today. She is having back pain and numbness and tingling in her hands and feet. She is denying nausea today and does not believe she needs her zofran.      Treatment Conditions:  Not Applicable.    Intravenous Access:  Implanted port accessed .    Post Infusion Assessment:  Patient tolerated infusion without incident.  Site patent and intact, free from redness, edema or discomfort.  No evidence of extravasations.  Access discontinued per protocol.    Discharge Plan:   Patient declined prescription refills.  Patient and/or family verbalized understanding of discharge instructions and all questions answered.  Copy of AVS reviewed with patient and/or family.  Patient will return 2/14 for next appointment.  Patient discharged in stable condition accompanied by: daughter.    Clare Garcia RN

## 2018-01-17 NOTE — PATIENT INSTRUCTIONS
Contact Numbers  [facility] Nurse Triage: [phone]  After Hours Nurse Line:  [phone]     Please call the [facility] Triage line if you experience a temperature greater than or equal to 100.5, shaking chills, have uncontrolled nausea, vomiting and/or diarrhea, dizziness, shortness of breath, chest pain, bleeding, unexplained bruising, or if you have any other new/concerning symptoms, questions or concerns.      If it is after hours, weekends, or holidays, please call either the after hours nurse line listed above.      If you are having any concerning symptoms or wish to speak to a provider before your next infusion visit, please call your care coordinator or triage to notify them so we can adequately serve you.      If you need a refill on a narcotic prescription or other medication, please call triage before your infusion appointment.

## 2018-01-17 NOTE — MR AVS SNAPSHOT
After Visit Summary   1/17/2018    Ashvin Tiwari    MRN: [id number]           Patient Information     Date Of Birth          1955        Visit Information        Provider Department      1/17/2018 2:30 PM UC 19 ATC; UC ONCOLOGY INFUSION [facility]        Today's Diagnoses     Nausea    -  1      Care Instructions    Contact Numbers  [facility] Nurse Triage: [phone]  After Hours Nurse Line:  [phone]     Please call the [facility] Triage line if you experience a temperature greater than or equal to 100.5, shaking chills, have uncontrolled nausea, vomiting and/or diarrhea, dizziness, shortness of breath, chest pain, bleeding, unexplained bruising, or if you have any other new/concerning symptoms, questions or concerns.      If it is after hours, weekends, or holidays, please call either the after hours nurse line listed above.      If you are having any concerning symptoms or wish to speak to a provider before your next infusion visit, please call your care coordinator or triage to notify them so we can adequately serve you.      If you need a refill on a narcotic prescription or other medication, please call triage before your infusion appointment.             Follow-ups after your visit        Your next 10 appointments already scheduled     Jan 17, 2018  4:30 PM CST   Treatment 60 with Dwayne Barbour PT   [facility] ([facility])    [address] [place] [address]  4th Floor  [facility] [id number] [phone]            Jan 30, 2018  1:00 PM CST   (Arrive by 12:45 PM)   Return Visit with Briana Medina MD   [facility] ([facility])    [address] [place] [address]  [address]  [facility] [id number] [phone]            Feb 14, 2018  9:00 AM CST   (Arrive by 8:45 AM)   [facility]   [facility] ([facility])    [address]  [facility] [address]   [phone]           Tell your doctor:   If there is any chance you may be pregnant or if you are breastfeeding.   If you have problems lying in small spaces (claustrophobia). If you do, your doctor may give you medicine to help you relax. If you have diabetes:   Have your exam early in the morning. Your blood glucose will go up as the day goes by.   Your glucose level must be 180 or less at the start of the exam. Please take any medicines you need to ensure this blood glucose level. 24 hours before your scan: Don t do any heavy exercise. (No jogging, aerobics or other workouts.) Exercise will make your pictures less accurate. 6 hours before your scan:   Stop all food and liquids (except water).   Do not chew gum or suck on mints.   If you need to take medicine with food, you may take it with a few crackers.  Please call your Imaging Department at your exam site with any questions.            Feb 20, 2018  9:00 AM CST   (Arrive by 8:45 AM)   Return Active Treatment with Barbara Soto MD   [facility] ([facility])    [address] [facility]  [address]  [facility] [address] [phone]              Who to contact     If you have questions or need follow up information about today's clinic visit or your schedule please contact [facility] directly at [phone].  Normal or non-critical lab and imaging results will be communicated to you by MyChart, letter or phone within 4 business days after the clinic has received the results. If you do not hear from us within 7 days, please contact the clinic through MyChart or phone. If you have a critical or abnormal lab result, we will notify you by phone as soon as possible.  Submit refill requests through [employer] or call your pharmacy and they will forward the refill request to us. Please allow 3 business days for your refill to be completed.          Additional  "Information About Your Visit        MyChart Information     [employer] lets you send messages to your doctor, view your test results, renew your prescriptions, schedule appointments and more. To sign up, go to [email or web address] . Click on \"Log in\" on the left side of the screen, which will take you to the Welcome page. Then click on \"Sign up Now\" on the right side of the page.     You will be asked to enter the access code listed below, as well as some personal information. Please follow the directions to create your username and password.     Your access code is: [email or web address]  Expires: 2018  6:30 AM     Your access code will  in 90 days. If you need help or a new code, please call your [place] clinic or [phone].        Care EveryWhere ID     This is your Care EveryWhere ID. This could be used by other organizations to access your [place] medical records  [id number]        Your Vitals Were     Pulse Temperature Respirations Pulse Oximetry          105 98.7  F (37.1  C) 16 95%         Blood Pressure from Last 3 Encounters:   18 142/76   18 143/83   18 156/78    Weight from Last 3 Encounters:   18 82.1 kg (181 lb 1.6 oz)   17 80.7 kg (177 lb 14.4 oz)   17 81 kg (178 lb 9 oz)              Today, you had the following     No orders found for display       Primary Care Provider Office Phone # Fax #    Chelsea Wren [phone]       [facility] [address] [facility] [address]        Equal Access to Services     [employer] : Hadnegrito Reese, juan miguel pelletier, elizabeth burciaga. So [facility] [phone].    ATENCIÓN: Si habla español, tiene a mae disposición servicios gratuitos de asistencia lingüística. Llame al [phone].    We comply with applicable federal civil rights laws and [place] laws. We do not discriminate on the basis of race, color, national origin, " age, disability, sex, sexual orientation, or gender identity.            Thank you!     Thank you for choosing [facility]CER CLINIC  for your care. Our goal is always to provide you with excellent care. Hearing back from our patients is one way we can continue to improve our services. Please take a few minutes to complete the written survey that you may receive in the mail after your visit with us. Thank you!             Your Updated Medication List - Protect others around you: Learn how to safely use, store and throw away your medicines at www.disposemymeds.org.          This list is accurate as of: 1/17/18  4:07 PM.  Always use your most recent med list.                   Brand Name Dispense Instructions for use Diagnosis    acetaminophen 325 MG tablet    TYLENOL    100 tablet    Take 3 tablets (975 mg) by mouth 3 times daily    Cancer related pain       amLODIPine 2.5 MG tablet    NORVASC    30 tablet    Take 1 tablet (2.5 mg) by mouth daily    Essential hypertension       dronabinol 5 MG capsule    MARINOL    60 capsule    Take 1 capsule (5 mg) by mouth 2 times daily (before meals)    Anorexia, Malignant neoplasm of cervix, unspecified site (H)       glutamine 500 MG Tabs     120 tablet    Take 500 mg by mouth 2 times daily    Drug-induced polyneuropathy (H)       LORazepam 1 MG tablet    ATIVAN    30 tablet    Take 1 tablet (1 mg) by mouth every 6 hours as needed (Anxiety, Nausea/Vomiting or Sleep)    Malignant neoplasm of cervix, unspecified site (H)       polyethylene glycol powder    MIRALAX    510 g    Take 17-34 g (1-2 capfuls) by mouth daily    Cancer related pain, Generalized abdominal pain       prochlorperazine 10 MG tablet    COMPAZINE    30 tablet    Take 1 tablet (10 mg) by mouth every 6 hours as needed (Nausea/Vomiting)    Malignant neoplasm of cervix, unspecified site (H)       senna-docusate 8.6-50 MG per tablet    SENOKOT-S;PERICOLACE    100 tablet    Take 2-4 tablets by mouth 2 times  daily    Cancer related pain       traMADol 50 MG tablet    ULTRAM    240 tablet    Take 2 tablets (100mg) every morning when you wake up, 2 tablets (100mg) in the middle of the day and 2 tablets (100mg) before bed.  Take 1-2 tablets once a day if needed for pain not controlled with the three scheduled doses.    Malignant neoplasm of cervix, unspecified site (H), Cancer related pain

## 2018-01-25 NOTE — TELEPHONE ENCOUNTER
Attempted to call patient and patient's daughter to advise them patient should stop her B6 supplement, but was unable to reach them. Left a VM on patient's phone advising to stop taking B6 and to call me back to confirm she got my message.

## 2018-01-25 NOTE — TELEPHONE ENCOUNTER
----- Message from Briana Medina MD sent at 1/25/2018 10:16 AM CST -----  HI. Can you call her or her daughter and make sure she stops taking the B6 supplement? Will you record in chart afterwards?     Thanks!

## 2018-01-30 NOTE — MR AVS SNAPSHOT
After Visit Summary   1/30/2018    Ashvin Tiwari    MRN: [id number]           Patient Information     Date Of Birth          1955        Visit Information        Provider Department      1/30/2018 1:00 PM Briana Medina MD [facility]        Today's Diagnoses     Gastroesophageal reflux disease, esophagitis presence not specified    -  1    Malignant neoplasm of cervix, unspecified site (H)        Cancer related pain          Care Instructions    Pain: Knee pain and shoulder pain - I believe this is arthritis pain which is different than the pain you were having in your stomach which was related to your cancer.   - continue with physical therapy for knee pain and ask physical therapist to do work on shoulder pain and call me if physical therapist needs new order   - continue doing the stretches and extercises at home as well   - call physical therapy for an appointment sooner than 2/21  - increase tylenol 1000mg three times a day  - continue tramadol 50-100mg three times a day if needed for pain  - apply heat, massage, icy hot cream to the shoulders.     Throat saliva that is very bothersome.   - trial for 14 days of Zantac 75mg twice day.  - call after 14 days to let me know if that pain has improved or not  - try to determine if the throat sensation is better or worse with certain foods.     Follow up on March 2nd           Follow-ups after your visit        Follow-up notes from your care team     Return in about 4 weeks (around 3/2/2018).      Your next 10 appointments already scheduled     Feb 14, 2018  9:00 AM CST   (Arrive by 8:45 AM)   PET ONCOLOGY WHOLE BODY with UMET1   [facility] ([facility])    [address] [facility] [address]   [phone]           Tell your doctor:   If there is any chance you may be pregnant or if you are breastfeeding.   If you have problems lying in small spaces (claustrophobia). If  you do, your doctor may give you medicine to help you relax. If you have diabetes:   Have your exam early in the morning. Your blood glucose will go up as the day goes by.   Your glucose level must be 180 or less at the start of the exam. Please take any medicines you need to ensure this blood glucose level.   If you are taking insulin in the morning take with breakfast by 6 am and schedule procedure between 12 and 2:15 pm.   If you are taking insulin at night take nightly dose, fast overnight, schedule procedure before 10 am.   If you take insulin both morning and night take morning dose by 6am and schedule procedure between 12 and 2:15 pm.   24 hours before your scan: Don t do any heavy exercise. (No jogging, aerobics or other workouts.) Exercise will make your pictures less accurate.  7 hours before your scan: Eat a low carb, high protein meal (Lean meats, seafood, beans, soy, low-fat dairy, eggs, nuts & seeds). 6 hours before your scan:   Stop all food and liquids (except water).   Do not chew gum or suck on mints.   If you need to take medicine with food, you may take it with a few crackers.  Please call your Imaging Department at your exam site with any questions.            Feb 20, 2018  9:00 AM CST   (Arrive by 8:45 AM)   Return Active Treatment with Barbara Soto MD   [facility] ([facility])    [address] [place] [address]  [address] [place] [address] [id number] [phone]            Feb 21, 2018  4:30 PM CST   Treatment 60 with Dwayne Barbour PT   [facility] ([facility])    [address] [place] [address]  4th Floor  [facility] [id number] [phone]            Mar 02, 2018 11:30 AM CST   (Arrive by 11:15 AM)   Return Visit with Briana Medina MD   [facility] ([facility])    [address] [place] [address]  [address] [place] [address] [id number] [phone]              Who to contact     If you  "have questions or need follow up information about today's clinic visit or your schedule please contact [facility] directly at [phone].  Normal or non-critical lab and imaging results will be communicated to you by MyChart, letter or phone within 4 business days after the clinic has received the results. If you do not hear from us within 7 days, please contact the clinic through [employer] or phone. If you have a critical or abnormal lab result, we will notify you by phone as soon as possible.  Submit refill requests through [employer] or call your pharmacy and they will forward the refill request to us. Please allow 3 business days for your refill to be completed.          Additional Information About Your Visit        [employer] Information     [employer] lets you send messages to your doctor, view your test results, renew your prescriptions, schedule appointments and more. To sign up, go to [email or web address] . Click on \"Log in\" on the left side of the screen, which will take you to the Welcome page. Then click on \"Sign up Now\" on the right side of the page.     You will be asked to enter the access code listed below, as well as some personal information. Please follow the directions to create your username and password.     Your access code is: [email or web address]  Expires: 2018  6:30 AM     Your access code will  in 90 days. If you need help or a new code, please call your [place] clinic or [phone].        Care EveryWhere ID     This is your Care EveryWhere ID. This could be used by other organizations to access your [place] medical records  [id number]        Your Vitals Were     Pulse Temperature Respirations Pulse Oximetry BMI (Body Mass Index)       85 98.4  F (36.9  C) (Tympanic) 16 100% 31.27 kg/m2        Blood Pressure from Last 3 Encounters:   18 (!) 151/92   18 142/76   18 143/83    Weight from Last 3 Encounters:   18 80.1 kg (176 lb 8 oz) "   01/11/18 82.1 kg (181 lb 1.6 oz)   12/21/17 80.7 kg (177 lb 14.4 oz)              Today, you had the following     No orders found for display         Today's Medication Changes          These changes are accurate as of 1/30/18  1:42 PM.  If you have any questions, ask your nurse or doctor.               Start taking these medicines.        Dose/Directions    ranitidine 75 MG tablet   Commonly known as:  ZANTAC   Used for:  Gastroesophageal reflux disease, esophagitis presence not specified   Started by:  Briana Medina MD        Dose:  75 mg   Take 1 tablet (75 mg) by mouth 2 times daily   Quantity:  30 tablet   Refills:  0            Where to get your medicines      These medications were sent to [facility] [address]  [address] [facility] [address]    Hours:  24-hours Phone:  [phone]     ranitidine 75 MG tablet         Some of these will need a paper prescription and others can be bought over the counter.  Ask your nurse if you have questions.     Bring a paper prescription for each of these medications     traMADol 50 MG tablet                Primary Care Provider Office Phone # Fax #    Chelsea Wren [phone]       [facility] [address] [facility] [place] [address]        Equal Access to Services     LISSET LUGO AH: Hadii elvia mccurdy hadasho Soomaali, waaxda luqadaha, qaybta kaalmada adeegyada, elizabeth cheema. So [facility] [phone].    ATENCIÓN: Si habla español, tiene a mae disposición servicios gratuitos de asistencia lingüística. [place] al [phone].    We comply with applicable federal civil rights laws and [place] laws. We do not discriminate on the basis of race, color, national origin, age, disability, sex, sexual orientation, or gender identity.            Thank you!     Thank you for choosing [facility]  for your care. Our  goal is always to provide you with excellent care. Hearing back from our patients is one way we can continue to improve our services. Please take a few minutes to complete the written survey that you may receive in the mail after your visit with us. Thank you!             Your Updated Medication List - Protect others around you: Learn how to safely use, store and throw away your medicines at www.disposemymeds.org.          This list is accurate as of 1/30/18  1:42 PM.  Always use your most recent med list.                   Brand Name Dispense Instructions for use Diagnosis    acetaminophen 325 MG tablet    TYLENOL    100 tablet    Take 3 tablets (975 mg) by mouth 3 times daily    Cancer related pain       amLODIPine 2.5 MG tablet    NORVASC    30 tablet    Take 1 tablet (2.5 mg) by mouth daily    Essential hypertension       dronabinol 5 MG capsule    MARINOL    60 capsule    Take 1 capsule (5 mg) by mouth 2 times daily (before meals)    Anorexia, Malignant neoplasm of cervix, unspecified site (H)       glutamine 500 MG Tabs     120 tablet    Take 500 mg by mouth 2 times daily    Drug-induced polyneuropathy (H)       LORazepam 1 MG tablet    ATIVAN    30 tablet    Take 1 tablet (1 mg) by mouth every 6 hours as needed (Anxiety, Nausea/Vomiting or Sleep)    Malignant neoplasm of cervix, unspecified site (H)       polyethylene glycol powder    MIRALAX    510 g    Take 17-34 g (1-2 capfuls) by mouth daily    Cancer related pain, Generalized abdominal pain       prochlorperazine 10 MG tablet    COMPAZINE    30 tablet    Take 1 tablet (10 mg) by mouth every 6 hours as needed (Nausea/Vomiting)    Malignant neoplasm of cervix, unspecified site (H)       ranitidine 75 MG tablet    ZANTAC    30 tablet    Take 1 tablet (75 mg) by mouth 2 times daily    Gastroesophageal reflux disease, esophagitis presence not specified       senna-docusate 8.6-50 MG per tablet    SENOKOT-S;PERICOLACE    100 tablet    Take 2-4 tablets by mouth  2 times daily    Cancer related pain       traMADol 50 MG tablet    ULTRAM    240 tablet    Take 2 tablets (100mg) every morning when you wake up, 2 tablets (100mg) in the middle of the day and 2 tablets (100mg) before bed.  Take 1-2 tablets once a day if needed for pain not controlled with the three scheduled doses.    Malignant neoplasm of cervix, unspecified site (H), Cancer related pain

## 2018-01-30 NOTE — NURSING NOTE
"Oncology Rooming Note    January 30, 2018 12:45 PM   Ashvin Tiwari is a 62 year old female who presents for:    Chief Complaint   Patient presents with     Oncology Clinic Visit     6-8 week f/u      Initial Vitals: BP (!) 151/92  Pulse 85  Temp 98.4  F (36.9  C) (Tympanic)  Resp 16  Wt 80.1 kg (176 lb 8 oz)  SpO2 100%  BMI 31.27 kg/m2 Estimated body mass index is 31.27 kg/(m^2) as calculated from the following:    Height as of 1/11/18: 1.6 m (5' 3\").    Weight as of this encounter: 80.1 kg (176 lb 8 oz). Body surface area is 1.89 meters squared.  No Pain (0) Comment: Data Unavailable   No LMP recorded. Patient is postmenopausal.  Allergies reviewed: Yes  Medications reviewed: Yes    Medications: Medication refills not needed today.  Pharmacy name entered into EPIC:    [facility] (USE ONLY AT [facility]) - [place], [place] - [address] [facility] [address] - [place], [place] - [address] [facility]    Clinical concerns: Neck and stomach  pain  Provider was notified.    7 minutes for nursing intake (face to face time)     Stanford Holt              "

## 2018-01-30 NOTE — LETTER
1/30/2018       RE: Ashvin Tiwari  [address]  [facility] [address]     Dear Colleague,    Thank you for referring your patient, Ashvin Tiwari, to the [facility] at [facility]. Please see a copy of my visit note below.    Palliative Care Outpatient Clinic Progress Note    Patient Name:  Ashvin Tiwari  Primary Provider:  Chelsea Wren    Chief Complaint:   Metastatic cervical cancer  Anorexia  Weight loss  Nausea  Right hip pain   Knee pain    Interim History:  Ashvin Tiwari 62 year old female returns to be seen by palliative care today.  She has stage IV cervical cancer, completed 6/6 cycles taxol/carbo/avastin on 1/11/18 and will have scan and see her primary oncologist, Dr. Soto, on 2/20.  Today she complains of a lot of shoulder and knee pains and pain in epigastrium when coughs.      Everyday feels like something in throat, section or something in throat that doesn't go away. It is making her miserable and uncomfortable. Can eat and drink but it is very bothersome. She has been talking about it but hasn't tried anything for it. At times less bothersome. Unclear what makes it worse or better. No connected to certain food. Can wake her up in the middle of the night. Has been a problem for a few months and getting worse.     Shoulder pain and knee pain is constant. Doing physical therapy x 2 appointments now. Knee pain not knew but worse. Now walking makes the knee pain worse. Shoulder pains - not taking anything.     Pain:   Tylenol 500mg TID   Tramadol as needed - last prescription on 10/24 for 50mg 240 tabs.     Tramadol helps the knee pain and shoulder pain.     Appetite good - eating well, no nausea  - taking marinol BID and that seems to be working  - no other scheduled medications     Tingling - neuropathy in fingers - now fingers just feel little numb - much better.     No constipation, no diarrhea    Some shortness of  breath - at times can feel short of breath.     CT scan on       Review of Systems:  ROS: 10 point ROS neg other than the symptoms noted above in the HPI       No Known Allergies  Current Outpatient Prescriptions   Medication Sig Dispense Refill     dronabinol (MARINOL) 5 MG capsule Take 1 capsule (5 mg) by mouth 2 times daily (before meals) 60 capsule 0     glutamine 500 MG TABS Take 500 mg by mouth 2 times daily 120 tablet 3     traMADol (ULTRAM) 50 MG tablet Take 2 tablets (100mg) every morning when you wake up, 2 tablets (100mg) in the middle of the day and 2 tablets (100mg) before bed.  Take 1-2 tablets once a day if needed for pain not controlled with the three scheduled doses. 240 tablet 0     amLODIPine (NORVASC) 2.5 MG tablet Take 1 tablet (2.5 mg) by mouth daily 30 tablet 1     senna-docusate (SENOKOT-S;PERICOLACE) 8.6-50 MG per tablet Take 2-4 tablets by mouth 2 times daily 100 tablet 1     polyethylene glycol (MIRALAX) powder Take 17-34 g (1-2 capfuls) by mouth daily 510 g 1     LORazepam (ATIVAN) 1 MG tablet Take 1 tablet (1 mg) by mouth every 6 hours as needed (Anxiety, Nausea/Vomiting or Sleep) 30 tablet 2     acetaminophen (TYLENOL) 325 MG tablet Take 3 tablets (975 mg) by mouth 3 times daily 100 tablet 0     prochlorperazine (COMPAZINE) 10 MG tablet Take 1 tablet (10 mg) by mouth every 6 hours as needed (Nausea/Vomiting) (Patient not taking: Reported on 1/11/2018) 30 tablet 2     Past Medical History:   Diagnosis Date     Cancer (H)     cervical     Hypertension      Past Surgical History:   Procedure Laterality Date     BIOPSY/EXCISION LYMPH NODE(S), NEEDLE, SUPERFICIAL (CERVICAL/INGUINAL/AXILLARY) Right 9/21/2017    Procedure: BIOPSY/EXCISION LYMPH NODE(S), NEEDLE, SUPERFICIAL (CERVICAL/INGUINAL/AXILLARY);;  Surgeon: Sonu Denson PA-C;  Location: [facility] OR     INSERT PORT VASCULAR ACCESS Right 9/21/2017    Procedure: INSERT PORT VASCULAR ACCESS;  Single Lumen Chest Power Port, Right  Axillary Lymph Node Biopsy;  Surgeon: Sonu Denson PA-C;  Location: [facility] OR     no previous surgery             BP (!) 151/92  Pulse 85  Temp 98.4  F (36.9  C) (Tympanic)  Resp 16  Wt 80.1 kg (176 lb 8 oz)  SpO2 100%  BMI 31.27 kg/m2  General: well groomed, well nourished, appears comfortable, in NAD  Eyes: EOMI, sclera clear  HEENT: NC/AT; mucous membranes moist; no oral lesions, no increased saliva visible, no thrush  Lungs: no increased work of breathing, speaking full sentences   Neuro: A&O x 3; CN II-XII grossly intact;   Neuropsych: alert, good eye contact, engaged, pleasant, sensorium intact      Key Data Reviewed:  GFR > 90; B6 204      Impression:     62 yr old female with metastatic cervical cancer, just completed 6th and final cycle of chemo with taxol/avastin/carbo.     Recommendations & Counseling:    Pain: Knee pain and shoulder pain - I believe this is arthritis pain which is different than the pain you were having in your stomach which was related to your cancer.   - continue with physical therapy for knee pain and ask physical therapist to do work on shoulder pain and call me if physical therapist needs new order   - continue doing the stretches and extercises at home as well   - call physical therapy for an appointment sooner than 2/21  - increase tylenol 1000mg three times a day  - continue tramadol 50-100mg three times a day if needed for pain  - apply heat, massage, icy hot cream to the shoulders.     Throat saliva that is very bothersome.   - trial for 14 days of Zantac 75mg twice day.  - call after 14 days to let me know if that pain has improved or not  - try to determine if the throat sensation is better or worse with certain foods.     Neuropathy  - basically resolved. Stopped B6 supplement    Follow up on March 2nd       Thank you for involving us in the patient's care. 30 minutes face time over half spent counseling.  Briana Medina MD / Palliative Medicine / Pager  [phone] / After-Hours Answering Service [phone] / Main Palliative Clinic - [phone] / [facility] Inpatient Team Consult Pager [phone] (answered 8am-430pm M-F)      Again, thank you for allowing me to participate in the care of your patient.      Sincerely,    Briana Medina MD

## 2018-01-30 NOTE — PROGRESS NOTES
Palliative Care Outpatient Clinic Progress Note    Patient Name:  Ashvin Tiwari  Primary Provider:  Chelsea Wren    Chief Complaint:   Metastatic cervical cancer  Anorexia  Weight loss  Nausea  Right hip pain   Knee pain    Interim History:  Ashvin Tiwari 62 year old female returns to be seen by palliative care today.  She has stage IV cervical cancer, completed 6/6 cycles taxol/carbo/avastin on 1/11/18 and will have scan and see her primary oncologist, Dr. Soto, on 2/20.  Today she complains of a lot of shoulder and knee pains and pain in epigastrium when coughs.      Everyday feels like something in throat, section or something in throat that doesn't go away. It is making her miserable and uncomfortable. Can eat and drink but it is very bothersome. She has been talking about it but hasn't tried anything for it. At times less bothersome. Unclear what makes it worse or better. No connected to certain food. Can wake her up in the middle of the night. Has been a problem for a few months and getting worse.     Shoulder pain and knee pain is constant. Doing physical therapy x 2 appointments now. Knee pain not knew but worse. Now walking makes the knee pain worse. Shoulder pains - not taking anything.     Pain:   Tylenol 500mg TID   Tramadol as needed - last prescription on 10/24 for 50mg 240 tabs.     Tramadol helps the knee pain and shoulder pain.     Appetite good - eating well, no nausea  - taking marinol BID and that seems to be working  - no other scheduled medications     Tingling - neuropathy in fingers - now fingers just feel little numb - much better.     No constipation, no diarrhea    Some shortness of breath - at times can feel short of breath.     CT scan on       Review of Systems:  ROS: 10 point ROS neg other than the symptoms noted above in the HPI       No Known Allergies  Current Outpatient Prescriptions   Medication Sig Dispense Refill     dronabinol (MARINOL) 5 MG capsule Take 1 capsule  (5 mg) by mouth 2 times daily (before meals) 60 capsule 0     glutamine 500 MG TABS Take 500 mg by mouth 2 times daily 120 tablet 3     traMADol (ULTRAM) 50 MG tablet Take 2 tablets (100mg) every morning when you wake up, 2 tablets (100mg) in the middle of the day and 2 tablets (100mg) before bed.  Take 1-2 tablets once a day if needed for pain not controlled with the three scheduled doses. 240 tablet 0     amLODIPine (NORVASC) 2.5 MG tablet Take 1 tablet (2.5 mg) by mouth daily 30 tablet 1     senna-docusate (SENOKOT-S;PERICOLACE) 8.6-50 MG per tablet Take 2-4 tablets by mouth 2 times daily 100 tablet 1     polyethylene glycol (MIRALAX) powder Take 17-34 g (1-2 capfuls) by mouth daily 510 g 1     LORazepam (ATIVAN) 1 MG tablet Take 1 tablet (1 mg) by mouth every 6 hours as needed (Anxiety, Nausea/Vomiting or Sleep) 30 tablet 2     acetaminophen (TYLENOL) 325 MG tablet Take 3 tablets (975 mg) by mouth 3 times daily 100 tablet 0     prochlorperazine (COMPAZINE) 10 MG tablet Take 1 tablet (10 mg) by mouth every 6 hours as needed (Nausea/Vomiting) (Patient not taking: Reported on 1/11/2018) 30 tablet 2     Past Medical History:   Diagnosis Date     Cancer (H)     cervical     Hypertension      Past Surgical History:   Procedure Laterality Date     BIOPSY/EXCISION LYMPH NODE(S), NEEDLE, SUPERFICIAL (CERVICAL/INGUINAL/AXILLARY) Right 9/21/2017    Procedure: BIOPSY/EXCISION LYMPH NODE(S), NEEDLE, SUPERFICIAL (CERVICAL/INGUINAL/AXILLARY);;  Surgeon: Sonu Denson PA-C;  Location:  OR     INSERT PORT VASCULAR ACCESS Right 9/21/2017    Procedure: INSERT PORT VASCULAR ACCESS;  Single Lumen Chest Power Port, Right Axillary Lymph Node Biopsy;  Surgeon: Sonu Denson PA-C;  Location:  OR     no previous surgery             BP (!) 151/92  Pulse 85  Temp 98.4  F (36.9  C) (Tympanic)  Resp 16  Wt 80.1 kg (176 lb 8 oz)  SpO2 100%  BMI 31.27 kg/m2  General: well groomed, well nourished, appears  comfortable, in NAD  Eyes: EOMI, sclera clear  HEENT: NC/AT; mucous membranes moist; no oral lesions, no increased saliva visible, no thrush  Lungs: no increased work of breathing, speaking full sentences   Neuro: A&O x 3; CN II-XII grossly intact;   Neuropsych: alert, good eye contact, engaged, pleasant, sensorium intact      Key Data Reviewed:  GFR > 90; B6 204      Impression:     62 yr old female with metastatic cervical cancer, just completed 6th and final cycle of chemo with taxol/avastin/carbo.     Recommendations & Counseling:    Pain: Knee pain and shoulder pain - I believe this is arthritis pain which is different than the pain you were having in your stomach which was related to your cancer.   - continue with physical therapy for knee pain and ask physical therapist to do work on shoulder pain and call me if physical therapist needs new order   - continue doing the stretches and extercises at home as well   - call physical therapy for an appointment sooner than 2/21  - increase tylenol 1000mg three times a day  - continue tramadol 50-100mg three times a day if needed for pain  - apply heat, massage, icy hot cream to the shoulders.     Throat saliva that is very bothersome.   - trial for 14 days of Zantac 75mg twice day.  - call after 14 days to let me know if that pain has improved or not  - try to determine if the throat sensation is better or worse with certain foods.     Neuropathy  - basically resolved. Stopped B6 supplement    Follow up on March 2nd       Thank you for involving us in the patient's care. 30 minutes face time over half spent counseling.  Briana Medina MD / Palliative Medicine / Pager [phone] / After-Hours Answering Service [phone] / Main Palliative Clinic - [phone] / [facility] Inpatient Team Consult Pager [phone] (answered 8am-430pm M-F)

## 2018-01-30 NOTE — PATIENT INSTRUCTIONS
Pain: Knee pain and shoulder pain - I believe this is arthritis pain which is different than the pain you were having in your stomach which was related to your cancer.   - continue with physical therapy for knee pain and ask physical therapist to do work on shoulder pain and call me if physical therapist needs new order   - continue doing the stretches and extercises at home as well   - call physical therapy for an appointment sooner than 2/21  - increase tylenol 1000mg three times a day  - continue tramadol 50-100mg three times a day if needed for pain  - apply heat, massage, icy hot cream to the shoulders.     Throat saliva that is very bothersome.   - trial for 14 days of Zantac 75mg twice day.  - call after 14 days to let me know if that pain has improved or not  - try to determine if the throat sensation is better or worse with certain foods.     Follow up on March 2nd

## 2018-02-14 NOTE — DISCHARGE INSTRUCTIONS

## 2018-02-14 NOTE — DISCHARGE INSTRUCTIONS

## 2018-02-20 NOTE — PROGRESS NOTES
[facility] GYN-Oncology Teaching Note    Relevant Diagnosis: Disease progression Change in chemotherapy cisplatin Topotecan and Avastin  Teaching Topic:  Chemotherapy for 3 cycles then a scan    Person(s) involved in teaching:  Pt sister and her daughter.     Motivation Level:   Asks Questions:   Yes  Eager to Learn:  Yes  Cooperative:  Yes  Receptive (willing/able to accept information):  Yes  Comments:  Would like to get started this week if possible.   Infusion is unable to do that will do next week.     Patient and Family demonstrates understanding of the following:  Reason for the appointment, diagnosis and treatment plan:  Yes  Knowledge of proper use of medications and conditions for which they are ordered (with special attention to potential side effects or drug interactions):  Yes  Which situations necessitate calling provider and whom to contact:  Yes    Teaching Concerns:  No    Instructional Materials Used/Given:  Chemotherapy Packet and Cards Written information on Cisplatin Topotecan and Avastin given to patientI    Time spent teaching with patient: 30.  Rima MADSEN RN, OCN  Care Coordinator   Gynecologic Cancer   Office [phone]  Pager [phone]

## 2018-02-20 NOTE — PROGRESS NOTES
Consult Notes on Referred Patient    Date: 2018       The patient returns today for follow-up.     Her history is as follows:  Patient is a 62 year old A1 woman with no significant past medical history who presented with 3 weeks of vaginal spotting, lower abdominal pain that radiates to the lower back. TShe was treated for UTI with Amoxicillin, since her UA was suggestive of an infection. She didn't feel any improvement after the treatment and underwent pelvic exam that revealed cervical mass. She was referred to gynecology at [facility] for biopsy however that was not done in the office due to concern of ongoing bleeding. Patient never had PAP smear done in the past, never had mammogram or colonoscopy.       17:  Seen in Gyn Onc.  Exam concerning for at least a Stage IIB cervical cancer.  Biopsy obtained consistent with poorly differentiated squamous cell ca.  MRI ordered.      17: MRI Pelvis with 7 x 7cm mass at level of cervix, protrudes into upper third of vagina, bilateral parametrial extension, abuts bilateral ureters but no obstruction, abuts rectal wall with obliteration of fat plan but no mucosal invasion, no clear bladder wall invasion, suspicious lymph nodes left hemipelvis, peritoneal nodule versus lymph node.      17 to 17:  Patient admitted to hospital with pain. CT Chest PE was negative for PE but showed multiple pulmonary nodules consistent with metastatic disease.  CT A/P showed cervical mass with regional metastatic disease as well as peritoneal nodularity.      17:  PET scan with multiple metastatic hypermetabolic lung nodules, axillary, mediastinal, hilar and abdominal lymph nodes.      17:  Seen in clinic.  Recommended treatment for Stage IV poorly differentiated squamous cell ca with , substituting Carbo for Cisplatin.      17-17: Cycle #1-3 Paclitaxel/Carboplatin/Avastin.   Avastin held with Cycle #3 due to  HTN.     11/17/17:  PET/CT with positive response to therapy.  Decrease in size of cervical mass, decreased pulmonary and metastatic lymphadenopathy.     11/21/17-1/11/18: Cycle #4-6 Paclitaxel/Carboplatin/Avastin     2/14/18:  PET/CT:  Progessive disease with increased pelvic mass, new left inguinal lymph node, right paratracheal lymph node, right axillary lymph node.    The patient returns today for follow-up.  Tired, increased LE swelling and pain, increased neuropathy, appetite stable.  Denies any vaginal drainage.        Past Medical History:    Past Medical History:   Diagnosis Date     Cancer (H)     cervical     Hypertension          Past Surgical History:    Past Surgical History:   Procedure Laterality Date     BIOPSY/EXCISION LYMPH NODE(S), NEEDLE, SUPERFICIAL (CERVICAL/INGUINAL/AXILLARY) Right 9/21/2017    Procedure: BIOPSY/EXCISION LYMPH NODE(S), NEEDLE, SUPERFICIAL (CERVICAL/INGUINAL/AXILLARY);;  Surgeon: Sonu Denson PA-C;  [facility]     INSERT PORT VASCULAR ACCESS Right 9/21/2017    Procedure: INSERT PORT VASCULAR ACCESS;  Single Lumen Chest Power Port, Right Axillary Lymph Node Biopsy;  Surgeon: Sonu Denson PA-C;  Location[facility]     no previous surgery           Health Maintenance:  Health Maintenance Due   Topic Date Due     TETANUS IMMUNIZATION (SYSTEM ASSIGNED)  05/10/1971     HEPATITIS C SCREENING  05/10/1971     LIPID SCREEN Q5 YR FEMALE (SYSTEM ASSIGNED)  05/10/1998     MAMMO SCREEN Q2 YR (SYSTEM ASSIGNED)  05/10/2003     COLON CANCER SCREEN (SYSTEM ASSIGNED)  05/10/2003     ADVANCE DIRECTIVE PLANNING Q5 YRS  05/10/2008     INFLUENZA VACCINE (SYSTEM ASSIGNED)  09/01/2017       Current Medications:     has a current medication list which includes the following prescription(s): tramadol, ranitidine, dronabinol, glutamine, amlodipine, senna-docusate, polyethylene glycol, prochlorperazine, lorazepam, and acetaminophen.       Allergies:     [unfilled]     "    Social History:     Social History   Substance Use Topics     Smoking status: Never Smoker     Smokeless tobacco: Never Used     Alcohol use No       History   Drug Use No           Family History:     The patient's family history is notable for :    No family history on file.      Physical Exam:     /85  Pulse 112  Temp 99  F (37.2  C) (Oral)  Resp 18  Ht 1.6 m (5' 2.99\")  Wt 81.4 kg (179 lb 6.4 oz)  SpO2 97%  BMI 31.79 kg/m2  Body mass index is 31.79 kg/(m^2).    General Appearance: healthy and alert, no distress        Cardiovascular: regular rate and rhythm, no gallops, rubs or murmurs     Respiratory: lungs clear, no rales, rhonchi or wheezes, normal diaphragmatic excursion    Musculoskeletal: extremities non tender, trace LE swelling    Skin: no lesions or rashes     Neurological: normal gait, no gross defects     Psychiatric: appropriate mood and affect                               Hematological: normal cervical, supraclavicular and inguinal lymph nodes     Gastrointestinal:       abdomen soft, non-tender, non-distended, no organomegaly or masses        Assessment:     Ashvin Tiwari is a 62 year old woman with a diagnosis of 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  Reviewed imaging with patient.  She continues to have a good performance status and is tolerating treatment.  Discussed incurable nature of the cancer, that additional chemotherapy is palliative only.  Options of symptomatic supportive care versus palliative second line therapy discussed.  She would like to proceed with second line " therapy.      Recommend Cisplatin/Topotecan with continuation of Monika, cisplatin 50 mg/m2 day 1 + topotecan 0.75 mg/m2 for 3 days q21 days recommended. Dose reduction for toxicity.  Continue Monika due to clear benefit for cervical cancer and in maintanence therapy for other gyn sites.    Plan three additional cycles with repeat imaging.      Risks and benefits of treatment discussed, anticipated side-effects and monitoring.  Chemo teaching.       2.)                 Palliative Care:  Symptom management, goals of care.  She has follow-up with them on 3/2/18        Barbara Soto MD  Gynecologic Oncology  [facility]  CC  Patient Care Team:  Chelsea Wren as PCP - General (Family Practice)  Sita Gallardo as Referring Physician (OB/Gyn)  Rima Saunders, RN as Continuity Care Coordinator (Gyn-Onc)  Leia Caraballo, RN as Registered Nurse (Palliative)  CHELSEA WREN

## 2018-02-20 NOTE — NURSING NOTE
"Oncology Rooming Note    February 20, 2018 9:11 AM   Ashvin Tiwari is a 62 year old female who presents for:    Chief Complaint   Patient presents with     Oncology Clinic Visit     CT Results     Initial Vitals: /85  Pulse 112  Temp 99  F (37.2  C) (Oral)  Resp 18  Ht 1.6 m (5' 2.99\")  Wt 81.4 kg (179 lb 6.4 oz)  SpO2 97%  BMI 31.79 kg/m2 Estimated body mass index is 31.79 kg/(m^2) as calculated from the following:    Height as of this encounter: 1.6 m (5' 2.99\").    Weight as of this encounter: 81.4 kg (179 lb 6.4 oz). Body surface area is 1.9 meters squared.  Extreme Pain (8) Comment: Pelvic   No LMP recorded. Patient is postmenopausal.  Allergies reviewed: Yes  Medications reviewed: Yes    Medications: Medication refills not needed today.  Pharmacy name entered into EPIC:    [facility] (USE ONLY AT [facility]) - [place], [place] - [address] [facility] - [place], [place] - [address] [facility]    Clinical concerns: None Dr Soto was NOT notified.    7 minutes for nursing intake (face to face time)     Malu Waller LPN              "

## 2018-02-20 NOTE — MR AVS SNAPSHOT
After Visit Summary   2/20/2018    Ashvin Tiwari    MRN: [id number]           Patient Information     Date Of Birth          1955        Visit Information        Provider Department      2/20/2018 9:00 AM Barbara Soto MD [facility]        Today's Diagnoses     Cervix cancer (H)    -  1       Follow-ups after your visit        Your next 10 appointments already scheduled     Feb 20, 2018 10:15 AM CST   [facility] Draw with  [facility]   [facility] ([facility])    [address] [place] [address]  [address]  [facility] [address] [phone]            Feb 21, 2018  4:30 PM CST   Treatment 60 with Dwayne Barbour PT   [facility] ([facility])    [address] [place] [address]  4th Floor  [facility] [id number] [phone]            Mar 02, 2018 11:30 AM CST   (Arrive by 11:15 AM)   Return Visit with Briana Medina MD   [facility] ([facility])    [address] [place] [address]  [address]  [facility] [id number] [phone]              Future tests that were ordered for you today     Open Future Orders        Priority Expected Expires Ordered    Protein qualitative urine Routine 2/21/2018 2/20/2019 2/20/2018    CBC with platelets differential STAT 2/20/2018 2/22/2018 2/20/2018    Mag STAT 2/20/2018 2/22/2018 2/20/2018    CMP - Comprehensive Metabolic Panel STAT 2/20/2018 2/22/2018 2/20/2018            Who to contact     If you have questions or need follow up information about today's clinic visit or your schedule please contact [facility] directly at [phone].  Normal or non-critical lab and imaging results will be communicated to you by MyChart, letter or phone within 4 business days after the clinic has received the results. If you do not hear from us within 7 days, please contact the clinic through MyChart or phone. If you have a  "critical or abnormal lab result, we will notify you by phone as soon as possible.  Submit refill requests through [email or web address] or call your pharmacy and they will forward the refill request to us. Please allow 3 business days for your refill to be completed.          Additional Information About Your Visit        Fishin' Gluehart Information     [email or web address] lets you send messages to your doctor, view your test results, renew your prescriptions, schedule appointments and more. To sign up, go to [email or web address] . Click on \"Log in\" on the left side of the screen, which will take you to the Welcome page. Then click on \"Sign up Now\" on the right side of the page.     You will be asked to enter the access code listed below, as well as some personal information. Please follow the directions to create your username and password.     Your access code is: [email or web address]  Expires: 2018  6:30 AM     Your access code will  in 90 days. If you need help or a new code, please call your [facility] clinic or [phone].        Care EveryWhere ID     This is your Care EveryWhere ID. This could be used by other organizations to access your [facility] medical records  [id number]        Your Vitals Were     Pulse Temperature Respirations Height Pulse Oximetry BMI (Body Mass Index)    112 99  F (37.2  C) (Oral) 18 1.6 m (5' 2.99\") 97% 31.79 kg/m2       Blood Pressure from Last 3 Encounters:   18 146/85   18 (!) 151/92   18 142/76    Weight from Last 3 Encounters:   18 81.4 kg (179 lb 6.4 oz)   18 80.1 kg (176 lb 8 oz)   18 82.1 kg (181 lb 1.6 oz)               Primary Care Provider Office Phone # Fax #    Chelsea Wren [phone]       [facility] [address] [facility] [address]        Equal Access to Services     LISSET LUGO AH: Sundar Reese, juan miguel pelletier, qajanet kaalelizabeth danielle. So [facility] " [phone].    ATENCIÓN: Si jazlyn thomas, tiene a mae disposición servicios gratuitos de asistencia lingüística. Remington singh [phone].    We comply with applicable federal civil rights laws and [place] laws. We do not discriminate on the basis of race, color, national origin, age, disability, sex, sexual orientation, or gender identity.            Thank you!     Thank you for choosing [facility]  for your care. Our goal is always to provide you with excellent care. Hearing back from our patients is one way we can continue to improve our services. Please take a few minutes to complete the written survey that you may receive in the mail after your visit with us. Thank you!             Your Updated Medication List - Protect others around you: Learn how to safely use, store and throw away your medicines at www.disposemymeds.org.          This list is accurate as of 2/20/18 10:06 AM.  Always use your most recent med list.                   Brand Name Dispense Instructions for use Diagnosis    acetaminophen 325 MG tablet    TYLENOL    100 tablet    Take 3 tablets (975 mg) by mouth 3 times daily    Cancer related pain       amLODIPine 2.5 MG tablet    NORVASC    30 tablet    Take 1 tablet (2.5 mg) by mouth daily    Essential hypertension       dronabinol 5 MG capsule    MARINOL    60 capsule    Take 1 capsule (5 mg) by mouth 2 times daily (before meals)    Anorexia, Malignant neoplasm of cervix, unspecified site (H)       glutamine 500 MG Tabs     120 tablet    Take 500 mg by mouth 2 times daily    Drug-induced polyneuropathy (H)       LORazepam 1 MG tablet    ATIVAN    30 tablet    Take 1 tablet (1 mg) by mouth every 6 hours as needed (Anxiety, Nausea/Vomiting or Sleep)    Malignant neoplasm of cervix, unspecified site (H)       polyethylene glycol powder    MIRALAX    510 g    Take 17-34 g (1-2 capfuls) by mouth daily    Cancer related pain, Generalized abdominal pain       prochlorperazine 10 MG  tablet    COMPAZINE    30 tablet    Take 1 tablet (10 mg) by mouth every 6 hours as needed (Nausea/Vomiting)    Malignant neoplasm of cervix, unspecified site (H)       ranitidine 75 MG tablet    ZANTAC    30 tablet    Take 1 tablet (75 mg) by mouth 2 times daily    Gastroesophageal reflux disease, esophagitis presence not specified       senna-docusate 8.6-50 MG per tablet    SENOKOT-S;PERICOLACE    100 tablet    Take 2-4 tablets by mouth 2 times daily    Cancer related pain       traMADol 50 MG tablet    ULTRAM    240 tablet    Take 2 tablets (100mg) every morning when you wake up, 2 tablets (100mg) in the middle of the day and 2 tablets (100mg) before bed.  Take 1-2 tablets once a day if needed for pain not controlled with the three scheduled doses.    Malignant neoplasm of cervix, unspecified site (H), Cancer related pain

## 2018-02-20 NOTE — LETTER
2018       RE: Ashvin Tiwari  [address]  [facility] [place] [address]     Dear Colleague,    Thank you for referring your patient, Ashvin Tiwari, to the [facility]. Please see a copy of my visit note below.                            Consult Notes on Referred Patient    Date: 2018       The patient returns today for follow-up.     Her history is as follows:  Patient is a 62 year old A1 woman with no significant past medical history who presented with 3 weeks of vaginal spotting, lower abdominal pain that radiates to the lower back. TShe was treated for UTI with Amoxicillin, since her UA was suggestive of an infection. She didn't feel any improvement after the treatment and underwent pelvic exam that revealed cervical mass. She was referred to gynecology at [facility] for biopsy however that was not done in the office due to concern of ongoing bleeding. Patient never had PAP smear done in the past, never had mammogram or colonoscopy.       17:  Seen in Gyn Onc.  Exam concerning for at least a Stage IIB cervical cancer.  Biopsy obtained consistent with poorly differentiated squamous cell ca.  MRI ordered.      17: MRI Pelvis with 7 x 7cm mass at level of cervix, protrudes into upper third of vagina, bilateral parametrial extension, abuts bilateral ureters but no obstruction, abuts rectal wall with obliteration of fat plan but no mucosal invasion, no clear bladder wall invasion, suspicious lymph nodes left hemipelvis, peritoneal nodule versus lymph node.      17 to 17:  Patient admitted to hospital with pain. CT Chest PE was negative for PE but showed multiple pulmonary nodules consistent with metastatic disease.  CT A/P showed cervical mass with regional metastatic disease as well as peritoneal nodularity.      17:  PET scan with multiple metastatic hypermetabolic lung nodules, axillary, mediastinal, hilar and abdominal lymph nodes.      17:  Seen in  clinic.  Recommended treatment for Stage IV poorly differentiated squamous cell ca with , substituting Carbo for Cisplatin.      9/21/17-11/2/17: Cycle #1-3 Paclitaxel/Carboplatin/Avastin.   Avastin held with Cycle #3 due to HTN.     11/17/17:  PET/CT with positive response to therapy.  Decrease in size of cervical mass, decreased pulmonary and metastatic lymphadenopathy.     11/21/17-1/11/18: Cycle #4-6 Paclitaxel/Carboplatin/Avastin     2/14/18:  PET/CT:  Progessive disease with increased pelvic mass, new left inguinal lymph node, right paratracheal lymph node, right axillary lymph node.    The patient returns today for follow-up.  Tired, increased LE swelling and pain, increased neuropathy, appetite stable.  Denies any vaginal drainage.        Past Medical History:    Past Medical History:   Diagnosis Date     Cancer (H)     cervical     Hypertension          Past Surgical History:    Past Surgical History:   Procedure Laterality Date     BIOPSY/EXCISION LYMPH NODE(S), NEEDLE, SUPERFICIAL (CERVICAL/INGUINAL/AXILLARY) Right 9/21/2017    Procedure: BIOPSY/EXCISION LYMPH NODE(S), NEEDLE, SUPERFICIAL (CERVICAL/INGUINAL/AXILLARY);;  Surgeon: Sonu Denson PA-C;  [facility]     INSERT PORT VASCULAR ACCESS Right 9/21/2017    Procedure: INSERT PORT VASCULAR ACCESS;  Single Lumen Chest Power Port, Right Axillary Lymph Node Biopsy;  Surgeon: Sonu Denson PA-C;  Location:  OR     no previous surgery           Health Maintenance:  Health Maintenance Due   Topic Date Due     TETANUS IMMUNIZATION (SYSTEM ASSIGNED)  05/10/1971     HEPATITIS C SCREENING  05/10/1971     LIPID SCREEN Q5 YR FEMALE (SYSTEM ASSIGNED)  05/10/1998     MAMMO SCREEN Q2 YR (SYSTEM ASSIGNED)  05/10/2003     COLON CANCER SCREEN (SYSTEM ASSIGNED)  05/10/2003     ADVANCE DIRECTIVE PLANNING Q5 YRS  05/10/2008     INFLUENZA VACCINE (SYSTEM ASSIGNED)  09/01/2017       Current Medications:     has a current medication list  "which includes the following prescription(s): tramadol, ranitidine, dronabinol, glutamine, amlodipine, senna-docusate, polyethylene glycol, prochlorperazine, lorazepam, and acetaminophen.       Allergies:     [unfilled]        Social History:     Social History   Substance Use Topics     Smoking status: Never Smoker     Smokeless tobacco: Never Used     Alcohol use No       History   Drug Use No           Family History:     The patient's family history is notable for :    No family history on file.      Physical Exam:     /85  Pulse 112  Temp 99  F (37.2  C) (Oral)  Resp 18  Ht 1.6 m (5' 2.99\")  Wt 81.4 kg (179 lb 6.4 oz)  SpO2 97%  BMI 31.79 kg/m2  Body mass index is 31.79 kg/(m^2).    General Appearance: healthy and alert, no distress        Cardiovascular: regular rate and rhythm, no gallops, rubs or murmurs     Respiratory: lungs clear, no rales, rhonchi or wheezes, normal diaphragmatic excursion    Musculoskeletal: extremities non tender, trace LE swelling    Skin: no lesions or rashes     Neurological: normal gait, no gross defects     Psychiatric: appropriate mood and affect                               Hematological: normal cervical, supraclavicular and inguinal lymph nodes     Gastrointestinal:       abdomen soft, non-tender, non-distended, no organomegaly or masses        Assessment:     Ashvin Tiwair is a 62 year old woman with a diagnosis of 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  Reviewed imaging with patient.  She continues to have a good performance status and is tolerating treatment.  " Discussed incurable nature of the cancer, that additional chemotherapy is palliative only.  Options of symptomatic supportive care versus palliative second line therapy discussed.  She would like to proceed with second line therapy.      Recommend Cisplatin/Topotecan with continuation of Monika, cisplatin 50 mg/m2 day 1 + topotecan 0.75 mg/m2 for 3 days q21 days recommended. Dose reduction for toxicity.  Continue Monika due to clear benefit for cervical cancer and in maintanence therapy for other gyn sites.    Plan three additional cycles with repeat imaging.      Risks and benefits of treatment discussed, anticipated side-effects and monitoring.  Chemo teaching.       2.)                 Palliative Care:  Symptom management, goals of care.  She has follow-up with them on 3/2/18        Barbara Soto MD  Gynecologic Oncology  [facility]  CC  Patient Care Team:  Chelsea Wren as PCP - General (Family Practice)  Sita Gallardo as Referring Physician (OB/Gyn)  Rima Saunders, RN as Continuity Care Coordinator (Gyn-Onc)  Leia Caraballo, RN as Registered Nurse (Palliative)

## 2018-02-20 NOTE — NURSING NOTE
Labs drawn by RN via port access.  Port de-accessed per pt request.    Yesy Chester CMA ([facility])

## 2018-02-27 NOTE — NURSING NOTE
Chief Complaint   Patient presents with     Port Draw     Right port accessed with a gripper needle, labs drawn and sent, flushed with saline and heparin, vitals completed, checked into infusion appointment.   Erin AlarconRN

## 2018-02-27 NOTE — PROGRESS NOTES
Infusion Nursing Note:  Ashvin Tiwari presents today for Cycle 1 Day 1 Cisplatin, Topotecan, Avastin.    Patient seen by provider today: No    Note: Patient states she feels pretty good today. Denies fevers, chills. Complaining of sharp pain to her right ribs with deep breaths for about 1 week. Endorses CARMONA, feels more short of breath after she stops walking. Endorses bilateral shoulder pain in her joints. Ambulatory O2 sats assessed. O2 95% with ambulation, 91% after completion. -125 with ambulation. Denied lightheadedness or dizziness. O2 and HR recovered within one minute of rest. Rachelle Miranda notified of patient condition.    TORB 2/27/18 1155 Rachelle Miranda NP/Arlene Navarrete RN: OK to treat today with /83. Educate patient to monitor BP at home and call PCP if BP's consistently over 140/90 or symptomatic. Educate patient to stretch or massage right rib area to help with pain. Call triage if pain increases or CARMONA increases.    Patient educated on above instructions. Patient received written chemotherapy handouts and teaching from Rima Saunders RNCC. Medications including side effects reviewed with patient. Pt received PRN anti-emetic teaching from pharmacist. Verbalized understanding.     Intravenous Access:  Implanted Port.    Treatment Conditions:  Lab Results   Component Value Date    HGB 11.3 02/27/2018     Lab Results   Component Value Date    WBC 4.5 02/27/2018      Lab Results   Component Value Date    ANEU 2.5 02/27/2018     Lab Results   Component Value Date     02/27/2018      Lab Results   Component Value Date     02/20/2018                   Lab Results   Component Value Date    POTASSIUM 4.1 02/27/2018           Lab Results   Component Value Date    MAG 2.0 02/27/2018            Lab Results   Component Value Date    CR 0.81 02/27/2018                   Lab Results   Component Value Date    BEATRIZ 9.3 02/20/2018                Lab Results   Component Value Date    BILITOTAL 0.3  02/20/2018           Lab Results   Component Value Date    ALBUMIN 3.3 02/20/2018                    Lab Results   Component Value Date    ALT 18 02/20/2018           Lab Results   Component Value Date    AST 18 02/20/2018     Results reviewed, labs MET treatment parameters, ok to proceed with treatment.  Urine Protein 10mg/dL.  /83    Post Infusion Assessment:  Patient tolerated infusion without incident.  Blood return noted pre and post infusion.  No evidence of extravasations.  Access discontinued per protocol.    Discharge Plan:   Prescription refills given for Ativan, Compazine.  Discharge instructions reviewed with: Patient.  Patient verbalized understanding of discharge instructions and all questions answered.  Copy of AVS reviewed with patient.  Patient will return 2/28/18 for next appointment.  Patient discharged in stable condition accompanied by: sister.  Face to Face time: 10.    ASHELY CONROY RN

## 2018-02-27 NOTE — PATIENT INSTRUCTIONS
Monitor your blood pressure at home. Call your primary care physician if it is consistently higher than 140/90, or you have headaches or vision changes.    Call triage if the pain in your right rib increases. Try stretching and massaging area to help with the pain.     Contact Numbers    [facility] [place] [phone]  [facility] [place]  [phone]    Call triage with chills and/or temperature greater than or equal to 100.5, uncontrolled nausea/vomiting, diarrhea, constipation, dizziness, shortness of breath, chest pain, bleeding, unexplained bruising, or any new/concerning symptoms, questions/concerns.     If you are having any concerning symptoms or wish to speak to a provider before your next infusion visit, please call your care coordinator or triage to notify them so we can adequately serve you.       After Hours: [phone]    If after hours, weekends, or holidays, call main hospital  and ask for Oncology doctor on call.         February 2018 Sunday Monday Tuesday Wednesday Thursday Friday Saturday                       1     2     3       4     5     6     7     8     9     10       11     12     13     14     CT SOFT TISSUE NECK WWO    8:30 AM   (15 min.)   [address] [facility]     PET ONCOLOGY WHOLE BODY    8:45 AM   (120 min.)   [address] [facility] 15     16     17       18     19     20     [facility] RETURN ACTIVE TREATMENT    8:45 AM   (30 min.)   Barbara Soto MD   [facility] DRAW   10:15 AM   (15 min.)    [facility]   [facility] Lab Draw 21     TREATMENT 60    4:30 PM   (60 min.)   Dwayne Barbour, PT   [facility] 22     23     24       25     26     27     [facility] DRAW   10:15 AM   (15 min.)    [facility]RAW   [facility] Lab Draw     [facility] ONC INFUSION 360   11:00 AM   (360 min.)    ONCOLOGY INFUSION   [facility] 28     [facility] ONC INFUSION 60    3:00  PM   (60 min.)   [facility]   [facility]                           March 2018 Sunday Monday Tuesday Wednesday Thursday Friday Saturday                       1     [facility] ONC INFUSION 60    2:30 PM   (60 min.)   [facility] ONCOL[facility] INFUSION   [facility] 2     [facility] RETURN   11:15 AM   (30 min.)   Briana Medina MD   [facility] 3       4     5     6     [facility] ONC INFUSION 120    2:30 PM   (120 min.)   [facility] ONCOLOGY INFUSION   [facility] 7     8     9     10       11     12     13     14     15     16     17       18     19     20     [facility]    8:00 AM   (15 min.)    [facility] DRAW   [facility] Lab Draw     [facility] RETURN ACTIVE TREATMENT    8:15 AM   (30 min.)   Rachelle Miranda APRN CNP   [facility]     [facility] ONC INFUSION 360    9:00 AM   (360 min.)    ONCOLOGY INFUSION   [facility] 21     [facility] ONC INFUSION 60    8:30 AM   (60 min.)   [facility] ONCOLOGY INFUSION   [facility]     TREATMENT 60    4:45 PM   (60 min.)   [facility]     [facility] INF[facility] 60    8:30 AM   (60 min.)   [facility] ONCOLOG[facility] INFUSION   [facility] 23     24       25     26     27     28     29     30     31                 Recent Results (from the past 24 hour(s))   Potassium    Collection Time: 02/27/18 10:30 AM   Result Value Ref Range    Potassium 4.1 3.4 - 5.3 mmol/L   Magnesium    Collection Time: 02/27/18 10:30 AM   Result Value Ref Range    Magnesium 2.0 1.6 - 2.3 mg/dL   Creatinine    Collection Time: 02/27/18 10:30 AM   Result Value Ref Range    Creatinine 0.81 0.52 - 1.04 mg/dL    GFR Estimate 71 >60 mL/min/1.7m2    GFR Estimate If Black 85 >60 mL/min/1.7m2   CBC with platelets differential    Collection Time: 02/27/18 10:30 AM   Result Value Ref Range    WBC 4.5 4.0 - 11.0 10e9/L    RBC Count 3.78 (L) 3.8 - 5.2 10e12/L    Hemoglobin 11.3  (L) 11.7 - 15.7 g/dL    Hematocrit 35.7 35.0 - 47.0 %    MCV 94 78 - 100 fl    MCH 29.9 26.5 - 33.0 pg    MCHC 31.7 31.5 - 36.5 g/dL    RDW 18.2 (H) 10.0 - 15.0 %    Platelet Count 242 150 - 450 10e9/L    Diff Method Automated Method     % Neutrophils 56.3 %    % Lymphocytes 31.2 %    % Monocytes 11.9 %    % Eosinophils 0.2 %    % Basophils 0.2 %    % Immature Granulocytes 0.2 %    Nucleated RBCs 0 0 /100    Absolute Neutrophil 2.5 1.6 - 8.3 10e9/L    Absolute Lymphocytes 1.4 0.8 - 5.3 10e9/L    Absolute Monocytes 0.5 0.0 - 1.3 10e9/L    Absolute Eosinophils 0.0 0.0 - 0.7 10e9/L    Absolute Basophils 0.0 0.0 - 0.2 10e9/L    Abs Immature Granulocytes 0.0 0 - 0.4 10e9/L    Absolute Nucleated RBC 0.0    Protein qualitative urine    Collection Time: 02/27/18 10:50 AM   Result Value Ref Range    Protein Albumin Urine 10 (A) NEG^Negative mg/dL

## 2018-02-27 NOTE — MR AVS SNAPSHOT
After Visit Summary   2/27/2018    Ashvin Tiwari    MRN: [id number]           Patient Information     Date Of Birth          1955        Visit Information        Provider Department      2/27/2018 11:00 AM  29 ATC;  ONCOLOGY INFUSION [facility]        Today's Diagnoses     Malignant neoplasm of cervix, unspecified site (H)    -  1      Care Instructions    Monitor your blood pressure at home. Call your primary care physician if it is consistently higher than 140/90, or you have headaches or vision changes.    Call triage if the pain in your right rib increases. Try stretching and massaging area to help with the pain.     Contact Numbers    [facility] Main Line: [phone]  [facility] Triage:  [phone]    Call triage with chills and/or temperature greater than or equal to 100.5, uncontrolled nausea/vomiting, diarrhea, constipation, dizziness, shortness of breath, chest pain, bleeding, unexplained bruising, or any new/concerning symptoms, questions/concerns.     If you are having any concerning symptoms or wish to speak to a provider before your next infusion visit, please call your care coordinator or triage to notify them so we can adequately serve you.       After Hours: [phone]    If after hours, weekends, or holidays, call main hospital  and ask for Oncology doctor on call.         February 2018 Sunday Monday Tuesday Wednesday Thursday Friday Saturday                       1     2     3       4     5     6     7     8     9     10       11     12     13     14     CT SOFT TISSUE NECK WWO    8:30 AM   (15 min.)   [place] [id number]   [facility]     PET ONCOLOGY WHOLE BODY    8:45 AM   (120 min.)   [address] [facility] 15     16     17       18     19     20     [facility] RETURN ACTIVE TREATMENT    8:45 AM   (30 min.)   Bar[facility] Soto [facility]   10:15 AM   (15 min.)     [facility] DRAW   [facility] Lab Draw 21     TREATMENT 60    4:30 PM   (60 min.)   [facility]way[facility] Barbour, PT   [facility] 22     23     24       25     26     27     [facility] DRAW   10:15 AM   (15 min.)    [facility] DRAW   [facility] Lab Draw     [facility] ONC INFUSION 360   11:00 AM   (360 min.)   [facility] ONCOLOGY INFUSION   [facility] 28     [facility] ONC INFUSION 60    3:00 PM   (60 min.)   [facility] ONCOLOGY INFUSION   [facility]                           March 2018 Sunday Monday Tuesday Wednesday Thursday Friday Saturday                       1     [facility] ONC INFUSION 60    2:30 PM   (60 min.)   [facility] ONCOLOGY INFUSION   [facility] 2     UMP RETURN   11:15 AM   (30 min.)   Briana Medina MD   [facility] 3       4     5     6     [facility] ONC INFUSION 120    2:30 PM   (120 min.)    ONCOLOGY INFUSION   [facility] 7     8     9     10       11     12     13     14     15     16     17       18     19     20     [facility] DRAW    8:00 AM   (15 min.)    [facility] DRAW   [facility] Lab Draw     [facility] RETURN ACTIVE TREATMENT    8:15 AM   (30 min.)   Rachelle Miranda APRN CNP   [facility]    9:00 AM   (360 min.)    ONCOLOGY INFUSION   [facility] 21     [facility] ONC INFUSION 60    8:30 AM   (60 min.)    ONCOLOGY INFUSION   [facility]     TREATMENT 60    4:45 PM   (60 min.)   Dwayne Barbour, PT   [facility] 22     [facility] ONC INFUSION 60    8:30 AM   (60 min.)   UC ONCOLOGY INFUSION   [facility] 23     24       25     26     27     28     29     30     31                 Recent Results (from the past 24 hour(s))   Potassium    Collection Time: 02/27/18 10:30 AM   Result Value Ref Range    Potassium 4.1 3.4 - 5.3 mmol/L   Magnesium    Collection Time: 02/27/18 10:30 AM   Result Value Ref Range     Magnesium 2.0 1.6 - 2.3 mg/dL   Creatinine    Collection Time: 02/27/18 10:30 AM   Result Value Ref Range    Creatinine 0.81 0.52 - 1.04 mg/dL    GFR Estimate 71 >60 mL/min/1.7m2    GFR Estimate If Black 85 >60 mL/min/1.7m2   CBC with platelets differential    Collection Time: 02/27/18 10:30 AM   Result Value Ref Range    WBC 4.5 4.0 - 11.0 10e9/L    RBC Count 3.78 (L) 3.8 - 5.2 10e12/L    Hemoglobin 11.3 (L) 11.7 - 15.7 g/dL    Hematocrit 35.7 35.0 - 47.0 %    MCV 94 78 - 100 fl    MCH 29.9 26.5 - 33.0 pg    MCHC 31.7 31.5 - 36.5 g/dL    RDW 18.2 (H) 10.0 - 15.0 %    Platelet Count 242 150 - 450 10e9/L    Diff Method Automated Method     % Neutrophils 56.3 %    % Lymphocytes 31.2 %    % Monocytes 11.9 %    % Eosinophils 0.2 %    % Basophils 0.2 %    % Immature Granulocytes 0.2 %    Nucleated RBCs 0 0 /100    Absolute Neutrophil 2.5 1.6 - 8.3 10e9/L    Absolute Lymphocytes 1.4 0.8 - 5.3 10e9/L    Absolute Monocytes 0.5 0.0 - 1.3 10e9/L    Absolute Eosinophils 0.0 0.0 - 0.7 10e9/L    Absolute Basophils 0.0 0.0 - 0.2 10e9/L    Abs Immature Granulocytes 0.0 0 - 0.4 10e9/L    Absolute Nucleated RBC 0.0    Protein qualitative urine    Collection Time: 02/27/18 10:50 AM   Result Value Ref Range    Protein Albumin Urine 10 (A) NEG^Negative mg/dL                 Follow-ups after your visit        Your next 10 appointments already scheduled     Feb 28, 2018  3:00 PM CST   Infusion 60 with UC ONCOLOGY INFUSION, UC 15 ATC   [facility])    [address] [place] [address]  [address] [place] [address] [phone]            Mar 01, 2018  2:30 PM CST   Infusion 60 with UC ONCOLOGY INFUSION, UC 29 ATC   [facility])    [address] [facility] [address]  [address] [place] [address]   [phone]            Mar 02, 2018 11:30 AM CST   (Arrive by 11:15 AM)   Return Visit with Briana Medina MD   [facility] ([facility])    [address] [place] [address]  [address]  [facility] [address]   [phone]            Mar 06, 2018  2:30 PM CST   Infusion 120 with UC ONCOLOGY INFUSION, UC 20 ATC   [facility] ([facility])    [address]  [address]  [facility] [address]   [phone]            Mar 20, 2018  8:00 AM CDT   [facility] Lab Draw with [facility] DRAW   [facility] Lab Draw ([facility])    [address] [place] [address]  [address]  [facility] [address]   [phone]            Mar 20, 2018  8:30 AM CDT   (Arrive by 8:15 AM)   Return Active Treatment with ROSA Mendoza CNP   [facility] ([facility])    [address] [place] [address]  [address]  [facility] [address]   [phone]            Mar 20, 2018  9:00 AM CDT   Infusion 360 with UC ONCOLOGY INFUSION   [facility] ([facility])    [address] [place] [address]  [address]  [facility] [address]   [phone]              Who to contact     If you have questions or need follow up information about today's clinic visit or your schedule please contact [facility] directly at [phone].  Normal or non-critical lab and imaging results will be communicated to you by [employer], letter or phone within 4 business days after the clinic has received the results. If you do not hear from us within 7 days, please contact the clinic through [employer] or phone. If you have a critical or abnormal lab result, we will notify you by phone as soon as possible.  Submit refill requests through [employer] or call your pharmacy and they will forward the refill request to us. Please allow 3 business days for your refill to be completed.          Additional Information About Your Visit        [employer] Information     [employer] lets you send messages to your doctor, view your  "test results, renew your prescriptions, schedule appointments and more. To sign up, go to [email or web address] . Click on \"Log in\" on the left side of the screen, which will take you to the Welcome page. Then click on \"Sign up Now\" on the right side of the page.     You will be asked to enter the access code listed below, as well as some personal information. Please follow the directions to create your username and password.     Your access code is: [email or web address]  Expires: 2018  6:30 AM     Your access code will  in 90 days. If you need help or a new code, please call your [place] clinic or [phone].        Care EveryWhere ID     This is your Care EveryWhere ID. This could be used by other organizations to access your [place] medical records  [id number]        Your Vitals Were     Pulse Temperature Respirations Pulse Oximetry BMI (Body Mass Index)       94 98.8  F (37.1  C) (Oral) 18 100% 31.59 kg/m2        Blood Pressure from Last 3 Encounters:   18 145/83   18 146/85   18 (!) 151/92    Weight from Last 3 Encounters:   18 80.9 kg (178 lb 4.8 oz)   18 81.4 kg (179 lb 6.4 oz)   18 80.1 kg (176 lb 8 oz)              We Performed the Following     CBC with platelets differential     Creatinine     Magnesium     Potassium     Protein qualitative urine          Today's Medication Changes          These changes are accurate as of 18  4:33 PM.  If you have any questions, ask your nurse or doctor.               These medicines have changed or have updated prescriptions.        Dose/Directions    * LORazepam 1 MG tablet   Commonly known as:  ATIVAN   This may have changed:  Another medication with the same name was added. Make sure you understand how and when to take each.   Used for:  Malignant neoplasm of cervix, unspecified site (H)        Dose:  1 mg   Take 1 tablet (1 mg) by mouth every 6 hours as needed (Anxiety, Nausea/Vomiting or Sleep)   Quantity:  30 " tablet   Refills:  2       * LORazepam 1 MG tablet   Commonly known as:  ATIVAN   This may have changed:  You were already taking a medication with the same name, and this prescription was added. Make sure you understand how and when to take each.   Used for:  Malignant neoplasm of cervix, unspecified site (H)        Dose:  1 mg   Take 1 tablet (1 mg) by mouth every 6 hours as needed (Anxiety, Nausea/Vomiting or Sleep)   Quantity:  30 tablet   Refills:  2       * prochlorperazine 10 MG tablet   Commonly known as:  COMPAZINE   This may have changed:  Another medication with the same name was added. Make sure you understand how and when to take each.   Used for:  Malignant neoplasm of cervix, unspecified site (H)        Dose:  10 mg   Take 1 tablet (10 mg) by mouth every 6 hours as needed (Nausea/Vomiting)   Quantity:  30 tablet   Refills:  2       * prochlorperazine 10 MG tablet   Commonly known as:  COMPAZINE   This may have changed:  You were already taking a medication with the same name, and this prescription was added. Make sure you understand how and when to take each.   Used for:  Malignant neoplasm of cervix, unspecified site (H)        Dose:  10 mg   Take 1 tablet (10 mg) by mouth every 6 hours as needed (Nausea/Vomiting)   Quantity:  30 tablet   Refills:  2       * Notice:  This list has 4 medication(s) that are the same as other medications prescribed for you. Read the directions carefully, and ask your doctor or other care provider to review them with you.         Where to get your medicines      These medications were sent to [address] [address]    Hours:  TRANSPLANT PHONE NUMBER [phone] Phone:  [phone]     prochlorperazine 10 MG tablet         Some of these will need a paper prescription and others can be bought over the counter.  Ask your nurse if you have questions.     Bring a  paper prescription for each of these medications     LORazepam 1 MG tablet                Primary Care Provider Office Phone # Fax #    Chelsea Wren [phone]       [facility] [place] [address] [facility] [address]        Equal Access to Services     LISSET LUGO : Hadii elvia mccurdy hadoscaro Soomaali, waaxda luqadaha, qaybta kaalmada adeegyada, elizabeth paul hayaden sofierodolfo worthy harmeet cheema. So [facility] [phone].    ATENCIÓN: Si habla español, tiene a mae disposición servicios gratuitos de asistencia lingüística. [facility] [phone].    We comply with applicable federal civil rights laws and [place] laws. We do not discriminate on the basis of race, color, national origin, age, disability, sex, sexual orientation, or gender identity.            Thank you!     Thank you for choosing [facility]  for your care. Our goal is always to provide you with excellent care. Hearing back from our patients is one way we can continue to improve our services. Please take a few minutes to complete the written survey that you may receive in the mail after your visit with us. Thank you!             Your Updated Medication List - Protect others around you: Learn how to safely use, store and throw away your medicines at www.disposemymeds.org.          This list is accurate as of 2/27/18  4:33 PM.  Always use your most recent med list.                   Brand Name Dispense Instructions for use Diagnosis    acetaminophen 325 MG tablet    TYLENOL    100 tablet    Take 3 tablets (975 mg) by mouth 3 times daily    Cancer related pain       amLODIPine 2.5 MG tablet    NORVASC    30 tablet    Take 1 tablet (2.5 mg) by mouth daily    Essential hypertension       dronabinol 5 MG capsule    MARINOL    60 capsule    Take 1 capsule (5 mg) by mouth 2 times daily (before meals)    Anorexia, Malignant neoplasm of cervix, unspecified site (H)       glutamine 500 MG Tabs     120 tablet    Take 500 mg by mouth 2  times daily    Drug-induced polyneuropathy (H)       * LORazepam 1 MG tablet    ATIVAN    30 tablet    Take 1 tablet (1 mg) by mouth every 6 hours as needed (Anxiety, Nausea/Vomiting or Sleep)    Malignant neoplasm of cervix, unspecified site (H)       * LORazepam 1 MG tablet    ATIVAN    30 tablet    Take 1 tablet (1 mg) by mouth every 6 hours as needed (Anxiety, Nausea/Vomiting or Sleep)    Malignant neoplasm of cervix, unspecified site (H)       polyethylene glycol powder    MIRALAX    510 g    Take 17-34 g (1-2 capfuls) by mouth daily    Cancer related pain, Generalized abdominal pain       * prochlorperazine 10 MG tablet    COMPAZINE    30 tablet    Take 1 tablet (10 mg) by mouth every 6 hours as needed (Nausea/Vomiting)    Malignant neoplasm of cervix, unspecified site (H)       * prochlorperazine 10 MG tablet    COMPAZINE    30 tablet    Take 1 tablet (10 mg) by mouth every 6 hours as needed (Nausea/Vomiting)    Malignant neoplasm of cervix, unspecified site (H)       ranitidine 75 MG tablet    ZANTAC    30 tablet    Take 1 tablet (75 mg) by mouth 2 times daily    Gastroesophageal reflux disease, esophagitis presence not specified       senna-docusate 8.6-50 MG per tablet    SENOKOT-S;PERICOLACE    100 tablet    Take 2-4 tablets by mouth 2 times daily    Cancer related pain       traMADol 50 MG tablet    ULTRAM    240 tablet    Take 2 tablets (100mg) every morning when you wake up, 2 tablets (100mg) in the middle of the day and 2 tablets (100mg) before bed.  Take 1-2 tablets once a day if needed for pain not controlled with the three scheduled doses.    Malignant neoplasm of cervix, unspecified site (H), Cancer related pain       * Notice:  This list has 4 medication(s) that are the same as other medications prescribed for you. Read the directions carefully, and ask your doctor or other care provider to review them with you.

## 2018-02-28 NOTE — MR AVS SNAPSHOT
After Visit Summary   2/28/2018    Ashvin Tiwari    MRN: [id number]           Patient Information     Date Of Birth          1955        Visit Information        Provider Department      2/28/2018 3:00 PM  15 ATC;  ONCOLOGY INFUSION [facility]        Today's Diagnoses     Malignant neoplasm of cervix, unspecified site (H)    -  1      Care Instructions    Contact Numbers    [facility] Main Line: [phone]  [facility] Triage:  [phone]    Call triage with chills and/or temperature greater than or equal to 100.5, uncontrolled nausea/vomiting, diarrhea, constipation, dizziness, shortness of breath, chest pain, bleeding, unexplained bruising, or any new/concerning symptoms, questions/concerns.     If you are having any concerning symptoms or wish to speak to a provider before your next infusion visit, please call your care coordinator or triage to notify them so we can adequately serve you.       After Hours: [phone]    If after hours, weekends, or holidays, call main hospital  and ask for Oncology doctor on call.         February 2018 Sunday Monday Tuesday Wednesday Thursday Friday Saturday                       1     2     3       4     5     6     7     8     9     10       11     12     13     14     CT SOFT TISSUE NECK WWO    8:30 AM   (15 min.)   [address] [facility]     PET ONCOLOGY WHOLE BODY    8:45 AM   (120 min.)   [address] [facility] 15     16     17       18     19     20     [facility] RETURN ACTIVE TREATMENT    8:45 AM   (30 min.)   Barbara Soto MD   [facility] LAB DRAW   10:15 AM   (15 min.)    [facility] DRAW   [facility] Lab Draw 21     TREATMENT 60    4:30 PM   (60 min.)   Dwayne Barbour, PT   [facility] Rehab 22     23     24       25     26     27     [facility] DRAW   10:15 AM   (15 min.)    [facility] DRAW   [facility] Lab Draw     [facility]  ONC INFUSION 360   11:00 AM   (360 min.)   UC ONCOLOGY INFUSION   [facility] 28     UMP ONC INFUSION 60    3:00 PM   (60 min.)   UC ONCOLOGY INFUSION   [facility]                           March 2018 Sunday Monday Tuesday Wednesday Thursday Friday Saturday                       1     UMP ONC INFUSION 60    2:30 PM   (60 min.)   UC ONCOLOGY INFUSION   [facility] 2     UMP RETURN   11:15 AM   (30 min.)   Briana Medina MD   [facility] 3       4     5     6     UMP ONC INFUSION 120    2:30 PM   (120 min.)   UC ONCOLOGY INFUSION   [facility] 7     8     9     10       11     12     13     14     15     16     17       18     19     20     [facility] DRAW    8:00 AM   (15 min.)   [facility] DRAW   [facility] Lab Draw     UMP RETURN ACTIVE TREATMENT    8:15 AM   (30 min.)   Rachelle Miranda APRN CNP   [facility]     UMP ONC INFUSION 360    9:00 AM   (360 min.)   UC ONCOLOGY INFUSION   [facility] 21     UMP ONC INFUSION 60    8:30 AM   (60 min.)   UC ONCOLOGY INFUSION   [facility]     TREATMENT 60    4:45 PM   (60 min.)   Dwayne Barbour, PT   [facility] Rehab 22     UMP ONC INFUSION 60    8:30 AM   (60 min.)   UC ONCOLOGY INFUSION   [facility] 23     24       25     26     27     28     29     30     31               No results found for this or any previous visit (from the past 24 hour(s)).              Follow-ups after your visit        Your next 10 appointments already scheduled     Mar 01, 2018  2:30 PM CST   Infusion 60 with UC ONCOLOGY INFUSION, UC 29 ATC   [facility] ([facility])    [address] [place] [address]  [address] [place] [id number] [phone]            Mar 02, 2018 11:30 AM CST   (Arrive by 11:15 AM)   Return Visit with Briana Medina MD     [facility] ([facility])    [address]  [address]  [facility] [address]   [phone]            Mar 06, 2018  2:30 PM CST   Infusion 120 with UC ONCOLOGY INFUSION, UC 20 ATC   [facility] ([facility])    [address]  [address]  [facility] [address]   [phone]            Mar 20, 2018  8:00 AM CDT   [facility] Draw with UC [facility] LAB DRAW   [facility] Lab Draw ([facility])    [address] [place] [address]  [address]  [facility] [address]   [phone]            Mar 20, 2018  8:30 AM CDT   (Arrive by 8:15 AM)   Return Active Treatment with ROSA Mendoza CNP   [facility] ([facility])    [address] [place] [address]  [address]  [facility] [address]   [phone]            Mar 20, 2018  9:00 AM CDT   Infusion 360 with UC ONCOLOGY INFUSION, UC 23 ATC   [facility] ([facility])    [address]  [address]  [facility] [address]   [phone]            Mar 21, 2018  8:30 AM CDT   Infusion 60 with UC ONCOLOGY INFUSION   [facility] ([facility])    [address] [place] [address]  [address]  [facility] [address]   [phone]              Who to contact     If you have questions or need follow up information about today's clinic visit or your schedule please contact [facility] directly at [phone].  Normal or non-critical lab and imaging results will be communicated to you by MyChart, letter or phone within 4 business days after the clinic has received the results. If you do not hear from us within 7 days, please contact the clinic through MyChart or phone. If you have a critical or abnormal lab result, we will notify you by phone as soon as possible.  Submit refill requests through [employer] or call your  "pharmacy and they will forward the refill request to us. Please allow 3 business days for your refill to be completed.          Additional Information About Your Visit        MyChart Information     [employer] lets you send messages to your doctor, view your test results, renew your prescriptions, schedule appointments and more. To sign up, go to [email or web address] . Click on \"Log in\" on the left side of the screen, which will take you to the Welcome page. Then click on \"Sign up Now\" on the right side of the page.     You will be asked to enter the access code listed below, as well as some personal information. Please follow the directions to create your username and password.     Your access code is: [email or web address]  Expires: 2018  6:30 AM     Your access code will  in 90 days. If you need help or a new code, please call your [place] clinic or [phone].        Care EveryWhere ID     This is your Care EveryWhere ID. This could be used by other organizations to access your [facility] medical records  [id number]        Your Vitals Were     Pulse Temperature Respirations Pulse Oximetry          106 98.1  F (36.7  C) 18 100%         Blood Pressure from Last 3 Encounters:   18 146/85   18 145/83   18 146/85    Weight from Last 3 Encounters:   18 80.9 kg (178 lb 4.8 oz)   18 81.4 kg (179 lb 6.4 oz)   18 80.1 kg (176 lb 8 oz)              Today, you had the following     No orders found for display       Primary Care Provider Office Phone # Fax #    Chelsea Wren [phone]       [facility] [address]  [facility] [address]        Equal Access to Services     LISSET LUGO : Sundar Reese, juan miguel pelletier, rajeev barbouralmartha wray, elizabeth cheema. So [facility] [phone].    ATENCIÓN: Si habla español, tiene a mae disposición servicios gratuitos de asistencia lingüística. Llame al [phone].    We " comply with applicable federal civil rights laws and [place] laws. We do not discriminate on the basis of race, color, national origin, age, disability, sex, sexual orientation, or gender identity.            Thank you!     Thank you for choosing [facility]  for your care. Our goal is always to provide you with excellent care. Hearing back from our patients is one way we can continue to improve our services. Please take a few minutes to complete the written survey that you may receive in the mail after your visit with us. Thank you!             Your Updated Medication List - Protect others around you: Learn how to safely use, store and throw away your medicines at www.disposemymeds.org.          This list is accurate as of 2/28/18  4:14 PM.  Always use your most recent med list.                   Brand Name Dispense Instructions for use Diagnosis    acetaminophen 325 MG tablet    TYLENOL    100 tablet    Take 3 tablets (975 mg) by mouth 3 times daily    Cancer related pain       amLODIPine 2.5 MG tablet    NORVASC    30 tablet    Take 1 tablet (2.5 mg) by mouth daily    Essential hypertension       dronabinol 5 MG capsule    MARINOL    60 capsule    Take 1 capsule (5 mg) by mouth 2 times daily (before meals)    Anorexia, Malignant neoplasm of cervix, unspecified site (H)       glutamine 500 MG Tabs     120 tablet    Take 500 mg by mouth 2 times daily    Drug-induced polyneuropathy (H)       * LORazepam 1 MG tablet    ATIVAN    30 tablet    Take 1 tablet (1 mg) by mouth every 6 hours as needed (Anxiety, Nausea/Vomiting or Sleep)    Malignant neoplasm of cervix, unspecified site (H)       * LORazepam 1 MG tablet    ATIVAN    30 tablet    Take 1 tablet (1 mg) by mouth every 6 hours as needed (Anxiety, Nausea/Vomiting or Sleep)    Malignant neoplasm of cervix, unspecified site (H)       polyethylene glycol powder    MIRALAX    510 g    Take 17-34 g (1-2 capfuls) by mouth daily    Cancer related  pain, Generalized abdominal pain       * prochlorperazine 10 MG tablet    COMPAZINE    30 tablet    Take 1 tablet (10 mg) by mouth every 6 hours as needed (Nausea/Vomiting)    Malignant neoplasm of cervix, unspecified site (H)       * prochlorperazine 10 MG tablet    COMPAZINE    30 tablet    Take 1 tablet (10 mg) by mouth every 6 hours as needed (Nausea/Vomiting)    Malignant neoplasm of cervix, unspecified site (H)       ranitidine 75 MG tablet    ZANTAC    30 tablet    Take 1 tablet (75 mg) by mouth 2 times daily    Gastroesophageal reflux disease, esophagitis presence not specified       senna-docusate 8.6-50 MG per tablet    SENOKOT-S;PERICOLACE    100 tablet    Take 2-4 tablets by mouth 2 times daily    Cancer related pain       traMADol 50 MG tablet    ULTRAM    240 tablet    Take 2 tablets (100mg) every morning when you wake up, 2 tablets (100mg) in the middle of the day and 2 tablets (100mg) before bed.  Take 1-2 tablets once a day if needed for pain not controlled with the three scheduled doses.    Malignant neoplasm of cervix, unspecified site (H), Cancer related pain       * Notice:  This list has 4 medication(s) that are the same as other medications prescribed for you. Read the directions carefully, and ask your doctor or other care provider to review them with you.

## 2018-02-28 NOTE — PROGRESS NOTES
Infusion Nursing Note:  Ashvin Tiwari presents today for Cycle 1 Day 2 Topotecan.    Patient seen by provider today: No   present during visit today: Not Applicable.    Note: Patient rates her right and left lower rib pain a 7/10. Patient takes two tablets of Tramadol three times a day and also takes three tablets of Tylenol three times a day. Patient only experiences pain when she sneezes, coughs, and applies direct pressure to her ribs. Patient has CARMONA. Symptoms are unchanged from yesterday. Rachelle Miranda NP and Rima Saunders RN notified.    TORB 2/28/18 1630 Rachelle Miranda NP/Tri Haynes RN:   -Ok to discharge patient to home. This pain is most likely muscular in nature. Instruct patient to continue pain regimen until her palliative care apt on 3/2/18. Instruct patient to apply hot packs to site. Reassess pain tomorrow.     Rima Saunders RN also encouraged patient to take two additional doses of Tramadol tonight, per the instructions on her Rx.     Patient verbalized understanding of instructions.     Intravenous Access:  Implanted Port.    Treatment Conditions:  Not Applicable - labs reviewed from 2/27/18    Post Infusion Assessment:  Patient tolerated infusion without incident.  Blood return noted pre and post infusion.  Site patent and intact, free from redness, edema or discomfort.  No evidence of extravasations.  Access discontinued per protocol.    Discharge Plan:   Patient declined prescription refills.  Copy of AVS reviewed with patient and/or family.  Patient will return 3/1/18 for next appointment.  Patient discharged in stable condition accompanied by: self and mother.  Departure Mode: Ambulatory.  Face to face time: 7    Tri Haynes RN

## 2018-02-28 NOTE — PATIENT INSTRUCTIONS
Contact Numbers    [facility] Main Line: [phone]  [facility] Triage:  [phone]    Call triage with chills and/or temperature greater than or equal to 100.5, uncontrolled nausea/vomiting, diarrhea, constipation, dizziness, shortness of breath, chest pain, bleeding, unexplained bruising, or any new/concerning symptoms, questions/concerns.     If you are having any concerning symptoms or wish to speak to a provider before your next infusion visit, please call your care coordinator or triage to notify them so we can adequately serve you.       After Hours: [phone]    If after hours, weekends, or holidays, call main hospital  and ask for Oncology doctor on call.         February 2018 Sunday Monday Tuesday Wednesday Thursday Friday Saturday                       1     2     3       4     5     6     7     8     9     10       11     12     13     14     CT SOFT TISSUE NECK WWO    8:30 AM   (15 min.)   [address] [facility]     PET ONCOLOGY WHOLE BODY    8:45 AM   (120 min.)   [address] [facility] 15     16     17       18     19     20     UMP RETURN ACTIVE TREATMENT    8:45 AM   (30 min.)   Barbara Soto MD   [facility]     [facility] DRAW   10:15 AM   (15 min.)    [facility] LAB DRAW   [facility] Lab Draw 21     TREATMENT 60    4:30 PM   (60 min.)   Dwayne Barbour, PT   [facility] Rehab 22     23     24       25     26     27     [facility] DRAW   10:15 AM   (15 min.)    [facility] DRAW   [facility] Lab Draw     UMP ONC INFUSION 360   11:00 AM   (360 min.)   UC ONCOLOGY INFUSION   [facility] 28     UMP ONC INFUSION 60    3:00 PM   (60 min.)   UC ONCOLOGY INFUSION   [facility]                           March 2018 Sunday Monday Tuesday Wednesday Thursday Friday Saturday                       1     UMP ONC INFUSION 60    2:30 PM   (60 min.)   UC ONCOLOGY INFUSION   [facility]  [facility] Cancer [facility] 2     UMP RETURN   11:15 AM   (30 min.)   Briana Medina MD   [facility] 3       4     5     6     UMP ONC INFUSION 120    2:30 PM   (120 min.)   UC ONCOLOGY INFUSION   [facility] 7     8     9     10       11     12     13     14     15     16     17       18     19     20     [facility] LAB DRAW    8:00 AM   (15 min.)   [facility] LAB DRAW   [facility] Lab Draw     UMP RETURN ACTIVE TREATMENT    8:15 AM   (30 min.)   Rachelle Miranda APRN CNP   [facility]     UMP ONC INFUSION 360    9:00 AM   (360 min.)   UC ONCOLOGY INFUSION   [facility] 21     P ONC INFUSION 60    8:30 AM   (60 min.)    ONCOLOGY INFUSION   [facility]     TREATMENT 60    4:45 PM   (60 min.)   Dwayne Barbour, PT   [facility] Rehab 22     UMP ONC INFUSION 60    8:30 AM   (60 min.)    ONCOLOGY INFUSION   [facility] 23     24       25     26     27     28     29     30     31               No results found for this or any previous visit (from the past 24 hour(s)).

## 2018-03-01 NOTE — PROGRESS NOTES
Infusion Nursing Note:  Ashvin Tiwari presents today for cycle 1 day 8 topotecan.    Patient seen by provider today: No   present during visit today: Not Applicable.    Note: Pt states her pain to her left and right sides continues at a 7/10 today.  She did take more tramadol last night as instructed yesterday and slept much better.  No other changes or concerns.    Intravenous Access:  Implanted Port.    Treatment Conditions:  Lab Results   Component Value Date    HGB 11.3 02/27/2018     Lab Results   Component Value Date    WBC 4.5 02/27/2018      Lab Results   Component Value Date    ANEU 2.5 02/27/2018     Lab Results   Component Value Date     02/27/2018      Lab Results   Component Value Date     02/20/2018                   Lab Results   Component Value Date    POTASSIUM 4.1 02/27/2018           Lab Results   Component Value Date    MAG 2.0 02/27/2018            Lab Results   Component Value Date    CR 0.81 02/27/2018                   Lab Results   Component Value Date    BEATRIZ 9.3 02/20/2018                Lab Results   Component Value Date    BILITOTAL 0.3 02/20/2018           Lab Results   Component Value Date    ALBUMIN 3.3 02/20/2018                    Lab Results   Component Value Date    ALT 18 02/20/2018           Lab Results   Component Value Date    AST 18 02/20/2018           Post Infusion Assessment:  Patient tolerated infusion without incident.  Blood return noted pre and post infusion.  Site patent and intact, free from redness, edema or discomfort.  No evidence of extravasations.  Access discontinued per protocol.    Discharge Plan:   Patient declined prescription refills.  Discharge instructions reviewed with: Patient.  Patient and/or family verbalized understanding of discharge instructions and all questions answered.  Copy of AVS reviewed with patient and/or family.  Patient will return 3/6/18 for next appointment.  Patient discharged in stable condition  accompanied by: family.  Departure Mode: Ambulatory.    Fartun Alarcon RN

## 2018-03-01 NOTE — PATIENT INSTRUCTIONS
Contact Numbers    [facility] Main Line: [phone]  [facility] Triage:  [phone]    Call triage with chills and/or temperature greater than or equal to 100.5, uncontrolled nausea/vomiting, diarrhea, constipation, dizziness, shortness of breath, chest pain, bleeding, unexplained bruising, or any new/concerning symptoms, questions/concerns.     If you are having any concerning symptoms or wish to speak to a provider before your next infusion visit, please call your care coordinator or triage to notify them so we can adequately serve you.       After Hours: [phone]    If after hours, weekends, or holidays, call main hospital  and ask for Oncology doctor on call.         March 2018 Sunday Monday Tuesday Wednesday Thursday Friday Saturday                       1     UMP ONC INFUSION 60    2:30 PM   (60 min.)   UC ONCOLOGY INFUSION   [facility] 2     UMP RETURN   11:15 AM   (30 min.)   Briana Medina MD   [facility] 3       4     5     6     UMP ONC INFUSION 120    2:30 PM   (120 min.)   UC ONCOLOGY INFUSION   [facility] 7     8     9     10       11     12     13     14     15     16     17       18     19     20     UMP [facility] LAB DRAW    8:00 AM   (15 min.)   [facility] LAB DRAW   [facility] Lab Draw     UMP RETURN ACTIVE TREATMENT    8:15 AM   (30 min.)   Rachelle Miranda APRN CNP   [facility]     UMP ONC INFUSION 360    9:00 AM   (360 min.)   UC ONCOLOGY INFUSION   [facility] 21     UMP ONC INFUSION 60    8:30 AM   (60 min.)   UC ONCOLOGY INFUSION   [facility]     TREATMENT 60    4:45 PM   (60 min.)   Dwayne Barbour, PT   [facility] Rehab 22     UMP ONC INFUSION 60    8:30 AM   (60 min.)   UC ONCOLOGY INFUSION   [facility] 23     24       25     26     27     28     29     30     31                April 2018 Sunday Monday Tuesday Wednesday Thursday  Friday Saturday   1     2     3     4     5     6     7       8     9     10     11     12     13     14       15     16     17     18     19     20     21       22     23     24     25     26     27     28       29     30                                           Lab Results:  No results found for this or any previous visit (from the past 12 hour(s)).

## 2018-03-01 NOTE — MR AVS SNAPSHOT
After Visit Summary   3/1/2018    Ashvin Tiwari    MRN: [id number]           Patient Information     Date Of Birth          1955        Visit Information        Provider Department      3/1/2018 2:30 PM  29 ATC;  ONCOLOGY INFUSION [facility]        Today's Diagnoses     Malignant neoplasm of cervix, unspecified site (H)    -  1      Care Instructions    Contact Numbers    [facility] Main Line: [phone]  [facility] Triage:  [phone]    Call triage with chills and/or temperature greater than or equal to 100.5, uncontrolled nausea/vomiting, diarrhea, constipation, dizziness, shortness of breath, chest pain, bleeding, unexplained bruising, or any new/concerning symptoms, questions/concerns.     If you are having any concerning symptoms or wish to speak to a provider before your next infusion visit, please call your care coordinator or triage to notify them so we can adequately serve you.       After Hours: [phone]    If after hours, weekends, or holidays, call main hospital  and ask for Oncology doctor on call.         March 2018 Sunday Monday Tuesday Wednesday Thursday Friday Saturday                       1     P ONC INFUSION 60    2:30 PM   (60 min.)    ONCOLOGY INFUSION   [facility] 2     UMP RETURN   11:15 AM   (30 min.)   Briana Medina MD   [facility] 3       4     5     6     [facility] ONC INFUSION 120    2:30 PM   (120 min.)    ONCOLOGY INFUSION   [facility] 7     8     9     10       11     12     13     14     15     16     17       18     19     20     [facility]    8:00 AM   (15 min.)   [facility]   [facility] Lab Draw     P RETURN ACTIVE TREATMENT    8:15 AM   (30 min.)   Rachelle Miranda APRN CNP   [facility] ONC INFUSION 360    9:00 AM   (360 min.)    ONCOLOGY INFUSION   [facility] 21     P ONC  INFUSION 60    8:30 AM   (60 min.)   [facility] ONCOLOGY INFUSION   [facility]     TREATMENT 60    4:45 PM   (60 min.)   Dwayne Barbour, PT   [facility] 22     [facility] ONC INFUSION 60    8:30 AM   (60 min.)   [facility] ONCOLOGY INFUSION   [facility] 23     24       25     26     27     28     29     30     31 April 2018 Sunday Monday Tuesday Wednesday Thursday Friday Saturday   1     2     3     4     5     6     7       8     9     10     11     12     13     14       15     16     17     18     19     20     21       22     23     24     25     26     27     28       29     30                                           Lab Results:  No results found for this or any previous visit (from the past 12 hour(s)).            Follow-ups after your visit        Your next 10 appointments already scheduled     Mar 02, 2018 11:30 AM CST   (Arrive by 11:15 AM)   Return Visit with Briana Medina MD   [facility] ([facility])    [address]  [address]  [facility] [address]   [phone]            Mar 06, 2018  2:30 PM CST   Infusion 120 with [facility] ONCOLOGY INFUSION, [facility] 20 ATC   [facility] ([facility])    [address]  [address]  [facility] [address]   [phone]            Mar 20, 2018  8:00 AM CDT   [facility]   [facility] ([facility])    [address]  [address]  [facility] [address]   [phone]            Mar 20, 2018  8:30 AM CDT   (Arrive by 8:15 AM)   Return Active Treatment with ROSA Mendoza CNP   [facility] ([facility])    [address]  [address]  [facility] [address]   [phone]            Mar 20, 2018  9:00 AM CDT   Infusion 360 with UC ONCOLOGY INFUSION, UC 23 ATC   [facility]  "([facility])    [address]  [address]  [facility] [id number] [phone]            Mar 21, 2018  8:30 AM CDT   Infusion 60 with UC ONCOLOGY INFUSION, UC 16 ATC   [facility] ([facility])    [address]  [address]  [facility] [id number] [phone]            Mar 21, 2018  4:45 PM CDT   Treatment 60 with Dwayne Barbour PT   [facility] ([facility])    [address]  [address]  [facility] [id number] [phone]              Who to contact     If you have questions or need follow up information about today's clinic visit or your schedule please contact [facility] [place] [facility] directly at [phone].  Normal or non-critical lab and imaging results will be communicated to you by MyChart, letter or phone within 4 business days after the clinic has received the results. If you do not hear from us within 7 days, please contact the clinic through [employer] or phone. If you have a critical or abnormal lab result, we will notify you by phone as soon as possible.  Submit refill requests through [employer] or call your pharmacy and they will forward the refill request to us. Please allow 3 business days for your refill to be completed.          Additional Information About Your Visit        MyChart Information     [employer] lets you send messages to your doctor, view your test results, renew your prescriptions, schedule appointments and more. To sign up, go to [email or web address] . Click on \"Log in\" on the left side of the screen, which will take you to the Welcome page. Then click on \"Sign up Now\" on the right side of the page.     You will be asked to enter the access code listed below, as well as some personal information. Please follow the directions to create your username and password.     Your access code is: [email or web address]  Expires: 5/27/2018  6:30 AM     Your access code " will  in 90 days. If you need help or a new code, please call your [facility] clinic or [phone].        Care EveryWhere ID     This is your Care EveryWhere ID. This could be used by other organizations to access your [facility] medical records  [id number]        Your Vitals Were     Pulse Temperature Respirations Pulse Oximetry          89 98.5  F (36.9  C) (Oral) 18 98%         Blood Pressure from Last 3 Encounters:   18 151/88   18 146/85   18 145/83    Weight from Last 3 Encounters:   18 80.9 kg (178 lb 4.8 oz)   18 81.4 kg (179 lb 6.4 oz)   18 80.1 kg (176 lb 8 oz)              Today, you had the following     No orders found for display       Primary Care Provider Office Phone # Fax #    Chelsea Wren [phone]       [facility] [address] [facility] [address]        Equal Access to Services     [facility] : Hadii aad ku hadasho Soomaali, waaxda luqadaha, qaybta kaalmada adeegyada, waxay idiin hayaden marquis ga . So [facility] [phone].    ATENCIÓN: Si habla español, tiene a mae disposición servicios gratuitos de asistencia lingüística. [facility] [phone].    We comply with applicable federal civil rights laws and [place] laws. We do not discriminate on the basis of race, color, national origin, age, disability, sex, sexual orientation, or gender identity.            Thank you!     Thank you for choosing [facility]INIC  for your care. Our goal is always to provide you with excellent care. Hearing back from our patients is one way we can continue to improve our services. Please take a few minutes to complete the written survey that you may receive in the mail after your visit with us. Thank you!             Your Updated Medication List - Protect others around you: Learn how to safely use, store and throw away your medicines at www.disposemymeds.org.          This list is accurate as of 3/1/18  4:03 PM.   Always use your most recent med list.                   Brand Name Dispense Instructions for use Diagnosis    acetaminophen 325 MG tablet    TYLENOL    100 tablet    Take 3 tablets (975 mg) by mouth 3 times daily    Cancer related pain       amLODIPine 2.5 MG tablet    NORVASC    30 tablet    Take 1 tablet (2.5 mg) by mouth daily    Essential hypertension       dronabinol 5 MG capsule    MARINOL    60 capsule    Take 1 capsule (5 mg) by mouth 2 times daily (before meals)    Anorexia, Malignant neoplasm of cervix, unspecified site (H)       glutamine 500 MG Tabs     120 tablet    Take 500 mg by mouth 2 times daily    Drug-induced polyneuropathy (H)       * LORazepam 1 MG tablet    ATIVAN    30 tablet    Take 1 tablet (1 mg) by mouth every 6 hours as needed (Anxiety, Nausea/Vomiting or Sleep)    Malignant neoplasm of cervix, unspecified site (H)       * LORazepam 1 MG tablet    ATIVAN    30 tablet    Take 1 tablet (1 mg) by mouth every 6 hours as needed (Anxiety, Nausea/Vomiting or Sleep)    Malignant neoplasm of cervix, unspecified site (H)       polyethylene glycol powder    MIRALAX    510 g    Take 17-34 g (1-2 capfuls) by mouth daily    Cancer related pain, Generalized abdominal pain       * prochlorperazine 10 MG tablet    COMPAZINE    30 tablet    Take 1 tablet (10 mg) by mouth every 6 hours as needed (Nausea/Vomiting)    Malignant neoplasm of cervix, unspecified site (H)       * prochlorperazine 10 MG tablet    COMPAZINE    30 tablet    Take 1 tablet (10 mg) by mouth every 6 hours as needed (Nausea/Vomiting)    Malignant neoplasm of cervix, unspecified site (H)       ranitidine 75 MG tablet    ZANTAC    30 tablet    Take 1 tablet (75 mg) by mouth 2 times daily    Gastroesophageal reflux disease, esophagitis presence not specified       senna-docusate 8.6-50 MG per tablet    SENOKOT-S;PERICOLACE    100 tablet    Take 2-4 tablets by mouth 2 times daily    Cancer related pain       traMADol 50 MG tablet    ULTRAM     240 tablet    Take 2 tablets (100mg) every morning when you wake up, 2 tablets (100mg) in the middle of the day and 2 tablets (100mg) before bed.  Take 1-2 tablets once a day if needed for pain not controlled with the three scheduled doses.    Malignant neoplasm of cervix, unspecified site (H), Cancer related pain       * Notice:  This list has 4 medication(s) that are the same as other medications prescribed for you. Read the directions carefully, and ask your doctor or other care provider to review them with you.

## 2018-03-02 NOTE — LETTER
3/2/2018       RE: Ashvin Tiwari  [address]  [facility] [address]     Dear Colleague,    Thank you for referring your patient, Ashvin Tiwari, to the [facility] Please see a copy of my visit note below.    Palliative Care Outpatient Clinic Progress Note    Patient Name:  Ashvin Tiwari  Primary Provider:  Chelsea Wren    Chief Complaint:   Metastatic cervical cancer  Nausea  Abdominal, chest, shoulder, back pain  Weakness  Fatigue    Interim History:  Ashvin Tiwari 62 year female with dx of metastatic cervical cancer, who completed 6 cycles of taxol/avastin/carbo and then unfortunately had disease progression noted on PET/CT scan on 2/14/18 so decision made for second line palliative chemo with cisplatin/topotecan and avastin with plan to complete 3 cycles and then re-scan.     Having pain in lower right abdomen, right sided ribs, little bit of pain in left rib cage, shoulders b/l and back. Pain is a 9/10. Pain is very sore.  Movement makes the pain worse. Tramadol makes pain little better but then pain comes back after a while. 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 Taking tramadol 100mg TID and tylenol. Pain this bad for past 1 week. Pain is constant. Chest pain is sore and when takes a deep breath, chest and back pain.     Doctor did tell her that the infusion would make her weak.     Taking nausea medication regularly but still with nausea. No vomiting although yesterday felt like needed to vomit but nothing came out. Still eating ok. Having regular bowel movements.     Sleeping well at night. Not sleeping during the day. Tries to walk around. Knee pain is improving. Still doing the physical therapy.     Feeling little SOB when walking. No SOB when sitting. Not as SOB as before.   No constipation or diarrhea  Significantly decreased energy      Review of Systems:  ROS: 10 point ROS neg  other than the symptoms noted above in the HPI         No Known Allergies  Current Outpatient Prescriptions   Medication Sig Dispense Refill     LORazepam (ATIVAN) 1 MG tablet Take 1 tablet (1 mg) by mouth every 6 hours as needed (Anxiety, Nausea/Vomiting or Sleep) 30 tablet 2     prochlorperazine (COMPAZINE) 10 MG tablet Take 1 tablet (10 mg) by mouth every 6 hours as needed (Nausea/Vomiting) 30 tablet 2     traMADol (ULTRAM) 50 MG tablet Take 2 tablets (100mg) every morning when you wake up, 2 tablets (100mg) in the middle of the day and 2 tablets (100mg) before bed.  Take 1-2 tablets once a day if needed for pain not controlled with the three scheduled doses. 240 tablet 0     ranitidine (ZANTAC) 75 MG tablet Take 1 tablet (75 mg) by mouth 2 times daily 30 tablet 0     dronabinol (MARINOL) 5 MG capsule Take 1 capsule (5 mg) by mouth 2 times daily (before meals) 60 capsule 0     glutamine 500 MG TABS Take 500 mg by mouth 2 times daily 120 tablet 3     amLODIPine (NORVASC) 2.5 MG tablet Take 1 tablet (2.5 mg) by mouth daily 30 tablet 1     senna-docusate (SENOKOT-S;PERICOLACE) 8.6-50 MG per tablet Take 2-4 tablets by mouth 2 times daily 100 tablet 1     polyethylene glycol (MIRALAX) powder Take 17-34 g (1-2 capfuls) by mouth daily 510 g 1     prochlorperazine (COMPAZINE) 10 MG tablet Take 1 tablet (10 mg) by mouth every 6 hours as needed (Nausea/Vomiting) 30 tablet 2     LORazepam (ATIVAN) 1 MG tablet Take 1 tablet (1 mg) by mouth every 6 hours as needed (Anxiety, Nausea/Vomiting or Sleep) 30 tablet 2     acetaminophen (TYLENOL) 325 MG tablet Take 3 tablets (975 mg) by mouth 3 times daily 100 tablet 0     Past Medical History:   Diagnosis Date     Cancer (H)     cervical     Hypertension      Past Surgical History:   Procedure Laterality Date     BIOPSY/EXCISION LYMPH NODE(S), NEEDLE, SUPERFICIAL (CERVICAL/INGUINAL/AXILLARY) Right 9/21/2017    Procedure: BIOPSY/EXCISION LYMPH NODE(S), NEEDLE, SUPERFICIAL  "(CERVICAL/INGUINAL/AXILLARY);;  Surgeon: Snou Denson PA-C;  [facility]     INSERT PORT VASCULAR ACCESS Right 9/21/2017    Procedure: INSERT PORT VASCULAR ACCESS;  Single Lumen Chest Power Port, Right Axillary Lymph Node Biopsy;  Surgeon: Sonu Denson PA-C;  [facility]     no previous surgery             /88  Pulse 89  Temp 98.5  F (36.9  C) (Oral)  Resp 18  Ht 1.6 m (5' 2.99\")  Wt 80 kg (176 lb 6.4 oz)  SpO2 98%  BMI 31.26 kg/m2  General: Appears tired, stated age, well-nourished, in no acute distress  Eyes: EOMI, sclera clear  HEENT: NC/AT; mucous membranes slightly dry  Lungs: No increased work of breathing, speaking full sentences, clear to auscultation bilaterally, no wheezes  Heart: RRR  Abdomen: Decreased bowel sounds, soft, diffuse tenderness to light palpation, no rebound or guarding  Neuro: A&O x 3; CN II-XII grossly intact; slow shuffle after initially standing up  Neuropsych: Tired, intermittent eye contact,  engaged, sensorium intact      Key Data Reviewed:  GFR 85; platelets 242      Impression:     62 yr old female with metastatic cervical cancer, who completed 6 cycles of taxol/avastin/carbo and then unfortunately had disease progression noted on PET/CT scan on 2/14/18 so decision made for second line palliative chemo with cisplatin/topotecan and avastin with plan to complete 3 cycles and then re-scan.     Recommendations & Counseling:    Pain control: Unclear cause of pain as no evidence of mets to bone or liver. Last visit, only pain was joint pain from osteoarthritis    - increase tramadol to take 2 tabs four times a day scheduled  - continue tylenol 975mg three times a day  - heat and ice to the areas that are painful.   - there are stronger medications to control pain but in the past, these medications have caused severe fatigue in you so we will continue to try and control the pain with tramadol.     Nausea and loss of appetite  - increase " marinol 10mg twice a day  - schedule prochlorerazine 10mg three times a day  - if still with nausea despite above medications, can take ondansetron     Fatigue: significant fatigue due to cancer and chemotherapy. If the fatigue continues, can consider starting medication called ritalin which works well to improve cancer related fatigue.     High blood pressure:   - will renew blood pressure medication for a month but please schedule an appointment with a primary care doctor who will follow your blood pressure and make sure it remains controlled.      Thank you for involving us in the patient's care. 30 minutes face time over half spent counseling.    Briana Medina MD / Palliative Medicine / Pager [phone] / After-Hours Answering Service [phone] / Main Palliative Clinic - [phone] / [facility] Inpatient Team Consult Pager [phone] (answered 8am-430pm M-F)

## 2018-03-02 NOTE — PROGRESS NOTES
Palliative Care Outpatient Clinic Progress Note    Patient Name:  Ashvin Tiwari  Primary Provider:  Chelsea Wren    Chief Complaint:   Metastatic cervical cancer  Nausea  Abdominal, chest, shoulder, back pain  Weakness  Fatigue    Interim History:  Ashvin Tiwari 62 year female with dx of metastatic cervical cancer, who completed 6 cycles of taxol/avastin/carbo and then unfortunately had disease progression noted on PET/CT scan on 2/14/18 so decision made for second line palliative chemo with cisplatin/topotecan and avastin with plan to complete 3 cycles and then re-scan.     Having pain in lower right abdomen, right sided ribs, little bit of pain in left rib cage, shoulders b/l and back. Pain is a 9/10. Pain is very sore.  Movement makes the pain worse. Tramadol makes pain little better but then pain comes back after a while. 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 Taking tramadol 100mg TID and tylenol. Pain this bad for past 1 week. Pain is constant. Chest pain is sore and when takes a deep breath, chest and back pain.     Doctor did tell her that the infusion would make her weak.     Taking nausea medication regularly but still with nausea. No vomiting although yesterday felt like needed to vomit but nothing came out. Still eating ok. Having regular bowel movements.     Sleeping well at night. Not sleeping during the day. Tries to walk around. Knee pain is improving. Still doing the physical therapy.     Feeling little SOB when walking. No SOB when sitting. Not as SOB as before.   No constipation or diarrhea  Significantly decreased energy      Review of Systems:  ROS: 10 point ROS neg other than the symptoms noted above in the HPI         No Known Allergies  Current Outpatient Prescriptions   Medication Sig Dispense Refill     LORazepam (ATIVAN) 1 MG tablet Take 1 tablet (1 mg) by mouth every 6 hours as needed (Anxiety, Nausea/Vomiting or Sleep) 30 tablet 2      prochlorperazine (COMPAZINE) 10 MG tablet Take 1 tablet (10 mg) by mouth every 6 hours as needed (Nausea/Vomiting) 30 tablet 2     traMADol (ULTRAM) 50 MG tablet Take 2 tablets (100mg) every morning when you wake up, 2 tablets (100mg) in the middle of the day and 2 tablets (100mg) before bed.  Take 1-2 tablets once a day if needed for pain not controlled with the three scheduled doses. 240 tablet 0     ranitidine (ZANTAC) 75 MG tablet Take 1 tablet (75 mg) by mouth 2 times daily 30 tablet 0     dronabinol (MARINOL) 5 MG capsule Take 1 capsule (5 mg) by mouth 2 times daily (before meals) 60 capsule 0     glutamine 500 MG TABS Take 500 mg by mouth 2 times daily 120 tablet 3     amLODIPine (NORVASC) 2.5 MG tablet Take 1 tablet (2.5 mg) by mouth daily 30 tablet 1     senna-docusate (SENOKOT-S;PERICOLACE) 8.6-50 MG per tablet Take 2-4 tablets by mouth 2 times daily 100 tablet 1     polyethylene glycol (MIRALAX) powder Take 17-34 g (1-2 capfuls) by mouth daily 510 g 1     prochlorperazine (COMPAZINE) 10 MG tablet Take 1 tablet (10 mg) by mouth every 6 hours as needed (Nausea/Vomiting) 30 tablet 2     LORazepam (ATIVAN) 1 MG tablet Take 1 tablet (1 mg) by mouth every 6 hours as needed (Anxiety, Nausea/Vomiting or Sleep) 30 tablet 2     acetaminophen (TYLENOL) 325 MG tablet Take 3 tablets (975 mg) by mouth 3 times daily 100 tablet 0     Past Medical History:   Diagnosis Date     Cancer (H)     cervical     Hypertension      Past Surgical History:   Procedure Laterality Date     BIOPSY/EXCISION LYMPH NODE(S), NEEDLE, SUPERFICIAL (CERVICAL/INGUINAL/AXILLARY) Right 9/21/2017    Procedure: BIOPSY/EXCISION LYMPH NODE(S), NEEDLE, SUPERFICIAL (CERVICAL/INGUINAL/AXILLARY);;  Surgeon: Sonu Denson PA-C;  [facility]     INSERT PORT VASCULAR ACCESS Right 9/21/2017    Procedure: INSERT PORT VASCULAR ACCESS;  Single Lumen Chest Power Port, Right Axillary Lymph Node Biopsy;  Surgeon: Sonu Denson PA-C;   "[facility]     no previous surgery             /88  Pulse 89  Temp 98.5  F (36.9  C) (Oral)  Resp 18  Ht 1.6 m (5' 2.99\")  Wt 80 kg (176 lb 6.4 oz)  SpO2 98%  BMI 31.26 kg/m2  General: Appears tired, stated age, well-nourished, in no acute distress  Eyes: EOMI, sclera clear  HEENT: NC/AT; mucous membranes slightly dry  Lungs: No increased work of breathing, speaking full sentences, clear to auscultation bilaterally, no wheezes  Heart: RRR  Abdomen: Decreased bowel sounds, soft, diffuse tenderness to light palpation, no rebound or guarding  Neuro: A&O x 3; CN II-XII grossly intact; slow shuffle after initially standing up  Neuropsych: Tired, intermittent eye contact,  engaged, sensorium intact      Key Data Reviewed:  GFR 85; platelets 242      Impression:     62 yr old female with metastatic cervical cancer, who completed 6 cycles of taxol/avastin/carbo and then unfortunately had disease progression noted on PET/CT scan on 2/14/18 so decision made for second line palliative chemo with cisplatin/topotecan and avastin with plan to complete 3 cycles and then re-scan.     Recommendations & Counseling:    Pain control: Unclear cause of pain as no evidence of mets to bone or liver. Last visit, only pain was joint pain from osteoarthritis    - increase tramadol to take 2 tabs four times a day scheduled  - continue tylenol 975mg three times a day  - heat and ice to the areas that are painful.   - there are stronger medications to control pain but in the past, these medications have caused severe fatigue in you so we will continue to try and control the pain with tramadol.     Nausea and loss of appetite  - increase marinol 10mg twice a day  - schedule prochlorerazine 10mg three times a day  - if still with nausea despite above medications, can take ondansetron     Fatigue: significant fatigue due to cancer and chemotherapy. If the fatigue continues, can consider starting medication called ritalin which " works well to improve cancer related fatigue.     High blood pressure:   - will renew blood pressure medication for a month but please schedule an appointment with a primary care doctor who will follow your blood pressure and make sure it remains controlled.      Thank you for involving us in the patient's care. 30 minutes face time over half spent counseling.  Briana Medina MD / Palliative Medicine / Pager [phone] / After-Hours Answering Service [phone] / Main Palliative Clinic - [phone] / [facility] Inpatient Team Consult Pager [phone] (answered 8am-430pm M-F)

## 2018-03-02 NOTE — PATIENT INSTRUCTIONS
Pain:   - increase tramadol to take 2 tabs four times a day scheduled  - continue tylenol 975mg three times a day  - heat and ice to the areas that are painful.   - there are stronger medications to control pain but in the past, these medications have caused severe fatigue in you so we will continue to try and control the pain with tramadol.     Nausea and loss of appetite  - increase marinol 10mg twice a day  - schedule prochlorerazine 10mg three times a day  - if still with nausea despite above medications, can take ondansetron     Fatigue: significant fatigue due to cancer and chemotherapy. If the fatigue continues, can consider starting medication called ritalin which works well to improve cancer related fatigue.     High blood pressure:   - will re-new blood pressure medication for a month but please schedule an appointment with a primary care doctor who will follow your blood pressure and make sure it remains controlled.    Follow up on April 10th

## 2018-03-02 NOTE — MR AVS SNAPSHOT
After Visit Summary   3/2/2018    Ashvin Tiwari    MRN: [id number]           Patient Information     Date Of Birth          1955        Visit Information        Provider Department      3/2/2018 11:30 AM Briana Medina MD [facility]        Today's Diagnoses     Nausea    -  1    Anorexia        Malignant neoplasm of cervix, unspecified site (H)        Cancer related pain        Essential hypertension          Care Instructions    Pain:   - increase tramadol to take 2 tabs four times a day scheduled  - continue tylenol 975mg three times a day  - heat and ice to the areas that are painful.   - there are stronger medications to control pain but in the past, these medications have caused severe fatigue in you so we will continue to try and control the pain with tramadol.     Nausea and loss of appetite  - increase marinol 10mg twice a day  - schedule prochlorerazine 10mg three times a day  - if still with nausea despite above medications, can take ondansetron     Fatigue: significant fatigue due to cancer and chemotherapy. If the fatigue continues, can consider starting medication called ritalin which works well to improve cancer related fatigue.     High blood pressure:   - will re-new blood pressure medication for a month but please schedule an appointment with a primary care doctor who will follow your blood pressure and make sure it remains controlled.    Follow up on April 10th          Follow-ups after your visit        Follow-up notes from your care team     Return in 6 weeks (on 4/10/2018).      Your next 10 appointments already scheduled     Mar 06, 2018  2:30 PM CST   Infusion 120 with  ONCOLOGY INFUSION, UC 20 ATC   [facility] ([facility])    [address] [place] [address]  [address] [place] [address] [phone]            Mar 20, 2018  8:00 AM CDT   [facility] Lab Draw with  MASONIC LAB DRAW   [facility]  Lab Draw ([facility])    [address]  [address]  [facility] [address]   [phone]            Mar 20, 2018  8:30 AM CDT   (Arrive by 8:15 AM)   Return Active Treatment with ROSA Mendoza CNP   [facility] ([facility])    [address]  [address]  [facility] [address]   [phone]            Mar 20, 2018  9:00 AM CDT   Infusion 360 with UC ONCOLOGY INFUSION, UC 23 ATC   [facility] ([facility])    [address]  [address]  [facility] [address]   [phone]            Mar 21, 2018  8:30 AM CDT   Infusion 60 with UC ONCOLOGY INFUSION, UC 16 ATC   [facility] ([facility])    [address]  [address]  [facility] [address]   [phone]            Mar 21, 2018  4:45 PM CDT   Treatment 60 with Dwayne Barbour PT   [facility] ([facility])    [address] [place] [address]  4th Floor  [facility] [address]   [phone]            Mar 22, 2018  8:30 AM CDT   Infusion 60 with UC ONCOLOGY INFUSION   [facility] ([facility])    [address]  [address]  [facility] [address] [phone]              Who to contact     If you have questions or need follow up information about today's clinic visit or your schedule please contact [facility] directly at [phone].  Normal or non-critical lab and imaging results will be communicated to you by MyChart, letter or phone within 4 business days after the clinic has received the results. If you do not hear from us within 7 days, please contact the clinic through MyChart or phone. If you have a critical or abnormal lab result, we will notify you by phone as soon as possible.  Submit refill requests through [employer] or call your pharmacy and they will forward the  "refill request to us. Please allow 3 business days for your refill to be completed.          Additional Information About Your Visit        MyChart Information     [email or web address] lets you send messages to your doctor, view your test results, renew your prescriptions, schedule appointments and more. To sign up, go to [email or web address] . Click on \"Log in\" on the left side of the screen, which will take you to the Welcome page. Then click on \"Sign up Now\" on the right side of the page.     You will be asked to enter the access code listed below, as well as some personal information. Please follow the directions to create your username and password.     Your access code is: [email or web address]  Expires: 2018  6:30 AM     Your access code will  in 90 days. If you need help or a new code, please call your [place] clinic or [phone].        Care EveryWhere ID     This is your Care EveryWhere ID. This could be used by other organizations to access your [place] medical records  [id number]        Your Vitals Were     Pulse Temperature Respirations Height Pulse Oximetry BMI (Body Mass Index)    89 98.5  F (36.9  C) (Oral) 18 1.6 m (5' 2.99\") 98% 31.26 kg/m2       Blood Pressure from Last 3 Encounters:   18 151/88   18 151/88   18 146/85    Weight from Last 3 Encounters:   18 80 kg (176 lb 6.4 oz)   18 80.9 kg (178 lb 4.8 oz)   18 81.4 kg (179 lb 6.4 oz)              Today, you had the following     No orders found for display         Today's Medication Changes          These changes are accurate as of 3/2/18 12:39 PM.  If you have any questions, ask your nurse or doctor.               These medicines have changed or have updated prescriptions.        Dose/Directions    Dronabinol 10 MG Caps   This may have changed:    - medication strength  - how much to take   Used for:  Anorexia, Malignant neoplasm of cervix, unspecified site (H), Nausea   Changed by:  Briana Medina MD "        Dose:  10 mg   Take 10 mg by mouth 2 times daily (before meals)   Quantity:  60 capsule   Refills:  3       prochlorperazine 10 MG tablet   Commonly known as:  COMPAZINE   This may have changed:    - when to take this  - reasons to take this   Used for:  Malignant neoplasm of cervix, unspecified site (H), Nausea   Changed by:  Briana Medina MD        Dose:  10 mg   Take 1 tablet (10 mg) by mouth 3 times daily   Quantity:  120 tablet   Refills:  2       traMADol 50 MG tablet   Commonly known as:  ULTRAM   This may have changed:  additional instructions   Used for:  Malignant neoplasm of cervix, unspecified site (H), Cancer related pain   Changed by:  Briana Medina MD        Take 2 tablets (100mg) every morning when you wake up, 2 tablets (100mg)around noon, 2 tablets (100mg) beofre dinner and 2 tablets (100mg) before bed.   Quantity:  240 tablet   Refills:  0            Where to get your medicines      These medications were sent to [address]  [address] [place] [address] [address] [address]    Hours:  TRANSPLANT PHONE NUMBER [phone] Phone:  [phone]     amLODIPine 2.5 MG tablet    prochlorperazine 10 MG tablet         Some of these will need a paper prescription and others can be bought over the counter.  Ask your nurse if you have questions.     Bring a paper prescription for each of these medications     Dronabinol 10 MG Caps    traMADol 50 MG tablet                Primary Care Provider Office Phone # Fax #    Chelsea Wren [phone]       [facility] [address]  [facility] [address]        Equal Access to Services     [facility]: Hadii eliva Reese, waaxda luqadaha, qaybta kaalmada kamala, elizabeth cheema. So [facility] [phone].    ATENCIÓN: Si habla español, tiene a mae disposición servicios gratuitos de asistencia lingüística. [place]  al [phone].    We comply with applicable federal civil rights laws and [place] laws. We do not discriminate on the basis of race, color, national origin, age, disability, sex, sexual orientation, or gender identity.            Thank you!     Thank you for choosing [facility]  for your care. Our goal is always to provide you with excellent care. Hearing back from our patients is one way we can continue to improve our services. Please take a few minutes to complete the written survey that you may receive in the mail after your visit with us. Thank you!             Your Updated Medication List - Protect others around you: Learn how to safely use, store and throw away your medicines at www.disposemymeds.org.          This list is accurate as of 3/2/18 12:39 PM.  Always use your most recent med list.                   Brand Name Dispense Instructions for use Diagnosis    acetaminophen 325 MG tablet    TYLENOL    100 tablet    Take 3 tablets (975 mg) by mouth 3 times daily    Cancer related pain       amLODIPine 2.5 MG tablet    NORVASC    30 tablet    Take 1 tablet (2.5 mg) by mouth daily    Essential hypertension       Dronabinol 10 MG Caps     60 capsule    Take 10 mg by mouth 2 times daily (before meals)    Anorexia, Malignant neoplasm of cervix, unspecified site (H), Nausea       glutamine 500 MG Tabs     120 tablet    Take 500 mg by mouth 2 times daily    Drug-induced polyneuropathy (H)       LORazepam 1 MG tablet    ATIVAN    30 tablet    Take 1 tablet (1 mg) by mouth every 6 hours as needed (Anxiety, Nausea/Vomiting or Sleep)    Malignant neoplasm of cervix, unspecified site (H)       polyethylene glycol powder    MIRALAX    510 g    Take 17-34 g (1-2 capfuls) by mouth daily    Cancer related pain, Generalized abdominal pain       prochlorperazine 10 MG tablet    COMPAZINE    120 tablet    Take 1 tablet (10 mg) by mouth 3 times daily    Malignant neoplasm of cervix, unspecified site (H),  Nausea       ranitidine 75 MG tablet    ZANTAC    30 tablet    Take 1 tablet (75 mg) by mouth 2 times daily    Gastroesophageal reflux disease, esophagitis presence not specified       senna-docusate 8.6-50 MG per tablet    SENOKOT-S;PERICOLACE    100 tablet    Take 2-4 tablets by mouth 2 times daily    Cancer related pain       traMADol 50 MG tablet    ULTRAM    240 tablet    Take 2 tablets (100mg) every morning when you wake up, 2 tablets (100mg)around noon, 2 tablets (100mg) beofre dinner and 2 tablets (100mg) before bed.    Malignant neoplasm of cervix, unspecified site (H), Cancer related pain

## 2018-03-02 NOTE — NURSING NOTE
"Oncology Rooming Note    March 2, 2018 11:47 AM   Ashvin Tiwari is a 62 year old female who presents for:    Chief Complaint   Patient presents with     Oncology Clinic Visit     f/u Rib pain     Initial Vitals: /88  Pulse 89  Temp 98.5  F (36.9  C) (Oral)  Resp 18  Ht 1.6 m (5' 2.99\")  Wt 80 kg (176 lb 6.4 oz)  SpO2 98%  BMI 31.26 kg/m2 Estimated body mass index is 31.26 kg/(m^2) as calculated from the following:    Height as of this encounter: 1.6 m (5' 2.99\").    Weight as of this encounter: 80 kg (176 lb 6.4 oz). Body surface area is 1.89 meters squared.  Extreme Pain (9) Comment: Rt ribs and chest, entire back   No LMP recorded. Patient is postmenopausal.  Allergies reviewed: Yes  Medications reviewed: Yes    Medications: MEDICATION REFILLS NEEDED TODAY. Provider was notified.  Pharmacy name entered into EPIC:    [facility] (USE ONLY AT [facility]) - [place], [place] - [address] [facility] [address] - [place], [place] - [address] AT [facility]    Clinical concerns: Yes, Patient complains of rt sided rib and chest pain, and her entire back is painful as well.(9)    10 minutes for nursing intake (face to face time)     ATIF LITTLE LPN            "

## 2018-03-06 NOTE — PATIENT INSTRUCTIONS
Contact Numbers    [facility] Main Line: [phone]  [facility] Triage:  [phone]    Call triage with chills and/or temperature greater than or equal to 100.5, uncontrolled nausea/vomiting, diarrhea, constipation, dizziness, shortness of breath, chest pain, bleeding, unexplained bruising, or any new/concerning symptoms, questions/concerns.     If you are having any concerning symptoms or wish to speak to a provider before your next infusion visit, please call your care coordinator or triage to notify them so we can adequately serve you.       After Hours: [phone]    If after hours, weekends, or holidays, call main hospital  and ask for Oncology doctor on call.

## 2018-03-06 NOTE — MR AVS SNAPSHOT
After Visit Summary   3/6/2018    Ashvin Tiwari    MRN: [id number]           Patient Information     Date Of Birth          1955        Visit Information        Provider Department      3/6/2018 2:30 PM UC 20 ATC; UC ONCOLOGY INFUSION [facility]        Today's Diagnoses     Nausea    -  1      Care Instructions    Contact Numbers    [facility] [place]: [phone]  [facility] Triage:  [phone]    Call triage with chills and/or temperature greater than or equal to 100.5, uncontrolled nausea/vomiting, diarrhea, constipation, dizziness, shortness of breath, chest pain, bleeding, unexplained bruising, or any new/concerning symptoms, questions/concerns.     If you are having any concerning symptoms or wish to speak to a provider before your next infusion visit, please call your care coordinator or triage to notify them so we can adequately serve you.       After Hours: [phone]    If after hours, weekends, or holidays, call main hospital  and ask for Oncology doctor on call.             Follow-ups after your visit        Your next 10 appointments already scheduled     Mar 20, 2018  8:00 AM CDT   [facility] Lab Draw with  MASONIC LAB DRAW   [facility] ([facility])    [address] [place] [address]  [address]  [facility] [address]   [phone]            Mar 20, 2018  8:30 AM CDT   (Arrive by 8:15 AM)   Return Active Treatment with ROSA Mendoza CNP   [facility] ([facility])    [address] [place] [address]  [address]  [facility] [address]   [phone]            Mar 20, 2018  9:00 AM CDT   Infusion 360 with UC ONCOLOGY INFUSION, UC 23 ATC   [facility] ([facility])    [address] [place] [address]  [address]  [facility] [address]   [phone]            Mar 21, 2018  8:30 AM CDT   Infusion 60 with UC ONCOLOGY INFUSION, UC 16 ATC   [facility]  "[facility] ([facility])    [address]  [address]  [facility] [address]   [phone]            Mar 21, 2018  4:45 PM CDT   Treatment 60 with Dwayne Barbour PT   [facility] ([facility])    [address]  4th Floor  [facility] [address]   [phone]            Mar 22, 2018  8:30 AM CDT   Infusion 60 with UC ONCOLOGY INFUSION, UC 16 ATC   [facility] ([facility])    [address]  [address]  [facility] [address] [phone]            Apr 10, 2018  3:00 PM CDT   (Arrive by 2:45 PM)   Return Visit with Briana Medina MD   [facility] ([facility])    [address]  [address]  [facility] [address] [phone]              Who to contact     If you have questions or need follow up information about today's clinic visit or your schedule please contact [facility] directly at [phone].  Normal or non-critical lab and imaging results will be communicated to you by MyChart, letter or phone within 4 business days after the clinic has received the results. If you do not hear from us within 7 days, please contact the clinic through [employer] or phone. If you have a critical or abnormal lab result, we will notify you by phone as soon as possible.  Submit refill requests through [employer] or call your pharmacy and they will forward the refill request to us. Please allow 3 business days for your refill to be completed.          Additional Information About Your Visit        [employer] Information     [employer] lets you send messages to your doctor, view your test results, renew your prescriptions, schedule appointments and more. To sign up, go to [email or web address] . Click on \"Log in\" on the left side of the screen, which will take you to the Welcome page. Then click on \"Sign up Now\" on the " right side of the page.     You will be asked to enter the access code listed below, as well as some personal information. Please follow the directions to create your username and password.     Your access code is: [email or web address]  Expires: 2018  6:30 AM     Your access code will  in 90 days. If you need help or a new code, please call your [facility] or [phone].        Care EveryWhere ID     This is your Care EveryWhere ID. This could be used by other organizations to access your [place] medical records  [id number]        Your Vitals Were     Pulse Temperature Respirations Pulse Oximetry          95 98.1  F (36.7  C) (Oral) 20 97%         Blood Pressure from Last 3 Encounters:   18 140/86   18 151/88   18 151/88    Weight from Last 3 Encounters:   18 80 kg (176 lb 6.4 oz)   18 80.9 kg (178 lb 4.8 oz)   18 81.4 kg (179 lb 6.4 oz)              Today, you had the following     No orders found for display       Primary Care Provider Office Phone # Fax #    Chelsea Wren [phone]       [facility] [address] [address]        Equal Access to Services     LISSET LUGO : Hadii aad ku hadasho Soomaali, waaxda luqadaha, qaybta kaalmada adeegyada, waxay idiin hayaan aderodolfo ga . So [facility] [phone].    ATENCIÓN: Si habla español, tiene a mae disposición servicios gratuitos de asistencia lingüística. [place] al [phone].    We comply with applicable federal civil rights laws and [place] laws. We do not discriminate on the basis of race, color, national origin, age, disability, sex, sexual orientation, or gender identity.            Thank you!     Thank you for choosing [facility]  for your care. Our goal is always to provide you with excellent care. Hearing back from our patients is one way we can continue to improve our services. Please take a few minutes to complete the written survey  that you may receive in the mail after your visit with us. Thank you!             Your Updated Medication List - Protect others around you: Learn how to safely use, store and throw away your medicines at www.disposemymeds.org.          This list is accurate as of 3/6/18  3:59 PM.  Always use your most recent med list.                   Brand Name Dispense Instructions for use Diagnosis    acetaminophen 325 MG tablet    TYLENOL    100 tablet    Take 3 tablets (975 mg) by mouth 3 times daily    Cancer related pain       amLODIPine 2.5 MG tablet    NORVASC    30 tablet    Take 1 tablet (2.5 mg) by mouth daily    Essential hypertension       Dronabinol 10 MG Caps     60 capsule    Take 10 mg by mouth 2 times daily (before meals)    Anorexia, Malignant neoplasm of cervix, unspecified site (H), Nausea       glutamine 500 MG Tabs     120 tablet    Take 500 mg by mouth 2 times daily    Drug-induced polyneuropathy (H)       LORazepam 1 MG tablet    ATIVAN    30 tablet    Take 1 tablet (1 mg) by mouth every 6 hours as needed (Anxiety, Nausea/Vomiting or Sleep)    Malignant neoplasm of cervix, unspecified site (H)       polyethylene glycol powder    MIRALAX    510 g    Take 17-34 g (1-2 capfuls) by mouth daily    Cancer related pain, Generalized abdominal pain       prochlorperazine 10 MG tablet    COMPAZINE    120 tablet    Take 1 tablet (10 mg) by mouth 3 times daily    Malignant neoplasm of cervix, unspecified site (H), Nausea       ranitidine 75 MG tablet    ZANTAC    30 tablet    Take 1 tablet (75 mg) by mouth 2 times daily    Gastroesophageal reflux disease, esophagitis presence not specified       senna-docusate 8.6-50 MG per tablet    SENOKOT-S;PERICOLACE    100 tablet    Take 2-4 tablets by mouth 2 times daily    Cancer related pain       traMADol 50 MG tablet    ULTRAM    240 tablet    Take 2 tablets (100mg) every morning when you wake up, 2 tablets (100mg)around noon, 2 tablets (100mg) beofre dinner and 2 tablets  (100mg) before bed.    Malignant neoplasm of cervix, unspecified site (H), Cancer related pain

## 2018-03-06 NOTE — PROGRESS NOTES
Infusion Nursing Note:  Ashvin Tiwari presents today for 1 L NS.    Patient seen by provider today: No    Note: Patient states she feels pretty good today. Denies fevers, chills, diarrhea, lightheadedness, dizziness. States that her nausea has been controlled with prn anti-emetics. Reports that her right rib and chest pain is improved. Vitals stable. Saw palliative care MD last week, and they increased her pain medications. Using heat to help with pain. States that a 5/10 pain level is manageable for her. No other concerns at this time.     Intravenous Access:  Implanted Port.    Treatment Conditions:  Not Applicable.    Post Infusion Assessment:  Patient tolerated infusion without incident.  Blood return noted pre and post infusion.  Access discontinued per protocol.    Discharge Plan:   Patient declined prescription refills.  Discharge instructions reviewed with: Patient.  Patient verbalized understanding of discharge instructions and all questions answered.  Copy of AVS reviewed with patient.  Patient will return 3/20/18 for next infusion appointment.  Patient discharged in stable condition accompanied by: sister.  Face to Face time: 0.    ASHELY CONROY RN

## 2018-03-20 PROBLEM — T45.1X5A CHEMOTHERAPY-INDUCED NEUTROPENIA (H): Status: ACTIVE | Noted: 2018-01-01

## 2018-03-20 PROBLEM — D70.1 CHEMOTHERAPY-INDUCED NEUTROPENIA (H): Status: ACTIVE | Noted: 2018-01-01

## 2018-03-20 NOTE — PROGRESS NOTES
Follow Up Notes on Referred Patient    Date: 3/20/2018       Dr. Chelsea Wren  [facility]  [address] [place] [address]       RE: Ashvin Tiwari  : 1955  DENIS: 3/20/2018    Dear Dr. Chelsea Wren:    Ashvin Tiwari is a 62 year old woman with a diagnosis of stage IV poorly differentiated SCC cervix.   She is here today for follow up and disease management. She arrived 45 minutes late to her first appointment.       Oncology history:   17:  Seen in Gyn Onc.  Exam concerning for at least a Stage IIB cervical cancer.  Biopsy obtained consistent with poorly differentiated squamous cell ca.  MRI ordered.      17: MRI Pelvis with 7 x 7cm mass at level of cervix, protrudes into upper third of vagina, bilateral parametrial extension, abuts bilateral ureters but no obstruction, abuts rectal wall with obliteration of fat plan but no mucosal invasion, no clear bladder wall invasion, suspicious lymph nodes left hemipelvis, peritoneal nodule versus lymph node.      17 to 17:  Patient admitted to hospital with pain. CT Chest PE was negative for PE but showed multiple pulmonary nodules consistent with metastatic disease.  CT A/P showed cervical mass with regional metastatic disease as well as peritoneal nodularity.      17:  PET scan with multiple metastatic hypermetabolic lung nodules, axillary, mediastinal, hilar and abdominal lymph nodes.      17-17: Cycle #1-3 Paclitaxel/Carboplatin/Avastin.   17: PET CTIMPRESSION:   1. In this patient with a history of stage IV poorly differentiated squamous cell carcinoma of the cervix, there has been a positive response to therapy. The primary cervical mass is significantly decreased in size and demonstrates decreased FDG uptake. Similarly, the metastatic pulmonary nodules and metastatic lymphadenopathy has significantly improved.      2. Bilateral hypermetabolic level 2A lymph nodes which have slightly  increased in size, of uncertain clinical significance given the positive response in the remainder of the body. Enlargement may be related to the patient's dental and sinus disease. Recommend close attention on follow-up imaging.      3. Periapical lucencies with FDG uptake and dental caries involving multiple upper teeth. Recommend follow-up with dentistry.      4. Asymmetric radiotracer uptake in the left prevertebral soft tissues without discernible mass. Recommend close attention on follow-up imaging.      5. Small bilateral pleural effusions.      11/21/17-1/11/18: Cycle #4-6 Paclitaxel/Carboplatin/Avastin.   2/14/18: PET CT IMPRESSION:   1. In this patient with stage IV poorly differentiated squamous cell carcinoma of the cervix there is evidence for progression of disease:  a. Increased size and FDG avidity of primary cervical mass (maximum SUV 30.49, previously 12.57)  b. New FDG avid 7 mm left inguinal lymph node (maximum SUV 6.06)  c. New FDG avid 10 mm right paratracheal lymph node (maximum SUV 12.40. No FDG avid 10 mm right axillary lymph node (maximum SUV 6.14). Resolution of previously FDG avid 9 mm right axillary lymph node. These findings may be reactive versus metastatic.   2. Increased FDG avidity of the rectum is nonspecific and may represent infection, inflamed hemorrhoids, or malignancy. Recommend direct visualization.  3. Small stable right-sided pleural effusion.   4. Please see dedicated dictation of high-resolution PET CT of the head and neck.     Plan: Avastin/Cisplatin 50 mg/m2 day 1 +Topotecan 0.75 mg/m2 for 3 days Q 21 days for 3 cycles then repeat imaging.     2/27/18: Cycle #1 Cisplatin/Topotecan/Avastin.   3/20/18: Cycle #2 Cisplatin/Topotecan/Avastin deferred secondary to neutropenia.       Today she comes to clinic stating her PCP is managing her HTN and she is taking 10 mg of her Norvasc not 2.5 mg. She does check her BP at home and reports this morning it was 157/78/ She denies  any vaginal bleeding, no changes in her bowel or bladder habits, no nausea/emesis, no lower extremity edema, and no difficulties eating or sleeping. She denies any abdominal discomfort/bloating, no fevers or chills, and no chest pain or shortness of breath but does have CARMONA. She has some joint aches which are not new. She does have some nausea at times and takes her antiemetics as needed.         Review of Systems:    Systemic           no weight changes; no fever; no chills; no night sweats; no appetite changes  Skin           no rashes, or lesions  Eye           no irritation; no changes in vision  Tabby-Laryngeal           no dysphagia; no hoarseness   Pulmonary    no cough; no shortness of breath  Cardiovascular    no chest pain; no palpitations; + HTN  Gastrointestinal    no diarrhea; no constipation; no abdominal pain; no changes in bowel habits; no blood in stool  Genitourinary   no urinary frequency; no urinary urgency; no dysuria; no pain; no abnormal vaginal discharge; no abnormal vaginal bleeding  Breast    no breast discharge; no breast changes; no breast pain  Musculoskeletal    no myalgias; + arthralgias; no back pain  Psychiatric           no depressed mood; no anxiety    Hematologic               no tender lymph nodes; no noticeable swellings or lumps   Endocrine    no hot flashes; no heat/cold intolerance         Neurological   no tremor; no numbness and tingling; no headaches; no difficulty sleeping      Past Medical History:    Past Medical History:   Diagnosis Date     Cancer (H)     cervical     Hypertension          Past Surgical History:    Past Surgical History:   Procedure Laterality Date     BIOPSY/EXCISION LYMPH NODE(S), NEEDLE, SUPERFICIAL (CERVICAL/INGUINAL/AXILLARY) Right 9/21/2017    Procedure: BIOPSY/EXCISION LYMPH NODE(S), NEEDLE, SUPERFICIAL (CERVICAL/INGUINAL/AXILLARY);;  Surgeon: Sonu Denson PA-C;  Location: [facility]     INSERT PORT VASCULAR ACCESS Right 9/21/2017     Procedure: INSERT PORT VASCULAR ACCESS;  Single Lumen Chest Power Port, Right Axillary Lymph Node Biopsy;  Surgeon: Sonu Denson PA-C;  Location: [facility] OR     no previous surgery           Health Maintenance Due   Topic Date Due     TETANUS IMMUNIZATION (SYSTEM ASSIGNED)  05/10/1971     HEPATITIS C SCREENING  05/10/1971     LIPID SCREEN Q5 YR FEMALE (SYSTEM ASSIGNED)  05/10/1998     MAMMO SCREEN Q2 YR (SYSTEM ASSIGNED)  05/10/2003     COLON CANCER SCREEN (SYSTEM ASSIGNED)  05/10/2003     ADVANCE DIRECTIVE PLANNING Q5 YRS  05/10/2008       Current Medications:     Current Outpatient Prescriptions   Medication Sig Dispense Refill     dronabinol 10 MG CAPS Take 10 mg by mouth 2 times daily (before meals) 60 capsule 3     prochlorperazine (COMPAZINE) 10 MG tablet Take 1 tablet (10 mg) by mouth 3 times daily 120 tablet 2     traMADol (ULTRAM) 50 MG tablet Take 2 tablets (100mg) every morning when you wake up, 2 tablets (100mg)around noon, 2 tablets (100mg) beofre dinner and 2 tablets (100mg) before bed. 240 tablet 0     amLODIPine (NORVASC) 2.5 MG tablet Take 1 tablet (2.5 mg) by mouth daily 30 tablet 1     LORazepam (ATIVAN) 1 MG tablet Take 1 tablet (1 mg) by mouth every 6 hours as needed (Anxiety, Nausea/Vomiting or Sleep) 30 tablet 2     ranitidine (ZANTAC) 75 MG tablet Take 1 tablet (75 mg) by mouth 2 times daily 30 tablet 0     glutamine 500 MG TABS Take 500 mg by mouth 2 times daily 120 tablet 3     senna-docusate (SENOKOT-S;PERICOLACE) 8.6-50 MG per tablet Take 2-4 tablets by mouth 2 times daily 100 tablet 1     polyethylene glycol (MIRALAX) powder Take 17-34 g (1-2 capfuls) by mouth daily 510 g 1     acetaminophen (TYLENOL) 325 MG tablet Take 3 tablets (975 mg) by mouth 3 times daily 100 tablet 0         Allergies:      No Known Allergies     Social History:     Social History   Substance Use Topics     Smoking status: Never Smoker     Smokeless tobacco: Never Used     Alcohol use No       History    Drug Use No         Family History:     No family history on file.      Physical Exam:     BP (!) 149/93 (BP Location: Right arm, Patient Position: Sitting, Cuff Size: Adult Large)  Pulse 122  Temp 99.4  F (37.4  C) (Oral)  Resp 16  Wt 80.4 kg (177 lb 4.8 oz)  SpO2 98%  BMI 31.42 kg/m2  Body mass index is 31.42 kg/(m^2).    General Appearance: healthy and alert, no distress     HEENT: no thyromegaly, no palpable nodules or masses        Cardiovascular: regular rate and rhythm, no gallops, rubs or murmurs     Respiratory: lungs clear, no rales, rhonchi or wheezes, normal diaphragmatic excursion    Musculoskeletal: extremities non tender and without edema    Skin: no lesions or rashes     Neurological: normal gait, no gross defects     Psychiatric: appropriate mood and affect                               Hematological: normal cervical, supraclavicular lymph nodes     Gastrointestinal:       abdomen soft, non-tender, non-distended    Genitourinary: deferred      Assessment:    Ashvin Tiwari is a 62 year old woman with a diagnosis of stage IV poorly differentiated SCC cervix.   She is here today for follow up and disease management. She arrived 45 minutes late to her first appointment.     30 minutes were spent with this patient, over 50% of that time was spent in symptom management, treatment planning and in counseling and coordination of care.      Plan:     1.)        No chemo secondary to neutropenia; will have her recheck labs in one week and if WNL ok to proceed. Cisplatin to be reduced to 20 mg/m2 per Dr. Soto. Will have Neulasta added in. Discussed Neulasta as well as taking Claritin. She will return for her next cycle in 3 weeks. Reviewed signs and symptoms for when she should contact the clinic or seek additional care. Patient to contact the clinic with any questions or concerns in the interim.     2.) Genetic risk factors were assessed and the patient does not meet the qualifications for a  referral.      3.) Labs and/or tests ordered include:  Chemo labs.      4.) Health maintenance issues addressed today include annual health maintenance and non-gynecologic issues with PCP.    5.)        Continue follow up with Palliative Care as needed.    6.)        HTN: managed by PCP; was increased to Norvasc 10 mg by her PCP. Encouraged to follow up with her PCP for ongoing management.     7.)       She verbalized understanding of the above.     ROSA Tavera, WHNP-BC, ANP-BC  Women's Health Nurse Practitioner  Adult Nurse Pracitioner  Division of Gynecologic Oncology          CC  Patient Care Team:  Chelsea Wren as PCP - General (Family Practice)  Sita Gallardo as Referring Physician (OB/Gyn)  Rima Saunders, RN as Continuity Care Coordinator (Gyn-Onc)  Leia Caraballo, RN as Registered Nurse (Palliative)  CHELSEA WREN

## 2018-03-20 NOTE — LETTER
3/20/2018       RE: Ashvin Tiwari  [address]  [facility] [place] [address]     Dear Colleague,    Thank you for referring your patient, Ashvin Tiwari, to the [facility]. Please see a copy of my visit note below.                Follow Up Notes on Referred Patient    Date: 3/20/2018       Dr. Chelsea Wren  [facility]  [address] [place] [address]       RE: Ashvin Tiwari  : 1955  DENIS: 3/20/2018    Dear Dr. Chelsea Wren:    Ashvin Tiwari is a 62 year old woman with a diagnosis of stage IV poorly differentiated SCC cervix.   She is here today for follow up and disease management. She arrived 45 minutes late to her first appointment.       Oncology history:   17:  Seen in Gyn Onc.  Exam concerning for at least a Stage IIB cervical cancer.  Biopsy obtained consistent with poorly differentiated squamous cell ca.  MRI ordered.      17: MRI Pelvis with 7 x 7cm mass at level of cervix, protrudes into upper third of vagina, bilateral parametrial extension, abuts bilateral ureters but no obstruction, abuts rectal wall with obliteration of fat plan but no mucosal invasion, no clear bladder wall invasion, suspicious lymph nodes left hemipelvis, peritoneal nodule versus lymph node.      17 to 17:  Patient admitted to hospital with pain. CT Chest PE was negative for PE but showed multiple pulmonary nodules consistent with metastatic disease.  CT A/P showed cervical mass with regional metastatic disease as well as peritoneal nodularity.      17:  PET scan with multiple metastatic hypermetabolic lung nodules, axillary, mediastinal, hilar and abdominal lymph nodes.      17-17: Cycle #1-3 Paclitaxel/Carboplatin/Avastin.   17: PET CTIMPRESSION:   1. In this patient with a history of stage IV poorly differentiated squamous cell carcinoma of the cervix, there has been a positive response to therapy. The primary cervical mass is  significantly decreased in size and demonstrates decreased FDG uptake. Similarly, the metastatic pulmonary nodules and metastatic lymphadenopathy has significantly improved.      2. Bilateral hypermetabolic level 2A lymph nodes which have slightly increased in size, of uncertain clinical significance given the positive response in the remainder of the body. Enlargement may be related to the patient's dental and sinus disease. Recommend close attention on follow-up imaging.      3. Periapical lucencies with FDG uptake and dental caries involving multiple upper teeth. Recommend follow-up with dentistry.      4. Asymmetric radiotracer uptake in the left prevertebral soft tissues without discernible mass. Recommend close attention on follow-up imaging.      5. Small bilateral pleural effusions.      11/21/17-1/11/18: Cycle #4-6 Paclitaxel/Carboplatin/Avastin.    2/14/18: PET CT IMPRESSION:   1. In this patient with stage IV poorly differentiated squamous cell carcinoma of the cervix there is evidence for progression of disease:  a. Increased size and FDG avidity of primary cervical mass (maximum SUV 30.49, previously 12.57)  b. New FDG avid 7 mm left inguinal lymph node (maximum SUV 6.06)  c. New FDG avid 10 mm right paratracheal lymph node (maximum SUV 12.40. No FDG avid 10 mm right axillary lymph node (maximum SUV 6.14). Resolution of previously FDG avid 9 mm right axillary lymph node. These findings may be reactive versus metastatic.   2. Increased FDG avidity of the rectum is nonspecific and may represent infection, inflamed hemorrhoids, or malignancy. Recommend direct visualization.  3. Small stable right-sided pleural effusion.   4. Please see dedicated dictation of high-resolution PET CT of the head and neck.     Plan: Avastin/Cisplatin 50 mg/m2 day 1 +Topotecan 0.75 mg/m2 for 3 days Q 21 days for 3 cycles then repeat imaging.     2/27/18: Cycle #1 Cisplatin/Topotecan/Avastin.   3/20/18: Cycle #2  Cisplatin/Topotecan/Avastin deferred secondary to neutropenia.       Today she comes to clinic stating her PCP is managing her HTN and she is taking 10 mg of her Norvasc not 2.5 mg. She does check her BP at home and reports this morning it was 157/78/ She denies any vaginal bleeding, no changes in her bowel or bladder habits, no nausea/emesis, no lower extremity edema, and no difficulties eating or sleeping. She denies any abdominal discomfort/bloating, no fevers or chills, and no chest pain or shortness of breath but does have CARMONA. She has some joint aches which are not new. She does have some nausea at times and takes her antiemetics as needed.         Review of Systems:    Systemic           no weight changes; no fever; no chills; no night sweats; no appetite changes  Skin           no rashes, or lesions  Eye           no irritation; no changes in vision  Tabby-Laryngeal           no dysphagia; no hoarseness   Pulmonary    no cough; no shortness of breath  Cardiovascular    no chest pain; no palpitations; + HTN  Gastrointestinal    no diarrhea; no constipation; no abdominal pain; no changes in bowel habits; no blood in stool  Genitourinary   no urinary frequency; no urinary urgency; no dysuria; no pain; no abnormal vaginal discharge; no abnormal vaginal bleeding  Breast    no breast discharge; no breast changes; no breast pain  Musculoskeletal    no myalgias; + arthralgias; no back pain  Psychiatric           no depressed mood; no anxiety    Hematologic               no tender lymph nodes; no noticeable swellings or lumps   Endocrine    no hot flashes; no heat/cold intolerance         Neurological   no tremor; no numbness and tingling; no headaches; no difficulty sleeping      Past Medical History:    Past Medical History:   Diagnosis Date     Cancer (H)     cervical     Hypertension          Past Surgical History:    Past Surgical History:   Procedure Laterality Date     BIOPSY/EXCISION LYMPH NODE(S), NEEDLE,  SUPERFICIAL (CERVICAL/INGUINAL/AXILLARY) Right 9/21/2017    Procedure: BIOPSY/EXCISION LYMPH NODE(S), NEEDLE, SUPERFICIAL (CERVICAL/INGUINAL/AXILLARY);;  Surgeon: Sonu Denson PA-C;  [facility]     INSERT PORT VASCULAR ACCESS Right 9/21/2017    Procedure: INSERT PORT VASCULAR ACCESS;  Single Lumen Chest Power Port, Right Axillary Lymph Node Biopsy;  Surgeon: Sonu Denson PA-C;  Location: [facility]     no previous surgery           Health Maintenance Due   Topic Date Due     TETANUS IMMUNIZATION (SYSTEM ASSIGNED)  05/10/1971     HEPATITIS C SCREENING  05/10/1971     LIPID SCREEN Q5 YR FEMALE (SYSTEM ASSIGNED)  05/10/1998     MAMMO SCREEN Q2 YR (SYSTEM ASSIGNED)  05/10/2003     COLON CANCER SCREEN (SYSTEM ASSIGNED)  05/10/2003     ADVANCE DIRECTIVE PLANNING Q5 YRS  05/10/2008       Current Medications:     Current Outpatient Prescriptions   Medication Sig Dispense Refill     dronabinol 10 MG CAPS Take 10 mg by mouth 2 times daily (before meals) 60 capsule 3     prochlorperazine (COMPAZINE) 10 MG tablet Take 1 tablet (10 mg) by mouth 3 times daily 120 tablet 2     traMADol (ULTRAM) 50 MG tablet Take 2 tablets (100mg) every morning when you wake up, 2 tablets (100mg)around noon, 2 tablets (100mg) beofre dinner and 2 tablets (100mg) before bed. 240 tablet 0     amLODIPine (NORVASC) 2.5 MG tablet Take 1 tablet (2.5 mg) by mouth daily 30 tablet 1     LORazepam (ATIVAN) 1 MG tablet Take 1 tablet (1 mg) by mouth every 6 hours as needed (Anxiety, Nausea/Vomiting or Sleep) 30 tablet 2     ranitidine (ZANTAC) 75 MG tablet Take 1 tablet (75 mg) by mouth 2 times daily 30 tablet 0     glutamine 500 MG TABS Take 500 mg by mouth 2 times daily 120 tablet 3     senna-docusate (SENOKOT-S;PERICOLACE) 8.6-50 MG per tablet Take 2-4 tablets by mouth 2 times daily 100 tablet 1     polyethylene glycol (MIRALAX) powder Take 17-34 g (1-2 capfuls) by mouth daily 510 g 1     acetaminophen (TYLENOL) 325 MG tablet Take  3 tablets (975 mg) by mouth 3 times daily 100 tablet 0         Allergies:      No Known Allergies     Social History:     Social History   Substance Use Topics     Smoking status: Never Smoker     Smokeless tobacco: Never Used     Alcohol use No       History   Drug Use No         Family History:     No family history on file.      Physical Exam:     BP (!) 149/93 (BP Location: Right arm, Patient Position: Sitting, Cuff Size: Adult Large)  Pulse 122  Temp 99.4  F (37.4  C) (Oral)  Resp 16  Wt 80.4 kg (177 lb 4.8 oz)  SpO2 98%  BMI 31.42 kg/m2  Body mass index is 31.42 kg/(m^2).    General Appearance: healthy and alert, no distress     HEENT: no thyromegaly, no palpable nodules or masses        Cardiovascular: regular rate and rhythm, no gallops, rubs or murmurs     Respiratory: lungs clear, no rales, rhonchi or wheezes, normal diaphragmatic excursion    Musculoskeletal: extremities non tender and without edema    Skin: no lesions or rashes     Neurological: normal gait, no gross defects     Psychiatric: appropriate mood and affect                               Hematological: normal cervical, supraclavicular lymph nodes     Gastrointestinal:       abdomen soft, non-tender, non-distended    Genitourinary: deferred      Assessment:    Ashvin Tiwari is a 62 year old woman with a diagnosis of stage IV poorly differentiated SCC cervix.   She is here today for follow up and disease management. She arrived 45 minutes late to her first appointment.     30 minutes were spent with this patient, over 50% of that time was spent in symptom management, treatment planning and in counseling and coordination of care.      Plan:     1.)        No chemo secondary to neutropenia; will have her recheck labs in one week and if WNL ok to proceed. Cisplatin to be reduced to 20 mg/m2 per Dr. Soto. Will have Neulasta added in. Discussed Neulasta as well as taking Claritin. She will return for her next cycle in 3 weeks. Reviewed  signs and symptoms for when she should contact the clinic or seek additional care. Patient to contact the clinic with any questions or concerns in the interim.     2.) Genetic risk factors were assessed and the patient does not meet the qualifications for a referral.      3.) Labs and/or tests ordered include:  Chemo labs.      4.) Health maintenance issues addressed today include annual health maintenance and non-gynecologic issues with PCP.    5.)        Continue follow up with Palliative Care as needed.    6.)        HTN: managed by PCP; was increased to Norvasc 10 mg by her PCP. Encouraged to follow up with her PCP for ongoing management.     7.)       She verbalized understanding of the above.     ROSA Tavera, WHNP-BC, ANP-BC  Women's Health Nurse Practitioner  Adult Nurse Pracitioner  Division of Gynecologic Oncology    CC  Patient Care Team:  Chelsea Wren as PCP - General (Family Practice)  Sita Gallardo as Referring Physician (OB/Gyn)  Rima Saunders, RN as Continuity Care Coordinator (Gyn-Onc)  Leia Caraballo, RN as Registered Nurse (Palliative)

## 2018-03-20 NOTE — NURSING NOTE
"Oncology Rooming Note    March 20, 2018 9:57 AM   Ashvin Tiwari is a 62 year old female who presents for:    Chief Complaint   Patient presents with     Oncology Clinic Visit     f/u Cervical CA, active tx     Port Draw     labs drawn from port by rn.  vs taken     Initial Vitals: BP (!) 149/93 (BP Location: Right arm, Patient Position: Sitting, Cuff Size: Adult Large)  Pulse 122  Temp 99.4  F (37.4  C) (Oral)  Resp 16  Wt 80.4 kg (177 lb 4.8 oz)  SpO2 98%  BMI 31.42 kg/m2 Estimated body mass index is 31.42 kg/(m^2) as calculated from the following:    Height as of 3/2/18: 1.6 m (5' 2.99\").    Weight as of this encounter: 80.4 kg (177 lb 4.8 oz). Body surface area is 1.89 meters squared.  Extreme Pain (8) Comment: ribs   No LMP recorded. Patient is postmenopausal.  Allergies reviewed: Yes  Medications reviewed: Yes    Medications: Medication refills not needed today.  Pharmacy name entered into EPIC:    [facility] (USE ONLY AT [facility]) - [place], [place] - [address] [facility] [id number] - [place], [place] - [address] [facility] AT [facility]    Clinical concerns: Patient states there are no new concerns to discuss with provider.  Rachelle Miranda was not notified.       8 minutes for nursing intake (face to face time)     Yesy Chester CMA              "

## 2018-03-20 NOTE — MR AVS SNAPSHOT
After Visit Summary   3/20/2018    Ashvin Tiwari    MRN: [id number]           Patient Information     Date Of Birth          1955        Visit Information        Provider Department      3/20/2018 8:30 AM Rachelle Miranda APRN CNP [facility]        Today's Diagnoses     Malignant neoplasm of cervix, unspecified site (H)    -  1    Encounter for antineoplastic chemotherapy           Follow-ups after your visit        Your next 10 appointments already scheduled     Mar 21, 2018  4:45 PM CDT   Treatment 60 with Dwayne Barbour PT   [facility] ([facility])    [address] [place] [address]  4th Floor  [facility] [address]   [phone]            Mar 28, 2018 10:30 AM CDT   [facility] Lab Draw with [facility] DRAW   [facility] ([facility])    [address] [place] [address]  [address]  [facility] [address]   [phone]            Mar 28, 2018 11:00 AM CDT   Infusion 360 with [facility] ONCOLOGY INFUSION, UC 15 ATC   [facility] ([facility])    [address] [place] [address]  [address]  [facility] [address]   [phone]            Mar 29, 2018  1:30 PM CDT   Infusion 60 with UC ONCOLOGY INFUSION, UC 22 ATC   [facility] ([facility])    [address] [place] [address]  [address]  [facility] [address]   [phone]            Mar 30, 2018  1:30 PM CDT   Infusion 60 with UC ONCOLOGY INFUSION, UC 22 ATC   [facility] ([facility])    [address] [place] [address]  [address]  [facility] [address]   [phone]            Apr 10, 2018  3:00 PM CDT   (Arrive by 2:45 PM)   Return Visit with Briana Medina MD   [facility] ([facility])    [address] [place] [address]  [address]  [facility] [address]   [phone]            Apr 17, 2018  7:00 AM CDT   [facility]  "Lab Draw with [facility] LAB DRAW   [facility] Draw ([facility])    [address]  [address]  [facility] [address] [phone]              Who to contact     If you have questions or need follow up information about today's clinic visit or your schedule please contact [facility] directly at [phone].  Normal or non-critical lab and imaging results will be communicated to you by MyChart, letter or phone within 4 business days after the clinic has received the results. If you do not hear from us within 7 days, please contact the clinic through [employer] or phone. If you have a critical or abnormal lab result, we will notify you by phone as soon as possible.  Submit refill requests through [employer] or call your pharmacy and they will forward the refill request to us. Please allow 3 business days for your refill to be completed.          Additional Information About Your Visit        [employer] Information     [employer] lets you send messages to your doctor, view your test results, renew your prescriptions, schedule appointments and more. To sign up, go to [email or web address] . Click on \"Log in\" on the left side of the screen, which will take you to the Welcome page. Then click on \"Sign up Now\" on the right side of the page.     You will be asked to enter the access code listed below, as well as some personal information. Please follow the directions to create your username and password.     Your access code is: [email or web address]  Expires: 2018  7:30 AM     Your access code will  in 90 days. If you need help or a new code, please call your [place] clinic or [phone].        Care EveryWhere ID     This is your Care EveryWhere ID. This could be used by other organizations to access your [place] medical records  [id number]        Your Vitals Were     Pulse Temperature Respirations Pulse Oximetry BMI (Body Mass Index)       122 99.4  F " (37.4  C) (Oral) 16 98% 31.42 kg/m2        Blood Pressure from Last 3 Encounters:   03/20/18 (!) 149/93   03/06/18 140/86   03/02/18 151/88    Weight from Last 3 Encounters:   03/20/18 80.4 kg (177 lb 4.8 oz)   03/02/18 80 kg (176 lb 6.4 oz)   02/27/18 80.9 kg (178 lb 4.8 oz)              Today, you had the following     No orders found for display         Today's Medication Changes          These changes are accurate as of 3/20/18 11:28 AM.  If you have any questions, ask your nurse or doctor.               These medicines have changed or have updated prescriptions.        Dose/Directions    amLODIPine 2.5 MG tablet   Commonly known as:  NORVASC   This may have changed:  how much to take   Used for:  Essential hypertension        Dose:  2.5 mg   Take 1 tablet (2.5 mg) by mouth daily   Quantity:  30 tablet   Refills:  1                Primary Care Provider Office Phone # Fax #    Chelsea Wren [phone]       [facility] [address] [facility] [place] [address]        Equal Access to Services     [facility] : Hadii elvia lackey Sosyd, waaxda luqadaha, qaybta kaalmada aderodolfoyada, elizabeth ga . So [facility] [phone].    ATENCIÓN: Si habla español, tiene a mae disposición servicios gratuitos de asistencia lingüística. [facility] [phone].    We comply with applicable federal civil rights laws and [place] laws. We do not discriminate on the basis of race, color, national origin, age, disability, sex, sexual orientation, or gender identity.            Thank you!     Thank you for choosing [facility]  for your care. Our goal is always to provide you with excellent care. Hearing back from our patients is one way we can continue to improve our services. Please take a few minutes to complete the written survey that you may receive in the mail after your visit with us. Thank you!             Your Updated Medication List - Protect others around  you: Learn how to safely use, store and throw away your medicines at www.disposemymeds.org.          This list is accurate as of 3/20/18 11:28 AM.  Always use your most recent med list.                   Brand Name Dispense Instructions for use Diagnosis    acetaminophen 325 MG tablet    TYLENOL    100 tablet    Take 3 tablets (975 mg) by mouth 3 times daily    Cancer related pain       amLODIPine 2.5 MG tablet    NORVASC    30 tablet    Take 1 tablet (2.5 mg) by mouth daily    Essential hypertension       Dronabinol 10 MG Caps     60 capsule    Take 10 mg by mouth 2 times daily (before meals)    Anorexia, Malignant neoplasm of cervix, unspecified site (H), Nausea       glutamine 500 MG Tabs     120 tablet    Take 500 mg by mouth 2 times daily    Drug-induced polyneuropathy (H)       LORazepam 1 MG tablet    ATIVAN    30 tablet    Take 1 tablet (1 mg) by mouth every 6 hours as needed (Anxiety, Nausea/Vomiting or Sleep)    Malignant neoplasm of cervix, unspecified site (H)       polyethylene glycol powder    MIRALAX    510 g    Take 17-34 g (1-2 capfuls) by mouth daily    Cancer related pain, Generalized abdominal pain       prochlorperazine 10 MG tablet    COMPAZINE    120 tablet    Take 1 tablet (10 mg) by mouth 3 times daily    Malignant neoplasm of cervix, unspecified site (H), Nausea       ranitidine 75 MG tablet    ZANTAC    30 tablet    Take 1 tablet (75 mg) by mouth 2 times daily    Gastroesophageal reflux disease, esophagitis presence not specified       senna-docusate 8.6-50 MG per tablet    SENOKOT-S;PERICOLACE    100 tablet    Take 2-4 tablets by mouth 2 times daily    Cancer related pain       traMADol 50 MG tablet    ULTRAM    240 tablet    Take 2 tablets (100mg) every morning when you wake up, 2 tablets (100mg)around noon, 2 tablets (100mg) beofre dinner and 2 tablets (100mg) before bed.    Malignant neoplasm of cervix, unspecified site (H), Cancer related pain

## 2018-03-20 NOTE — NURSING NOTE
"Chief Complaint   Patient presents with     Oncology Clinic Visit     f/u Cervical CA, active tx     Port Draw     labs drawn from port by rn.  vs taken     Port accessed with 20 gauge 3/4\" gripper needle and labs drawn by rn.  Port flushed with NS and heparin.  Pt tolerated well.  VS taken (elevated BP and P relayed to provider).  Pt checked in for next appt.  Kerrie Ramirez RN      "

## 2018-03-20 NOTE — PROGRESS NOTES
Care Coordinator Note  Chemo held  will add neulasta with the next course.  Reviewed Neulasta side effects and application of the On-Pro.  Patient verbalized back understanding of the above information discussed.   Face to face time spent with patient 5.  Rima MADSEN RN, OCN  Care Coordinator   Gynecologic Cancer   Office [phone]  Pager [phone]    Reviewed the neutropenia with Dr Soto and she is decreasing the CDDP to 40mg/m2.  Rima MADSEN RN, OCN  Care Coordinator   Gynecologic Cancer   Office [phone]  Pager [phone]

## 2018-03-28 NOTE — PATIENT INSTRUCTIONS
[facility] Main Line: [phone]    Call triage nurse with chills and/or temperature greater than or equal to 100.4, uncontrolled nausea/vomiting, diarrhea, constipation, dizziness, shortness of breath, chest pain, bleeding, unexplained bruising, or any new/concerning symptoms, questions/concerns.   If you are having any concerning symptoms or wish to speak to a provider before your next infusion visit, please call your care coordinator or triage to notify them so we can adequately serve you.   Triage Nurse Line: [phone]    If after hours, weekends, or holidays, call main hospital  and ask for Oncology doctor on call @ [phone]               March 2018 Sunday Monday Tuesday Wednesday Thursday Friday Saturday                       1     [facility] ONC INFUSION 60    2:30 PM   (60 min.)   [facility] ONCOL[facility]Y INFUSION   [facility] 2     [facility] RETURN   11:15 AM   (30 min.)   Briana Medina MD   [facility] 3       4     5     6     [facility] ONC INFUSION 120    2:30 PM   (120 min.)    ONCOLOGY INFUSION   [facility] 7     8     9     10       11     12     13     14     15     16     17       18     19     20     [facility] DRAW    8:00 AM   (15 min.)    [facility] LAB DRAW   [facility] Draw     [facility] RETURN ACTIVE TREATMENT    8:15 AM   (30 min.)   Rachelle Miranda APRN CNP   [facility] 21     TREATMENT 60    4:45 PM   (60 min.)   Dwayne Barbour, PT   [facility] 22     23     24       25     26     27     28     [facility] LAB DRAW   10:30 AM   (15 min.)   [facility] DRAW   [facility] Draw     [facility] ONC INFUSION 360   11:00 AM   (360 min.)   [facility] ONCOLOGY INFUSION   [facility] 29     [facility] ONC INFUSION 60    1:30 PM   (60 min.)    ONCOLOGY INFUSION   [facility] 30     [facility] ONC INFUSION 60    1:30 PM   (60 min.)    ONCOLOGY INFUSION   [facility]  [facility] 31 April 2018 Sunday Monday Tuesday Wednesday Thursday Friday Saturday   1     2     3     4     5     6     7       8     9     10     [facility] RETURN    2:45 PM   (30 min.)   Briana Medina MD   [facility] 11     12     13     14       15     16     17     [facility] LAB DRAW    7:00 AM   (15 min.)    [facility] LAB DRAW   [facility] Lab Draw     [facility] RETURN ACTIVE TREATMENT    7:15 AM   (30 min.)   Rachelle Miranda APRN CNP   [facility]     [facility] ONC INFUSION 360    8:30 AM   (360 min.)   [facility] ONCOLOGY INFUSION   [facility] 18     [facility] ONC INFUSION 60   12:00 PM   (60 min.)   [facility] ONCOLOGY INFUSION   [facility] 19     [facility] ONC INFUSION 60   12:00 PM   (60 min.)   [facility] ONCOLOGY INFUSION   [facility] 20     21       22     23     24     25     26     27     28       29     30                                           Lab Results:  Recent Results (from the past 12 hour(s))   CBC with platelets differential    Collection Time: 03/28/18 11:02 AM   Result Value Ref Range    WBC 4.9 4.0 - 11.0 10e9/L    RBC Count 4.06 3.8 - 5.2 10e12/L    Hemoglobin 12.2 11.7 - 15.7 g/dL    Hematocrit 38.3 35.0 - 47.0 %    MCV 94 78 - 100 fl    MCH 30.0 26.5 - 33.0 pg    MCHC 31.9 31.5 - 36.5 g/dL    RDW 16.0 (H) 10.0 - 15.0 %    Platelet Count 279 150 - 450 10e9/L    Diff Method Automated Method     % Neutrophils 57.9 %    % Lymphocytes 28.5 %    % Monocytes 13.0 %    % Eosinophils 0.2 %    % Basophils 0.2 %    % Immature Granulocytes 0.2 %    Nucleated RBCs 0 0 /100    Absolute Neutrophil 2.9 1.6 - 8.3 10e9/L    Absolute Lymphocytes 1.4 0.8 - 5.3 10e9/L    Absolute Monocytes 0.6 0.0 - 1.3 10e9/L    Absolute Eosinophils 0.0 0.0 - 0.7 10e9/L    Absolute Basophils 0.0 0.0 - 0.2 10e9/L    Abs Immature Granulocytes 0.0 0 - 0.4 10e9/L    Absolute Nucleated RBC 0.0    Comprehensive metabolic panel     Collection Time: 03/28/18 11:02 AM   Result Value Ref Range    Sodium 139 133 - 144 mmol/L    Potassium 3.4 3.4 - 5.3 mmol/L    Chloride 105 94 - 109 mmol/L    Carbon Dioxide 26 20 - 32 mmol/L    Anion Gap 8 3 - 14 mmol/L    Glucose 128 (H) 70 - 99 mg/dL    Urea Nitrogen 12 7 - 30 mg/dL    Creatinine 0.82 0.52 - 1.04 mg/dL    GFR Estimate 70 >60 mL/min/1.7m2    GFR Estimate If Black 85 >60 mL/min/1.7m2    Calcium 9.2 8.5 - 10.1 mg/dL    Bilirubin Total 0.2 0.2 - 1.3 mg/dL    Albumin 3.2 (L) 3.4 - 5.0 g/dL    Protein Total 8.2 6.8 - 8.8 g/dL    Alkaline Phosphatase 96 40 - 150 U/L    ALT 18 0 - 50 U/L    AST 20 0 - 45 U/L   Magnesium    Collection Time: 03/28/18 11:02 AM   Result Value Ref Range    Magnesium 1.8 1.6 - 2.3 mg/dL   Protein qualitative urine    Collection Time: 03/28/18 11:03 AM   Result Value Ref Range    Protein Albumin Urine Trace (A) NEG^Negative mg/dL

## 2018-03-28 NOTE — TELEPHONE ENCOUNTER
Received call from infusion - patient requesting refill of zantac.      Last refill: 1/30/2018  Last office visit: 3/2/2018  Scheduled for follow up 4/10/2018     Will route request to MD for review.     Reviewed [place]  Report.

## 2018-03-28 NOTE — NURSING NOTE
Chief Complaint   Patient presents with     Port Draw     Labs drawn via port by RN. Line flushed and hep locked. VS taken.     Angela Julio RN

## 2018-03-28 NOTE — MR AVS SNAPSHOT
After Visit Summary   3/28/2018    Ashvin Tiwari    MRN: [id number]           Patient Information     Date Of Birth          1955        Visit Information        Provider Department      3/28/2018 11:00 AM  15 ATC;  ONCOLOGY INFUSION [facility]        Today's Diagnoses     Chemotherapy-induced neutropenia (H)    -  1    Malignant neoplasm of cervix, unspecified site (H)          [facility] [place] Main Line: [phone]    Call triage nurse with chills and/or temperature greater than or equal to 100.4, uncontrolled nausea/vomiting, diarrhea, constipation, dizziness, shortness of breath, chest pain, bleeding, unexplained bruising, or any new/concerning symptoms, questions/concerns.   If you are having any concerning symptoms or wish to speak to a provider before your next infusion visit, please call your care coordinator or triage to notify them so we can adequately serve you.   Triage Nurse Line: [phone]    If after hours, weekends, or holidays, call main hospital  and ask for Oncology doctor on call @ [phone]               March 2018 Sunday Monday Tuesday Wednesday Thursday Friday Saturday                       1     [facility] ONC INFUSION 60    2:30 PM   (60 min.)    ONCOLOGY INFUSION   [facility] 2     [facility] RETURN   11:15 AM   (30 min.)   Briana Medina MD   [facility] 3       4     5     6     [facility] ONC INFUSION 120    2:30 PM   (120 min.)    ONCOLOGY INFUSION   [facility] 7     8     9     10       11     12     13     14     15     16     17       18     19     20     [facility]    8:00 AM   (15 min.)   [facility]   [facility] Lab Draw     [facility] RETURN ACTIVE TREATMENT    8:15 AM   (30 min.)   Rachelle Miranda APRN CNP   [facility] 21     TREATMENT 60    4:45 PM   (60 min.)   Dwayne Barbour, PT   [facility]  Rehab 22     23     24       25     26     27     28     [facility]   10:30 AM   (15 min.)    [facility]   [facility] Lab Draw     [facility] ONC INFUSION 360   11:00 AM   (360 min.)   [facility] ONCOLOGY INFUSION   [facility] 29     [facility] ONC INFUSION 60    1:30 PM   (60 min.)   [facility] ONCOLOGY INFUSION   [facility] 30     [facility] ONC INFUSION 60    1:30 PM   (60 min.)   [facility] ONCOLOGY INFUSION   [facility] 31 April 2018 Sunday Monday Tuesday Wednesday Thursday Friday Saturday   1     2     3     4     5     6     7       8     9     10     [facility] RETURN    2:45 PM   (30 min.)   Briana Medina MD   [facility] 11     12     13     14       15     16     17     [facility]    7:00 AM   (15 min.)    [facility] DRAW   [facility] Lab Draw     [facility] RETURN ACTIVE TREATMENT    7:15 AM   (30 min.)   Rachelle Miranda APRN CNP   [facility]     [facility] ONC INFUSION 360    8:30 AM   (360 min.)   [facility] ONCOLOGY INFUSION   [facility] 18     [facility] ONC INFUSION 60   12:00 PM   (60 min.)   [facility] ONCOLOGY INFUSION   [facility] 19     [facility] ONC INFUSION 60   12:00 PM   (60 min.)   [facility] ONCOLOGY INFUSION   [facility] 20     21       22     23     24     25     26     27     28       29     30                                           Lab Results:  Recent Results (from the past 12 hour(s))   CBC with platelets differential    Collection Time: 03/28/18 11:02 AM   Result Value Ref Range    WBC 4.9 4.0 - 11.0 10e9/L    RBC Count 4.06 3.8 - 5.2 10e12/L    Hemoglobin 12.2 11.7 - 15.7 g/dL    Hematocrit 38.3 35.0 - 47.0 %    MCV 94 78 - 100 fl    MCH 30.0 26.5 - 33.0 pg    MCHC 31.9 31.5 - 36.5 g/dL    RDW 16.0 (H) 10.0 - 15.0 %    Platelet Count 279 150 - 450 10e9/L    Diff Method Automated Method     % Neutrophils 57.9 %    % Lymphocytes 28.5 %    % Monocytes  13.0 %    % Eosinophils 0.2 %    % Basophils 0.2 %    % Immature Granulocytes 0.2 %    Nucleated RBCs 0 0 /100    Absolute Neutrophil 2.9 1.6 - 8.3 10e9/L    Absolute Lymphocytes 1.4 0.8 - 5.3 10e9/L    Absolute Monocytes 0.6 0.0 - 1.3 10e9/L    Absolute Eosinophils 0.0 0.0 - 0.7 10e9/L    Absolute Basophils 0.0 0.0 - 0.2 10e9/L    Abs Immature Granulocytes 0.0 0 - 0.4 10e9/L    Absolute Nucleated RBC 0.0    Comprehensive metabolic panel    Collection Time: 03/28/18 11:02 AM   Result Value Ref Range    Sodium 139 133 - 144 mmol/L    Potassium 3.4 3.4 - 5.3 mmol/L    Chloride 105 94 - 109 mmol/L    Carbon Dioxide 26 20 - 32 mmol/L    Anion Gap 8 3 - 14 mmol/L    Glucose 128 (H) 70 - 99 mg/dL    Urea Nitrogen 12 7 - 30 mg/dL    Creatinine 0.82 0.52 - 1.04 mg/dL    GFR Estimate 70 >60 mL/min/1.7m2    GFR Estimate If Black 85 >60 mL/min/1.7m2    Calcium 9.2 8.5 - 10.1 mg/dL    Bilirubin Total 0.2 0.2 - 1.3 mg/dL    Albumin 3.2 (L) 3.4 - 5.0 g/dL    Protein Total 8.2 6.8 - 8.8 g/dL    Alkaline Phosphatase 96 40 - 150 U/L    ALT 18 0 - 50 U/L    AST 20 0 - 45 U/L   Magnesium    Collection Time: 03/28/18 11:02 AM   Result Value Ref Range    Magnesium 1.8 1.6 - 2.3 mg/dL   Protein qualitative urine    Collection Time: 03/28/18 11:03 AM   Result Value Ref Range    Protein Albumin Urine Trace (A) NEG^Negative mg/dL             Follow-ups after your visit        Your next 10 appointments already scheduled     Mar 29, 2018  1:30 PM CDT   Infusion 60 with [facility] ONCOLOGY INFUSION, [address] ([facility])    [address] [place] [address] [phone]            Mar 30, 2018  1:30 PM CDT   Infusion 60 with [facility] ONCOLOGY INFUSION, [address] ([facility])    [address]  [address]  [facility] [address] [phone]            Apr 10, 2018  3:00 PM CDT   (Arrive by 2:45 PM)    Return Visit with Briana Medina MD   [facility] ([facility])    [address]  [address]  [facility] [address]   [phone]            Apr 17, 2018  7:00 AM CDT   [facility] with [facility]   [facility] Lab Draw ([facility])    [address]  [address]  [facility] [address] [phone]            Apr 17, 2018  7:30 AM CDT   (Arrive by 7:15 AM)   Return Active Treatment with ROSA Mendoza CNP   [facility] ([facility])    [address]  [address]  [facility] [address]   [phone]            Apr 17, 2018  8:30 AM CDT   Infusion 360 with [facility] ONCOLOGY INFUSION, UC 19 ATC   [facility] ([facility])    [address]  [address]  [facility] [address] [phone]            Apr 18, 2018 12:00 PM CDT   Infusion 60 with UC ONCOLOGY INFUSION   [facility] ([facility])    [address]  [address]  [facility] [address] [phone]              Who to contact     If you have questions or need follow up information about today's clinic visit or your schedule please contact [facility] directly at [phone].  Normal or non-critical lab and imaging results will be communicated to you by MyChart, letter or phone within 4 business days after the clinic has received the results. If you do not hear from us within 7 days, please contact the clinic through MyChart or phone. If you have a critical or abnormal lab result, we will notify you by phone as soon as possible.  Submit refill requests through [employer] or call your pharmacy and they will forward the refill request to us. Please allow 3 business days for your refill to be completed.          Additional Information About Your Visit        [facility]  "Information     [employer] lets you send messages to your doctor, view your test results, renew your prescriptions, schedule appointments and more. To sign up, go to [email or web address] . Click on \"Log in\" on the left side of the screen, which will take you to the Welcome page. Then click on \"Sign up Now\" on the right side of the page.     You will be asked to enter the access code listed below, as well as some personal information. Please follow the directions to create your username and password.     Your access code is: [email or web address]  Expires: 2018  7:30 AM     Your access code will  in 90 days. If you need help or a new code, please call your [facility] clinic or [phone].        Care EveryWhere ID     This is your Care EveryWhere ID. This could be used by other organizations to access your [facility] medical records  [id number]        Your Vitals Were     Pulse Temperature Respirations Pulse Oximetry BMI (Body Mass Index)       117 98.3  F (36.8  C) (Tympanic) 18 93% 31.31 kg/m2        Blood Pressure from Last 3 Encounters:   18 151/85   18 (!) 149/93   18 140/86    Weight from Last 3 Encounters:   18 80.2 kg (176 lb 11.2 oz)   18 80.4 kg (177 lb 4.8 oz)   18 80 kg (176 lb 6.4 oz)              We Performed the Following     CBC with platelets differential     Comprehensive metabolic panel     Magnesium     Protein qualitative urine          Today's Medication Changes          These changes are accurate as of 3/28/18 12:11 PM.  If you have any questions, ask your nurse or doctor.               These medicines have changed or have updated prescriptions.        Dose/Directions    amLODIPine 2.5 MG tablet   Commonly known as:  NORVASC   This may have changed:  how much to take   Used for:  Essential hypertension        Dose:  2.5 mg   Take 1 tablet (2.5 mg) by mouth daily   Quantity:  30 tablet   Refills:  1                Primary Care Provider Office Phone # Fax # "    Chelsea Wren [phone]       [facility] [address]  [facility] [address]        Equal Access to Services     LISSET LUOG : Sundar elvia mccurdy darya Reese, wamaryurida luqzane, qamohsenta kaalmada kamala, elizabeth lainain hayaan sofierodolfo worthy harmeet cheema. So [facility] [phone].    ATENCIÓN: Si habla español, tiene a mae disposición servicios gratuitos de asistencia lingüística. [place] al [phone].    We comply with applicable federal civil rights laws and [place] laws. We do not discriminate on the basis of race, color, national origin, age, disability, sex, sexual orientation, or gender identity.            Thank you!     Thank you for choosing [facility]  for your care. Our goal is always to provide you with excellent care. Hearing back from our patients is one way we can continue to improve our services. Please take a few minutes to complete the written survey that you may receive in the mail after your visit with us. Thank you!             Your Updated Medication List - Protect others around you: Learn how to safely use, store and throw away your medicines at www.disposemymeds.org.          This list is accurate as of 3/28/18 12:11 PM.  Always use your most recent med list.                   Brand Name Dispense Instructions for use Diagnosis    acetaminophen 325 MG tablet    TYLENOL    100 tablet    Take 3 tablets (975 mg) by mouth 3 times daily    Cancer related pain       amLODIPine 2.5 MG tablet    NORVASC    30 tablet    Take 1 tablet (2.5 mg) by mouth daily    Essential hypertension       Dronabinol 10 MG Caps     60 capsule    Take 10 mg by mouth 2 times daily (before meals)    Anorexia, Malignant neoplasm of cervix, unspecified site (H), Nausea       glutamine 500 MG Tabs     120 tablet    Take 500 mg by mouth 2 times daily    Drug-induced polyneuropathy (H)       LORazepam 1 MG tablet    ATIVAN    30 tablet    Take 1 tablet (1 mg) by mouth every 6 hours as  needed (Anxiety, Nausea/Vomiting or Sleep)    Malignant neoplasm of cervix, unspecified site (H)       polyethylene glycol powder    MIRALAX    510 g    Take 17-34 g (1-2 capfuls) by mouth daily    Cancer related pain, Generalized abdominal pain       prochlorperazine 10 MG tablet    COMPAZINE    120 tablet    Take 1 tablet (10 mg) by mouth 3 times daily    Malignant neoplasm of cervix, unspecified site (H), Nausea       ranitidine 75 MG tablet    ZANTAC    30 tablet    Take 1 tablet (75 mg) by mouth 2 times daily    Gastroesophageal reflux disease, esophagitis presence not specified       senna-docusate 8.6-50 MG per tablet    SENOKOT-S;PERICOLACE    100 tablet    Take 2-4 tablets by mouth 2 times daily    Cancer related pain       traMADol 50 MG tablet    ULTRAM    240 tablet    Take 2 tablets (100mg) every morning when you wake up, 2 tablets (100mg)around noon, 2 tablets (100mg) beofre dinner and 2 tablets (100mg) before bed.    Malignant neoplasm of cervix, unspecified site (H), Cancer related pain

## 2018-03-28 NOTE — PROGRESS NOTES
Infusion Nursing Note:  Ashvin Tiwari presents today for Cycle 2 Day 1 Cisplat, Topotecan, Avastin.    Patient seen by provider today: No   present during visit today: Not Applicable.    Note: Offers no new complaints.    Intravenous Access:  Implanted Port.    Treatment Conditions:  Lab Results   Component Value Date    HGB 12.2 03/28/2018     Lab Results   Component Value Date    WBC 4.9 03/28/2018      Lab Results   Component Value Date    ANEU 2.9 03/28/2018     Lab Results   Component Value Date     03/28/2018      Lab Results   Component Value Date     03/28/2018                   Lab Results   Component Value Date    POTASSIUM 3.4 03/28/2018           Lab Results   Component Value Date    MAG 1.8 03/28/2018            Lab Results   Component Value Date    CR 0.82 03/28/2018                   Lab Results   Component Value Date    BEATRIZ 9.2 03/28/2018                Lab Results   Component Value Date    BILITOTAL 0.2 03/28/2018           Lab Results   Component Value Date    ALBUMIN 3.2 03/28/2018                    Lab Results   Component Value Date    ALT 18 03/28/2018           Lab Results   Component Value Date    AST 20 03/28/2018       Results reviewed, labs MET treatment parameters, ok to proceed with treatment.  Urine Protein Trace.     Up to bathroom pre/post Cisplat    Post Infusion Assessment:  Patient tolerated infusion without incident.  Blood return noted pre and post infusion.  Site patent and intact, free from redness, edema or discomfort.  No evidence of extravasations.  Access discontinued per protocol per patient request.    Discharge Plan:   Prescription refills given for Glutamine, Compazine, Ativan, Zantac.  (Zantac refilled by Palliative)  Copy of AVS reviewed with patient and/or family.  Patient will return tomorrow for next appointment.  Patient discharged in stable condition accompanied by: family.  Departure Mode: Ambulatory.  Face to Face time: 0.    Alyx SALGADO  TIMOTHY Ritchie

## 2018-03-29 NOTE — PROGRESS NOTES
Infusion Nursing Note:  Ashvin Tiwari presents today for Cycle 2 Day 2 Topotecan infusion.    Patient seen by provider today: No   present during visit today: Not Applicable.    Note: Pt states she feels well overall with no new complaints.    Intravenous Access:  Implanted Port.    Treatment Conditions:  Lab Results   Component Value Date    HGB 12.2 03/28/2018     Lab Results   Component Value Date    WBC 4.9 03/28/2018      Lab Results   Component Value Date    ANEU 2.9 03/28/2018     Lab Results   Component Value Date     03/28/2018      Lab Results   Component Value Date     03/28/2018                   Lab Results   Component Value Date    POTASSIUM 3.4 03/28/2018           Lab Results   Component Value Date    MAG 1.8 03/28/2018            Lab Results   Component Value Date    CR 0.82 03/28/2018                   Lab Results   Component Value Date    BEATRIZ 9.2 03/28/2018                Lab Results   Component Value Date    BILITOTAL 0.2 03/28/2018           Lab Results   Component Value Date    ALBUMIN 3.2 03/28/2018                    Lab Results   Component Value Date    ALT 18 03/28/2018           Lab Results   Component Value Date    AST 20 03/28/2018       Results reviewed, labs MET treatment parameters, ok to proceed with treatment.      Post Infusion Assessment:  Patient tolerated infusion without incident.  Blood return noted pre and post infusion.  Site patent and intact, free from redness, edema or discomfort.  No evidence of extravasations.  Access discontinued per protocol.    Discharge Plan:   Prescription refills given for Amlodipine.  Discharge instructions reviewed with: Patient and Family.  Patient and/or family verbalized understanding of discharge instructions and all questions answered.  Patient declined copy of AVS, she states she has one from her appointment yesterday and has no new questions/concerns.  Patient will return 3/30/18 for next appointment.  Patient  discharged in stable condition accompanied by: self and daughter.  Departure Mode: Ambulatory.  Face-to-Face time: 0    Roge Pressley RN

## 2018-03-29 NOTE — MR AVS SNAPSHOT
After Visit Summary   3/29/2018    Ashvin Tiwari    MRN: [id number]           Patient Information     Date Of Birth          1955        Visit Information        Provider Department      3/29/2018 1:30 PM UC 22 ATC;  ONCOLOGY INFUSION [facility]        Today's Diagnoses     Chemotherapy-induced neutropenia (H)    -  1    Malignant neoplasm of cervix, unspecified site (H)          Care Instructions    Contact Numbers    [facility] [place] [place] Main Line: [phone]  [facility] [place] [place] Triage:  [phone]    Call triage with chills and/or temperature greater than or equal to 100.5, uncontrolled nausea/vomiting, diarrhea, constipation, dizziness, shortness of breath, chest pain, bleeding, unexplained bruising, or any new/concerning symptoms, questions/concerns.     If you are having any concerning symptoms or wish to speak to a provider before your next infusion visit, please call your care coordinator or triage to notify them so we can adequately serve you.       After Hours: [phone]    If after hours, weekends, or holidays, call main hospital  and ask for Oncology doctor on call.         March 2018 Sunday Monday Tuesday Wednesday Thursday Friday Saturday                       1     UMP ONC INFUSION 60    2:30 PM   (60 min.)   UC ONCOLOGY INFUSION   [facility] 2     UMP RETURN   11:15 AM   (30 min.)   Briana Medina MD   [facility] 3       4     5     6     UMP ONC INFUSION 120    2:30 PM   (120 min.)    ONCOLOGY INFUSION   [facility] 7     8     9     10       11     12     13     14     15     16     17       18     19     20     [facility] DRAW    8:00 AM   (15 min.)   [facility] DRAW   [facility] Draw     UMP RETURN ACTIVE TREATMENT    8:15 AM   (30 min.)   Rachelle Miranda APRN CNP   [facility] 21     TREATMENT 60    4:45 PM   (60 min.)   Dwayne Barbour, PT   [facility]  Rehab 22     23     24       25     26     27     28     [facility] DRAW   10:30 AM   (15 min.)    [facility] LAB DRAW   [facility] Lab Draw     UMP ONC INFUSION 360   11:00 AM   (360 min.)   UC ONCOLOGY INFUSION   [facility] 29     UMP ONC INFUSION 60    1:30 PM   (60 min.)   UC ONCOLOGY INFUSION   [facility] 30     UMP ONC INFUSION 60    1:30 PM   (60 min.)   UC ONCOLOGY INFUSION   [facility] 31 April 2018 Sunday Monday Tuesday Wednesday Thursday Friday Saturday   1     2     3     4     5     6     7       8     9     10     UMP RETURN    2:45 PM   (30 min.)   Briana Medina MD   [facility] 11     12     13     14       15     16     17     [facility] DRAW    7:00 AM   (15 min.)    [facility] DRAW   [facility] Lab Draw     UMP RETURN ACTIVE TREATMENT    7:15 AM   (30 min.)   Rachelle Miranda APRN CNP   [facility]     UMP ONC INFUSION 360    8:30 AM   (360 min.)   UC ONCOLOGY INFUSION   [facility] 18     UMP ONC INFUSION 60   12:00 PM   (60 min.)   UC ONCOLOGY INFUSION   [facility] 19     UMP ONC INFUSION 60   12:00 PM   (60 min.)   UC ONCOLOGY INFUSION   [facility] 20     21       22     23     24     25     26     27     28       29     30                                           Lab Results:  No results found for this or any previous visit (from the past 12 hour(s)).            Follow-ups after your visit        Your next 10 appointments already scheduled     Mar 30, 2018  1:30 PM CDT   Infusion 60 with UC ONCOLOGY INFUSION, UC 22 ATC   [facility] ([facility] [place])    [address] [place] [address]  [address] [place] [address] [phone]            Apr 10, 2018  3:00 PM CDT   (Arrive by 2:45 PM)   Return Visit with Briana Medina MD   [facility] ([facility])    [address]  [address]  [facility] [address]   [phone]            Apr 17, 2018  7:00 AM CDT   [facility] Draw with [facility] DRAW   [facility] Lab Draw ([facility])    [address]  [address]  [facility] [address]   [phone]            Apr 17, 2018  7:30 AM CDT   (Arrive by 7:15 AM)   Return Active Treatment with ROSA Mendoza CNP   [facility] ([facility])    [address]  [address]  [facility] [address]   [phone]            Apr 17, 2018  8:30 AM CDT   Infusion 360 with UC ONCOLOGY INFUSION, UC 19 ATC   [facility] ([facility])    [address] [place] [address]  [address]  [facility] [address]   [phone]            Apr 18, 2018 12:00 PM CDT   Infusion 60 with UC ONCOLOGY INFUSION, UC 28 ATC   [facility] ([facility])    [address]  [address]  [facility] [address]   [phone]            Apr 19, 2018 12:00 PM CDT   Infusion 60 with UC ONCOLOGY INFUSION   [facility] ([facility])    [address] [place] [address]  [address]  [facility] [address]   [phone]              Who to contact     If you have questions or need follow up information about today's clinic visit or your schedule please contact [facility] directly at [phone].  Normal or non-critical lab and imaging results will be communicated to you by MyChart, letter or phone within 4 business days after the clinic has received the results. If you do not hear from us within 7 days, please contact the clinic through MyChart or phone. If you have a critical or abnormal lab result, we will notify you by phone as soon as possible.  Submit refill requests through [employer] or call your pharmacy and  "they will forward the refill request to us. Please allow 3 business days for your refill to be completed.          Additional Information About Your Visit        [employer] Information     [employer] lets you send messages to your doctor, view your test results, renew your prescriptions, schedule appointments and more. To sign up, go to [email or web address] . Click on \"Log in\" on the left side of the screen, which will take you to the Welcome page. Then click on \"Sign up Now\" on the right side of the page.     You will be asked to enter the access code listed below, as well as some personal information. Please follow the directions to create your username and password.     Your access code is: [email or web address]  Expires: 2018  7:30 AM     Your access code will  in 90 days. If you need help or a new code, please call your [place] clinic or [phone].        Care EveryWhere ID     This is your Care EveryWhere ID. This could be used by other organizations to access your [place] medical records  [id number]        Your Vitals Were     Pulse Temperature Pulse Oximetry             104 98.7  F (37.1  C) (Oral) 98%          Blood Pressure from Last 3 Encounters:   18 (!) 152/91   18 151/85   18 (!) 149/93    Weight from Last 3 Encounters:   18 80.2 kg (176 lb 11.2 oz)   18 80.4 kg (177 lb 4.8 oz)   18 80 kg (176 lb 6.4 oz)              Today, you had the following     No orders found for display         Today's Medication Changes          These changes are accurate as of 3/29/18  3:05 PM.  If you have any questions, ask your nurse or doctor.               These medicines have changed or have updated prescriptions.        Dose/Directions    amLODIPine 2.5 MG tablet   Commonly known as:  NORVASC   This may have changed:  how much to take   Used for:  Essential hypertension        Dose:  2.5 mg   Take 1 tablet (2.5 mg) by mouth daily   Quantity:  30 tablet   Refills:  1             "    Primary Care Provider Office Phone # Fax #    Chelsea Wren [phone]       [facility] [address]  [facility] [address]        Equal Access to Services     LISSET LUGO : Hadii elvia ku bearo Soyoonali, waaxda luqadaha, qaybta kaalmada adeveroniqueda, elizabeth worthy laLloydchris cheema. So [facility] [phone].    ATENCIÓN: Si habla español, tiene a mae disposición servicios gratuitos de asistencia lingüística. [place] al [phone].    We comply with applicable federal civil rights laws and [place] laws. We do not discriminate on the basis of race, color, national origin, age, disability, sex, sexual orientation, or gender identity.            Thank you!     Thank you for choosing [facility]  for your care. Our goal is always to provide you with excellent care. Hearing back from our patients is one way we can continue to improve our services. Please take a few minutes to complete the written survey that you may receive in the mail after your visit with us. Thank you!             Your Updated Medication List - Protect others around you: Learn how to safely use, store and throw away your medicines at www.disposemymeds.org.          This list is accurate as of 3/29/18  3:05 PM.  Always use your most recent med list.                   Brand Name Dispense Instructions for use Diagnosis    acetaminophen 325 MG tablet    TYLENOL    100 tablet    Take 3 tablets (975 mg) by mouth 3 times daily    Cancer related pain       amLODIPine 2.5 MG tablet    NORVASC    30 tablet    Take 1 tablet (2.5 mg) by mouth daily    Essential hypertension       Dronabinol 10 MG Caps     60 capsule    Take 10 mg by mouth 2 times daily (before meals)    Anorexia, Malignant neoplasm of cervix, unspecified site (H), Nausea       glutamine 500 MG Tabs     120 tablet    Take 500 mg by mouth 2 times daily    Drug-induced polyneuropathy (H)       LORazepam 1 MG tablet    ATIVAN    30 tablet     Take 1 tablet (1 mg) by mouth every 6 hours as needed (Anxiety, Nausea/Vomiting or Sleep)    Malignant neoplasm of cervix, unspecified site (H)       polyethylene glycol powder    MIRALAX    510 g    Take 17-34 g (1-2 capfuls) by mouth daily    Cancer related pain, Generalized abdominal pain       prochlorperazine 10 MG tablet    COMPAZINE    120 tablet    Take 1 tablet (10 mg) by mouth 3 times daily    Malignant neoplasm of cervix, unspecified site (H), Nausea       ranitidine 75 MG tablet    ZANTAC    60 tablet    Take 1 tablet (75 mg) by mouth 2 times daily    Gastroesophageal reflux disease, esophagitis presence not specified       senna-docusate 8.6-50 MG per tablet    SENOKOT-S;PERICOLACE    100 tablet    Take 2-4 tablets by mouth 2 times daily    Cancer related pain       traMADol 50 MG tablet    ULTRAM    240 tablet    Take 2 tablets (100mg) every morning when you wake up, 2 tablets (100mg)around noon, 2 tablets (100mg) beofre dinner and 2 tablets (100mg) before bed.    Malignant neoplasm of cervix, unspecified site (H), Cancer related pain

## 2018-03-29 NOTE — PATIENT INSTRUCTIONS
Contact Numbers    [facility] Main Line: [phone]  [facility] Triage:  [phone]    Call triage with chills and/or temperature greater than or equal to 100.5, uncontrolled nausea/vomiting, diarrhea, constipation, dizziness, shortness of breath, chest pain, bleeding, unexplained bruising, or any new/concerning symptoms, questions/concerns.     If you are having any concerning symptoms or wish to speak to a provider before your next infusion visit, please call your care coordinator or triage to notify them so we can adequately serve you.       After Hours: [phone]    If after hours, weekends, or holidays, call main hospital  and ask for Oncology doctor on call.         March 2018 Sunday Monday Tuesday Wednesday Thursday Friday Saturday                       1     UMP ONC INFUSION 60    2:30 PM   (60 min.)   UC ONCOLOGY INFUSION   [facility] 2     UMP RETURN   11:15 AM   (30 min.)   Briana Medina MD   [facility] 3       4     5     6     UMP ONC INFUSION 120    2:30 PM   (120 min.)   UC ONCOLOGY INFUSION   [facility] 7     8     9     10       11     12     13     14     15     16     17       18     19     20     [facility]    8:00 AM   (15 min.)    [facility] DRAW   [facility] Lab Draw     UMP RETURN ACTIVE TREATMENT    8:15 AM   (30 min.)   Rachelle Miranda APRN CNP   [facility] 21     TREATMENT 60    4:45 PM   (60 min.)   Dwayne Barbour, PT   [facility] Rehab 22     23     24       25     26     27     28     [facility] DRAW   10:30 AM   (15 min.)   [facility] DRAW   [facility] Lab Draw     UMP ONC INFUSION 360   11:00 AM   (360 min.)   UC ONCOLOGY INFUSION   [facility] 29     UMP ONC INFUSION 60    1:30 PM   (60 min.)    ONCOLOGY INFUSION   [facility] 30     UMP ONC INFUSION 60    1:30 PM   (60 min.)    ONCOLOGY INFUSION   [facility]  [facility] 31 April 2018 Sunday Monday Tuesday Wednesday Thursday Friday Saturday   1     2     3     4     5     6     7       8     9     10     UMP RETURN    2:45 PM   (30 min.)   Briana Medina MD   [facility] 11     12     13     14       15     16     17     [facility]    7:00 AM   (15 min.)   [facility] LAB DRAW   [facility] Lab Draw     UMP RETURN ACTIVE TREATMENT    7:15 AM   (30 min.)   [facility]achel[facility] Miranda [facility]N CNP   [facility]    8:30 AM   (360 min.)   UC ONCOLOGY INFUSION   [facility] 18     UMP ONC INFUSION 60   12:00 PM   (60 min.)   UC ONCOLOGY INFUSION   [facility] 19     UMP ONC INFUSION 60   12:00 PM   (60 min.)   UC ONCOLOGY INFUSION   [facility] 20     21       22     23     24     25     26     27     28       29     30                                           Lab Results:  No results found for this or any previous visit (from the past 12 hour(s)).

## 2018-03-30 NOTE — PATIENT INSTRUCTIONS
Contact Numbers  [facility] Nurse Triage: [phone]  After Hours Nurse Line:  [phone]    Please call the [facility] Triage line if you experience a temperature greater than or equal to 100.5, shaking chills, have uncontrolled nausea, vomiting and/or diarrhea, dizziness, shortness of breath, chest pain, bleeding, unexplained bruising, or if you have any other new/concerning symptoms, questions or concerns.     If it is after hours, weekends, or holidays, please call either the after hours nurse line listed above.    If you are having any concerning symptoms or wish to speak to a provider before your next infusion visit, please call your care coordinator or triage to notify them so we can adequately serve you.     If you need a refill on a narcotic prescription or other medication, please call triage before your infusion appointment.         March 2018 Sunday Monday Tuesday Wednesday Thursday Friday Saturday                       1     UMP ONC INFUSION 60    2:30 PM   (60 min.)   UC ONCOLOGY INFUSION   [facility] 2     UMP RETURN   11:15 AM   (30 min.)   Briana Medina MD   [facility] 3       4     5     6     UMP ONC INFUSION 120    2:30 PM   (120 min.)    ONCOLOGY INFUSION   [facility] 7     8     9     10       11     12     13     14     15     16     17       18     19     20     [facility] DRAW    8:00 AM   (15 min.)   [facility] DRAW   [facility] Lab Draw     UMP RETURN ACTIVE TREATMENT    8:15 AM   (30 min.)   Rachelle Miranda APRN CNP   [facility] 21     TREATMENT 60    4:45 PM   (60 min.)   Dwayne Barbour, PT   [facility] Rehab 22     23     24       25     26     27     28     [facility] DRAW   10:30 AM   (15 min.)    [facility] LAB DRAW   [facility] Lab Draw     UMP ONC INFUSION 360   11:00 AM   (360 min.)   UC ONCOLOGY INFUSION   [facility] 29      UMP ONC INFUSION 60    1:30 PM   (60 min.)   UC ONCOLOGY INFUSION   [facility] 30     UMP ONC INFUSION 60    1:30 PM   (60 min.)    ONCOLOGY INFUSION   [facility] 31 April 2018 Sunday Monday Tuesday Wednesday Thursday Friday Saturday   1     2     3     4     5     6     7       8     9     10     UMP RETURN    2:45 PM   (30 min.)   Briana Medina MD   [facility] 11     12     13     14       15     16     17     P [facility] DRAW    7:00 AM   (15 min.)   [facility] DRAW   [facility] Lab Draw     UMP RETURN ACTIVE TREATMENT    7:15 AM   (30 min.)   Rachelle Miranda APRN CNP   [facility]     UMP ONC INFUSION 360    8:30 AM   (360 min.)    ONCOLOGY INFUSION   [facility] 18     UMP ONC INFUSION 60   12:00 PM   (60 min.)    ONCOLOGY INFUSION   [facility] 19     UMP ONC INFUSION 60   12:00 PM   (60 min.)    ONCOLOGY INFUSION   [facility] 20     21       22     23     24     25     26     27     28       29     30                                           Lab Results:  No results found for this or any previous visit (from the past 12 hour(s)).

## 2018-03-30 NOTE — PROGRESS NOTES
Infusion Nursing Note:  Ashvin Tiwari presents today for D3 C2 Topotecan, Neulasta OnPro.    Patient seen by provider today: No    Intravenous Access:  Implanted Port.    Treatment Conditions:  Lab Results   Component Value Date    HGB 12.2 03/28/2018     Lab Results   Component Value Date    WBC 4.9 03/28/2018      Lab Results   Component Value Date    ANEU 2.9 03/28/2018     Lab Results   Component Value Date     03/28/2018      Lab Results   Component Value Date     03/28/2018                   Lab Results   Component Value Date    POTASSIUM 3.4 03/28/2018           Lab Results   Component Value Date    MAG 1.8 03/28/2018            Lab Results   Component Value Date    CR 0.82 03/28/2018                   Lab Results   Component Value Date    BEATRIZ 9.2 03/28/2018                Lab Results   Component Value Date    BILITOTAL 0.2 03/28/2018           Lab Results   Component Value Date    ALBUMIN 3.2 03/28/2018                    Lab Results   Component Value Date    ALT 18 03/28/2018           Lab Results   Component Value Date    AST 20 03/28/2018       Results reviewed from 3/28/18, labs MET treatment parameters, ok to proceed with treatment.    Note: Patient presents for day 3 Topotecan. States she has been experiencing nausea without vomiting. Has been taking home anti-emetics with some relief. Also, continues to have left side/back pain that is exacerbated by coughing. Today, she rates her pain 8/10. Taking tramadol and tylenol prn with some relief.    Patient will receive Neulasta OnPro for the first time today. Additional time taken to provide education on neulasta medication and Onpro kit. Written education materials also reviewed and given to patient and family member.    Neulasta On Pro- On Body injector applied to patient today on left arm at 1440 with light facing down. Writer discussed Neulasta injection would start tomorrow at 1740, approximately 27 hours after application applied  today.  Written and Verbal instruction reviewed with patient.  Pt instructed when the dose delivery starts, it will take about 45 minutes to complete. Pt aware Neulasta Onpro On-Body should have green flashing light and to call triage or on-call MD if injector flashes red or appears to be leaking. Pt aware to keep Onpro On-Body Neualsta 4 inches away from electrical equipment and to avoid showering 4 hours prior to injection. Neulasta Onpro Lot number documented on MAR.    Post Infusion Assessment:  Patient tolerated infusion without incident.  Blood return noted pre and post infusion.  Site patent and intact, free from redness, edema or discomfort.  No evidence of extravasations.  Access discontinued per protocol.    Discharge Plan:   Patient declined prescription refills.  Discharge instructions reviewed with: Patient and Family.  Patient and/or family verbalized understanding of discharge instructions and all questions answered.  Copy of AVS reviewed with patient and/or family.  Patient will return 4/17/18 for next appointment.  Patient discharged in stable condition accompanied by: self and family.  Departure Mode: Ambulatory.    Briana Ta RN

## 2018-03-30 NOTE — MR AVS SNAPSHOT
After Visit Summary   3/30/2018    Ashvin Tiwari    MRN: [id number]           Patient Information     Date Of Birth          1955        Visit Information        Provider Department      3/30/2018 1:30 PM UC 22 ATC; UC ONCOLOGY INFUSION [facility]        Today's Diagnoses     Chemotherapy-induced neutropenia (H)    -  1    Malignant neoplasm of cervix, unspecified site (H)          Care Instructions    Contact Numbers  [facility] Nurse Triage: [phone]  After Hours Nurse Line:  [phone]    Please call the [facility] Triage line if you experience a temperature greater than or equal to 100.5, shaking chills, have uncontrolled nausea, vomiting and/or diarrhea, dizziness, shortness of breath, chest pain, bleeding, unexplained bruising, or if you have any other new/concerning symptoms, questions or concerns.     If it is after hours, weekends, or holidays, please call either the after hours nurse line listed above.    If you are having any concerning symptoms or wish to speak to a provider before your next infusion visit, please call your care coordinator or triage to notify them so we can adequately serve you.     If you need a refill on a narcotic prescription or other medication, please call triage before your infusion appointment.         March 2018 Sunday Monday Tuesday Wednesday Thursday Friday Saturday                       1     UMP ONC INFUSION 60    2:30 PM   (60 min.)    ONCOLOGY INFUSION   [facility] 2     UMP RETURN   11:15 AM   (30 min.)   Briana Medina MD   [facility] 3       4     5     6     UMP ONC INFUSION 120    2:30 PM   (120 min.)    ONCOLOGY INFUSION   [facility] 7     8     9     10       11     12     13     14     15     16     17       18     19     20     [facility] LAB DRAW    8:00 AM   (15 min.)   [facility] LAB DRAW   [facility] Lab Draw     UMP RETURN  ACTIVE TREATMENT    8:15 AM   (30 min.)   Rachelle Miranda APRN CNP   [facility] 21     TREATMENT 60    4:45 PM   (60 min.)   Dwayne Barbour PT   [facility] 22     23     24       25     26     27     28     [facility] LAB DRAW   10:30 AM   (15 min.)    [facility]IC LAB DRAW   [facility] Lab Draw     UMP ONC INFUSION 360   11:00 AM   (360 min.)   UC ONCOLOGY INFUSION   [facility] 29     UMP ONC INFUSION 60    1:30 PM   (60 min.)   UC ONCOLOGY INFUSION   [facility] 30     UMP ONC INFUSION 60    1:30 PM   (60 min.)   UC ONCOLOGY INFUSION   [facility] 31 April 2018 Sunday Monday Tuesday Wednesday Thursday Friday Saturday   1     2     3     4     5     6     7       8     9     10     UMP RETURN    2:45 PM   (30 min.)   Briana Medina MD   [facility] 11     12     13     14       15     16     17     [facility] LAB DRAW    7:00 AM   (15 min.)    [facility] LAB DRAW   [facility] Lab Draw     UMP RETURN ACTIVE TREATMENT    7:15 AM   (30 min.)   Rachelle Miranda APRN CNP   [facility]     UMP ONC INFUSION 360    8:30 AM   (360 min.)   UC ONCOLOGY INFUSION   [facility] 18     UMP ONC INFUSION 60   12:00 PM   (60 min.)   UC ONCOLOGY INFUSION   [facility] 19     UMP ONC INFUSION 60   12:00 PM   (60 min.)   UC ONCOLOGY INFUSION   [facility] 20     21       22     23     24     25     26     27     28       29     30                                           Lab Results:  No results found for this or any previous visit (from the past 12 hour(s)).            Follow-ups after your visit        Your next 10 appointments already scheduled     Apr 10, 2018  3:00 PM CDT   (Arrive by 2:45 PM)   Return Visit with Briana Medina MD   [facility] ([facility])     [address]  [address]  [facility] [address]   [phone]            Apr 17, 2018  7:00 AM CDT   [facility] Lab Draw with [facility] LAB DRAW   [facility] Lab Draw ([facility])    [address] [place] [address]  [address]  [facility] [address]   [phone]            Apr 17, 2018  7:30 AM CDT   (Arrive by 7:15 AM)   Return Active Treatment with ROSA Mendoza CNP   [facility] ([facility])    [address] [place] [address]  [address]  [facility] [address]   [phone]            Apr 17, 2018  8:30 AM CDT   Infusion 360 with UC ONCOLOGY INFUSION, UC 19 ATC   [facility] ([facility])    [address] [place] [address]  [address]  [facility] [address]   [phone]            Apr 18, 2018 12:00 PM CDT   Infusion 60 with UC ONCOLOGY INFUSION, UC 28 ATC   [facility] ([facility])    [address] [place] [address]  [address]  [facility] [address]   [phone]            Apr 19, 2018 12:00 PM CDT   Infusion 60 with UC ONCOLOGY INFUSION, UC 28 ATC   [facility] ([facility])    [address] [place] [address]  [address]  [facility] [address]   [phone]              Who to contact     If you have questions or need follow up information about today's clinic visit or your schedule please contact [facility] directly at [phone].  Normal or non-critical lab and imaging results will be communicated to you by MyChart, letter or phone within 4 business days after the clinic has received the results. If you do not hear from us within 7 days, please contact the clinic through MyChart or phone. If you have a critical or abnormal lab result, we will notify you by phone as soon as possible.  Submit refill requests through [employer] or call your pharmacy and they will forward the refill request to us.  "Please allow 3 business days for your refill to be completed.          Additional Information About Your Visit        MyChart Information     M[employer] lets you send messages to your doctor, view your test results, renew your prescriptions, schedule appointments and more. To sign up, go to [email or web address] . Click on \"Log in\" on the left side of the screen, which will take you to the Welcome page. Then click on \"Sign up Now\" on the right side of the page.     You will be asked to enter the access code listed below, as well as some personal information. Please follow the directions to create your username and password.     Your access code is: [email or web address]  Expires: 2018  7:30 AM     Your access code will  in 90 days. If you need help or a new code, please call your [facility] clinic or [phone].        Care EveryWhere ID     This is your Care EveryWhere ID. This could be used by other organizations to access your [facility] medical records  [id number]        Your Vitals Were     Pulse Temperature Pulse Oximetry             95 98.6  F (37  C) (Tympanic) 96%          Blood Pressure from Last 3 Encounters:   18 148/80   18 (!) 152/91   18 151/85    Weight from Last 3 Encounters:   18 80.2 kg (176 lb 11.2 oz)   18 80.4 kg (177 lb 4.8 oz)   18 80 kg (176 lb 6.4 oz)              Today, you had the following     No orders found for display         Today's Medication Changes          These changes are accurate as of 3/30/18  2:42 PM.  If you have any questions, ask your nurse or doctor.               These medicines have changed or have updated prescriptions.        Dose/Directions    amLODIPine 2.5 MG tablet   Commonly known as:  NORVASC   This may have changed:  how much to take   Used for:  Essential hypertension        Dose:  2.5 mg   Take 1 tablet (2.5 mg) by mouth daily   Quantity:  30 tablet   Refills:  1                Primary Care Provider Office Phone # Fax # "    Chelsea Wren [phone]       [facility] [address]  [facility] [address]        Equal Access to Services     LISSET LUGO : Sundar elvia mccurdy darya Reese, wamaryurida luqzane, qamohsenta kaalmada kamala, elizabeth lainain hayaan sofierodolfo worthy harmeet cheema. So [facility] [phone].    ATENCIÓN: Si habla español, tiene a mae disposición servicios gratuitos de asistencia lingüística. [place] al [phone].    We comply with applicable federal civil rights laws and [place] laws. We do not discriminate on the basis of race, color, national origin, age, disability, sex, sexual orientation, or gender identity.            Thank you!     Thank you for choosing [facility]  for your care. Our goal is always to provide you with excellent care. Hearing back from our patients is one way we can continue to improve our services. Please take a few minutes to complete the written survey that you may receive in the mail after your visit with us. Thank you!             Your Updated Medication List - Protect others around you: Learn how to safely use, store and throw away your medicines at www.disposemymeds.org.          This list is accurate as of 3/30/18  2:42 PM.  Always use your most recent med list.                   Brand Name Dispense Instructions for use Diagnosis    acetaminophen 325 MG tablet    TYLENOL    100 tablet    Take 3 tablets (975 mg) by mouth 3 times daily    Cancer related pain       amLODIPine 2.5 MG tablet    NORVASC    30 tablet    Take 1 tablet (2.5 mg) by mouth daily    Essential hypertension       Dronabinol 10 MG Caps     60 capsule    Take 10 mg by mouth 2 times daily (before meals)    Anorexia, Malignant neoplasm of cervix, unspecified site (H), Nausea       glutamine 500 MG Tabs     120 tablet    Take 500 mg by mouth 2 times daily    Drug-induced polyneuropathy (H)       LORazepam 1 MG tablet    ATIVAN    30 tablet    Take 1 tablet (1 mg) by mouth every 6 hours as  needed (Anxiety, Nausea/Vomiting or Sleep)    Malignant neoplasm of cervix, unspecified site (H)       polyethylene glycol powder    MIRALAX    510 g    Take 17-34 g (1-2 capfuls) by mouth daily    Cancer related pain, Generalized abdominal pain       prochlorperazine 10 MG tablet    COMPAZINE    120 tablet    Take 1 tablet (10 mg) by mouth 3 times daily    Malignant neoplasm of cervix, unspecified site (H), Nausea       ranitidine 75 MG tablet    ZANTAC    60 tablet    Take 1 tablet (75 mg) by mouth 2 times daily    Gastroesophageal reflux disease, esophagitis presence not specified       senna-docusate 8.6-50 MG per tablet    SENOKOT-S;PERICOLACE    100 tablet    Take 2-4 tablets by mouth 2 times daily    Cancer related pain       traMADol 50 MG tablet    ULTRAM    240 tablet    Take 2 tablets (100mg) every morning when you wake up, 2 tablets (100mg)around noon, 2 tablets (100mg) beofre dinner and 2 tablets (100mg) before bed.    Malignant neoplasm of cervix, unspecified site (H), Cancer related pain

## 2018-04-10 NOTE — MR AVS SNAPSHOT
After Visit Summary   4/10/2018    Ashvin Tiwari    MRN: [id number]           Patient Information     Date Of Birth          1955        Visit Information        Provider Department      4/10/2018 3:00 PM Briana Medina MD [facility]        Today's Diagnoses     Anorexia        Malignant neoplasm of cervix, unspecified site (H)        Nausea        Essential hypertension          Care Instructions    Nausea and loss of appetite - always vomits due to marinol and feeing nausea throughout the day and no appetite  - start zyprexa 5mg before bed  -  schedule prochlorperazine (compazine) 10mg three times a day - immediately when you wake up, mid day, dinner time.  About an hour or so after taking prochlorperazine, can take marinol.   -  decrease marinol 2.5mg twice a day  - if still with nausea despite above medications, can take 1/2 -1 tablet of lorazepam     Pain:   -  tramadol to take 2 tabs four times a day scheduled  - continue tylenol 975mg three times a day  - heat and ice to the areas that are painful.   - can take ibuprofen (advil) 1 -2 tablets up to twice a day if needed to help control the pain.      Fatigue: significant fatigue due to cancer and chemotherapy. If the fatigue continues, can consider starting medication called ritalin which works well to improve cancer related fatigue.     Bring in all your medications at your next appointment with me.     Follow up on May 8th at 3:30pm          Follow-ups after your visit        Your next 10 appointments already scheduled     Apr 17, 2018  7:00 AM CDT   [facility] Lab Draw with  [facility] LAB DRAW   [facility] Draw ([facility])    [address]  [address]  [facility] [id number] [phone]            Apr 17, 2018  7:30 AM CDT   (Arrive by 7:15 AM)   Return Active Treatment with ROSA Mendoza CNP   [facility] ([facility])    [address]  [address]  [facility] [address]   [phone]            Apr 17, 2018  8:30 AM CDT   Infusion 360 with UC ONCOLOGY INFUSION, UC 19 ATC   [facility] ([facility])    [address] [place] [address]  [address]  [facility] [address]   [phone]            Apr 18, 2018 12:00 PM CDT   Infusion 60 with UC ONCOLOGY INFUSION, UC 28 ATC   [facility] ([facility])    [address] [place] [address]  [address]  [facility] [address]   [phone]            Apr 19, 2018 12:00 PM CDT   Infusion 60 with UC ONCOLOGY INFUSION, UC 28 ATC   [facility] ([facility])    [address] [place] [address]  [address]  [facility] [address]   [phone]            May 08, 2018  3:30 PM CDT   (Arrive by 3:15 PM)   Return Visit with Briana Medina MD   [facility] ([facility])    [address] [place] [address]  [address]  [facility] [address]   [phone]              Who to contact     If you have questions or need follow up information about today's clinic visit or your schedule please contact [facility] directly at [phone].  Normal or non-critical lab and imaging results will be communicated to you by WiNetworkshart, letter or phone within 4 business days after the clinic has received the results. If you do not hear from us within 7 days, please contact the clinic through [employer] or phone. If you have a critical or abnormal lab result, we will notify you by phone as soon as possible.  Submit refill requests through [employer] or call your pharmacy and they will forward the refill request to us. Please allow 3 business days for your refill to be completed.          Additional Information About Your Visit        [employer] Information     [employer] lets you send messages to your doctor, view your test results, renew your  "prescriptions, schedule appointments and more. To sign up, go to [email or web address] . Click on \"Log in\" on the left side of the screen, which will take you to the Welcome page. Then click on \"Sign up Now\" on the right side of the page.     You will be asked to enter the access code listed below, as well as some personal information. Please follow the directions to create your username and password.     Your access code is: [email or web address]  Expires: 2018  7:30 AM     Your access code will  in 90 days. If you need help or a new code, please call your [facility] clinic or [phone].        Care EveryWhere ID     This is your Care EveryWhere ID. This could be used by other organizations to access your [facility] medical records  [id number]        Your Vitals Were     Pulse Temperature Respirations Pulse Oximetry BMI (Body Mass Index)       122 98.6  F (37  C) (Oral) 18 94% 30.63 kg/m2        Blood Pressure from Last 3 Encounters:   04/10/18 (!) 144/91   18 148/80   18 (!) 152/91    Weight from Last 3 Encounters:   04/10/18 78.4 kg (172 lb 13.5 oz)   18 80.2 kg (176 lb 11.2 oz)   18 80.4 kg (177 lb 4.8 oz)              Today, you had the following     No orders found for display         Today's Medication Changes          These changes are accurate as of 4/10/18  4:19 PM.  If you have any questions, ask your nurse or doctor.               Start taking these medicines.        Dose/Directions    OLANZapine zydis 5 MG ODT tab   Commonly known as:  zyPREXA   Used for:  Anorexia, Malignant neoplasm of cervix, unspecified site (H), Nausea   Started by:  Briana Medina MD        Dose:  5 mg   Take 1 tablet (5 mg) by mouth At Bedtime   Quantity:  30 tablet   Refills:  3         These medicines have changed or have updated prescriptions.        Dose/Directions    amLODIPine 10 MG tablet   Commonly known as:  NORVASC   This may have changed:    - medication strength  - how much to take "   Used for:  Essential hypertension   Changed by:  Briana Medina MD        Dose:  10 mg   Take 1 tablet (10 mg) by mouth daily   Quantity:  30 tablet   Refills:  3       dronabinol 2.5 MG capsule   Commonly known as:  MARINOL   This may have changed:    - medication strength  - how much to take   Used for:  Anorexia, Malignant neoplasm of cervix, unspecified site (H), Nausea   Changed by:  Briana Medina MD        Dose:  2.5 mg   Take 1 capsule (2.5 mg) by mouth 2 times daily (before meals)   Quantity:  60 capsule   Refills:  1            Where to get your medicines      These medications were sent to [facility] [address], [facility] [address]    Hours:  TRANSPLANT PHONE NUMBER [phone] Phone:  [phone]     OLANZapine zydis 5 MG ODT tab         Some of these will need a paper prescription and others can be bought over the counter.  Ask your nurse if you have questions.     Bring a paper prescription for each of these medications     dronabinol 2.5 MG capsule                Primary Care Provider Office Phone # Fax #    Chelsea Wren [phone]       [facility] [address] [address]        Equal Access to Services     LISSET LUGO AH: Hadii elvia ku hadasho Soomaali, waaxda luqadaha, qaybta kaalmada adeegyada, waxdioni marinain hayaden marquis cheema. So [facility] [phone].    ATENCIÓN: Si habla español, tiene a mae disposición servicios gratcucaos de asistencia lingüística. Llame al [phone].    We comply with applicable federal civil rights laws and [place] laws. We do not discriminate on the basis of race, color, national origin, age, disability, sex, sexual orientation, or gender identity.            Thank you!     Thank you for choosing [facility]  for your care. Our goal is always to provide you with excellent care. Hearing back from  our patients is one way we can continue to improve our services. Please take a few minutes to complete the written survey that you may receive in the mail after your visit with us. Thank you!             Your Updated Medication List - Protect others around you: Learn how to safely use, store and throw away your medicines at www.disposemymeds.org.          This list is accurate as of 4/10/18  4:19 PM.  Always use your most recent med list.                   Brand Name Dispense Instructions for use Diagnosis    acetaminophen 325 MG tablet    TYLENOL    100 tablet    Take 3 tablets (975 mg) by mouth 3 times daily    Cancer related pain       amLODIPine 10 MG tablet    NORVASC    30 tablet    Take 1 tablet (10 mg) by mouth daily    Essential hypertension       dronabinol 2.5 MG capsule    MARINOL    60 capsule    Take 1 capsule (2.5 mg) by mouth 2 times daily (before meals)    Anorexia, Malignant neoplasm of cervix, unspecified site (H), Nausea       glutamine 500 MG Tabs     120 tablet    Take 500 mg by mouth 2 times daily    Drug-induced polyneuropathy (H)       LORazepam 1 MG tablet    ATIVAN    30 tablet    Take 1 tablet (1 mg) by mouth every 6 hours as needed (Anxiety, Nausea/Vomiting or Sleep)    Malignant neoplasm of cervix, unspecified site (H)       OLANZapine zydis 5 MG ODT tab    zyPREXA    30 tablet    Take 1 tablet (5 mg) by mouth At Bedtime    Anorexia, Malignant neoplasm of cervix, unspecified site (H), Nausea       polyethylene glycol powder    MIRALAX    510 g    Take 17-34 g (1-2 capfuls) by mouth daily    Cancer related pain, Generalized abdominal pain       prochlorperazine 10 MG tablet    COMPAZINE    120 tablet    Take 1 tablet (10 mg) by mouth 3 times daily    Malignant neoplasm of cervix, unspecified site (H), Nausea       ranitidine 75 MG tablet    ZANTAC    60 tablet    Take 1 tablet (75 mg) by mouth 2 times daily    Gastroesophageal reflux disease, esophagitis presence not specified        senna-docusate 8.6-50 MG per tablet    SENOKOT-S;PERICOLACE    100 tablet    Take 2-4 tablets by mouth 2 times daily    Cancer related pain       traMADol 50 MG tablet    ULTRAM    240 tablet    Take 2 tablets (100mg) every morning when you wake up, 2 tablets (100mg)around noon, 2 tablets (100mg) beofre dinner and 2 tablets (100mg) before bed.    Malignant neoplasm of cervix, unspecified site (H), Cancer related pain

## 2018-04-10 NOTE — NURSING NOTE
"Oncology Rooming Note    April 10, 2018 2:35 PM   Ashvin Tiwari is a 62 year old female who presents for:    Chief Complaint   Patient presents with     Oncology Clinic Visit     Return for      Initial Vitals: BP (!) 144/91  Pulse 122  Temp 98.6  F (37  C) (Oral)  Resp 18  Wt 78.4 kg (172 lb 13.5 oz)  SpO2 94%  BMI 30.63 kg/m2 Estimated body mass index is 30.63 kg/(m^2) as calculated from the following:    Height as of 3/2/18: 1.6 m (5' 2.99\").    Weight as of this encounter: 78.4 kg (172 lb 13.5 oz). Body surface area is 1.87 meters squared.  No Pain (0) Comment: Data Unavailable   No LMP recorded. Patient is postmenopausal.  Allergies reviewed: Yes  Medications reviewed: Yes    Medications: Medication refills not needed today.  Pharmacy name entered into EPIC:    [facility] (USE ONLY AT [facility]) - [place], [place] - [address] [place] [facility] [address] [place] - [address] [facility]    Clinical concerns:    Adam was notified.    9 minutes for nursing intake (face to face time)     Monica Madrigal MA              "

## 2018-04-10 NOTE — PROGRESS NOTES
"Palliative Care Outpatient Clinic Progress Note    Patient Name:  Ashvin Tiwari  Primary Provider:  Chelsea Wren    Chief Complaint:   Metastatic cervical cancer  Nausea  Abdominal, chest, shoulder, back pain  Weakness  Fatigue    Interim History:  Ashvin Tiwari 62 year old female returns to be seen by palliative care today.  She states that today she feels \"nasty, miserable, sick'.  Her whole body hurts, chest, ribs, breasts hurt with coughing, no appetite, stopped the marinol as was vomiting everytime after taking it. Feeling nausea most of the time. Taking compazine scheduled.     No constipation    Tired and sleeping 50-75% of the day and sleeping well at night. With nausea, just wants to sleep.     Pain not keeping her awake at night.     Feels SOB when walking. No SOB with sitting.     Not exactly clear with her medications but believes she is taking everything on the list and nothing else.       Review of Systems:  ROS: 10 point ROS neg other than the symptoms noted above in the HPI      No Known Allergies  Current Outpatient Prescriptions   Medication Sig Dispense Refill     ranitidine (ZANTAC) 75 MG tablet Take 1 tablet (75 mg) by mouth 2 times daily 60 tablet 1     dronabinol 10 MG CAPS Take 10 mg by mouth 2 times daily (before meals) 60 capsule 3     prochlorperazine (COMPAZINE) 10 MG tablet Take 1 tablet (10 mg) by mouth 3 times daily 120 tablet 2     traMADol (ULTRAM) 50 MG tablet Take 2 tablets (100mg) every morning when you wake up, 2 tablets (100mg)around noon, 2 tablets (100mg) beofre dinner and 2 tablets (100mg) before bed. 240 tablet 0     amLODIPine (NORVASC) 2.5 MG tablet Take 1 tablet (2.5 mg) by mouth daily (Patient taking differently: Take 10 mg by mouth daily ) 30 tablet 1     LORazepam (ATIVAN) 1 MG tablet Take 1 tablet (1 mg) by mouth every 6 hours as needed (Anxiety, Nausea/Vomiting or Sleep) 30 tablet 2     glutamine 500 MG TABS Take 500 mg by mouth 2 times daily 120 tablet 3 "     senna-docusate (SENOKOT-S;PERICOLACE) 8.6-50 MG per tablet Take 2-4 tablets by mouth 2 times daily 100 tablet 1     polyethylene glycol (MIRALAX) powder Take 17-34 g (1-2 capfuls) by mouth daily 510 g 1     acetaminophen (TYLENOL) 325 MG tablet Take 3 tablets (975 mg) by mouth 3 times daily 100 tablet 0     Past Medical History:   Diagnosis Date     Cancer (H)     cervical     Hypertension      Past Surgical History:   Procedure Laterality Date     BIOPSY/EXCISION LYMPH NODE(S), NEEDLE, SUPERFICIAL (CERVICAL/INGUINAL/AXILLARY) Right 9/21/2017    Procedure: BIOPSY/EXCISION LYMPH NODE(S), NEEDLE, SUPERFICIAL (CERVICAL/INGUINAL/AXILLARY);;  Surgeon: Sonu Denson PA-C;  [facility]     INSERT PORT VASCULAR ACCESS Right 9/21/2017    Procedure: INSERT PORT VASCULAR ACCESS;  Single Lumen Chest Power Port, Right Axillary Lymph Node Biopsy;  Surgeon: Sonu Denson PA-C;  [facility]     no previous surgery             BP (!) 144/91  Pulse 122  Temp 98.6  F (37  C) (Oral)  Resp 18  Wt 78.4 kg (172 lb 13.5 oz)  SpO2 94%  BMI 30.63 kg/m2  General: Appears tired, stated age, well-nourished, in no acute distress  Eyes: EOMI, sclera clear  HEENT: NC/AT; mucous membranes slightly dry  Lungs: No increased work of breathing, speaking full sentences, clear to auscultation bilaterally, no wheezes  Heart: tachy RR  Abdomen:soft, no sig tenderness to palpation, no rebound or guarding  Neuro: A&O x 3; CN II-XII grossly intact; slow shuffle after initially standing up  Neuropsych: Tired, intermittent eye contact, engaged, sad at times, sensorium intact        Key Data Reviewed:  GFR 85; platelets 279        Impression:      62 yr old female with metastatic cervical cancer, who completed 6 cycles of taxol/avastin/carbo and then unfortunately had disease progression noted on PET/CT scan on 2/14/18 so decision made for second line palliative chemo with cisplatin/topotecan and avastin with plan to  complete 3 cycles and then re-scan.  Recommendations & Counseling:    Nausea and loss of appetite - always vomits due to marinol and feeing nausea throughout the day and no appetite  - start zyprexa 5mg before bed  -  schedule prochlorperazine (compazine) 10mg three times a day - immediately when you wake up, mid day, dinner time.  About an hour or so after taking prochlorperazine, can take marinol.   -  decrease marinol 2.5mg twice a day  - if still with nausea despite above medications, can take 1/2 -1 tablet of lorazepam     Pain:   -  tramadol to take 2 tabs four times a day scheduled  - continue tylenol 975mg three times a day  - heat and ice to the areas that are painful.   - can take ibuprofen (advil) 1 -2 tablets up to twice a day if needed to help control the pain.      Fatigue: significant fatigue due to cancer and chemotherapy. If the fatigue continues, can consider starting medication called ritalin which works well to improve cancer related fatigue.     Bring in all your medications at your next appointment with me.     Follow up on May 8th at 3:30pm      Thank you for involving us in the patient's care. 30 minutes face time over half spent counseling.  Briana Medina MD / Palliative Medicine / Pager [phone] / After-Hours Answering Service [phone] / Main Palliative Clinic - [phone] / [facility] Inpatient Team Consult Pager [phone] (answered 8am-430pm M-F)

## 2018-04-10 NOTE — LETTER
"4/10/2018       RE: Ashvin Tiwari  [address]  [facility] [address]     Dear Colleague,    Thank you for referring your patient, Ashvin Tiwari, to the [facility] at [facility]. Please see a copy of my visit note below.    Palliative Care Outpatient Clinic Progress Note    Patient Name:  Ashvin Tiwari  Primary Provider:  Chelsea Wren    Chief Complaint:   Metastatic cervical cancer  Nausea  Abdominal, chest, shoulder, back pain  Weakness  Fatigue    Interim History:  Ashvin Tiwari 62 year old female returns to be seen by palliative care today.  She states that today she feels \"nasty, miserable, sick'.  Her whole body hurts, chest, ribs, breasts hurt with coughing, no appetite, stopped the marinol as was vomiting everytime after taking it. Feeling nausea most of the time. Taking compazine scheduled.     No constipation    Tired and sleeping 50-75% of the day and sleeping well at night. With nausea, just wants to sleep.     Pain not keeping her awake at night.     Feels SOB when walking. No SOB with sitting.     Not exactly clear with her medications but believes she is taking everything on the list and nothing else.       Review of Systems:  ROS: 10 point ROS neg other than the symptoms noted above in the HPI      No Known Allergies  Current Outpatient Prescriptions   Medication Sig Dispense Refill     ranitidine (ZANTAC) 75 MG tablet Take 1 tablet (75 mg) by mouth 2 times daily 60 tablet 1     dronabinol 10 MG CAPS Take 10 mg by mouth 2 times daily (before meals) 60 capsule 3     prochlorperazine (COMPAZINE) 10 MG tablet Take 1 tablet (10 mg) by mouth 3 times daily 120 tablet 2     traMADol (ULTRAM) 50 MG tablet Take 2 tablets (100mg) every morning when you wake up, 2 tablets (100mg)around noon, 2 tablets (100mg) beofre dinner and 2 tablets (100mg) before bed. 240 tablet 0     amLODIPine (NORVASC) 2.5 MG tablet Take 1 tablet (2.5 mg) " by mouth daily (Patient taking differently: Take 10 mg by mouth daily ) 30 tablet 1     LORazepam (ATIVAN) 1 MG tablet Take 1 tablet (1 mg) by mouth every 6 hours as needed (Anxiety, Nausea/Vomiting or Sleep) 30 tablet 2     glutamine 500 MG TABS Take 500 mg by mouth 2 times daily 120 tablet 3     senna-docusate (SENOKOT-S;PERICOLACE) 8.6-50 MG per tablet Take 2-4 tablets by mouth 2 times daily 100 tablet 1     polyethylene glycol (MIRALAX) powder Take 17-34 g (1-2 capfuls) by mouth daily 510 g 1     acetaminophen (TYLENOL) 325 MG tablet Take 3 tablets (975 mg) by mouth 3 times daily 100 tablet 0     Past Medical History:   Diagnosis Date     Cancer (H)     cervical     Hypertension      Past Surgical History:   Procedure Laterality Date     BIOPSY/EXCISION LYMPH NODE(S), NEEDLE, SUPERFICIAL (CERVICAL/INGUINAL/AXILLARY) Right 9/21/2017    Procedure: BIOPSY/EXCISION LYMPH NODE(S), NEEDLE, SUPERFICIAL (CERVICAL/INGUINAL/AXILLARY);;  Surgeon: Sonu Denson PA-C;  [facility]     INSERT PORT VASCULAR ACCESS Right 9/21/2017    Procedure: INSERT PORT VASCULAR ACCESS;  Single Lumen Chest Power Port, Right Axillary Lymph Node Biopsy;  Surgeon: Sonu Denson PA-C;  Location:  OR     no previous surgery             BP (!) 144/91  Pulse 122  Temp 98.6  F (37  C) (Oral)  Resp 18  Wt 78.4 kg (172 lb 13.5 oz)  SpO2 94%  BMI 30.63 kg/m2  General: Appears tired, stated age, well-nourished, in no acute distress  Eyes: EOMI, sclera clear  HEENT: NC/AT; mucous membranes slightly dry  Lungs: No increased work of breathing, speaking full sentences, clear to auscultation bilaterally, no wheezes  Heart: tachy RR  Abdomen:soft, no sig tenderness to palpation, no rebound or guarding  Neuro: A&O x 3; CN II-XII grossly intact; slow shuffle after initially standing up  Neuropsych: Tired, intermittent eye contact, engaged,  sad at times, sensorium intact        Key Data Reviewed:  GFR 85; platelets  279        Impression:      62 yr old female with metastatic cervical cancer, who completed 6 cycles of taxol/avastin/carbo and then unfortunately had disease progression noted on PET/CT scan on 2/14/18 so decision made for second line palliative chemo with cisplatin/topotecan and avastin with plan to complete 3 cycles and then re-scan.  Recommendations & Counseling:    Nausea and loss of appetite - always vomits due to marinol and feeing nausea throughout the day and no appetite  - start zyprexa 5mg before bed  -  schedule prochlorperazine (compazine) 10mg three times a day - immediately when you wake up, mid day, dinner time.  About an hour or so after taking prochlorperazine, can take marinol.   -  decrease marinol 2.5mg twice a day  - if still with nausea despite above medications, can take 1/2 -1 tablet of lorazepam     Pain:   -  tramadol to take 2 tabs four times a day scheduled  - continue tylenol 975mg three times a day  - heat and ice to the areas that are painful.   - can take ibuprofen (advil) 1 -2 tablets up to twice a day if needed to help control the pain.      Fatigue: significant fatigue due to cancer and chemotherapy. If the fatigue continues, can consider starting medication called ritalin which works well to improve cancer related fatigue.     Bring in all your medications at your next appointment with me.     Follow up on May 8th at 3:30pm    Again, thank you for allowing me to participate in the care of your patient.      Sincerely,    Briana Medina MD

## 2018-04-10 NOTE — PATIENT INSTRUCTIONS
Nausea and loss of appetite - always vomits due to marinol and feeing nausea throughout the day and no appetite  - start zyprexa 5mg before bed  -  schedule prochlorperazine (compazine) 10mg three times a day - immediately when you wake up, mid day, dinner time.  About an hour or so after taking prochlorperazine, can take marinol.   -  decrease marinol 2.5mg twice a day  - if still with nausea despite above medications, can take 1/2 -1 tablet of lorazepam     Pain:   -  tramadol to take 2 tabs four times a day scheduled  - continue tylenol 975mg three times a day  - heat and ice to the areas that are painful.   - can take ibuprofen (advil) 1 -2 tablets up to twice a day if needed to help control the pain.      Fatigue: significant fatigue due to cancer and chemotherapy. If the fatigue continues, can consider starting medication called ritalin which works well to improve cancer related fatigue.     Bring in all your medications at your next appointment with me.     Follow up on May 8th at 3:30pm

## 2018-04-16 NOTE — PROGRESS NOTES
"4/16/18  Care Coordinator Note   Called pt to follow up on the ER visit at [facility].   Reviewed Care Everywhere information .  Large Pleural effusion on the right no PE.  \" I went to the ER at [facility] because I am dizzy and SOB when I walk. \" I my be a little better but I am still dizzy when I am up. \"  Will be seen tomorrow and then discussed with Dr Soto may need to be tapped.       Patient verbalized back understanding of the above information discussed.   Rima MADSEN RN, OCN  Care Coordinator   Gynecologic Cancer   Office [phone]  Pager [phone]  "

## 2018-04-17 PROBLEM — R91.8 PULMONARY NODULES: Status: ACTIVE | Noted: 2018-01-01

## 2018-04-17 PROBLEM — J90 PLEURAL EFFUSION: Status: ACTIVE | Noted: 2018-01-01

## 2018-04-17 NOTE — MR AVS SNAPSHOT
After Visit Summary   4/17/2018    Ashvin Tiwari    MRN: [id number]           Patient Information     Date Of Birth          1955        Visit Information        Provider Department      4/17/2018 7:30 AM Rachelle Miranda APRN CNP [facility]        Today's Diagnoses     Malignant neoplasm of cervix, unspecified site (H)    -  1    Encounter for antineoplastic chemotherapy        Pleural effusion        Pulmonary nodules        Chemotherapy-induced neutropenia (H)           Follow-ups after your visit        Your next 10 appointments already scheduled     Apr 19, 2018 12:00 PM CDT   Infusion 60 with [facility] ONCOLOGY INFUSION, UC 28 ATC   [facility] ([facility])    [address]  [address]  [place] [address]   [phone]            May 08, 2018  3:30 PM CDT   (Arrive by 3:15 PM)   Return Visit with Briana Medina MD   [facility] ([facility])    [address]  [address]  [place] [address]   [phone]            May 10, 2018  8:30 AM CDT   (Arrive by 8:15 AM)   [facility] [place] [address] [facility] Clinical Imaging [facility] ([facility])    [address] [facility] [address]   [phone]           Tell your doctor:   If there is any chance you may be pregnant or if you are breastfeeding.   If you have problems lying in small spaces (claustrophobia). If you do, your doctor may give you medicine to help you relax. If you have diabetes:   Have your exam early in the morning. Your blood glucose will go up as the day goes by.   Your glucose level must be 180 or less at the start of the exam. Please take any medicines you need to ensure this blood glucose level.   If you are taking insulin in the morning take with breakfast by 6 am and schedule procedure between 12 and 2:15 pm.   If you are taking insulin at night take  nightly dose, fast overnight, schedule procedure before 10 am.   If you take insulin both morning and night take morning dose by 6am and schedule procedure between 12 and 2:15 pm.   24 hours before your scan: Don t do any heavy exercise. (No jogging, aerobics or other workouts.) Exercise will make your pictures less accurate.  At least 7 hours before your scan, or the evening before if you have an early appointment: Eat a low carb, high protein meal (Lean meats, seafood, beans, soy, low-fat dairy, eggs, nuts & seeds). 6 hours before your scan:   Stop all food and liquids (except water).   Do not chew gum or suck on mints.   If you need to take medicine with food, you may take it with a few crackers.  Please call your Imaging Department at your exam site with any questions.            May 15, 2018  8:30 AM CDT   (Arrive by 8:15 AM)   Return Active Treatment with Barbara Soto MD   [facility] ([facility])    [address] [place] [address]  [address] [place] [address] [phone]              Who to contact     If you have questions or need follow up information about today's clinic visit or your schedule please contact [facility] CANCER [facility] directly at [phone].  Normal or non-critical lab and imaging results will be communicated to you by MyChart, letter or phone within 4 business days after the clinic has received the results. If you do not hear from us within 7 days, please contact the clinic through MyChart or phone. If you have a critical or abnormal lab result, we will notify you by phone as soon as possible.  Submit refill requests through [employer] or call your pharmacy and they will forward the refill request to us. Please allow 3 business days for your refill to be completed.          Additional Information About Your Visit        [employer] Information     [employer] lets you send messages to your doctor, view your test results, renew your prescriptions,  "schedule appointments and more. To sign up, go to [email or web address] . Click on \"Log in\" on the left side of the screen, which will take you to the Welcome page. Then click on \"Sign up Now\" on the right side of the page.     You will be asked to enter the access code listed below, as well as some personal information. Please follow the directions to create your username and password.     Your access code is: [email or web address]  Expires: 2018  7:30 AM     Your access code will  in 90 days. If you need help or a new code, please call your [facility] clinic or [phone].        Care EveryWhere ID     This is your Care EveryWhere ID. This could be used by other organizations to access your [facility] medical records  [id number]        Your Vitals Were     Pulse Temperature Respirations Pulse Oximetry BMI (Body Mass Index)       119 99.8  F (37.7  C) (Oral) 16 100% 31.24 kg/m2        Blood Pressure from Last 3 Encounters:   18 (!) 161/105   18 150/83   18 140/87    Weight from Last 3 Encounters:   18 79.4 kg (175 lb)   18 79.5 kg (175 lb 3.2 oz)   18 80 kg (176 lb 4.8 oz)               Primary Care Provider Office Phone # Fax #    Chelsea Wren [phone]       [facility] [address] [address]        Equal Access to Services     LISSET LUGO : Hadii elvia mccurdy hadasho Sosyd, waaxda luqadaha, qaybta kaalmada adeegyada, elizabeth cheema. So [facility] [phone].    ATENCIÓN: Si habla español, tiene a mae disposición servicios gratuitos de asistencia lingüística. [place] al [phone].    We comply with applicable federal civil rights laws and [place] laws. We do not discriminate on the basis of race, color, national origin, age, disability, sex, sexual orientation, or gender identity.            Thank you!     Thank you for choosing [facility]CER [facility]  for your care. Our goal is always to provide " you with excellent care. Hearing back from our patients is one way we can continue to improve our services. Please take a few minutes to complete the written survey that you may receive in the mail after your visit with us. Thank you!             Your Updated Medication List - Protect others around you: Learn how to safely use, store and throw away your medicines at www.disposemymeds.org.          This list is accurate as of 4/17/18 11:59 PM.  Always use your most recent med list.                   Brand Name Dispense Instructions for use Diagnosis    acetaminophen 325 MG tablet    TYLENOL    100 tablet    Take 3 tablets (975 mg) by mouth 3 times daily    Cancer related pain       amLODIPine 10 MG tablet    NORVASC    30 tablet    Take 1 tablet (10 mg) by mouth daily    Essential hypertension       dronabinol 2.5 MG capsule    MARINOL    60 capsule    Take 1 capsule (2.5 mg) by mouth 2 times daily (before meals)    Anorexia, Malignant neoplasm of cervix, unspecified site (H), Nausea       glutamine 500 MG Tabs     120 tablet    Take 500 mg by mouth 2 times daily    Drug-induced polyneuropathy (H)       LORazepam 1 MG tablet    ATIVAN    30 tablet    Take 1 tablet (1 mg) by mouth every 6 hours as needed (Anxiety, Nausea/Vomiting or Sleep)    Malignant neoplasm of cervix, unspecified site (H)       OLANZapine zydis 5 MG ODT tab    zyPREXA    30 tablet    Take 1 tablet (5 mg) by mouth At Bedtime    Anorexia, Malignant neoplasm of cervix, unspecified site (H), Nausea       polyethylene glycol powder    MIRALAX    510 g    Take 17-34 g (1-2 capfuls) by mouth daily    Cancer related pain, Generalized abdominal pain       prochlorperazine 10 MG tablet    COMPAZINE    120 tablet    Take 1 tablet (10 mg) by mouth 3 times daily    Malignant neoplasm of cervix, unspecified site (H), Nausea       ranitidine 75 MG tablet    ZANTAC    60 tablet    Take 1 tablet (75 mg) by mouth 2 times daily    Gastroesophageal reflux disease,  esophagitis presence not specified       senna-docusate 8.6-50 MG per tablet    SENOKOT-S;PERICOLACE    100 tablet    Take 2-4 tablets by mouth 2 times daily    Cancer related pain       traMADol 50 MG tablet    ULTRAM    240 tablet    Take 2 tablets (100mg) every morning when you wake up, 2 tablets (100mg)around noon, 2 tablets (100mg) beofre dinner and 2 tablets (100mg) before bed.    Malignant neoplasm of cervix, unspecified site (H), Cancer related pain

## 2018-04-17 NOTE — PROGRESS NOTES
Follow Up Notes on Referred Patient    Date: 2018       Dr. Chelsea Wren  [facility] [address]  [address] [place] [address]       RE: Ashvin Tiwari  : 1955  DENIS: 2018    Dear Dr. Chelsea Wren:    Ashvin Tiwari is a 62 year old woman with a diagnosis of stage IV poorly differentiated SCC cervix.   She is here today for follow up and disease management.         Oncology history:   17:  Seen in Gyn Onc.  Exam concerning for at least a Stage IIB cervical cancer.  Biopsy obtained consistent with poorly differentiated squamous cell ca.  MRI ordered.      17: MRI Pelvis with 7 x 7cm mass at level of cervix, protrudes into upper third of vagina, bilateral parametrial extension, abuts bilateral ureters but no obstruction, abuts rectal wall with obliteration of fat plan but no mucosal invasion, no clear bladder wall invasion, suspicious lymph nodes left hemipelvis, peritoneal nodule versus lymph node.      17 to 17:  Patient admitted to hospital with pain. CT Chest PE was negative for PE but showed multiple pulmonary nodules consistent with metastatic disease.  CT A/P showed cervical mass with regional metastatic disease as well as peritoneal nodularity.      17:  PET scan with multiple metastatic hypermetabolic lung nodules, axillary, mediastinal, hilar and abdominal lymph nodes.      17-17: Cycle #1-3 Paclitaxel/Carboplatin/Avastin.   17: PET CTIMPRESSION:   1. In this patient with a history of stage IV poorly differentiated squamous cell carcinoma of the cervix, there has been a positive response to therapy. The primary cervical mass is significantly decreased in size and demonstrates decreased FDG uptake. Similarly, the metastatic pulmonary nodules and metastatic lymphadenopathy has significantly improved.      2. Bilateral hypermetabolic level 2A lymph nodes which have slightly increased in size, of uncertain clinical  significance given the positive response in the remainder of the body. Enlargement may be related to the patient's dental and sinus disease. Recommend close attention on follow-up imaging.      3. Periapical lucencies with FDG uptake and dental caries involving multiple upper teeth. Recommend follow-up with dentistry.      4. Asymmetric radiotracer uptake in the left prevertebral soft tissues without discernible mass. Recommend close attention on follow-up imaging.      5. Small bilateral pleural effusions.      11/21/17-1/11/18: Cycle #4-6 Paclitaxel/Carboplatin/Avastin.   2/14/18: PET CT IMPRESSION:   1. In this patient with stage IV poorly differentiated squamous cell carcinoma of the cervix there is evidence for progression of disease:  a. Increased size and FDG avidity of primary cervical mass (maximum SUV 30.49, previously 12.57)  b. New FDG avid 7 mm left inguinal lymph node (maximum SUV 6.06)  c. New FDG avid 10 mm right paratracheal lymph node (maximum SUV 12.40. No FDG avid 10 mm right axillary lymph node (maximum SUV 6.14). Resolution of previously FDG avid 9 mm right axillary lymph node. These findings may be reactive versus metastatic.   2. Increased FDG avidity of the rectum is nonspecific and may represent infection, inflamed hemorrhoids, or malignancy. Recommend direct visualization.  3. Small stable right-sided pleural effusion.   4. Please see dedicated dictation of high-resolution PET CT of the head and neck.      Plan: Avastin/Cisplatin 50 mg/m2 day 1 +Topotecan 0.75 mg/m2 for 3 days Q 21 days for 3 cycles then repeat imaging.      2/27/18: Cycle #1 Cisplatin/Topotecan/Avastin.   3/20/18: Cycle #2 Cisplatin/Topotecan/Avastin deferred secondary to neutropenia. Given 3/28/18.  4/15/18: ED for SOB; CT Chest Pulmonary Angio:  IMPRESSION:    1.  No CT evidence of pulmonary emboli.  2.  Large right and small left pleural effusions.  3.  Complete consolidation of the right lower lobe.  Atelectasis in  the right upper and left lower lobe.  4.  Borderline prominent right axillary lymph node.  4/17/18: Cycle #3 Cisplatin/Topotecan/Avastin. Cisplatin and Topotecan reduced 25% secondary to CrCl.         Today she comes to clinic feeling better than she did two days go when she went to the ED due to SOB. She was evaluated and then released. She reports she still has CARMONA but denies any SOB when sitting still. She also has experienced fatigue with each additional cycle of chemotherapy. She takes Compazine and Ativan at needed for nausea; she does not need any medication refills. She denies any vaginal bleeding, no changes in her bowel or bladder habits, no emesis, no lower extremity edema, and no difficulties eating or sleeping. She denies any abdominal discomfort/bloating, no fevers or chills, and no chest pain. She sees palliative care for her back/generalized pain. She reports her neuropathy in her fingers and toes is not new and is the same. She is consuming 2 cans of Ensure each day. She feels ready for her infusion.       Review of Systems:    Systemic           no weight changes; no fever; no chills; no night sweats; no appetite changes  Skin           no rashes, or lesions  Eye           no irritation; no changes in vision  Tabby-Laryngeal           no dysphagia; no hoarseness   Pulmonary    no cough; no shortness of breath; + CARMONA  Cardiovascular    no chest pain; no palpitations; +HTN  Gastrointestinal    no diarrhea; no constipation; no abdominal pain; no changes in bowel habits; no blood in stool  Genitourinary   no urinary frequency; no urinary urgency; no dysuria; no pain; no abnormal vaginal discharge; no abnormal vaginal bleeding  Breast    no breast discharge; no breast changes; no breast pain  Musculoskeletal    no myalgias; no arthralgias; + back pain  Psychiatric           no depressed mood; no anxiety    Hematologic              no tender lymph nodes; no noticeable swellings or lumps   Endocrine    no  hot flashes; no heat/cold intolerance         Neurological   no tremor; + numbness and tingling; no headaches; no difficulty sleeping      Past Medical History:    Past Medical History:   Diagnosis Date     Cancer (H)     cervical     Hypertension          Past Surgical History:    Past Surgical History:   Procedure Laterality Date     BIOPSY/EXCISION LYMPH NODE(S), NEEDLE, SUPERFICIAL (CERVICAL/INGUINAL/AXILLARY) Right 9/21/2017    Procedure: BIOPSY/EXCISION LYMPH NODE(S), NEEDLE, SUPERFICIAL (CERVICAL/INGUINAL/AXILLARY);;  Surgeon: Sonu Denson PA-C;  Location: [facility] OR     INSERT PORT VASCULAR ACCESS Right 9/21/2017    Procedure: INSERT PORT VASCULAR ACCESS;  Single Lumen Chest Power Port, Right Axillary Lymph Node Biopsy;  Surgeon: Sonu Denson PA-C;  Location: [facility] OR     no previous surgery           Health Maintenance Due   Topic Date Due     TETANUS IMMUNIZATION (SYSTEM ASSIGNED)  05/10/1971     HEPATITIS C SCREENING  05/10/1971     LIPID SCREEN Q5 YR FEMALE (SYSTEM ASSIGNED)  05/10/1998     MAMMO SCREEN Q2 YR (SYSTEM ASSIGNED)  05/10/2003     COLON CANCER SCREEN (SYSTEM ASSIGNED)  05/10/2003     ADVANCE DIRECTIVE PLANNING Q5 YRS  05/10/2008       Current Medications:     Current Outpatient Prescriptions   Medication Sig Dispense Refill     dronabinol (MARINOL) 2.5 MG capsule Take 1 capsule (2.5 mg) by mouth 2 times daily (before meals) 60 capsule 1     OLANZapine zydis (ZYPREXA) 5 MG ODT tab Take 1 tablet (5 mg) by mouth At Bedtime 30 tablet 3     amLODIPine (NORVASC) 10 MG tablet Take 1 tablet (10 mg) by mouth daily 30 tablet 3     ranitidine (ZANTAC) 75 MG tablet Take 1 tablet (75 mg) by mouth 2 times daily 60 tablet 1     prochlorperazine (COMPAZINE) 10 MG tablet Take 1 tablet (10 mg) by mouth 3 times daily 120 tablet 2     traMADol (ULTRAM) 50 MG tablet Take 2 tablets (100mg) every morning when you wake up, 2 tablets (100mg)around noon, 2 tablets (100mg) beofre dinner and 2  tablets (100mg) before bed. 240 tablet 0     LORazepam (ATIVAN) 1 MG tablet Take 1 tablet (1 mg) by mouth every 6 hours as needed (Anxiety, Nausea/Vomiting or Sleep) 30 tablet 2     glutamine 500 MG TABS Take 500 mg by mouth 2 times daily 120 tablet 3     senna-docusate (SENOKOT-S;PERICOLACE) 8.6-50 MG per tablet Take 2-4 tablets by mouth 2 times daily 100 tablet 1     polyethylene glycol (MIRALAX) powder Take 17-34 g (1-2 capfuls) by mouth daily 510 g 1     acetaminophen (TYLENOL) 325 MG tablet Take 3 tablets (975 mg) by mouth 3 times daily 100 tablet 0         Allergies:      No Known Allergies     Social History:     Social History   Substance Use Topics     Smoking status: Never Smoker     Smokeless tobacco: Never Used     Alcohol use No       History   Drug Use No         Family History:       No family history on file.      Physical Exam:     /87 (BP Location: Right arm, Patient Position: Sitting, Cuff Size: Adult Large)  Pulse 119  Temp 99.8  F (37.7  C) (Oral)  Resp 16  Wt 80 kg (176 lb 4.8 oz)  SpO2 100%  BMI 31.24 kg/m2  Body mass index is 31.24 kg/(m^2).    General Appearance: healthy and alert, no distress     HEENT: no thyromegaly, no palpable nodules or masses        Cardiovascular: regular rhythm, tachycardic, no gallops, rubs or murmurs     Respiratory: lungs clear on left, diminished on right, normal diaphragmatic excursion    Musculoskeletal: extremities non tender and without edema    Skin: no lesions or rashes     Neurological: normal gait, no gross defects     Psychiatric: appropriate mood and affect                               Hematological: normal cervical, supraclavicular lymph nodes     Gastrointestinal:       abdomen soft, non-tender, non-distended, no organomegaly or masses    Genitourinary: deferred      Assessment:    Ashvin Tiwari is a 62 year old woman with a diagnosis of stage IV poorly differentiated SCC cervix.   She is here today for follow up and disease  management.     35 minutes were spent with this patient, over 50% of that time was spent in symptom management, treatment planning and in counseling and coordination of care.      Plan:     1.)        Ok to proceed with planned chemotherapy pending labs are WNL; she will need as 25% dose reduction in her Cisplatin and Topotecan due to her CrCl 47. She will have a thoracentesis done tomorrow. She will have a PET in 3-4 weeks and then see and MD for results. Reviewed signs and symptoms for when she should contact the clinic or seek additional care. Patient to contact the clinic with any questions or concerns in the interim.     2.) Patient did not bring her Tramadol with her for her noon dose and is requesting 100 mg to be given while in infusion.  Pharmacy does not have any Tramadol so will given her Tylenol 650 mg.      3.) Labs and/or tests ordered include:  Chemo labs. PET CT. Thoracentesis.      4.) Health maintenance issues addressed today include annual health maintenance and non-gynecologic issues with PCP.    ROSA Tavera, WHNP-BC, ANP-BC  Women's Health Nurse Practitioner  Adult Nurse Pracitioner  Division of Gynecologic Oncology           CC  Patient Care Team:  Chelsea Wren as PCP - General (Family Practice)  Sita Gallardo as Referring Physician (OB/Gyn)  Rima Saunders, RN as Continuity Care Coordinator (Gyn-Onc)  Leia Caraballo, RN as Registered Nurse (Palliative)  CHELSEA WREN

## 2018-04-17 NOTE — NURSING NOTE
"Chief Complaint   Patient presents with     Oncology Clinic Visit     Cervical CA; Active Tx     Port Draw     no labs drawn from port d/t no blood return.  Labs drawn with vpt by rn.  vs taken     Port accessed with 20 gauge 3/4\" gripper needle.  No blood return so left pt accessed and ordered alteplase to be given later in infusion. Labs drawn with vpt by rn and urine collected.   Pt tolerated well.  VS taken.  Pt checked in for next appt.  Kerrie Ramirez RN      "

## 2018-04-17 NOTE — NURSING NOTE
"Oncology Rooming Note    April 17, 2018 7:30 AM   Ashvin Tiwari is a 62 year old female who presents for:    Chief Complaint   Patient presents with     Oncology Clinic Visit     Cervical CA; Active Tx     Initial Vitals: /87 (BP Location: Right arm, Patient Position: Sitting, Cuff Size: Adult Large)  Pulse 119  Temp 99.8  F (37.7  C) (Oral)  Resp 16  Wt 80 kg (176 lb 4.8 oz)  SpO2 100%  BMI 31.24 kg/m2 Estimated body mass index is 31.24 kg/(m^2) as calculated from the following:    Height as of 3/2/18: 1.6 m (5' 2.99\").    Weight as of this encounter: 80 kg (176 lb 4.8 oz). Body surface area is 1.89 meters squared.  Extreme Pain (8) Comment: Data Unavailable   No LMP recorded. Patient is postmenopausal.  Allergies reviewed: Yes  Medications reviewed: Yes    Medications: Medication refills not needed today.  Pharmacy name entered into EPIC:    [facility] (USE ONLY AT [facility]) - [place], [place] - [address] [id number] - [place], [place] - [address] [facility]    Clinical concerns: Patient states there are no new concerns to discuss with provider.  Rachelle Miranda was not notified.       6 minutes for nursing intake (face to face time)     Yesy Chester CMA              "

## 2018-04-17 NOTE — MR AVS SNAPSHOT
After Visit Summary   4/17/2018    Ashvin Tiwari    MRN: [id number]           Patient Information     Date Of Birth          1955        Visit Information        Provider Department      4/17/2018 8:30 AM UC 19 ATC;  ONCOLOGY INFUSION [facility]        Today's Diagnoses     Chemotherapy-induced neutropenia (H)    -  1    Malignant neoplasm of cervix, unspecified site (H)          Care Instructions    Contact Numbers    [facility] Main Line: [phone]  [facility] Triage:  [phone]    Call triage with chills and/or temperature greater than or equal to 100.5, uncontrolled nausea/vomiting, diarrhea, constipation, dizziness, shortness of breath, chest pain, bleeding, unexplained bruising, or any new/concerning symptoms, questions/concerns.     If you are having any concerning symptoms or wish to speak to a provider before your next infusion visit, please call your care coordinator or triage to notify them so we can adequately serve you.       After Hours: [phone]    If after hours, weekends, or holidays, call main hospital  and ask for Oncology doctor on call.         April 2018 Sunday Monday Tuesday Wednesday Thursday Friday Saturday   1     2     3     4     5     6     7       8     9     10     UMP RETURN    2:45 PM   (30 min.)   Briana Medina MD   [facility] 11     12     13     14       15     16     17     P [facility]RAW    7:00 AM   (15 min.)    [facility]   [facility] Lab Draw     UMP RETURN ACTIVE TREATMENT    7:15 AM   (30 min.)   Rachelle Miranda APRN CNP   [facility]    8:30 AM   (360 min.)    ONCOLOGY INFUSION   [facility] 18     UMP ONC INFUSION 60   11:00 AM   (60 min.)    ONCOLOGY INFUSION   [facility]     IR THORACENTESIS   12:45 PM   (75 min.)   [id number]   [facility] Imaging     THORACENTESIS    1:45 PM    Sonu Denson PA-C   UC OR     Outpatient Visit    1:45 PM   [facility] 19     UMP ONC INFUSION 60   12:00 PM   (60 min.)   UC ONCOLOGY INFUSION   [facility] 20     21       22     23     24     25     26     27     28       29     30                                         May 2018   [place] Monday Tuesday Wednesday Thursday Friday Saturday             1     2     3     4     5       6     7     8     UMP RETURN    3:15 PM   (30 min.)   Briana Medina MD   [facility] 9     10  Happy Birthday!     PET ONCOLOGY WHOLE BODY    8:15 AM   (120 min.)   PPET1   [place] [facility] 11     12       13     14     15     UMP RETURN ACTIVE TREATMENT    8:15 AM   (30 min.)   Barbara Soto MD   [facility] 16     17     18     19       20     21     22     23     24     25     26       27     28     29     30     31                           Recent Results (from the past 24 hour(s))   CBC with platelets differential    Collection Time: 04/17/18  7:48 AM   Result Value Ref Range    WBC 6.7 4.0 - 11.0 10e9/L    RBC Count 4.23 3.8 - 5.2 10e12/L    Hemoglobin 12.6 11.7 - 15.7 g/dL    Hematocrit 39.8 35.0 - 47.0 %    MCV 94 78 - 100 fl    MCH 29.8 26.5 - 33.0 pg    MCHC 31.7 31.5 - 36.5 g/dL    RDW 17.1 (H) 10.0 - 15.0 %    Platelet Count 258 150 - 450 10e9/L    Diff Method Automated Method     % Neutrophils 58.3 %    % Lymphocytes 24.7 %    % Monocytes 15.9 %    % Eosinophils 0.3 %    % Basophils 0.5 %    % Immature Granulocytes 0.3 %    Nucleated RBCs 0 0 /100    Absolute Neutrophil 3.9 1.6 - 8.3 10e9/L    Absolute Lymphocytes 1.6 0.8 - 5.3 10e9/L    Absolute Monocytes 1.1 0.0 - 1.3 10e9/L    Absolute Eosinophils 0.0 0.0 - 0.7 10e9/L    Absolute Basophils 0.0 0.0 - 0.2 10e9/L    Abs Immature Granulocytes 0.0 0 - 0.4 10e9/L    Absolute Nucleated RBC 0.0    Comprehensive metabolic panel    Collection Time:  04/17/18  7:48 AM   Result Value Ref Range    Sodium 138 133 - 144 mmol/L    Potassium 4.0 3.4 - 5.3 mmol/L    Chloride 104 94 - 109 mmol/L    Carbon Dioxide 26 20 - 32 mmol/L    Anion Gap 8 3 - 14 mmol/L    Glucose 107 (H) 70 - 99 mg/dL    Urea Nitrogen 15 7 - 30 mg/dL    Creatinine 1.20 (H) 0.52 - 1.04 mg/dL    GFR Estimate 45 (L) >60 mL/min/1.7m2    GFR Estimate If Black 55 (L) >60 mL/min/1.7m2    Calcium 9.2 8.5 - 10.1 mg/dL    Bilirubin Total 0.2 0.2 - 1.3 mg/dL    Albumin 3.2 (L) 3.4 - 5.0 g/dL    Protein Total 8.0 6.8 - 8.8 g/dL    Alkaline Phosphatase 113 40 - 150 U/L    ALT 22 0 - 50 U/L    AST 24 0 - 45 U/L   Magnesium    Collection Time: 04/17/18  7:48 AM   Result Value Ref Range    Magnesium 1.7 1.6 - 2.3 mg/dL   Protein qualitative urine    Collection Time: 04/17/18  7:53 AM   Result Value Ref Range    Protein Albumin Urine Negative NEG^Negative mg/dL                 Follow-ups after your visit        Your next 10 appointments already scheduled     Apr 18, 2018 11:00 AM CDT   Infusion 60 with  ONCOLOGY INFUSION, UC 28 ATC   [facility] ([facility])    [address] [place] [address]  [address]  [facility] [id number] [phone]            Apr 18, 2018   Procedure with Sonu Denson PA-C   [facility] ([facility])    [address] [place] [address]  5th Floor  [facility] [address] [phone]           Located in the Clinics and Surgery Center at [address] [place] [address].   parking is very convenient and highly recommended.  is a $6 flat rate fee.  Both  and self parkers should enter the main arrival plaza from [facility]; parking attendants will direct you based on your parking preference.            Apr 18, 2018  1:45 PM CDT   (Arrive by 12:45 PM)   IR THORACENTESIS with [id number]   [facility] ([facility])    [address] [place] [address]  [facility] [address]              1. Laboratory test are to be obtained by your doctor prior to the exam (Hgb/Hct, platelet count, INR) 2. Someone will need to drive you to and from the hospital if you feel you may need sedation for the procedure. 3. Bring a list of all drugs you are taking; include supplements and over-the-counter medications. 4. Wear comfortable clothes and leave your valuables at home. 5. If you are or may be pregnant, be sure to contact your doctor or a Radiology nurse prior to the day of the exam. 6. If you have diabetes, check with your doctor or a Radiology nurse to see if your insulin needs to be adjusted for the exam. 7. If you are taking Coumadin (to thin your blood) please contact your doctor or a Radiology nurse at least 3 days before the exam for special instructions (only need the INR to be <2.0). 8. The day before your exam you may eat your regular diet. Drink no alcoholic beverages for 24 hours prior to the exam. 9. Do not eat any solid food or milk products for 6 hours prior to the exam. You may drink clear liquids until 2 hours prior to the exam. Clear liquids include the following: water, Jell-O, clear broth, apple juice or any noncarbonated drink that you can see through (no pop!) 10. The morning of the exam you may brush your teeth and take medications as directed with a sip of water. 11. Tell the Radiology nurse if you have any allergies. 12. If the tube needs to remain in place you will remain in the hospital until the tube can be removed 13. If the tube is removed immediately you may leave the hospital following your exam. However, if you received sedation you will need to stay 1-2 hours. You may be asked to stay longer and have a chest x-ray sometime after the procedure. 14. If you received sedation, you cannot drive until morning, and an adult must be with you until then. If you live more than one hour away you should stay in the [place] area overnight.             Apr 19, 2018 12:00 PM CDT   Infusion 60 with UC ONCOLOGY INFUSION, UC 28 ATC   [facility] ([facility])    [address]  [address]  [facility] [address]   [phone]            May 08, 2018  3:30 PM CDT   (Arrive by 3:15 PM)   Return Visit with Briana Medina MD   [facility] ([facility])    [address]  [address]  [facility] [address]   [phone]            May 10, 2018  8:30 AM CDT   (Arrive by 8:15 AM)   PET [facility]COLOGY WHO[facility] BODY [facility] [place] [address] [facility] ([facility])    [address] [facility] [address]   [phone]           Tell your doctor:   If there is any chance you may be pregnant or if you are breastfeeding.   If you have problems lying in small spaces (claustrophobia). If you do, your doctor may give you medicine to help you relax. If you have diabetes:   Have your exam early in the morning. Your blood glucose will go up as the day goes by.   Your glucose level must be 180 or less at the start of the exam. Please take any medicines you need to ensure this blood glucose level.   If you are taking insulin in the morning take with breakfast by 6 am and schedule procedure between 12 and 2:15 pm.   If you are taking insulin at night take nightly dose, fast overnight, schedule procedure before 10 am.   If you take insulin both morning and night take morning dose by 6am and schedule procedure between 12 and 2:15 pm.   24 hours before your scan: Don t do any heavy exercise. (No jogging, aerobics or other workouts.) Exercise will make your pictures less accurate.  At least 7 hours before your scan, or the evening before if you have an early appointment: Eat a low carb, high protein meal (Lean meats, seafood, beans, soy, low-fat dairy, eggs, nuts & seeds). 6 hours before your scan:   Stop all food and liquids (except water).   Do not  "chew gum or suck on mints.   If you need to take medicine with food, you may take it with a few crackers.  Please call your Imaging Department at your exam site with any questions.            May 15, 2018  8:30 AM CDT   (Arrive by 8:15 AM)   Return Active Treatment with Barbara Soot MD   [facility] ([facility])    [address] [place] [address]  [address] [place] [address] [phone]              Future tests that were ordered for you today     Open Future Orders        Priority Expected Expires Ordered    IR Thoracentesis Routine  6/1/2018 4/17/2018    PET Oncology Whole Body Routine  4/17/2019 4/17/2018            Who to contact     If you have questions or need follow up information about today's clinic visit or your schedule please contact [facility] directly at [phone].  Normal or non-critical lab and imaging results will be communicated to you by MyChart, letter or phone within 4 business days after the clinic has received the results. If you do not hear from us within 7 days, please contact the clinic through [employer] or phone. If you have a critical or abnormal lab result, we will notify you by phone as soon as possible.  Submit refill requests through [employer] or call your pharmacy and they will forward the refill request to us. Please allow 3 business days for your refill to be completed.          Additional Information About Your Visit        [employer] Information     [employer] lets you send messages to your doctor, view your test results, renew your prescriptions, schedule appointments and more. To sign up, go to [email or web address] . Click on \"Log in\" on the left side of the screen, which will take you to the Welcome page. Then click on \"Sign up Now\" on the right side of the page.     You will be asked to enter the access code listed below, as well as some personal information. Please follow the directions to create your username " and password.     Your access code is: [email or web address]  Expires: 2018  7:30 AM     Your access code will  in 90 days. If you need help or a new code, please call your [facility] or [phone].        Care EveryWhere ID     This is your Care EveryWhere ID. This could be used by other organizations to access your [facility] medical records  [id number]         Blood Pressure from Last 3 Encounters:   18 140/87   18 124/82   04/10/18 (!) 144/91    Weight from Last 3 Encounters:   18 80 kg (176 lb 4.8 oz)   04/10/18 78.4 kg (172 lb 13.5 oz)   18 80.2 kg (176 lb 11.2 oz)              We Performed the Following     CBC with platelets differential     Comprehensive metabolic panel     Magnesium     Protein qualitative urine        Primary Care Provider Office Phone # Fax #    Chelsea Wren [phone]       [facility] [address] [facility] [address]        Equal Access to Services     [facility]: Hadii aad ku hadasho Soomaali, waaxda luqadaha, qaybta kaalmada adeegyada, elizabeth ga . So [facility] [phone].    ATENCIÓN: Si habla español, tiene a mae disposición servicios gratuitos de asistencia lingüística. [facility] [phone].    We comply with applicable federal civil rights laws and [place] laws. We do not discriminate on the basis of race, color, national origin, age, disability, sex, sexual orientation, or gender identity.            Thank you!     Thank you for choosing [facility]  for your care. Our goal is always to provide you with excellent care. Hearing back from our patients is one way we can continue to improve our services. Please take a few minutes to complete the written survey that you may receive in the mail after your visit with us. Thank you!             Your Updated Medication List - Protect others around you: Learn how to safely use, store and throw away your medicines at  www.disposemymeds.org.          This list is accurate as of 4/17/18  1:46 PM.  Always use your most recent med list.                   Brand Name Dispense Instructions for use Diagnosis    acetaminophen 325 MG tablet    TYLENOL    100 tablet    Take 3 tablets (975 mg) by mouth 3 times daily    Cancer related pain       amLODIPine 10 MG tablet    NORVASC    30 tablet    Take 1 tablet (10 mg) by mouth daily    Essential hypertension       dronabinol 2.5 MG capsule    MARINOL    60 capsule    Take 1 capsule (2.5 mg) by mouth 2 times daily (before meals)    Anorexia, Malignant neoplasm of cervix, unspecified site (H), Nausea       glutamine 500 MG Tabs     120 tablet    Take 500 mg by mouth 2 times daily    Drug-induced polyneuropathy (H)       LORazepam 1 MG tablet    ATIVAN    30 tablet    Take 1 tablet (1 mg) by mouth every 6 hours as needed (Anxiety, Nausea/Vomiting or Sleep)    Malignant neoplasm of cervix, unspecified site (H)       OLANZapine zydis 5 MG ODT tab    zyPREXA    30 tablet    Take 1 tablet (5 mg) by mouth At Bedtime    Anorexia, Malignant neoplasm of cervix, unspecified site (H), Nausea       polyethylene glycol powder    MIRALAX    510 g    Take 17-34 g (1-2 capfuls) by mouth daily    Cancer related pain, Generalized abdominal pain       prochlorperazine 10 MG tablet    COMPAZINE    120 tablet    Take 1 tablet (10 mg) by mouth 3 times daily    Malignant neoplasm of cervix, unspecified site (H), Nausea       ranitidine 75 MG tablet    ZANTAC    60 tablet    Take 1 tablet (75 mg) by mouth 2 times daily    Gastroesophageal reflux disease, esophagitis presence not specified       senna-docusate 8.6-50 MG per tablet    SENOKOT-S;PERICOLACE    100 tablet    Take 2-4 tablets by mouth 2 times daily    Cancer related pain       traMADol 50 MG tablet    ULTRAM    240 tablet    Take 2 tablets (100mg) every morning when you wake up, 2 tablets (100mg)around noon, 2 tablets (100mg) beofre dinner and 2 tablets  (100mg) before bed.    Malignant neoplasm of cervix, unspecified site (H), Cancer related pain

## 2018-04-17 NOTE — PATIENT INSTRUCTIONS
Contact Numbers    [facility] Main Line: [phone]  [facility] Triage:  [phone]    Call triage with chills and/or temperature greater than or equal to 100.5, uncontrolled nausea/vomiting, diarrhea, constipation, dizziness, shortness of breath, chest pain, bleeding, unexplained bruising, or any new/concerning symptoms, questions/concerns.     If you are having any concerning symptoms or wish to speak to a provider before your next infusion visit, please call your care coordinator or triage to notify them so we can adequately serve you.       After Hours: [phone]    If after hours, weekends, or holidays, call main hospital  and ask for Oncology doctor on call.         April 2018 Sunday Monday Tuesday Wednesday Thursday Friday Saturday   1     2     3     4     5     6     7       8     9     10     UMP RETURN    2:45 PM   (30 min.)   Briana Medina MD   [facility] 11     12     13     14       15     16     17     [facility] D[facility]    7:00 AM   (15 min.)    [facility] DRAW   [facility] Lab Draw     P RETURN ACTIVE TREATMENT    7:15 AM   (30 min.)   Rachelle Miranda APRN CNP   [facility]    8:30 AM   (360 min.)   UC ONCOLOGY INFUSION   [facility] 18     P ONC INFUSION 60   11:00 AM   (60 min.)    ONCOLOGY INFUSION   [facility]     IR THORACENTESIS   12:45 PM   (75 min.)   [email or web address]   [facility] Imaging     THORACENTESIS    1:45 PM   Soun Denson PA-C   UC OR     Outpatient Visit    1:45 PM   [facility] 19     P ONC INFUSION 60   12:00 PM   (60 min.)    ONCOLOGY INFUSION   [facility] 20     21       22     23     24     25     26     27     28       29     30                                         May 2018   [place] Monday Tuesday Wednesday Thursday Friday Saturday             1     2     3     4     5        6     7     8     UMP RETURN    3:15 PM   (30 min.)   Briana Medina MD   [facility] 9     10  Happy Birthday!     PET ONCOLOGY WHOLE BODY    8:15 AM   (120 min.)   [facility] 11     12       13     14     15     UMP RETURN ACTIVE TREATMENT    8:15 AM   (30 min.)   Barbara Soto MD   [facility] 16     17     18     19       20     21     22     23     24     25     26       27     28     29     30     31                           Recent Results (from the past 24 hour(s))   CBC with platelets differential    Collection Time: 04/17/18  7:48 AM   Result Value Ref Range    WBC 6.7 4.0 - 11.0 10e9/L    RBC Count 4.23 3.8 - 5.2 10e12/L    Hemoglobin 12.6 11.7 - 15.7 g/dL    Hematocrit 39.8 35.0 - 47.0 %    MCV 94 78 - 100 fl    MCH 29.8 26.5 - 33.0 pg    MCHC 31.7 31.5 - 36.5 g/dL    RDW 17.1 (H) 10.0 - 15.0 %    Platelet Count 258 150 - 450 10e9/L    Diff Method Automated Method     % Neutrophils 58.3 %    % Lymphocytes 24.7 %    % Monocytes 15.9 %    % Eosinophils 0.3 %    % Basophils 0.5 %    % Immature Granulocytes 0.3 %    Nucleated RBCs 0 0 /100    Absolute Neutrophil 3.9 1.6 - 8.3 10e9/L    Absolute Lymphocytes 1.6 0.8 - 5.3 10e9/L    Absolute Monocytes 1.1 0.0 - 1.3 10e9/L    Absolute Eosinophils 0.0 0.0 - 0.7 10e9/L    Absolute Basophils 0.0 0.0 - 0.2 10e9/L    Abs Immature Granulocytes 0.0 0 - 0.4 10e9/L    Absolute Nucleated RBC 0.0    Comprehensive metabolic panel    Collection Time: 04/17/18  7:48 AM   Result Value Ref Range    Sodium 138 133 - 144 mmol/L    Potassium 4.0 3.4 - 5.3 mmol/L    Chloride 104 94 - 109 mmol/L    Carbon Dioxide 26 20 - 32 mmol/L    Anion Gap 8 3 - 14 mmol/L    Glucose 107 (H) 70 - 99 mg/dL    Urea Nitrogen 15 7 - 30 mg/dL    Creatinine 1.20 (H) 0.52 - 1.04 mg/dL    GFR Estimate 45 (L) >60 mL/min/1.7m2    GFR Estimate If Black 55 (L) >60 mL/min/1.7m2    Calcium 9.2 8.5 - 10.1 mg/dL    Bilirubin  Total 0.2 0.2 - 1.3 mg/dL    Albumin 3.2 (L) 3.4 - 5.0 g/dL    Protein Total 8.0 6.8 - 8.8 g/dL    Alkaline Phosphatase 113 40 - 150 U/L    ALT 22 0 - 50 U/L    AST 24 0 - 45 U/L   Magnesium    Collection Time: 04/17/18  7:48 AM   Result Value Ref Range    Magnesium 1.7 1.6 - 2.3 mg/dL   Protein qualitative urine    Collection Time: 04/17/18  7:53 AM   Result Value Ref Range    Protein Albumin Urine Negative NEG^Negative mg/dL

## 2018-04-17 NOTE — PROGRESS NOTES
"Infusion Nursing Note:  Ashvin Tiwari presents today for Cycle 3 Day 1 Cisplatin, Topotecan, Avastin.    Patient seen by provider today: Yes: Rachelle Miranda NP    Note: TORB 4/17/18 0920 Rachelle Miranda NP/Arlene Navarrete RN: OK to treat today. Chemotherapy dose reduced due to elevated creatinine. Patient to have pleural thoracentesis tomorrow for recent pleural effusion. CARMONA at baseline.     Patient rating pain 8/10 in her abdomen and bilateral rib cage. States that she took her tramadol this morning, but did not bring her home pain medications with her. Declined heat packs, states that she feels okay with her pain level right now.      TORB 4/17/18 1000 Rachelle Miranda NP/Arlene Navarrete RN: Give 650 mg po Tylenol once.     Patient reported that her pain decreased to 6/10. Satisfied with pain management. Declined further intervention at this time.     1545: Patient's sister reported that patient was \"wheezing\" and feeling more short of breath after walking out to the lobby. Patient assessed in the lob. Denied SOB at rest. O2 93% on room air,  at rest, resp 20. O2 desated to 87%  with ambulation, Resp 24, increased work of breathing noted. O2 level recovered to 93% within 1 minute of rest. Patient denied lightheadedness, dizziness, chest pain. Rachelle Miranda NP notified. Rachelle Miranda NP saw patient in [facility]. Educated patient to take frequent rest with activity, energy conservation techniques. Instructed patient to call triage for worsening CARMONA, SOB at rest, lightheadedness, dizziness. Verbalized understanding.     TOR 4/17/18 1600 Rachelle Miranda NP/Arlene Navarrete RN: Ok for patient to be discharged home.     Intravenous Access:  Implanted Port. TPA instilled. 10 mL blood return wasted.     Treatment Conditions:  Lab Results   Component Value Date    HGB 12.6 04/17/2018     Lab Results   Component Value Date    WBC 6.7 04/17/2018      Lab Results   Component Value Date    ANEU 3.9 04/17/2018 "     Lab Results   Component Value Date     04/17/2018      Lab Results   Component Value Date     04/17/2018                   Lab Results   Component Value Date    POTASSIUM 4.0 04/17/2018           Lab Results   Component Value Date    MAG 1.7 04/17/2018            Lab Results   Component Value Date    CR 1.20 04/17/2018                   Lab Results   Component Value Date    BEATRIZ 9.2 04/17/2018                Lab Results   Component Value Date    BILITOTAL 0.2 04/17/2018           Lab Results   Component Value Date    ALBUMIN 3.2 04/17/2018                    Lab Results   Component Value Date    ALT 22 04/17/2018           Lab Results   Component Value Date    AST 24 04/17/2018     Results reviewed, labs MET treatment parameters, ok to proceed with treatment.  Urine Protein Albumin: Negative.  /87.  Patient voided prior to Cisplatin adminstration.    Post Infusion Assessment:  Patient tolerated infusion without incident.  Blood return noted pre and post infusion.  Access discontinued per protocol.    Discharge Plan:   Patient declined prescription refills.  Discharge instructions reviewed with: Patient.  Patient verbalized understanding of discharge instructions and all questions answered.  Copy of AVS reviewed with patient.  Patient will return 4/18/18 for next appointment.  Patient discharged in stable condition accompanied by: sister.  Face to Face time: 10.    ASHELY CONROY RN

## 2018-04-17 NOTE — LETTER
2018     RE: Ashvin Tiwari  [address]  [facility] [place] [address]     Dear Colleague,    Thank you for referring your patient, Ashvin Tiwari, to the [facility]. Please see a copy of my visit note below.                Follow Up Notes on Referred Patient    Date: 2018       Dr. Chelsea Wren  [facility] [address]  [address] [place] [address]       RE: Ashvin Tiwari  : 1955  DENIS: 2018    Dear Dr. Chelsea Wren:    Ashvin Tiwari is a 62 year old woman with a diagnosis of stage IV poorly differentiated SCC cervix.   She is here today for follow up and disease management.         Oncology history:   17:  Seen in Gyn Onc.  Exam concerning for at least a Stage IIB cervical cancer.  Biopsy obtained consistent with poorly differentiated squamous cell ca.  MRI ordered.      17: MRI Pelvis with 7 x 7cm mass at level of cervix, protrudes into upper third of vagina, bilateral parametrial extension, abuts bilateral ureters but no obstruction, abuts rectal wall with obliteration of fat plan but no mucosal invasion, no clear bladder wall invasion, suspicious lymph nodes left hemipelvis, peritoneal nodule versus lymph node.      17 to 17:  Patient admitted to hospital with pain. CT Chest PE was negative for PE but showed multiple pulmonary nodules consistent with metastatic disease.  CT A/P showed cervical mass with regional metastatic disease as well as peritoneal nodularity.      17:  PET scan with multiple metastatic hypermetabolic lung nodules, axillary, mediastinal, hilar and abdominal lymph nodes.      17-17: Cycle #1-3 Paclitaxel/Carboplatin/Avastin.   17: PET CTIMPRESSION:   1. In this patient with a history of stage IV poorly differentiated squamous cell carcinoma of the cervix, there has been a positive response to therapy. The primary cervical mass is significantly decreased in size and demonstrates  decreased FDG uptake. Similarly, the metastatic pulmonary nodules and metastatic lymphadenopathy has significantly improved.      2. Bilateral hypermetabolic level 2A lymph nodes which have slightly increased in size, of uncertain clinical significance given the positive response in the remainder of the body. Enlargement may be related to the patient's dental and sinus disease. Recommend close attention on follow-up imaging.      3. Periapical lucencies with FDG uptake and dental caries involving multiple upper teeth. Recommend follow-up with dentistry.      4. Asymmetric radiotracer uptake in the left prevertebral soft tissues without discernible mass. Recommend close attention on follow-up imaging.      5. Small bilateral pleural effusions.      11/21/17-1/11/18: Cycle #4-6 Paclitaxel/Carboplatin/Avastin.   2/14/18: PET CT IMPRESSION:   1. In this patient with stage IV poorly differentiated squamous cell carcinoma of the cervix there is evidence for progression of disease:  a. Increased size and FDG avidity of primary cervical mass (maximum SUV 30.49, previously 12.57)  b. New FDG avid 7 mm left inguinal lymph node (maximum SUV 6.06)  c. New FDG avid 10 mm right paratracheal lymph node (maximum SUV 12.40. No FDG avid 10 mm right axillary lymph node (maximum SUV 6.14). Resolution of previously FDG avid 9 mm right axillary lymph node. These findings may be reactive versus metastatic.   2. Increased FDG avidity of the rectum is nonspecific and may represent infection, inflamed hemorrhoids, or malignancy. Recommend direct visualization.  3. Small stable right-sided pleural effusion.   4. Please see dedicated dictation of high-resolution PET CT of the head and neck.      Plan: Avastin/Cisplatin 50 mg/m2 day 1 +Topotecan 0.75 mg/m2 for 3 days Q 21 days for 3 cycles then repeat imaging.      2/27/18: Cycle #1 Cisplatin/Topotecan/Avastin.   3/20/18: Cycle #2 Cisplatin/Topotecan/Avastin deferred secondary to neutropenia.  Given 3/28/18.  4/15/18: ED for SOB; CT Chest Pulmonary Angio:  IMPRESSION:    1.  No CT evidence of pulmonary emboli.  2.  Large right and small left pleural effusions.  3.  Complete consolidation of the right lower lobe.  Atelectasis in the right upper and left lower lobe.  4.  Borderline prominent right axillary lymph node.  4/17/18: Cycle #3 Cisplatin/Topotecan/Avastin. Cisplatin and Topotecan reduced 25% secondary to CrCl.         Today she comes to clinic feeling better than she did two days go when she went to the ED due to SOB. She was evaluated and then released. She reports she still has CARMONA but denies any SOB when sitting still. She also has experienced fatigue with each additional cycle of chemotherapy. She takes Compazine and Ativan at needed for nausea; she does not need any medication refills. She denies any vaginal bleeding, no changes in her bowel or bladder habits, no emesis, no lower extremity edema, and no difficulties eating or sleeping. She denies any abdominal discomfort/bloating, no fevers or chills, and no chest pain. She sees palliative care for her back/generalized pain. She reports her neuropathy in her fingers and toes is not new and is the same. She is consuming 2 cans of Ensure each day. She feels ready for her infusion.       Review of Systems:    Systemic           no weight changes; no fever; no chills; no night sweats; no appetite changes  Skin           no rashes, or lesions  Eye           no irritation; no changes in vision  Tabby-Laryngeal           no dysphagia; no hoarseness   Pulmonary    no cough; no shortness of breath; + CARMONA  Cardiovascular    no chest pain; no palpitations; +HTN  Gastrointestinal    no diarrhea; no constipation; no abdominal pain; no changes in bowel habits; no blood in stool  Genitourinary   no urinary frequency; no urinary urgency; no dysuria; no pain; no abnormal vaginal discharge; no abnormal vaginal bleeding  Breast    no breast discharge; no breast  changes; no breast pain  Musculoskeletal    no myalgias; no arthralgias; + back pain  Psychiatric           no depressed mood; no anxiety    Hematologic              no tender lymph nodes; no noticeable swellings or lumps   Endocrine    no hot flashes; no heat/cold intolerance         Neurological   no tremor; + numbness and tingling; no headaches; no difficulty sleeping      Past Medical History:    Past Medical History:   Diagnosis Date     Cancer (H)     cervical     Hypertension          Past Surgical History:    Past Surgical History:   Procedure Laterality Date     BIOPSY/EXCISION LYMPH NODE(S), NEEDLE, SUPERFICIAL (CERVICAL/INGUINAL/AXILLARY) Right 9/21/2017    Procedure: BIOPSY/EXCISION LYMPH NODE(S), NEEDLE, SUPERFICIAL (CERVICAL/INGUINAL/AXILLARY);;  Surgeon: Sonu Denson PA-C;  Location: [facility] OR     INSERT PORT VASCULAR ACCESS Right 9/21/2017    Procedure: INSERT PORT VASCULAR ACCESS;  Single Lumen Chest Power Port, Right Axillary Lymph Node Biopsy;  Surgeon: Sonu Denson PA-C;  Location: [facility] OR     no previous surgery           Health Maintenance Due   Topic Date Due     TETANUS IMMUNIZATION (SYSTEM ASSIGNED)  05/10/1971     HEPATITIS C SCREENING  05/10/1971     LIPID SCREEN Q5 YR FEMALE (SYSTEM ASSIGNED)  05/10/1998     MAMMO SCREEN Q2 YR (SYSTEM ASSIGNED)  05/10/2003     COLON CANCER SCREEN (SYSTEM ASSIGNED)  05/10/2003     ADVANCE DIRECTIVE PLANNING Q5 YRS  05/10/2008       Current Medications:     Current Outpatient Prescriptions   Medication Sig Dispense Refill     dronabinol (MARINOL) 2.5 MG capsule Take 1 capsule (2.5 mg) by mouth 2 times daily (before meals) 60 capsule 1     OLANZapine zydis (ZYPREXA) 5 MG ODT tab Take 1 tablet (5 mg) by mouth At Bedtime 30 tablet 3     amLODIPine (NORVASC) 10 MG tablet Take 1 tablet (10 mg) by mouth daily 30 tablet 3     ranitidine (ZANTAC) 75 MG tablet Take 1 tablet (75 mg) by mouth 2 times daily 60 tablet 1     prochlorperazine  (COMPAZINE) 10 MG tablet Take 1 tablet (10 mg) by mouth 3 times daily 120 tablet 2     traMADol (ULTRAM) 50 MG tablet Take 2 tablets (100mg) every morning when you wake up, 2 tablets (100mg)around noon, 2 tablets (100mg) beofre dinner and 2 tablets (100mg) before bed. 240 tablet 0     LORazepam (ATIVAN) 1 MG tablet Take 1 tablet (1 mg) by mouth every 6 hours as needed (Anxiety, Nausea/Vomiting or Sleep) 30 tablet 2     glutamine 500 MG TABS Take 500 mg by mouth 2 times daily 120 tablet 3     senna-docusate (SENOKOT-S;PERICOLACE) 8.6-50 MG per tablet Take 2-4 tablets by mouth 2 times daily 100 tablet 1     polyethylene glycol (MIRALAX) powder Take 17-34 g (1-2 capfuls) by mouth daily 510 g 1     acetaminophen (TYLENOL) 325 MG tablet Take 3 tablets (975 mg) by mouth 3 times daily 100 tablet 0         Allergies:      No Known Allergies     Social History:     Social History   Substance Use Topics     Smoking status: Never Smoker     Smokeless tobacco: Never Used     Alcohol use No       History   Drug Use No         Family History:       No family history on file.      Physical Exam:     /87 (BP Location: Right arm, Patient Position: Sitting, Cuff Size: Adult Large)  Pulse 119  Temp 99.8  F (37.7  C) (Oral)  Resp 16  Wt 80 kg (176 lb 4.8 oz)  SpO2 100%  BMI 31.24 kg/m2  Body mass index is 31.24 kg/(m^2).    General Appearance: healthy and alert, no distress     HEENT: no thyromegaly, no palpable nodules or masses        Cardiovascular: regular rhythm, tachycardic, no gallops, rubs or murmurs     Respiratory: lungs clear on left, diminished on right, normal diaphragmatic excursion    Musculoskeletal: extremities non tender and without edema    Skin: no lesions or rashes     Neurological: normal gait, no gross defects     Psychiatric: appropriate mood and affect                               Hematological: normal cervical, supraclavicular lymph nodes     Gastrointestinal:       abdomen soft, non-tender,  non-distended, no organomegaly or masses    Genitourinary: deferred      Assessment:    Ashvin Tiwari is a 62 year old woman with a diagnosis of stage IV poorly differentiated SCC cervix.   She is here today for follow up and disease management.     35 minutes were spent with this patient, over 50% of that time was spent in symptom management, treatment planning and in counseling and coordination of care.      Plan:     1.)        Ok to proceed with planned chemotherapy pending labs are WNL; she will need as 25% dose reduction in her Cisplatin and Topotecan due to her CrCl 47. She will have a thoracentesis done tomorrow. She will have a PET in 3-4 weeks and then see and MD for results. Reviewed signs and symptoms for when she should contact the clinic or seek additional care. Patient to contact the clinic with any questions or concerns in the interim.     2.) Patient did not bring her Tramadol with her for her noon dose and is requesting 100 mg to be given while in infusion.  Pharmacy does not have any Tramadol so will given her Tylenol 650 mg.      3.) Labs and/or tests ordered include:  Chemo labs. PET CT. Thoracentesis.      4.) Health maintenance issues addressed today include annual health maintenance and non-gynecologic issues with PCP.    ROSA Tavera, WHNP-BC, ANP-BC  Women's Health Nurse Practitioner  Adult Nurse Pracitioner  Division of Gynecologic Oncology           CC  Patient Care Team:  Chelsea Wren as PCP - General (Family Practice)  Sita Gallardo as Referring Physician (OB/Gyn)  Rima Saunders, RN as Continuity Care Coordinator (Gyn-Onc)  Leia Caraballo, RN as Registered Nurse (Palliative)  CHELSEA WREN

## 2018-04-18 NOTE — PROGRESS NOTES
Infusion Nursing Note:  Ashvin Tiwari presents today for Day 2, Cycle 3 Topotecan.    Patient seen by provider today: No   present during visit today: Not Applicable.    Note: N/A.    Intravenous Access:  Implanted Port. Thoracentesis post infusion today.    Treatment Conditions:  Reviewed 4/17/18 infusion note.    Post Infusion Assessment:  Patient tolerated infusion without incident.    Discharge Plan:   Patient declined prescription refills.  Discharge instructions reviewed with: Patient.  Copy of AVS reviewed with patient and/or family.  Patient will return 4/19 for next appointment.  Patient discharged in stable condition accompanied by: self and Sister, Daughter  Departure Mode: Ambulatory    Edith Cintron RN

## 2018-04-18 NOTE — PROCEDURES
Interventional Radiology Brief Post Procedure Note    Procedure:  IR THORACENTESIS [[id number]]    Proceduralist: CHARLEE Sanchez PA-C    Assistant: None    Time Out: Prior to the start of the procedure and with procedural staff participation, I verbally confirmed the patient s identity using two indicators, relevant allergies, that the procedure was appropriate and matched the consent or emergent situation, and that the correct equipment/implants were available. Immediately prior to starting the procedure I conducted the Time Out with the procedural staff and re-confirmed the patient s name, procedure, and site/side. (The Joint Commission universal protocol was followed.)  Yes        Sedation: None. Local Anesthestic used    Findings: Completed ultrasound guided RIGHT-sided thoracentesis. A total of 800 mL clear yellow colored fluid drained from the chest.  No fluid sample was collected for analysis.  No immediate complication.  Dx: Pleural effusion. Jarett.    Estimated Blood Loss: Minimal    Fluoroscopy Time:  minute(s)    SPECIMENS: None    Complications: 1. None     Condition: Stable    Plan:     Comments: See dictated procedure note for full details.    Sonu Denson PA-C

## 2018-04-18 NOTE — PROGRESS NOTES
Care Coordinator Note  Patient seen in clinic today 4/17/18.  Arrangements made for a paracentesis for Wednesday at 1230.  Pt given instructions for place and time.   Offered support. Discussed paracentesis and the correlation between that and cancer.       Patient and family verbalized back understanding of the above information discussed.   Face to face time spent with patient 15.  Rima MADSEN RN, OCN  Care Coordinator   Gynecologic Cancer   Office [phone]  Pager [phone] #[phone]

## 2018-04-18 NOTE — IP AVS SNAPSHOT
MRN:[id number]                      After Visit Summary   4/18/2018    Ashvin Tiwari    MRN: [id number]           Thank you!     Thank you for choosing [facility] for your care. Our goal is always to provide you with excellent care. Hearing back from our patients is one way we can continue to improve our services. Please take a few minutes to complete the written survey that you may receive in the mail after you visit with us. Thank you!        Patient Information     Date Of Birth          1955        About your hospital stay     You were admitted on:  April 18, 2018 You last received care in the:  [facility]    You were discharged on:  April 18, 2018       Who to Call     For medical emergencies, please call 911.  For non-urgent questions about your medical care, please call your primary care provider or clinic, [phone]  For questions related to your surgery, please call your surgery clinic        Attending Provider     Provider Specialty    Sonu Denson PA-C Radiology       Primary Care Provider Office Phone # Fax #    Chelsea Wren [phone]      Your next 10 appointments already scheduled     Apr 19, 2018 12:00 PM CDT   Infusion 60 with UC ONCOLOGY INFUSION, UC 28 ATC   [facility] ([facility])    [address] [place] [address]  [address]  [facility] [address] [phone]            May 08, 2018  3:30 PM CDT   (Arrive by 3:15 PM)   Return Visit with Briana Medina MD   [facility] ([facility])    [address] [place] [address]  [address]  [facility] [address] [phone]            May 10, 2018  8:30 AM CDT   (Arrive by 8:15 AM)   PET ONCOLOGY WHOLE BODY with UMET   [facility] ([facility])    [address] [facility] [id number] [phone]           Tell your doctor:   If there is any  chance you may be pregnant or if you are breastfeeding.   If you have problems lying in small spaces (claustrophobia). If you do, your doctor may give you medicine to help you relax. If you have diabetes:   Have your exam early in the morning. Your blood glucose will go up as the day goes by.   Your glucose level must be 180 or less at the start of the exam. Please take any medicines you need to ensure this blood glucose level.   If you are taking insulin in the morning take with breakfast by 6 am and schedule procedure between 12 and 2:15 pm.   If you are taking insulin at night take nightly dose, fast overnight, schedule procedure before 10 am.   If you take insulin both morning and night take morning dose by 6am and schedule procedure between 12 and 2:15 pm.   24 hours before your scan: Don t do any heavy exercise. (No jogging, aerobics or other workouts.) Exercise will make your pictures less accurate.  At least 7 hours before your scan, or the evening before if you have an early appointment: Eat a low carb, high protein meal (Lean meats, seafood, beans, soy, low-fat dairy, eggs, nuts & seeds). 6 hours before your scan:   Stop all food and liquids (except water).   Do not chew gum or suck on mints.   If you need to take medicine with food, you may take it with a few crackers.  Please call your Imaging Department at your exam site with any questions.            May 15, 2018  8:30 AM CDT   (Arrive by 8:15 AM)   Return Active Treatment with Barbara Soto MD   [facility] ([facility])    [address] [place] [address]  [address]  [facility] [address] [phone]              Further instructions from your care team         Discharge Instructions for Thoracentesis  Thoracentesis is a procedure that removes extra fluid (pleural effusion) from the pleural space. This space is between the outside surface of the lungs (pleura) and the chest wall. The procedure may be done to  take a sample of the fluid for testing to help find the cause. Or it may be done to drain the extra fluid if you are having trouble breathing.  Home care    You may have some pain after the procedure. Your doctor can prescribe or recommend pain medicine for you to take at home, if needed. Take these exactly as directed. If you stopped taking other medicines before the procedure, ask your doctor when you can start them again.    Take it easy for 48 hours after the procedure. Don't do anything active until your doctor says it s OK.    Don't do strenuous activities, such as lifting, until your doctor says it s OK.    You will have a small bandage over the puncture site. You may remove the bandage in 24 hours.    Check the puncture site for the signs of infection listed below.  Follow-up  Make a follow-up appointment with your doctor as directed. During your follow-up visit, your doctor will check your healing. Be sure to let your doctor know how you are feeling.  When to call your doctor  Call your doctor right away if you have any of the following:    Coughing up blood    Chest pain. If chest pain suddenly gets worse, it may be an emergency.    Shortness of breath    Fever of 100.4 F (38 C) or higher, or as directed by your healthcare provider    Pain that doesn't get better after taking pain medicine    Signs of infection at the puncture site. These include increased pain, redness, swelling, or warmth.    Fluid draining from the puncture site       A collaboration between [facility] and [facility]  Experts in minimally invasive, targeted treatments performed using imaging guidance      One of our Radiology PAs performed this procedure for you today:  ? Maico Thomas, [facility], SHRAVAN  ? CHARLEE Sanchez, SHRAVAN  ? Aguilar Wright PA-C  ? Michelle Sparrow MS, PA-C  ? Stevo Arguelles PA-C    Call our Interventional Radiology (IR) service if:  - If you start bleeding from the  "procedure site.  If you do start to bleed from the site, lie down and hold some pressure on the site.  Our radiology provider can help you decide if you need to return to the hospital.  - If you have new or worsening pain related to the procedure.  - If you have concerning swelling at the procedure site.  - If you develop persistent nausea or vomiting.  - If you develop hives or a rash or any unexplained itching.  - If you have a fever of greater than 100.5  F and chills in the first 5 days after procedure.  - Any other concerns related to your procedure.      [facility]  Interventional Radiology (IR)  [address] [facility]  [address] [facility] [place] [address]    Contact Number:  [phone]  (IR control desk)  - Monday - Friday 8:00 am - 4:30 pm    After hours for urgent concerns:  [phone]  - After 4:30 pm Monday - Friday, Weekends and Holidays.   - Ask for Interventional Radiology on-call.  Someone is available 24 hours a day.  - [facility] toll free number:  [phone]            Pending Results     Date and Time Order Name Status Description    4/18/2018 1305 IR THORACENTESIS In process             Admission Information     Date & Time Provider Department Dept. Phone    4/18/2018 Sonu Denson PA-C [facility] [phone]      Your Vitals Were     Blood Pressure Pulse Temperature Respirations Height Weight    155/93 107 98.4  F (36.9  C) (Oral) 18 1.6 m (5' 3\") 79.4 kg (175 lb)    Pulse Oximetry BMI (Body Mass Index)                98% 31 kg/m2          [employer] Information     [employer] is an electronic gateway that provides easy, online access to your medical records. With [employer], you can request a clinic appointment, read your test results, renew a prescription or communicate with your care team.     To sign up for [employer] visit the website at [email or web address]   You will be asked to enter the access code listed " below, as well as some personal information. Please follow the directions to create your username and password.     Your access code is: [email or web address]  Expires: 2018  7:30 AM     Your access code will  in 90 days. If you need help or a new code, please contact your [facility] or call [phone] for assistance.        Care EveryWhere ID     This is your Care EveryWhere ID. This could be used by other organizations to access your [facility] medical records  [id number]        Equal Access to Services     [facility] : Hadii elvia lackey Sosyd, waaxda luqadaha, qaybta kaalmafernando wray, elizabeth ga . So [facility] [phone].    ATENCIÓN: Si habla español, tiene a mae disposición servicios gratuitos de asistencia lingüística. Llame al [phone].    We comply with applicable federal civil rights laws and [place] laws. We do not discriminate on the basis of race, color, national origin, age, disability, sex, sexual orientation, or gender identity.               Review of your medicines      UNREVIEWED medicines. Ask your doctor about these medicines        Dose / Directions    acetaminophen 325 MG tablet   Commonly known as:  TYLENOL   Used for:  Cancer related pain        Dose:  1000 mg   Take 3 tablets (975 mg) by mouth 3 times daily   Quantity:  100 tablet   Refills:  0       amLODIPine 10 MG tablet   Commonly known as:  NORVASC   Used for:  Essential hypertension        Dose:  10 mg   Take 1 tablet (10 mg) by mouth daily   Quantity:  30 tablet   Refills:  3       dronabinol 2.5 MG capsule   Commonly known as:  MARINOL   Used for:  Anorexia, Malignant neoplasm of cervix, unspecified site (H), Nausea        Dose:  2.5 mg   Take 1 capsule (2.5 mg) by mouth 2 times daily (before meals)   Quantity:  60 capsule   Refills:  1       glutamine 500 MG Tabs   Used for:  Drug-induced polyneuropathy (H)        Dose:  500 mg   Take 500 mg by mouth 2 times  daily   Quantity:  120 tablet   Refills:  3       LORazepam 1 MG tablet   Commonly known as:  ATIVAN   Used for:  Malignant neoplasm of cervix, unspecified site (H)        Dose:  1 mg   Take 1 tablet (1 mg) by mouth every 6 hours as needed (Anxiety, Nausea/Vomiting or Sleep)   Quantity:  30 tablet   Refills:  2       OLANZapine zydis 5 MG ODT tab   Commonly known as:  zyPREXA   Used for:  Anorexia, Malignant neoplasm of cervix, unspecified site (H), Nausea        Dose:  5 mg   Take 1 tablet (5 mg) by mouth At Bedtime   Quantity:  30 tablet   Refills:  3       polyethylene glycol powder   Commonly known as:  MIRALAX   Used for:  Cancer related pain, Generalized abdominal pain        Dose:  1-2 capful   Take 17-34 g (1-2 capfuls) by mouth daily   Quantity:  510 g   Refills:  1       prochlorperazine 10 MG tablet   Commonly known as:  COMPAZINE   Used for:  Malignant neoplasm of cervix, unspecified site (H), Nausea        Dose:  10 mg   Take 1 tablet (10 mg) by mouth 3 times daily   Quantity:  120 tablet   Refills:  2       ranitidine 75 MG tablet   Commonly known as:  ZANTAC   Used for:  Gastroesophageal reflux disease, esophagitis presence not specified        Dose:  75 mg   Take 1 tablet (75 mg) by mouth 2 times daily   Quantity:  60 tablet   Refills:  1       senna-docusate 8.6-50 MG per tablet   Commonly known as:  SENOKOT-S;PERICOLACE   Used for:  Cancer related pain        Dose:  2-4 tablet   Take 2-4 tablets by mouth 2 times daily   Quantity:  100 tablet   Refills:  1       traMADol 50 MG tablet   Commonly known as:  ULTRAM   Used for:  Malignant neoplasm of cervix, unspecified site (H), Cancer related pain        Take 2 tablets (100mg) every morning when you wake up, 2 tablets (100mg)around noon, 2 tablets (100mg) beofre dinner and 2 tablets (100mg) before bed.   Quantity:  240 tablet   Refills:  0                Protect others around you: Learn how to safely use, store and throw away your medicines at  www.disposemymeds.org.             Medication List: This is a list of all your medications and when to take them. Check marks below indicate your daily home schedule. Keep this list as a reference.      Medications           Morning Afternoon Evening Bedtime As Needed    acetaminophen 325 MG tablet   Commonly known as:  TYLENOL   Take 3 tablets (975 mg) by mouth 3 times daily                                amLODIPine 10 MG tablet   Commonly known as:  NORVASC   Take 1 tablet (10 mg) by mouth daily                                dronabinol 2.5 MG capsule   Commonly known as:  MARINOL   Take 1 capsule (2.5 mg) by mouth 2 times daily (before meals)                                glutamine 500 MG Tabs   Take 500 mg by mouth 2 times daily                                LORazepam 1 MG tablet   Commonly known as:  ATIVAN   Take 1 tablet (1 mg) by mouth every 6 hours as needed (Anxiety, Nausea/Vomiting or Sleep)                                OLANZapine zydis 5 MG ODT tab   Commonly known as:  zyPREXA   Take 1 tablet (5 mg) by mouth At Bedtime                                polyethylene glycol powder   Commonly known as:  MIRALAX   Take 17-34 g (1-2 capfuls) by mouth daily                                prochlorperazine 10 MG tablet   Commonly known as:  COMPAZINE   Take 1 tablet (10 mg) by mouth 3 times daily                                ranitidine 75 MG tablet   Commonly known as:  ZANTAC   Take 1 tablet (75 mg) by mouth 2 times daily                                senna-docusate 8.6-50 MG per tablet   Commonly known as:  SENOKOT-S;PERICOLACE   Take 2-4 tablets by mouth 2 times daily                                traMADol 50 MG tablet   Commonly known as:  ULTRAM   Take 2 tablets (100mg) every morning when you wake up, 2 tablets (100mg)around noon, 2 tablets (100mg) beofre dinner and 2 tablets (100mg) before bed.

## 2018-04-18 NOTE — DISCHARGE INSTRUCTIONS
Discharge Instructions for Thoracentesis  Thoracentesis is a procedure that removes extra fluid (pleural effusion) from the pleural space. This space is between the outside surface of the lungs (pleura) and the chest wall. The procedure may be done to take a sample of the fluid for testing to help find the cause. Or it may be done to drain the extra fluid if you are having trouble breathing.  Home care    You may have some pain after the procedure. Your doctor can prescribe or recommend pain medicine for you to take at home, if needed. Take these exactly as directed. If you stopped taking other medicines before the procedure, ask your doctor when you can start them again.    Take it easy for 48 hours after the procedure. Don't do anything active until your doctor says it s OK.    Don't do strenuous activities, such as lifting, until your doctor says it s OK.    You will have a small bandage over the puncture site. You may remove the bandage in 24 hours.    Check the puncture site for the signs of infection listed below.  Follow-up  Make a follow-up appointment with your doctor as directed. During your follow-up visit, your doctor will check your healing. Be sure to let your doctor know how you are feeling.  When to call your doctor  Call your doctor right away if you have any of the following:    Coughing up blood    Chest pain. If chest pain suddenly gets worse, it may be an emergency.    Shortness of breath    Fever of 100.4 F (38 C) or higher, or as directed by your healthcare provider    Pain that doesn't get better after taking pain medicine    Signs of infection at the puncture site. These include increased pain, redness, swelling, or warmth.    Fluid draining from the puncture site       A collaboration between [facility] and [facility]  Experts in minimally invasive, targeted treatments performed using imaging guidance      One of our Radiology PAs  performed this procedure for you today:  ? Maico Thomas, [facility], SHRAVAN  ? CHARLEE Sanchez PA-C  ? Aguilar Wright PA-C  ? Michelle Sparrow MS, PA-C  ? Stevo Arguelles PA-C    Call our Interventional Radiology (IR) service if:  - If you start bleeding from the procedure site.  If you do start to bleed from the site, lie down and hold some pressure on the site.  Our radiology provider can help you decide if you need to return to the hospital.  - If you have new or worsening pain related to the procedure.  - If you have concerning swelling at the procedure site.  - If you develop persistent nausea or vomiting.  - If you develop hives or a rash or any unexplained itching.  - If you have a fever of greater than 100.5  F and chills in the first 5 days after procedure.  - Any other concerns related to your procedure.      [facility]  Interventional Radiology (IR)  [address] [facility]  [address] [facility] [place] [address]    Contact Number:  [phone]  (IR control desk)  - Monday - Friday 8:00 am - 4:30 pm    After hours for urgent concerns:  [phone]  - After 4:30 pm Monday - Friday, Weekends and Holidays.   - Ask for Interventional Radiology on-call.  Someone is available 24 hours a day.  - [facility] toll free number:  [phone]

## 2018-04-18 NOTE — PATIENT INSTRUCTIONS
[facility] Main Line: [phone]    Call triage nurse with chills and/or temperature greater than or equal to 100.4, uncontrolled nausea/vomiting, diarrhea, constipation, dizziness, shortness of breath, chest pain, bleeding, unexplained bruising, or any new/concerning symptoms, questions/concerns.   If you are having any concerning symptoms or wish to speak to a provider before your next infusion visit, please call your care coordinator or triage to notify them so we can adequately serve you.   Triage Nurse Line: [phone]    If after hours, weekends, or holidays, call main hospital  and ask for Oncology doctor on call @ [phone]               April 2018 Sunday Monday Tuesday Wednesday Thursday Friday Saturday   1     2     3     4     5     6     7       8     9     10     [facility] RETURN    2:45 PM   (30 min.)   Briana Medina MD   [facility] 11     12     13     14       15     16     17     [facility] DRAW    7:00 AM   (15 min.)    [facility] DRAW   [facility] Lab Draw     [facility] RETURN ACTIVE TREATMENT    7:15 AM   (30 min.)   Rachelle Miranda APRN CNP   [facility]    8:30 AM   (360 min.)   [facility] ONCOLOGY INFUSION   [facility] 18     [facility] ONC INFUSION 60   11:00 AM   (60 min.)    ONCOLOGY INFUSION   [facility]     IR THORACENTESIS   12:45 PM   (75 min.)   [id number]   [facility]     THORACENTESIS    1:45 PM   Sonu Denson PA-C   [facility] OR     Outpatient Visit    1:45 PM   [facility] 19     P ONC INFUSION 60   12:00 PM   (60 min.)    ONCOLOGY INFUSION   [facility] 20     21       22     23     24     25     26     27     28       29     30                                         May 2018   [place] Monday Tuesday Wednesday Thursday Friday Saturday             1     2     3     4     5        6     7     8     [facility] RETURN    3:15 PM   (30 min.)   Briana Medina MD   [facility] 9     10  Happy Birthday!     PET ONCOLOGY WHOLE BODY    8:15 AM   (120 min.)   [place] [address]   [facility] 11     12       13     14     15     [facility] RETURN ACTIVE TREATMENT    8:15 AM   (30 min.)   Barbara Soto MD   [facility] 16     17     18     19       20     21     22     23     24     25     26       27     28     29     30     31                            Lab Results:  No results found for this or any previous visit (from the past 12 hour(s)).

## 2018-04-18 NOTE — MR AVS SNAPSHOT
After Visit Summary   4/18/2018    Ashvin Tiwari    MRN: [id number]           Patient Information     Date Of Birth          1955        Visit Information        Provider Department      4/18/2018 11:00 AM UC 28 ATC;  ONCOLOGY INFUSION [facility]        Today's Diagnoses     Chemotherapy-induced neutropenia (H)    -  1    Malignant neoplasm of cervix, unspecified site (H)          [facility] [place] Main Line: [phone]    Call triage nurse with chills and/or temperature greater than or equal to 100.4, uncontrolled nausea/vomiting, diarrhea, constipation, dizziness, shortness of breath, chest pain, bleeding, unexplained bruising, or any new/concerning symptoms, questions/concerns.   If you are having any concerning symptoms or wish to speak to a provider before your next infusion visit, please call your care coordinator or triage to notify them so we can adequately serve you.   Triage Nurse Line: [phone]    If after hours, weekends, or holidays, call main hospital  and ask for Oncology doctor on call @ [phone]               April 2018 Sunday Monday Tuesday Wednesday Thursday Friday Saturday   1     2     3     4     5     6     7       8     9     10     UMP RETURN    2:45 PM   (30 min.)   Briana Medina MD   [facility] 11     12     13     14       15     16     17     P [facility] LAB DRAW    7:00 AM   (15 min.)    [facility] DRAW   [facility] Lab Draw     UMP RETURN ACTIVE TREATMENT    7:15 AM   (30 min.)   Rachelle Miranda APRN CNP   [facility]    8:30 AM   (360 min.)    ONCOLOGY INFUSION   [facility] 18     UMP ONC INFUSION 60   11:00 AM   (60 min.)    ONCOLOGY INFUSION   [facility]     IR THORACENTESIS   12:45 PM   (75 min.)   ASCCARM6   [facility]     THORACENTESIS    1:45  PM   Sonu Denson PA-C   UC OR     Outpatient Visit    1:45 PM   [facility] 19     UMP ONC INFUSION 60   12:00 PM   (60 min.)   UC ONCOLOGY INFUSION   [facility] 20     21       22     23     24     25     26     27     28       29     30                                         May 2018   [place] Monday Tuesday Wednesday Thursday Friday Saturday             1     2     3     4     5       6     7     8     UMP RETURN    3:15 PM   (30 min.)   Briana Medina MD   [facility] 9     10  Happy Birthday!     PET ONCOLOGY WHOLE BODY    8:15 AM   (120 min.)   UMPPET1   [facility] 11     12       13     14     15     UMP RETURN ACTIVE TREATMENT    8:15 AM   (30 min.)   Barbara Soto MD   [facility] 16     17     18     19       20     21     22     23     24     25     26       27     28     29     30     31                            Lab Results:  No results found for this or any previous visit (from the past 12 hour(s)).            Follow-ups after your visit        Your next 10 appointments already scheduled     Apr 18, 2018   Procedure with Sonu Denson PA-C   [facility] ([facility] [place])    [address] [place], [place] [address] [phone]           Located in the Clin[facility] Center at [address] [place] [address].   parking is very convenient and highly recommended.  is a $6 flat rate fee.  Both  and self parkers should enter the main arrival plaza from [facility]; parking attendants will direct you based on your parking preference.            Apr 18, 2018  1:45 PM CDT   (Arrive by 12:45 PM)   IR THORACENTESIS with UCASCCARM6   [facility] ([facility])    [address] [place] [address]              1.  Laboratory test are to be obtained by your doctor prior to the exam (Hgb/Hct, platelet count, INR) 2. Someone will need to drive you to and from the hospital if you feel you may need sedation for the procedure. 3. Bring a list of all drugs you are taking; include supplements and over-the-counter medications. 4. Wear comfortable clothes and leave your valuables at home. 5. If you are or may be pregnant, be sure to contact your doctor or a Radiology nurse prior to the day of the exam. 6. If you have diabetes, check with your doctor or a Radiology nurse to see if your insulin needs to be adjusted for the exam. 7. If you are taking Coumadin (to thin your blood) please contact your doctor or a Radiology nurse at least 3 days before the exam for special instructions (only need the INR to be <2.0). 8. The day before your exam you may eat your regular diet. Drink no alcoholic beverages for 24 hours prior to the exam. 9. Do not eat any solid food or milk products for 6 hours prior to the exam. You may drink clear liquids until 2 hours prior to the exam. Clear liquids include the following: water, Jell-O, clear broth, apple juice or any noncarbonated drink that you can see through (no pop!) 10. The morning of the exam you may brush your teeth and take medications as directed with a sip of water. 11. Tell the Radiology nurse if you have any allergies. 12. If the tube needs to remain in place you will remain in the hospital until the tube can be removed 13. If the tube is removed immediately you may leave the hospital following your exam. However, if you received sedation you will need to stay 1-2 hours. You may be asked to stay longer and have a chest x-ray sometime after the procedure. 14. If you received sedation, you cannot drive until morning, and an adult must be with you until then. If you live more than one hour away you should stay in the [place] area overnight.            Apr 19, 2018 12:00 PM CDT   Infusion 60  with  ONCOLOGY INFUSION, UC 28 ATC   [facility] ([facility])    [address]  [address]  [place] [address]   [phone]            May 08, 2018  3:30 PM CDT   (Arrive by 3:15 PM)   Return Visit with Briana Medina MD   [facility] ([facility])    [address]  [address]  [place] [address]   [phone]            May 10, 2018  8:30 AM CDT   (Arrive by 8:15 AM)   PET ONCOLOGY WHOLE BODY with UMPPET1   [facility] [place] [facility] ([facility])    [address] [address]   [phone]           Tell your doctor:   If there is any chance you may be pregnant or if you are breastfeeding.   If you have problems lying in small spaces (claustrophobia). If you do, your doctor may give you medicine to help you relax. If you have diabetes:   Have your exam early in the morning. Your blood glucose will go up as the day goes by.   Your glucose level must be 180 or less at the start of the exam. Please take any medicines you need to ensure this blood glucose level.   If you are taking insulin in the morning take with breakfast by 6 am and schedule procedure between 12 and 2:15 pm.   If you are taking insulin at night take nightly dose, fast overnight, schedule procedure before 10 am.   If you take insulin both morning and night take morning dose by 6am and schedule procedure between 12 and 2:15 pm.   24 hours before your scan: Don t do any heavy exercise. (No jogging, aerobics or other workouts.) Exercise will make your pictures less accurate.  At least 7 hours before your scan, or the evening before if you have an early appointment: Eat a low carb, high protein meal (Lean meats, seafood, beans, soy, low-fat dairy, eggs, nuts & seeds). 6 hours before your scan:   Stop all food and liquids (except water).   Do not chew gum or suck on mints.   If you need to take  "medicine with food, you may take it with a few crackers.  Please call your Imaging Department at your exam site with any questions.            May 15, 2018  8:30 AM CDT   (Arrive by 8:15 AM)   Return Active Treatment with Barbara Soto MD   [facility] ([facility])    [address]  [address]  [facility] [address] [phone]              Future tests that were ordered for you today     Open Future Orders        Priority Expected Expires Ordered    IR Thoracentesis Routine  6/1/2018 4/17/2018    PET Oncology Whole Body Routine  4/17/2019 4/17/2018            Who to contact     If you have questions or need follow up information about today's clinic visit or your schedule please contact [facility] directly at [phone].  Normal or non-critical lab and imaging results will be communicated to you by MyChart, letter or phone within 4 business days after the clinic has received the results. If you do not hear from us within 7 days, please contact the clinic through MyChart or phone. If you have a critical or abnormal lab result, we will notify you by phone as soon as possible.  Submit refill requests through Blaast or call your pharmacy and they will forward the refill request to us. Please allow 3 business days for your refill to be completed.          Additional Information About Your Visit        Blaast Information     Blaast lets you send messages to your doctor, view your test results, renew your prescriptions, schedule appointments and more. To sign up, go to [email or web address] . Click on \"Log in\" on the left side of the screen, which will take you to the Welcome page. Then click on \"Sign up Now\" on the right side of the page.     You will be asked to enter the access code listed below, as well as some personal information. Please follow the directions to create your username and password.     Your access code is: " [email or web address]  Expires: 2018  7:30 AM     Your access code will  in 90 days. If you need help or a new code, please call your [facility] or [phone].        Care EveryWhere ID     This is your Care EveryWhere ID. This could be used by other organizations to access your [place] medical records  [id number]        Your Vitals Were     Pulse Temperature Respirations Pulse Oximetry BMI (Body Mass Index)       106 97.4  F (36.3  C) (Oral) 20 96% 31.04 kg/m2        Blood Pressure from Last 3 Encounters:   18 150/83   18 140/87   18 124/82    Weight from Last 3 Encounters:   18 79.5 kg (175 lb 3.2 oz)   18 80 kg (176 lb 4.8 oz)   04/10/18 78.4 kg (172 lb 13.5 oz)              Today, you had the following     No orders found for display       Primary Care Provider Office Phone # Fax #    Chelsea Wren [phone]       [facility] [address] [address]        Equal Access to Services     [facility] : Hadii aad ku hadasho Soyoonali, waaxda luqadaha, qaybta kaalmada aderodolfoyada, elizabeth cheema. So [facility] [phone].    ATENCIÓN: Si habla español, tiene a mae disposición servicios gratuitos de asistencia lingüística. [facility] [phone].    We comply with applicable federal civil rights laws and [place] laws. We do not discriminate on the basis of race, color, national origin, age, disability, sex, sexual orientation, or gender identity.            Thank you!     Thank you for choosing [facility]  for your care. Our goal is always to provide you with excellent care. Hearing back from our patients is one way we can continue to improve our services. Please take a few minutes to complete the written survey that you may receive in the mail after your visit with us. Thank you!             Your Updated Medication List - Protect others around you: Learn how to safely use, store and throw away  your medicines at www.disposemymeds.org.          This list is accurate as of 4/18/18 11:59 AM.  Always use your most recent med list.                   Brand Name Dispense Instructions for use Diagnosis    acetaminophen 325 MG tablet    TYLENOL    100 tablet    Take 3 tablets (975 mg) by mouth 3 times daily    Cancer related pain       amLODIPine 10 MG tablet    NORVASC    30 tablet    Take 1 tablet (10 mg) by mouth daily    Essential hypertension       dronabinol 2.5 MG capsule    MARINOL    60 capsule    Take 1 capsule (2.5 mg) by mouth 2 times daily (before meals)    Anorexia, Malignant neoplasm of cervix, unspecified site (H), Nausea       glutamine 500 MG Tabs     120 tablet    Take 500 mg by mouth 2 times daily    Drug-induced polyneuropathy (H)       LORazepam 1 MG tablet    ATIVAN    30 tablet    Take 1 tablet (1 mg) by mouth every 6 hours as needed (Anxiety, Nausea/Vomiting or Sleep)    Malignant neoplasm of cervix, unspecified site (H)       OLANZapine zydis 5 MG ODT tab    zyPREXA    30 tablet    Take 1 tablet (5 mg) by mouth At Bedtime    Anorexia, Malignant neoplasm of cervix, unspecified site (H), Nausea       polyethylene glycol powder    MIRALAX    510 g    Take 17-34 g (1-2 capfuls) by mouth daily    Cancer related pain, Generalized abdominal pain       prochlorperazine 10 MG tablet    COMPAZINE    120 tablet    Take 1 tablet (10 mg) by mouth 3 times daily    Malignant neoplasm of cervix, unspecified site (H), Nausea       ranitidine 75 MG tablet    ZANTAC    60 tablet    Take 1 tablet (75 mg) by mouth 2 times daily    Gastroesophageal reflux disease, esophagitis presence not specified       senna-docusate 8.6-50 MG per tablet    SENOKOT-S;PERICOLACE    100 tablet    Take 2-4 tablets by mouth 2 times daily    Cancer related pain       traMADol 50 MG tablet    ULTRAM    240 tablet    Take 2 tablets (100mg) every morning when you wake up, 2 tablets (100mg)around noon, 2 tablets (100mg) beofre dinner  and 2 tablets (100mg) before bed.    Malignant neoplasm of cervix, unspecified site (H), Cancer related pain

## 2018-04-18 NOTE — IP AVS SNAPSHOT
[facility]    [address] [place] [address]    Phone:  [phone]    Fax:  [phone]                                       After Visit Summary   4/18/2018    Ashvin Tiwari    MRN: [id number]           After Visit Summary Signature Page     I have received my discharge instructions, and my questions have been answered. I have discussed any challenges I see with this plan with the nurse or doctor.    ..........................................................................................................................................  Patient/Patient Representative Signature      ..........................................................................................................................................  Patient Representative Print Name and Relationship to Patient    ..................................................               ................................................  Date                                            Time    ..........................................................................................................................................  Reviewed by Signature/Title    ...................................................              ..............................................  Date                                                            Time

## 2018-04-19 NOTE — NURSING NOTE
Medications and Allergies reviewed with patient.   Vitals done.  Patient complains of some pain on her left side that starts from her lower abdomen and stops in her ribs - she rates the pain a 9 out of 10 on the pain scale.

## 2018-04-19 NOTE — MR AVS SNAPSHOT
After Visit Summary   4/19/2018    Ashivn Tiwari    MRN: [id number]           Patient Information     Date Of Birth          1955        Visit Information        Provider Department      4/19/2018 12:00 PM UC 28 ATC; UC ONCOLOGY INFUSION [facility]        Today's Diagnoses     Chemotherapy-induced neutropenia (H)    -  1    Malignant neoplasm of cervix, unspecified site (H)           Follow-ups after your visit        Your next 10 appointments already scheduled     May 08, 2018  3:30 PM CDT   (Arrive by 3:15 PM)   Return Visit with Briana Medina MD   [facility] ([facility])    [address]  [address]  [facility] [address] [phone]            May 10, 2018  8:30 AM CDT   (Arrive by 8:15 AM)   PET ONCOLOGY WHOLE BODY with UMPPET1   [place] [facility] ([facility])    [address] [address]   [phone]           Tell your doctor:   If there is any chance you may be pregnant or if you are breastfeeding.   If you have problems lying in small spaces (claustrophobia). If you do, your doctor may give you medicine to help you relax. If you have diabetes:   Have your exam early in the morning. Your blood glucose will go up as the day goes by.   Your glucose level must be 180 or less at the start of the exam. Please take any medicines you need to ensure this blood glucose level.   If you are taking insulin in the morning take with breakfast by 6 am and schedule procedure between 12 and 2:15 pm.   If you are taking insulin at night take nightly dose, fast overnight, schedule procedure before 10 am.   If you take insulin both morning and night take morning dose by 6am and schedule procedure between 12 and 2:15 pm.   24 hours before your scan: Don t do any heavy exercise. (No jogging, aerobics or other workouts.) Exercise will make your pictures less accurate.  At  "least 7 hours before your scan, or the evening before if you have an early appointment: Eat a low carb, high protein meal (Lean meats, seafood, beans, soy, low-fat dairy, eggs, nuts & seeds). 6 hours before your scan:   Stop all food and liquids (except water).   Do not chew gum or suck on mints.   If you need to take medicine with food, you may take it with a few crackers.  Please call your Imaging Department at your exam site with any questions.            May 15, 2018  8:30 AM CDT   (Arrive by 8:15 AM)   Return Active Treatment with Barbara Soto MD   [facility] ([facility] [place])    [address]  [address]  [place] [address] [phone]              Who to contact     If you have questions or need follow up information about today's clinic visit or your schedule please contact [facility] directly at [phone].  Normal or non-critical lab and imaging results will be communicated to you by IPextremehart, letter or phone within 4 business days after the clinic has received the results. If you do not hear from us within 7 days, please contact the clinic through Loop88t or phone. If you have a critical or abnormal lab result, we will notify you by phone as soon as possible.  Submit refill requests through Assistera or call your pharmacy and they will forward the refill request to us. Please allow 3 business days for your refill to be completed.          Additional Information About Your Visit        Assistera Information     Assistera lets you send messages to your doctor, view your test results, renew your prescriptions, schedule appointments and more. To sign up, go to [email or web address] . Click on \"Log in\" on the left side of the screen, which will take you to the Welcome page. Then click on \"Sign up Now\" on the right side of the page.     You will be asked to enter the access code listed below, as well as some personal information. Please " follow the directions to create your username and password.     Your access code is: [email or web address]  Expires: 2018  7:30 AM     Your access code will  in 90 days. If you need help or a new code, please call your [place] clinic or [phone].        Care EveryWhere ID     This is your Care EveryWhere ID. This could be used by other organizations to access your [place] medical records  [id number]        Your Vitals Were     Pulse Temperature Respirations Pulse Oximetry          92 98.1  F (36.7  C) (Oral) 18 98%         Blood Pressure from Last 3 Encounters:   18 145/87   18 (!) 161/105   18 150/83    Weight from Last 3 Encounters:   18 79.4 kg (175 lb)   18 79.5 kg (175 lb 3.2 oz)   18 80 kg (176 lb 4.8 oz)              Today, you had the following     No orders found for display       Primary Care Provider Office Phone # Fax #    Chelsea Wren [phone]       [facility] [address] [address]        Equal Access to Services     SHAN LUGO : Hadii elvia mccurdy hadasho Soomaali, waaxda luqadaha, qaybta kaalmada adeegyada, elizabeth cheema. So [facility] [phone].    ATENCIÓN: Si habla español, tiene a mae disposición servicios gratuitos de asistencia lingüística. [place] al [phone].    We comply with applicable federal civil rights laws and [place] laws. We do not discriminate on the basis of race, color, national origin, age, disability, sex, sexual orientation, or gender identity.            Thank you!     Thank you for choosing [facility]  for your care. Our goal is always to provide you with excellent care. Hearing back from our patients is one way we can continue to improve our services. Please take a few minutes to complete the written survey that you may receive in the mail after your visit with us. Thank you!             Your Updated Medication List - Protect others around  you: Learn how to safely use, store and throw away your medicines at www.disposemymeds.org.          This list is accurate as of 4/19/18  2:03 PM.  Always use your most recent med list.                   Brand Name Dispense Instructions for use Diagnosis    acetaminophen 325 MG tablet    TYLENOL    100 tablet    Take 3 tablets (975 mg) by mouth 3 times daily    Cancer related pain       amLODIPine 10 MG tablet    NORVASC    30 tablet    Take 1 tablet (10 mg) by mouth daily    Essential hypertension       dronabinol 2.5 MG capsule    MARINOL    60 capsule    Take 1 capsule (2.5 mg) by mouth 2 times daily (before meals)    Anorexia, Malignant neoplasm of cervix, unspecified site (H), Nausea       glutamine 500 MG Tabs     120 tablet    Take 500 mg by mouth 2 times daily    Drug-induced polyneuropathy (H)       LORazepam 1 MG tablet    ATIVAN    30 tablet    Take 1 tablet (1 mg) by mouth every 6 hours as needed (Anxiety, Nausea/Vomiting or Sleep)    Malignant neoplasm of cervix, unspecified site (H)       OLANZapine zydis 5 MG ODT tab    zyPREXA    30 tablet    Take 1 tablet (5 mg) by mouth At Bedtime    Anorexia, Malignant neoplasm of cervix, unspecified site (H), Nausea       polyethylene glycol powder    MIRALAX    510 g    Take 17-34 g (1-2 capfuls) by mouth daily    Cancer related pain, Generalized abdominal pain       prochlorperazine 10 MG tablet    COMPAZINE    120 tablet    Take 1 tablet (10 mg) by mouth 3 times daily    Malignant neoplasm of cervix, unspecified site (H), Nausea       ranitidine 75 MG tablet    ZANTAC    60 tablet    Take 1 tablet (75 mg) by mouth 2 times daily    Gastroesophageal reflux disease, esophagitis presence not specified       senna-docusate 8.6-50 MG per tablet    SENOKOT-S;PERICOLACE    100 tablet    Take 2-4 tablets by mouth 2 times daily    Cancer related pain       traMADol 50 MG tablet    ULTRAM    240 tablet    Take 2 tablets (100mg) every morning when you wake up, 2 tablets  (100mg)around noon, 2 tablets (100mg) beofre dinner and 2 tablets (100mg) before bed.    Malignant neoplasm of cervix, unspecified site (H), Cancer related pain

## 2018-04-19 NOTE — PROGRESS NOTES
Infusion Nursing Note:  Ashvin Tiwari presents today for Cycle 3 Day 3 Topotecan.    Patient seen by provider today: No    Note: Pt arrived with improved breathing since having the paracentesis. However is having left lower abdominal pain that radiates upwards and rates the pain at 9/10. Took Tramadol prior to coming to infusion. Denies any fevers/chills or other new concerns.   Rachelle Miranda NP came to assess pt due to pain level. Encouraged pt to continue taking home pain medications, heat, etc. Pain is most likely due to the cancer.    Neulasta Onpro On-Body injector applied to left arm at 1315 with light facing down.  Writer discussed Neulasta injection would start tomorrow at 1615, approximately 27 hours after application applied today.  Written and Verbal instruction reviewed with patient.  Pt instructed when the dose delivery starts, it will take about 45 minutes to complete.  Pt aware Neulasta Onpro On-Body should have green flashing light and to call triage or on-call MD if injector flashes red or appears to be leaking. Pt aware to keep Onpro On-Body Neulasta 4 inches away from electrical equipment and to avoid showering 4 hours prior to injection.       Intravenous Access:  Implanted Port.    Treatment Conditions:  Not Applicable. Labs reviewed from 4/17      Post Infusion Assessment:  Patient tolerated infusion without incident.  Blood return noted pre and post infusion.  Site patent and intact, free from redness, edema or discomfort.  No evidence of extravasations. Access discontinued per protocol.    Discharge Plan:   Copy of AVS reviewed with patient and/or family.  Patient will return 5/15 for next provider appointment.  Patient discharged in stable condition accompanied by: family.  Departure Mode: Ambulatory.  Face to Face time: 3 minutes.    Angelica Rutledge RN

## 2018-05-01 NOTE — PROGRESS NOTES
Care Coordinator Note  Call from patient complaining of feeling short of breath.  States she had thoracentesis two weeks ago.  Patient advised to go to ER for SOB, states she will go to ER.     Chante Kuo MSN, RN  Care Coordinator  Gynecologic Cancer   Office:  [phone]  Pager: [phone] #[phone]

## 2018-05-08 NOTE — PROCEDURES
Hospitalist Procedure Service - Thoracentesis Procedure Note  Date of Service: 05/08/2018    Patient: Ashvin Tiwari  MRN: [id number]  Admission Date: 5/8/2018  Hospital Day # 0    Attending: Michelle Thibodeaux Md  Resident: Rianna Valencia MD  Procedure: Therapeutic thoracentesis  Indication: Pleural effusion with associated worsening SOB  Pre-procedure diagnosis: Pleural Effusion  Post-procedure diagnosis: Same    Procedure Note  The risks and benefits of the procedure were explained to Ashvin who expressed understanding and opted to proceed.  Consent was obtained and placed in the chart and the patient was placed on pulse oximetry.  A time out was performed.      An ultrasound probe was used to identify an area of pleural fluid in the right hemithorax.  This area was prepped and draped in the usual sterile fashion.  10 ml of 1% lidocaine was instilled and pleural fluid located. The thoracentesis catheter and needle were inserted above the rib until fluid obtained then the needle removed.    Using a one-way valve, 750 ml were removed.   The catheter was removed with the patient exhaling and an occlusive dressing placed.     Ultrasound revealed normal lung sliding after the procedure and a chest radiograph is pending.  Patient tolerated the procedure well.    Michelle Thibodeaux MD   of Medicine  Med-Peds Hospitalist  Pager [phone]

## 2018-05-08 NOTE — ED PROVIDER NOTES
History     Chief Complaint   Patient presents with     Shortness of Breath     HPI  Ashvin Tiwari is a 62 year old female with a history of cervical cancer (currently on chemotherapy), HTN, and pleural effusions s/p thoracentesis (04/18/18) who presents for evaluation of shortness of breath. Patient complains of shortness of breath that has been progressively worsening over the past one week. She notes her symptoms feel similar to how she felt prior to her recent pleural effusion requiring thoracentesis. She denies cough or fever. Her last chemotherapy treatment was approximately two weeks ago.     I have reviewed the Medications, Allergies, Past Medical and Surgical History, and Social History in the [facility] system.  Past Medical History:   Diagnosis Date     Cancer (H)     cervical     Hypertension        Past Surgical History:   Procedure Laterality Date     BIOPSY/EXCISION LYMPH NODE(S), NEEDLE, SUPERFICIAL (CERVICAL/INGUINAL/AXILLARY) Right 9/21/2017    Procedure: BIOPSY/EXCISION LYMPH NODE(S), NEEDLE, SUPERFICIAL (CERVICAL/INGUINAL/AXILLARY);;  Surgeon: Sonu Denson PA-C;  Location: [facility] OR     INSERT PORT VASCULAR ACCESS Right 9/21/2017    Procedure: INSERT PORT VASCULAR ACCESS;  Single Lumen Chest Power Port, Right Axillary Lymph Node Biopsy;  Surgeon: Sonu Denson PA-C;  Location: [facility] OR     no previous surgery       THORACENTESIS N/A 4/18/2018    Procedure: THORACENTESIS;  Thoracentesis;  Surgeon: Sonu Denson PA-C;  Location: [facility] OR       No family history on file.    Social History   Substance Use Topics     Smoking status: Never Smoker     Smokeless tobacco: Never Used     Alcohol use No       No current facility-administered medications for this encounter.      Current Outpatient Prescriptions   Medication     acetaminophen (TYLENOL) 325 MG tablet     amLODIPine (NORVASC) 10 MG tablet     dronabinol (MARINOL) 2.5 MG capsule     glutamine 500 MG TABS      "LORazepam (ATIVAN) 1 MG tablet     OLANZapine zydis (ZYPREXA) 5 MG ODT tab     polyethylene glycol (MIRALAX) powder     prochlorperazine (COMPAZINE) 10 MG tablet     ranitidine (ZANTAC) 75 MG tablet     senna-docusate (SENOKOT-S;PERICOLACE) 8.6-50 MG per tablet     traMADol (ULTRAM) 50 MG tablet      No Known Allergies    Review of Systems   Constitutional: Negative for fever.   Respiratory: Positive for shortness of breath. Negative for cough.    Cardiovascular: Negative.    Gastrointestinal: Negative.    All other systems reviewed and are negative.      Physical Exam   BP: (!) 189/105  Pulse: 107  Heart Rate: 102  Temp: 98  F (36.7  C)  Resp: 20  Height: 160 cm (5' 3\")  Weight: 77.1 kg (170 lb)  SpO2: 98 %      Physical Exam   Constitutional: She is oriented to person, place, and time. She appears distressed.   Cardiovascular: Regular rhythm.  Tachycardia present.    Pulmonary/Chest: Tachypnea noted. She has decreased breath sounds in the right upper field, the right middle field and the right lower field.   Abdominal: There is no tenderness.   Neurological: She is alert and oriented to person, place, and time.   Skin: Skin is warm.   Psychiatric: She has a normal mood and affect.   Nursing note and vitals reviewed.      ED Course     ED Course     Procedures  Results for orders placed during the hospital encounter of 05/08/18   POC US GUIDE FOR THORACENTESIS    Impression Hospitalist Procedure Service - Thoracentesis Procedure Note  Date of Service: 05/08/2018     Patient: Ashvin Tiwari  MRN: [id number]  Admission Date: 5/8/2018  Hospital Day # 0     Attending: Michelle Thibodeaux Md  Resident: Rianna Valencia MD  Procedure: Therapeutic thoracentesis  Indication: Pleural effusion with associated worsening SOB  Pre-procedure diagnosis: Pleural Effusion  Post-procedure diagnosis: Same     Procedure Note  The risks and benefits of the procedure were explained to Ashvin who expressed understanding and opted to proceed.  " Consent was obtained and placed in the chart and the patient was placed on pulse oximetry.  A time out was performed.       An ultrasound probe was used to identify an area of pleural fluid in the right hemithorax.  This area was prepped and draped in the usual sterile fashion.  10 ml of 1% lidocaine was instilled and pleural fluid located. The thoracentesis catheter and needle were inserted above the rib until fluid obtained then the needle removed.    Using a one-way valve, 750 ml were removed.   The catheter was removed with the patient exhaling and an occlusive dressing placed.     Ultrasound revealed normal lung sliding after the procedure and a chest radiograph is pending.  Patient tolerated the procedure well.     Michelle Thibodeaux MD   of Medicine  Med-Peds Hospitalist  Pager [phone]       9:05 AM  The patient was seen and examined by Dr. Orlando in Room 9.          EKG Interpretation:      Interpreted by Seymour Orlando  Time reviewed: 9:47 AM   Symptoms at time of EKG: dyspnea   Rhythm: normal sinus   Rate: 102  Axis: normal  Ectopy: none  Conduction: normal  ST Segments/ T Waves: No ST-T wave changes  Q Waves: none  Comparison to prior: Unchanged from 2017    Clinical Impression: Sinus tach      5:04  cc of pleural fluid removed, the patient feels better.  Will check chest x-ray and likely discharge the patient home.   5:36 PM Discussed with GynOnc Resident who will talk to her team    No results found for this or any previous visit (from the past 24 hour(s)).  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 Chest x-ray shows bilateral pleural effusions right greater than left. She had the right drained about 2 weeks ago.  I was able to get the Medicine procedure team to do a thoracentesis and afterwards she feels well. Post procedure chest x-ray looks improved. She was seen by the Gynecology team who agrees that she could be discharged home. I do not think this represents anything more concerning the effusion such as ACS or pulmonary embolism.  She did recently have a scan that did not show PE.  She will follow up with Palliative Medicine and with Gynecology Oncology. Return to the ER if any problems or concerns.    This part of the document was transcribed by Danna Burgos Medical Scribe.      I have reviewed the nursing notes.    I have reviewed the findings, diagnosis, plan and need for follow up with the patient.    Discharge Medication List as of 5/8/2018  6:14 PM          Final diagnoses:   Dyspnea, unspecified type   Pleural effusion   Malignant neoplasm of cervix, unspecified site (H)   I, Patti Horvath, am serving as a trained medical scribe to document services personally performed by Donnell Orlando MD, based on the provider's statements to me.   I, Donnell Orlando MD, was physically present and have reviewed and verified the accuracy of this note documented by Patti Horvath.      5/8/2018   [facility], [place], EMERGENCY DEPARTMENT     Seymour Orlando MD  05/16/18 0040       Seymour Orlando MD  05/16/18 0040

## 2018-05-08 NOTE — CONSULTS
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 On her arrival to the ED, she was having a lot of trouble breathing and was having some pain. Also complains of some swelling in her lower extremities. The patient states she previously had a thoracentesis for symptoms similar to this with good resolution. This was about 3 weeks ago. She has some diffuse abdominal discomfort. She has never had a paracentesis per the patient.     She denies fevers, chills, nausea, vomiting, dysuria or difficulty urinating, bowel issues. No chest pain. No vaginal bleeding. No difficulty eating.             Cancer Treatment History:   8/29/17:  Seen in Gyn Onc.  Exam concerning for at least a Stage IIB cervical cancer.  Biopsy obtained consistent with poorly differentiated squamous cell ca.  MRI ordered.      9/1/17: MRI Pelvis with 7 x 7cm mass at level of cervix, protrudes into upper third of vagina, bilateral parametrial extension, abuts bilateral ureters but no obstruction, abuts rectal wall with obliteration of fat plan but no mucosal invasion, no clear bladder wall invasion, suspicious lymph nodes left hemipelvis, peritoneal nodule versus lymph node.      9/6/17 to 9/7/17:  Patient admitted to hospital with pain. CT Chest PE was negative for PE but showed multiple pulmonary nodules consistent with metastatic disease.  CT A/P showed cervical mass with regional metastatic disease as well as peritoneal nodularity.      9/8/17:  PET scan with multiple metastatic hypermetabolic lung nodules, axillary, mediastinal, hilar and abdominal lymph nodes.      9/21/17-11/2/17: Cycle #1-3 Paclitaxel/Carboplatin/Avastin.    11/17/17: PET CTIMPRESSION:   1. In this patient with a history of stage IV poorly differentiated squamous cell carcinoma of the cervix, there has been a positive response to therapy. The primary cervical mass is significantly decreased in size and demonstrates decreased FDG uptake. Similarly, the metastatic pulmonary nodules and metastatic lymphadenopathy has significantly improved.      2. Bilateral hypermetabolic level 2A lymph nodes which have slightly increased in size, of uncertain clinical significance given the positive response in the remainder of the body. Enlargement may be related to the patient's dental and sinus disease. Recommend close attention on follow-up imaging.      3. Periapical lucencies with FDG uptake and dental caries involving multiple upper teeth. Recommend follow-up with dentistry.      4. Asymmetric radiotracer uptake in the left prevertebral soft tissues without discernible mass. Recommend close attention on follow-up imaging.      5. Small bilateral pleural effusions.      11/21/17-1/11/18: Cycle #4-6 Paclitaxel/Carboplatin/Avastin.   2/14/18: PET CT IMPRESSION:   1. In this patient with stage IV poorly differentiated squamous cell carcinoma of the cervix there is evidence for progression of disease:  a. Increased size and FDG avidity of primary cervical mass (maximum SUV 30.49, previously 12.57)  b. New FDG avid 7 mm left inguinal lymph node (maximum SUV 6.06)  c. New FDG avid 10 mm right paratracheal lymph node (maximum SUV 12.40. No FDG avid 10 mm right axillary lymph node (maximum SUV 6.14). Resolution of previously FDG avid 9 mm right axillary lymph node. These findings may be reactive versus metastatic.   2. Increased FDG avidity of the rectum is nonspecific and may represent infection, inflamed hemorrhoids, or malignancy. Recommend direct visualization.  3. Small stable right-sided pleural effusion.   4. Please see dedicated dictation of high-resolution PET CT of the head and  neck.       Plan: Avastin/Cisplatin 50 mg/m2 day 1 +Topotecan 0.75 mg/m2 for 3 days Q 21 days for 3 cycles then repeat imaging.       2/27/18: Cycle #1 Cisplatin/Topotecan/Avastin.   3/20/18: Cycle #2 Cisplatin/Topotecan/Avastin deferred secondary to neutropenia. Given 3/28/18.  4/15/18: ED for SOB; CT Chest Pulmonary Angio:  IMPRESSION:    1.  No CT evidence of pulmonary emboli.  2.  Large right and small left pleural effusions.  3.  Complete consolidation of the right lower lobe.  Atelectasis in the right upper and left lower lobe.  4.  Borderline prominent right axillary lymph node.  4/17/18: Cycle #3 Cisplatin/Topotecan/Avastin. Cisplatin and Topotecan reduced 25% secondary to CrCl.          Past Medical History:     Past Medical History:   Diagnosis Date     Cancer (H)     cervical     Hypertension             Past Surgical History:      Past Surgical History:   Procedure Laterality Date     BIOPSY/EXCISION LYMPH NODE(S), NEEDLE, SUPERFICIAL (CERVICAL/INGUINAL/AXILLARY) Right 9/21/2017    Procedure: BIOPSY/EXCISION LYMPH NODE(S), NEEDLE, SUPERFICIAL (CERVICAL/INGUINAL/AXILLARY);;  Surgeon: Sonu Denson PA-C;  Location:  OR     INSERT PORT VASCULAR ACCESS Right 9/21/2017    Procedure: INSERT PORT VASCULAR ACCESS;  Single Lumen Chest Power Port, Right Axillary Lymph Node Biopsy;  Surgeon: Sonu Denson PA-C;  Location:  OR     no previous surgery       THORACENTESIS N/A 4/18/2018    Procedure: THORACENTESIS;  Thoracentesis;  Surgeon: Sonu Denson PA-C;  Location:  OR            Social History:     Social History   Substance Use Topics     Smoking status: Never Smoker     Smokeless tobacco: Never Used     Alcohol use No            Family History:     No family history on file.            Allergies:     No Known Allergies         Medications:     Current Facility-Administered Medications   Medication     lidocaine (PF) (XYLOCAINE) 1 % injection 10 mL     lidocaine (PF)  (XYLOCAINE) 1 % injection     sodium chloride 0.9% infusion     Current Outpatient Prescriptions   Medication Sig     acetaminophen (TYLENOL) 325 MG tablet Take 3 tablets (975 mg) by mouth 3 times daily     amLODIPine (NORVASC) 10 MG tablet Take 1 tablet (10 mg) by mouth daily     dronabinol (MARINOL) 2.5 MG capsule Take 1 capsule (2.5 mg) by mouth 2 times daily (before meals)     glutamine 500 MG TABS Take 500 mg by mouth 2 times daily     LORazepam (ATIVAN) 1 MG tablet Take 1 tablet (1 mg) by mouth every 6 hours as needed (Anxiety, Nausea/Vomiting or Sleep)     OLANZapine zydis (ZYPREXA) 5 MG ODT tab Take 1 tablet (5 mg) by mouth At Bedtime     polyethylene glycol (MIRALAX) powder Take 17-34 g (1-2 capfuls) by mouth daily     prochlorperazine (COMPAZINE) 10 MG tablet Take 1 tablet (10 mg) by mouth 3 times daily     ranitidine (ZANTAC) 75 MG tablet Take 1 tablet (75 mg) by mouth 2 times daily     senna-docusate (SENOKOT-S;PERICOLACE) 8.6-50 MG per tablet Take 2-4 tablets by mouth 2 times daily     traMADol (ULTRAM) 50 MG tablet Take 2 tablets (100mg) every morning when you wake up, 2 tablets (100mg)around noon, 2 tablets (100mg) beofre dinner and 2 tablets (100mg) before bed.            Review of Systems:   Systemic           no weight changes; no fever; no chills; no night sweats; no appetite changes  Skin           no rashes, or lesions  Eye           no  Tabby-Laryngeal           no   Pulmonary    yes - see HPI  Cardiovascular    yes - see HPI  Gastrointestinal    yes - see HPI  Genitourinary   no urinary frequency; no urinary urgency; no dysuria; no pain; no abnormal vaginal discharge; no abnormal vaginal bleeding  Breast   no  Musculoskeletal    no  Psychiatric           no    Hematologic           no   Endocrine    no         Neurological   no         Physical Exam:     Vitals:    05/08/18 1514 05/08/18 1515 05/08/18 1650 05/08/18 1654   BP: 155/82  (!) 145/91    Pulse:       Resp: 24 22     Temp:        TempSrc:       SpO2: 95% 98% 97% 96%   Weight:       Height:         General: sitting up in bed, appears comfortable  CV: mildly tachycardia intermittent, no murmurs  Resp: CTAB, nonlabored breathing, sat 97% on RA  Abdomen: soft, nontender to palpation, obese, non-distended  : deferred  Extremities: trace edema to BLE          Labs and Imaging:     Results for orders placed or performed during the hospital encounter of 05/08/18 (from the past 24 hour(s))   EKG 12 lead   Result Value Ref Range    Interpretation ECG Click View Image link to view waveform and result    CBC with platelets differential   Result Value Ref Range    WBC 8.2 4.0 - 11.0 10e9/L    RBC Count 3.82 3.8 - 5.2 10e12/L    Hemoglobin 11.3 (L) 11.7 - 15.7 g/dL    Hematocrit 35.8 35.0 - 47.0 %    MCV 94 78 - 100 fl    MCH 29.6 26.5 - 33.0 pg    MCHC 31.6 31.5 - 36.5 g/dL    RDW 17.7 (H) 10.0 - 15.0 %    Platelet Count 324 150 - 450 10e9/L    Diff Method Automated Method     % Neutrophils 57.3 %    % Lymphocytes 29.5 %    % Monocytes 12.3 %    % Eosinophils 0.6 %    % Basophils 0.2 %    % Immature Granulocytes 0.1 %    Nucleated RBCs 0 0 /100    Absolute Neutrophil 4.7 1.6 - 8.3 10e9/L    Absolute Lymphocytes 2.4 0.8 - 5.3 10e9/L    Absolute Monocytes 1.0 0.0 - 1.3 10e9/L    Absolute Eosinophils 0.1 0.0 - 0.7 10e9/L    Absolute Basophils 0.0 0.0 - 0.2 10e9/L    Abs Immature Granulocytes 0.0 0 - 0.4 10e9/L    Absolute Nucleated RBC 0.0     Platelet Estimate Confirming automated cell count    Basic metabolic panel   Result Value Ref Range    Sodium 140 133 - 144 mmol/L    Potassium 3.7 3.4 - 5.3 mmol/L    Chloride 105 94 - 109 mmol/L    Carbon Dioxide 28 20 - 32 mmol/L    Anion Gap 6 3 - 14 mmol/L    Glucose 93 70 - 99 mg/dL    Urea Nitrogen 13 7 - 30 mg/dL    Creatinine 0.82 0.52 - 1.04 mg/dL    GFR Estimate 70 >60 mL/min/1.7m2    GFR Estimate If Black 85 >60 mL/min/1.7m2    Calcium 9.0 8.5 - 10.1 mg/dL   INR   Result Value Ref Range    INR 0.99  0.86 - 1.14   XR Chest 2 Views    Narrative    XR CHEST 2 VW  5/8/2018 9:42 AM      HISTORY: SOA; . History of cervical cancer currently on chemotherapy.  Pleural effusion status post thoracentesis 4/18/2018.    COMPARISON: Outside chest CT for/13/2018    TECHNIQUE: Upright PA and lateral radiographs of the chest.    FINDINGS: Moderate right pleural effusion with intrafissural fluid and  mild left pleural effusion with bibasilar opacities not significantly  changed since prior CT examination. No new focal airspace opacity or  pneumothorax. Cardiac mediastinal silhouette is within normal limits.  Trachea is midline. The upper abdomen is unremarkable. Right-sided  Port-A-Cath with tip projecting over the superior cavoatrial junction.  No significant degenerative change of the spine.      Impression    IMPRESSION:   1. Relatively stable moderate right and mild left pleural effusions  with bibasilar opacities, atelectasis versus infection. No  pneumothorax.  2. No new focal airspace opacity.    These findings were discussed with senior radiology resident Ro Nowak MD.     I have personally reviewed the examination and initial interpretation  and I agree with the findings.    EDUIN MILES MD   XR Chest Port 1 View    Narrative    Exam:  XR CHEST PORT 1 VW, 5/8/2018 5:23 PM    History: post thoracen;     Comparison:  Chest x-ray 5/8/2018    Findings:  Single AP view of the chest. Right IJ Port-A-Cath  terminates at the superior cavoatrial junction, unchanged. Cardiac  silhouette and pulmonary vasculature are also unchanged.  Redemonstration of layering bilateral pleural effusions, the change in  which is difficult to assess given differences in patient positioning  compared to the x-ray performed earlier today. No pneumothorax.      Impression    Impression:    1. No pneumothorax following thoracentesis.  2. Redemonstration of bilateral layering pleural effusions and  bibasilar atelectasis. The change in volume of  the pleural effusions  is difficult to assess given differences in patient positioning  compared to previous x-ray. At minimum, there are persistent small  bilateral pleural effusions.    I have personally reviewed the examination and initial interpretation  and I agree with the findings.    MAXI LINDO MD             Assessment and Plan:   Assessment: 62 year old with Stage IV poorly differentiated SCC of cervix who presented to the ED with increasing shortness of breath. Vitals on admission significant for hypertension to 180s/100s, tachycardia to 107 and requiring 2L by NC to maintain O2 saturations. CXR showed bilateral pleural effusions, similar to her prior presentation a few weeks ago. She received a thoracentesis by the medicine team with significant improvement in her symptoms. On our evaluation, the patient was maintaining sats on room air and had nonlabored breathing. We discussed options for management including an admission to observation overnight versus discharging home with previously planned clinic follow up. The patient very much wanted to go home, as her symptoms were completely alleviated by the thoracentesis. Discussed importance of keeping PET scan and clinic appointment next week, and also recommended that she schedule a follow up with palliative care. She has seen them previously. She potentially could have scheduled palliative thoracenteses in the future if these effusions recur.     The patient understands the plan and has no further questions.     Active Problem List:  Stage IV SCC of cervix  Recurrent pleural effusions  Hypertension    Dispo:  Discharge home. Patient has PET scan this Thursday and appointment with Dr. Soto on 5/15.      Patient seen with Dr. Roberto Carlos Nash MD  Gyn Oncology Resident  [phone]  5/8/2018 5:56 PM

## 2018-05-08 NOTE — ED TRIAGE NOTES
Pt arrives to ED with complaints of SOB. Pt reports she has felt more short of breath this past week and today became worse. Pt denies cough. Pt reports some intermittent chest pain this past week. Pt also complaining of abd pain. Pt's SOB increases with activity. 88% on RA upon arrival, placed on 2LPM oxygen via NC, sats improved to 97-98%. A&O.

## 2018-05-08 NOTE — ED AVS SNAPSHOT
[facility], [place], Emergency Department    [address] [address]    Phone:  [phone]                                       Ashvin Tiwari   MRN: [id number]    Department:  [facility], Emergency Department   Date of Visit:  5/8/2018           After Visit Summary Signature Page     I have received my discharge instructions, and my questions have been answered. I have discussed any challenges I see with this plan with the nurse or doctor.    ..........................................................................................................................................  Patient/Patient Representative Signature      ..........................................................................................................................................  Patient Representative Print Name and Relationship to Patient    ..................................................               ................................................  Date                                            Time    ..........................................................................................................................................  Reviewed by Signature/Title    ...................................................              ..............................................  Date                                                            Time

## 2018-05-08 NOTE — ED AVS SNAPSHOT
[facility], Emergency Department    [address] [address]    Phone:  [phone]                                       Ashvin Tiwari   MRN: [id number]    Department:  [facility], [place], Emergency Department   Date of Visit:  5/8/2018           Patient Information     Date Of Birth          1955        Your diagnoses for this visit were:     Dyspnea, unspecified type     Pleural effusion     Malignant neoplasm of cervix, unspecified site (H)        You were seen by Seymour Orlando MD.        Discharge Instructions       Follow up in your clinic  Return to the ER if you are not doing well particularly difficulty breathing    Your next 10 appointments already scheduled     May 10, 2018  8:30 AM CDT   PET ONCOLOGY WHOLE BODY with UMPPET1   [facility] ([facility])    [address] [address]   [phone]           Tell your doctor:   If there is any chance you may be pregnant or if you are breastfeeding.   If you have problems lying in small spaces (claustrophobia). If you do, your doctor may give you medicine to help you relax. If you have diabetes:   Have your exam early in the morning. Your blood glucose will go up as the day goes by.   Your glucose level must be 180 or less at the start of the exam. Please take any medicines you need to ensure this blood glucose level.   If you are taking insulin in the morning take with breakfast by 6 am and schedule procedure between 12 and 2:15 pm.   If you are taking insulin at night take nightly dose, fast overnight, schedule procedure before 10 am.   If you take insulin both morning and night take morning dose by 6am and schedule procedure between 12 and 2:15 pm.   24 hours before your scan: Don t do any heavy exercise. (No jogging, aerobics or other workouts.) Exercise will make your pictures less accurate.  At least 7 hours before your scan, or the evening before if you have an early  appointment: Eat a low carb, high protein meal (Lean meats, seafood, beans, soy, low-fat dairy, eggs, nuts & seeds). 6 hours before your scan:   Stop all food and liquids (except water).   Do not chew gum or suck on mints.   If you need to take medicine with food, you may take it with a few crackers.  Please call your Imaging Department at your exam site with any questions.            May 15, 2018  8:30 AM CDT   (Arrive by 8:15 AM)   Return Active Treatment with Barbara Soto MD   [facility] ([facility])    [address]  [address]  [place] [address] [phone]              24 Hour Appointment Hotline       To make an appointment at any [facility], call [phone] ([phone]). If you don't have a family doctor or clinic, we will help you find one. [facility] clinics are conveniently located to serve the needs of you and your family.             Review of your medicines      Our records show that you are taking the medicines listed below. If these are incorrect, please call your family doctor or clinic.        Dose / Directions Last dose taken    acetaminophen 325 MG tablet   Commonly known as:  TYLENOL   Dose:  1000 mg   Quantity:  100 tablet        Take 3 tablets (975 mg) by mouth 3 times daily   Refills:  0        amLODIPine 10 MG tablet   Commonly known as:  NORVASC   Dose:  10 mg   Quantity:  30 tablet        Take 1 tablet (10 mg) by mouth daily   Refills:  3        dronabinol 2.5 MG capsule   Commonly known as:  MARINOL   Dose:  2.5 mg   Quantity:  60 capsule        Take 1 capsule (2.5 mg) by mouth 2 times daily (before meals)   Refills:  1        glutamine 500 MG Tabs   Dose:  500 mg   Quantity:  120 tablet        Take 500 mg by mouth 2 times daily   Refills:  3        LORazepam 1 MG tablet   Commonly known as:  ATIVAN   Dose:  1 mg   Quantity:  30 tablet        Take 1 tablet (1 mg) by mouth every 6 hours as needed (Anxiety,  Nausea/Vomiting or Sleep)   Refills:  2        OLANZapine zydis 5 MG ODT tab   Commonly known as:  zyPREXA   Dose:  5 mg   Quantity:  30 tablet        Take 1 tablet (5 mg) by mouth At Bedtime   Refills:  3        polyethylene glycol powder   Commonly known as:  MIRALAX   Dose:  1-2 capful   Quantity:  510 g        Take 17-34 g (1-2 capfuls) by mouth daily   Refills:  1        prochlorperazine 10 MG tablet   Commonly known as:  COMPAZINE   Dose:  10 mg   Quantity:  120 tablet        Take 1 tablet (10 mg) by mouth 3 times daily   Refills:  2        ranitidine 75 MG tablet   Commonly known as:  ZANTAC   Dose:  75 mg   Quantity:  60 tablet        Take 1 tablet (75 mg) by mouth 2 times daily   Refills:  1        senna-docusate 8.6-50 MG per tablet   Commonly known as:  SENOKOT-S;PERICOLACE   Dose:  2-4 tablet   Quantity:  100 tablet        Take 2-4 tablets by mouth 2 times daily   Refills:  1        traMADol 50 MG tablet   Commonly known as:  ULTRAM   Quantity:  240 tablet        Take 2 tablets (100mg) every morning when you wake up, 2 tablets (100mg)around noon, 2 tablets (100mg) beofre dinner and 2 tablets (100mg) before bed.   Refills:  0                Procedures and tests performed during your visit     Basic metabolic panel    CBC with platelets differential    EKG 12 lead    EKG 12-lead, tracing only    INR    Internal Medicine Procedure Team ADULT IP Consult [facility] - Thoracentesis    POC US Guide for Thorcentesis    XR Chest 2 Views    XR Chest Port 1 View      Orders Needing Specimen Collection     None      Pending Results     Date and Time Order Name Status Description    5/8/2018 1517 POC US Guide for Thorcentesis In process             Pending Culture Results     No orders found from 5/6/2018 to 5/9/2018.            Pending Results Instructions     If you had any lab results that were not finalized at the time of your Discharge, you can call the ED Lab Result RN at [phone]. You will be contacted by  "this team for any positive Lab results or changes in treatment. The nurses are available 7 days a week from 10A to 6:30P.  You can leave a message 24 hours per day and they will return your call.        Thank you for choosing [facility]       Thank you for choosing [facility] for your care. Our goal is always to provide you with excellent care. Hearing back from our patients is one way we can continue to improve our services. Please take a few minutes to complete the written survey that you may receive in the mail after you visit with us. Thank you!        [employer] Information     [employer] lets you send messages to your doctor, view your test results, renew your prescriptions, schedule appointments and more. To sign up, go to [email or web address] . Click on \"Log in\" on the left side of the screen, which will take you to the Welcome page. Then click on \"Sign up Now\" on the right side of the page.     You will be asked to enter the access code listed below, as well as some personal information. Please follow the directions to create your username and password.     Your access code is: [email or web address]  Expires: 2018  7:30 AM     Your access code will  in 90 days. If you need help or a new code, please call your [facility] clinic or [phone].        Care EveryWhere ID     This is your Care EveryWhere ID. This could be used by other organizations to access your [facility] medical records  [id number]        Equal Access to Services     LISSET LUGO AH: Hadii elvia sifuenteso Sosyd, waaxda luqadaha, qaybta kaalmada adeegyada, elizabeth ga . So [facility] [phone].    ATENCIÓN: Si habla español, tiene a mae disposición servicios gratuitos de asistencia lingüística. [place] al [phone].    We comply with applicable federal civil rights laws and [place] laws. We do not discriminate on the basis of race, color, national origin, age, disability, sex, sexual orientation, or gender " identity.            After Visit Summary       This is your record. Keep this with you and show to your community pharmacist(s) and doctor(s) at your next visit.

## 2018-05-08 NOTE — DISCHARGE INSTRUCTIONS
Follow up in your clinic  Return to the ER if you are not doing well particularly difficulty breathing

## 2018-05-10 NOTE — DISCHARGE INSTRUCTIONS

## 2018-05-11 NOTE — PROGRESS NOTES
Care Coordinator Note  Called pt and left a message to see if she would be willing to set up appt for Thoracentesis  Every 1 to 2 weeks.     Rima MADSEN RN, OCN  Care Coordinator   Gynecologic Cancer   Office [phone]  Pager [phone] #[phone]  5/14 Did not return my call will discuss at her visit on 5/15/18.  Rima MADSEN RN, OCN  Care Coordinator   Gynecologic Cancer   Office [phone]  Pager [phone] #[phone]

## 2018-05-15 NOTE — MR AVS SNAPSHOT
After Visit Summary   5/15/2018    Ashvin Tiwari    MRN: [id number]           Patient Information     Date Of Birth          1955        Visit Information        Provider Department      5/15/2018 8:30 AM Barbara Soto MD [facility]        Today's Diagnoses     Malignant neoplasm of cervix, unspecified site (H)    -  1      Care Instructions    IR consult for biopsy for Foundation One    Resume chemo with weekly Taxol after biopsy done    Follow-up appt ASAP with Palliative Medicine    Barbara Soto MD  Gynecologic Oncology  [facility]            Follow-ups after your visit        Your next 10 appointments already scheduled     May 18, 2018  1:00 PM CDT   (Arrive by 12:45 PM)   New Patient Visit with Tabitha Millan RD   [facility] ([facility])    [address] [place] [address]  [address]  [facility] [address] [phone]            May 21, 2018  8:30 AM CDT   (Arrive by 8:15 AM)   New Patient Visit with Cuong Wright PA-C   [facility] Interventional Radiology ([facility])    [address] [place] [address]  1st Floor  [facility] [address] [phone]              Future tests that were ordered for you today     Open Future Orders        Priority Expected Expires Ordered    IR Consultation for IR Exam Routine  5/15/2019 5/15/2018            Who to contact     If you have questions or need follow up information about today's clinic visit or your schedule please contact [facility] directly at [phone].  Normal or non-critical lab and imaging results will be communicated to you by MyChart, letter or phone within 4 business days after the clinic has received the results. If you do not hear from us within 7 days, please contact the clinic through MyChart or phone. If you have a critical or abnormal lab result, we will notify you by phone as soon as  "possible.  Submit refill requests through Gibberin or call your pharmacy and they will forward the refill request to us. Please allow 3 business days for your refill to be completed.          Additional Information About Your Visit        Woven IncharIdentiGEN Information     Gibberin lets you send messages to your doctor, view your test results, renew your prescriptions, schedule appointments and more. To sign up, go to [email or web address] . Click on \"Log in\" on the left side of the screen, which will take you to the Welcome page. Then click on \"Sign up Now\" on the right side of the page.     You will be asked to enter the access code listed below, as well as some personal information. Please follow the directions to create your username and password.     Your access code is: [email or web address]  Expires: 2018  7:30 AM     Your access code will  in 90 days. If you need help or a new code, please call your [facility] clinic or [phone].        Care EveryWhere ID     This is your Care EveryWhere ID. This could be used by other organizations to access your [facility] medical records  [id number]        Your Vitals Were     Pulse Temperature Respirations Height Pulse Oximetry BMI (Body Mass Index)    125 98.5  F (36.9  C) (Oral) 16 1.6 m (5' 3\") 93% 31.35 kg/m2       Blood Pressure from Last 3 Encounters:   05/15/18 146/79   18 154/90   18 145/87    Weight from Last 3 Encounters:   05/15/18 80.3 kg (177 lb)   18 77.1 kg (170 lb)   18 79.4 kg (175 lb)               Primary Care Provider Office Phone # Fax #    Chelsea Wren [phone]       [facility] [address] [facility] [address]        Equal Access to Services     LISSET LUGO : Sundar Reese, juan miguel pelletier, qajanet kaalelizabeth danielle. [facility] [phone].    ATENCIÓN: Si habla español, tiene a mae disposición servicios gratuitos de asistencia " lingüística. [place] al [phone].    We comply with applicable federal civil rights laws and [place] laws. We do not discriminate on the basis of race, color, national origin, age, disability, sex, sexual orientation, or gender identity.            Thank you!     Thank you for choosing [facility]  for your care. Our goal is always to provide you with excellent care. Hearing back from our patients is one way we can continue to improve our services. Please take a few minutes to complete the written survey that you may receive in the mail after your visit with us. Thank you!             Your Updated Medication List - Protect others around you: Learn how to safely use, store and throw away your medicines at www.disposemymeds.org.          This list is accurate as of 5/15/18  9:43 AM.  Always use your most recent med list.                   Brand Name Dispense Instructions for use Diagnosis    acetaminophen 325 MG tablet    TYLENOL    100 tablet    Take 3 tablets (975 mg) by mouth 3 times daily    Cancer related pain       amLODIPine 10 MG tablet    NORVASC    30 tablet    Take 1 tablet (10 mg) by mouth daily    Essential hypertension       dronabinol 2.5 MG capsule    MARINOL    60 capsule    Take 1 capsule (2.5 mg) by mouth 2 times daily (before meals)    Anorexia, Malignant neoplasm of cervix, unspecified site (H), Nausea       glutamine 500 MG Tabs     120 tablet    Take 500 mg by mouth 2 times daily    Drug-induced polyneuropathy (H)       LORazepam 1 MG tablet    ATIVAN    30 tablet    Take 1 tablet (1 mg) by mouth every 6 hours as needed (Anxiety, Nausea/Vomiting or Sleep)    Malignant neoplasm of cervix, unspecified site (H)       OLANZapine zydis 5 MG ODT tab    zyPREXA    30 tablet    Take 1 tablet (5 mg) by mouth At Bedtime    Anorexia, Malignant neoplasm of cervix, unspecified site (H), Nausea       polyethylene glycol powder    MIRALAX    510 g    Take 17-34 g (1-2 capfuls) by mouth  daily    Cancer related pain, Generalized abdominal pain       prochlorperazine 10 MG tablet    COMPAZINE    120 tablet    Take 1 tablet (10 mg) by mouth 3 times daily    Malignant neoplasm of cervix, unspecified site (H), Nausea       ranitidine 75 MG tablet    ZANTAC    60 tablet    Take 1 tablet (75 mg) by mouth 2 times daily    Gastroesophageal reflux disease, esophagitis presence not specified       senna-docusate 8.6-50 MG per tablet    SENOKOT-S;PERICOLACE    100 tablet    Take 2-4 tablets by mouth 2 times daily    Cancer related pain       traMADol 50 MG tablet    ULTRAM    240 tablet    Take 2 tablets (100mg) every morning when you wake up, 2 tablets (100mg)around noon, 2 tablets (100mg) beofre dinner and 2 tablets (100mg) before bed.    Malignant neoplasm of cervix, unspecified site (H), Cancer related pain

## 2018-05-15 NOTE — PROGRESS NOTES
"Social Work Follow-Up Encounter Visit  Oncology Clinic    Data/Intervention:  Patient Name:  Ashvin Tiwari  /Age:  1955 (63 year old)    Reason for Follow-Up:  Patient requested to meet with social work during appointment    Collaborated With:    -Patient  -Daughter    SW met with patient and daughter during appointment.  Daughter indicated patient has applied for SSDI but \"the process is taking a long time.\" Daughter asked if this worker would be able to expedite application.  SW indicated that application process often takes several weeks to months and as patient has not been denied, it will likely be a matter of waiting for processing to occur.  Daughter also indicated they were hoping to be able to get PCA services for patient but had not found a provider accepting of patient's insurance. SW gave daughter list of private duty home care agencies to determine if any would be appropriate.    Resources Provided:  Private Duty home care list    Assessment:  Daughter anticipating patient's needs appropriately and seeking resources.     Plan:  Previously provided patient/family with writer's contact information and availability.     Soo Yeon Han, MSW, [facility]HealthSW  Pager: [phone]  Phone: [phone]    "

## 2018-05-15 NOTE — PROGRESS NOTES
"Care Coordinator Note  Appt for Palliative Care,  IR consult and Paracentesis for Monday, Dietician.   Reviewed the plan of care.  Dr Soto does not want the weekly Taxol started until after the Foundation one specimen has been sent.  Pt given a form for Disability for her car.   Reviewed side effects and schedule for weekly Taxol  Discussed quality of life and the importance of during no harm.  \" We have great mariah in God.\"       Patient verbalized back understanding of the above information discussed.   Face to face time spent with patient 20 minutes  Rima MADSEN RN, OCN  Care Coordinator   Gynecologic Cancer   Office [phone]  Pager [phone] #[phone]  "

## 2018-05-15 NOTE — NURSING NOTE
"Oncology Rooming Note    May 15, 2018 7:57 AM   Ashvin Tiwari is a 63 year old female who presents for:    Chief Complaint   Patient presents with     Oncology Clinic Visit     Cervix Ca     Initial Vitals: /79  Pulse 125  Temp 98.5  F (36.9  C) (Oral)  Resp 16  Ht 1.6 m (5' 3\")  Wt 80.3 kg (177 lb)  SpO2 93%  BMI 31.35 kg/m2 Estimated body mass index is 31.35 kg/(m^2) as calculated from the following:    Height as of this encounter: 1.6 m (5' 3\").    Weight as of this encounter: 80.3 kg (177 lb). Body surface area is 1.89 meters squared.  Extreme Pain (8) Comment: back, side, stomach and feet   No LMP recorded. Patient is postmenopausal.  Allergies reviewed: Yes  Medications reviewed: Yes    Medications: Medication refills not needed today.  Pharmacy name entered into EPIC:    [facility] (USE ONLY AT [facility]) - [place], [place] - [address] [facility] [address] - [place], [place] - [address] [facility]    Clinical concerns: Pain at a 8 for feet, back, side and stomach     6 minutes for nursing intake (face to face time)     Silva Bartholomew [facility]              "

## 2018-05-15 NOTE — MR AVS SNAPSHOT
After Visit Summary   5/15/2018    Ashvin Tiwari    MRN: [id number]           Patient Information     Date Of Birth          1955        Visit Information        Provider Department      5/15/2018 3:54 PM Han, Soo Yeon, MSW [facility]        Today's Diagnoses     Visit for counseling    -  1       Follow-ups after your visit        Your next 10 appointments already scheduled     May 18, 2018  1:00 PM CDT   (Arrive by 12:45 PM)   New Patient Visit with Tabitha Millan RD   [facility] ([facility])    [address]  [address]  [facility] [id number] [phone]            May 21, 2018  7:30 AM CDT   (Arrive by 6:30 AM)   IR THORACENTESIS with [email or web address]   [facility] ([facility])    [address]  5th Floor  [facility] [id number] [phone]           1. Laboratory test are to be obtained by your doctor prior to the exam (Hgb/Hct, platelet count, INR) 2. Someone will need to drive you to and from the hospital if you feel you may need sedation for the procedure. 3. Bring a list of all drugs you are taking; include supplements and over-the-counter medications. 4. Wear comfortable clothes and leave your valuables at home. 5. If you are or may be pregnant, be sure to contact your doctor or a Radiology nurse prior to the day of the exam. 6. If you have diabetes, check with your doctor or a Radiology nurse to see if your insulin needs to be adjusted for the exam. 7. If you are taking Coumadin (to thin your blood) please contact your doctor or a Radiology nurse at least 3 days before the exam for special instructions (only need the INR to be <2.0). 8. The day before your exam you may eat your regular diet. Drink no alcoholic beverages for 24 hours prior to the exam. 9. Do not eat any solid food or milk products for 6 hours prior to the exam. You may drink clear liquids  until 2 hours prior to the exam. Clear liquids include the following: water, Jell-O, clear broth, apple juice or any noncarbonated drink that you can see through (no pop!) 10. The morning of the exam you may brush your teeth and take medications as directed with a sip of water. 11. Tell the Radiology nurse if you have any allergies. 12. If the tube needs to remain in place you will remain in the hospital until the tube can be removed 13. If the tube is removed immediately you may leave the hospital following your exam. However, if you received sedation you will need to stay 1-2 hours. You may be asked to stay longer and have a chest x-ray sometime after the procedure. 14. If you received sedation, you cannot drive until morning, and an adult must be with you until then. If you live more than one hour away you should stay in the [place] area overnight.            May 21, 2018   Procedure with Sonu Denson PA-C   [facility] ([facility])    [address] [place] [address]  5th Floor  [facility] [id number] [phone]           Located in the [facility] at [address] [place] [address].   parking is very convenient and highly recommended.  is a $6 flat rate fee.  Both  and self parkers should enter the main arrival plaza from [facility]; parking attendants will direct you based on your parking preference.            May 21, 2018  8:30 AM CDT   (Arrive by 8:15 AM)   New Patient Visit with Cuong Wright PA-C   [facility] Interventional Radiology ([facility])    [address] [place] [address]  1st Floor  [facility] [id number] [phone]            Jun 05, 2018 10:00 AM CDT   (Arrive by 9:45 AM)   Return Visit with Briana Medina MD   [facility] ([facility])    [address] [place] [address]  [address]  [facility] [id number] [phone]         "      Future tests that were ordered for you today     Open Future Orders        Priority Expected Expires Ordered    IR Consultation for IR Exam Routine  5/15/2019 5/15/2018            Who to contact     If you have questions or need follow up information about today's clinic visit or your schedule please contact [facility] directly at [phone].  Normal or non-critical lab and imaging results will be communicated to you by MyChart, letter or phone within 4 business days after the clinic has received the results. If you do not hear from us within 7 days, please contact the clinic through Leyou softwarehart or phone. If you have a critical or abnormal lab result, we will notify you by phone as soon as possible.  Submit refill requests through [email or web address] or call your pharmacy and they will forward the refill request to us. Please allow 3 business days for your refill to be completed.          Additional Information About Your Visit        Leyou softwarehart Information     [email or web address] lets you send messages to your doctor, view your test results, renew your prescriptions, schedule appointments and more. To sign up, go to [email or web address] . Click on \"Log in\" on the left side of the screen, which will take you to the Welcome page. Then click on \"Sign up Now\" on the right side of the page.     You will be asked to enter the access code listed below, as well as some personal information. Please follow the directions to create your username and password.     Your access code is: [email or web address]  Expires: 2018  7:30 AM     Your access code will  in 90 days. If you need help or a new code, please call your [facility] clinic or [phone].        Care EveryWhere ID     This is your Care EveryWhere ID. This could be used by other organizations to access your [facility] medical records  [id number]         Blood Pressure from Last 3 Encounters:   05/15/18 146/79   18 154/90   18 145/87    Weight from Last 3 " Encounters:   05/15/18 80.3 kg (177 lb)   05/08/18 77.1 kg (170 lb)   04/18/18 79.4 kg (175 lb)              Today, you had the following     No orders found for display       Primary Care Provider Office Phone # Fax #    Chelsea Wren [phone]       [facility] [address]  [facility] [address]        Equal Access to Services     LISSET LUGO : Hadii aad ku hadasho Soomaali, waaxda luqadaha, qaybta kaalmada adeegyada, waxay idiin hayaan adeeg khadriansheila laaraceli . So [facility] [phone].    ATENCIÓN: Si habla español, tiene a mae disposición servicios gratuitos de asistencia lingüística. [place] al [phone].    We comply with applicable federal civil rights laws and [place] laws. We do not discriminate on the basis of race, color, national origin, age, disability, sex, sexual orientation, or gender identity.            Thank you!     Thank you for choosing [facility]  for your care. Our goal is always to provide you with excellent care. Hearing back from our patients is one way we can continue to improve our services. Please take a few minutes to complete the written survey that you may receive in the mail after your visit with us. Thank you!             Your Updated Medication List - Protect others around you: Learn how to safely use, store and throw away your medicines at www.disposemymeds.org.          This list is accurate as of 5/15/18  3:59 PM.  Always use your most recent med list.                   Brand Name Dispense Instructions for use Diagnosis    acetaminophen 325 MG tablet    TYLENOL    100 tablet    Take 3 tablets (975 mg) by mouth 3 times daily    Cancer related pain       amLODIPine 10 MG tablet    NORVASC    30 tablet    Take 1 tablet (10 mg) by mouth daily    Essential hypertension       dronabinol 2.5 MG capsule    MARINOL    60 capsule    Take 1 capsule (2.5 mg) by mouth 2 times daily (before meals)    Anorexia, Malignant neoplasm of cervix,  unspecified site (H), Nausea       glutamine 500 MG Tabs     120 tablet    Take 500 mg by mouth 2 times daily    Drug-induced polyneuropathy (H)       LORazepam 1 MG tablet    ATIVAN    30 tablet    Take 1 tablet (1 mg) by mouth every 6 hours as needed (Anxiety, Nausea/Vomiting or Sleep)    Malignant neoplasm of cervix, unspecified site (H)       OLANZapine zydis 5 MG ODT tab    zyPREXA    30 tablet    Take 1 tablet (5 mg) by mouth At Bedtime    Anorexia, Malignant neoplasm of cervix, unspecified site (H), Nausea       polyethylene glycol powder    MIRALAX    510 g    Take 17-34 g (1-2 capfuls) by mouth daily    Cancer related pain, Generalized abdominal pain       prochlorperazine 10 MG tablet    COMPAZINE    120 tablet    Take 1 tablet (10 mg) by mouth 3 times daily    Malignant neoplasm of cervix, unspecified site (H), Nausea       ranitidine 75 MG tablet    ZANTAC    60 tablet    Take 1 tablet (75 mg) by mouth 2 times daily    Gastroesophageal reflux disease, esophagitis presence not specified       senna-docusate 8.6-50 MG per tablet    SENOKOT-S;PERICOLACE    100 tablet    Take 2-4 tablets by mouth 2 times daily    Cancer related pain       traMADol 50 MG tablet    ULTRAM    240 tablet    Take 2 tablets (100mg) every morning when you wake up, 2 tablets (100mg)around noon, 2 tablets (100mg) beofre dinner and 2 tablets (100mg) before bed.    Malignant neoplasm of cervix, unspecified site (H), Cancer related pain

## 2018-05-15 NOTE — LETTER
5/15/2018       RE: Ashvin Tiwari  [address]  [place] [address]     Dear Colleague,    Thank you for referring your patient, Ashvin Tiwari, to the [facility]. Please see a copy of my visit note below.                            Consult Notes on Referred Patient    Date: 5/15/2018     The patient returns today for follow-up.      Her history is as follows:  Patient is a 62 year old A1 woman with no significant past medical history who presented with 3 weeks of vaginal spotting, lower abdominal pain that radiates to the lower back. TShe was treated for UTI with Amoxicillin, since her UA was suggestive of an infection. She didn't feel any improvement after the treatment and underwent pelvic exam that revealed cervical mass. She was referred to gynecology at [facility] for biopsy however that was not done in the office due to concern of ongoing bleeding. Patient never had PAP smear done in the past, never had mammogram or colonoscopy.       17:  Seen in Gyn Onc.  Exam concerning for at least a Stage IIB cervical cancer.  Biopsy obtained consistent with poorly differentiated squamous cell ca.  MRI ordered.      17: MRI Pelvis with 7 x 7cm mass at level of cervix, protrudes into upper third of vagina, bilateral parametrial extension, abuts bilateral ureters but no obstruction, abuts rectal wall with obliteration of fat plan but no mucosal invasion, no clear bladder wall invasion, suspicious lymph nodes left hemipelvis, peritoneal nodule versus lymph node.      17 to 17:  Patient admitted to hospital with pain. CT Chest PE was negative for PE but showed multiple pulmonary nodules consistent with metastatic disease.  CT A/P showed cervical mass with regional metastatic disease as well as peritoneal nodularity.      17:  PET scan with multiple metastatic hypermetabolic lung nodules, axillary, mediastinal, hilar and abdominal lymph nodes.      17:  Seen in  clinic.  Recommended treatment for Stage IV poorly differentiated squamous cell ca with , substituting Carbo for Cisplatin.       9/21/17-11/2/17: Cycle #1-3 Paclitaxel/Carboplatin/Avastin.   Avastin held with Cycle #3 due to HTN.      11/17/17:  PET/CT with positive response to therapy.  Decrease in size of cervical mass, decreased pulmonary and metastatic lymphadenopathy.      11/21/17-1/11/18: Cycle #4-6 Paclitaxel/Carboplatin/Avastin      2/14/18:  PET/CT:  Progessive disease with increased pelvic mass, new left inguinal lymph node, right paratracheal lymph node, right axillary lymph node.  Switch to Cisplatin, Top, Monika.     2/27/18: Cycle #1 Cisplatin/Topotecan/Avastin.     3/28/18: Cycle #2 Cisplatin/Topotecan/Avastin deferred one week secondary to neutropenia.     4/15/18: ED for SOB; CT Chest Pulmonary Angio with large right and small left pleural effusions. Complete consolidation of the right lower lobe.  Atelectasis in the right upper and left lower lobe.     4/17/18: Cycle #3 Cisplatin/Topotecan/Avastin. Cisplatin and Topotecan reduced 25% secondary to CrCl.      4/18/18: Right thoracentesis 800 ml     5/10/18: PET CT Progressive disease with increased FDG uptake cervix, new peritoneal nodules, new hypermetabolic liver mets, increased number of metastatic left inguinal lymph nodes, increased mediastinal lymph nodes, new left pulmonary nodules, new cervical lymph node, moderate to severe pleural effusion    The patient presents today for follow-up.  She has had continued decline.  Decreased energy, increased SOB, recurrent effusions, increased abdominal pain.           Past Medical History:    Past Medical History:   Diagnosis Date     Cancer (H)     cervical     Hypertension          Past Surgical History:    Past Surgical History:   Procedure Laterality Date     BIOPSY/EXCISION LYMPH NODE(S), NEEDLE, SUPERFICIAL (CERVICAL/INGUINAL/AXILLARY) Right 9/21/2017    Procedure: BIOPSY/EXCISION LYMPH  NODE(S), NEEDLE, SUPERFICIAL (CERVICAL/INGUINAL/AXILLARY);;  Surgeon: Sonu Denson PA-C;  Location: [facility] OR     INSERT PORT VASCULAR ACCESS Right 9/21/2017    Procedure: INSERT PORT VASCULAR ACCESS;  Single Lumen Chest Power Port, Right Axillary Lymph Node Biopsy;  Surgeon: Sonu Denson PA-C;  Location: [facility] OR     no previous surgery       THORACENTESIS N/A 4/18/2018    Procedure: THORACENTESIS;  Thoracentesis;  Surgeon: Sonu Denson PA-C;  Location: [facility]     THORACENTESIS Bilateral 5/21/2018    Procedure: THORACENTESIS;  Thoracentesis;  Surgeon: Sonu Denson PA-C;  [facility]: [facility]     THORACENTESIS Bilateral 6/1/2018    Procedure: THORACENTESIS;  Thoracentesis;  Surgeon: Michelle Sparrow PA-C;  Location: [facility] OR         Health Maintenance:  Health Maintenance Due   Topic Date Due     TETANUS IMMUNIZATION (SYSTEM ASSIGNED)  05/10/1971     HIV SCREEN (SYSTEM ASSIGNED)  05/10/1971     HEPATITIS C SCREENING  05/10/1971     LIPID SCREEN Q5 YR FEMALE (SYSTEM ASSIGNED)  05/10/1998     COLON CANCER SCREEN (SYSTEM ASSIGNED)  05/10/2003     ADVANCE DIRECTIVE PLANNING Q5 YRS  05/10/2008     FALL RISK ASSESSMENT  05/10/2018     DEXA SCAN SCREENING (SYSTEM ASSIGNED)  05/10/2018     PNEUMOCOCCAL (1 of 2 - PCV13) 05/10/2018         Current Medications:     has a current medication list which includes the following prescription(s): amlodipine, dronabinol, glutamine, lorazepam, olanzapine zydis, polyethylene glycol, prochlorperazine, ranitidine, senna-docusate, acetaminophen, lorazepam, methylphenidate, oxycodone ir, prochlorperazine, and tramadol.       Allergies:     [unfilled]        Social History:     Social History   Substance Use Topics     Smoking status: Never Smoker     Smokeless tobacco: Never Used     Alcohol use No       History   Drug Use No           Family History:     The patient's family history is notable for:    No family history on file.      Physical Exam:  "    /79  Pulse 125  Temp 98.5  F (36.9  C) (Oral)  Resp 16  Ht 1.6 m (5' 3\")  Wt 80.3 kg (177 lb)  SpO2 93%  BMI 31.35 kg/m2  Body mass index is 31.35 kg/(m^2).    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   Switched to Cis/Top/Monika and PET scan shows widely progressive disease and symptoms notable for recurrent symptomatic pleural effusions.    Dennis discussion with patient and her family regarding progressive disease, incurable nature of the cancer, that additional chemotherapy is palliative only.  Options of symptomatic supportive care versus palliative third line therapy discussed.   Her performance status is declining rapidly, is having worsening difficulty with pleural effusions.  Treatment options limited.  No current clinical trial options available.  Discussed IR biopsy of lesion for molecular profiling to assess for possible targeted therapy through [facility] [place].  Turn around time will likely be about 2-3 wks pending how quickly she can be biopsied.  Risks and benefits discussed.  At this time, patient would like to continue to receive treatment as long as she is able to " tolerated.    Therefore, will arrange IR guided biopsy.  Once biopsy performed, will start single agent weekly Taxol as this is fairly well tolerated until results of tumor sequencing available.    Discussed in detail, questions answered.  Will arrange for patient to have regular thoracentesis to manage pleural effusions.  Palliative, supportive care discussed. Will have patient continue to follow-up with Palliative Medicine.      Recommend Cisplatin/Topotecan with continuation of Monika, cisplatin 50 mg/m2 day 1 + topotecan 0.75 mg/m2 for 3 days q21 days recommended. Dose reduction for toxicity.  Continue Monika due to clear benefit for cervical cancer and in maintanence therapy for other gyn sites.        Risks and benefits of treatment discussed, anticipated side-effects and monitoring.  Chemo teaching.       2.)                 Palliative Care:  Symptom management, goals of care.  She has follow-up with them on 3/2/18        Barbara Soto MD  Gynecologic Oncology  [facility]    CC  Patient Care Team:  Chelsea Wren as PCP - General (Family Practice)  Sita Gallardo as Referring Physician (OB/Gyn)  Rima Saunders, TIMOTHY as Continuity Care Coordinator (Gyn-Onc)  Leia Caraballo RN as Registered Nurse (Palliative)  Barbara Soto MD as Referring Physician (OB/Gyn)

## 2018-05-15 NOTE — PATIENT INSTRUCTIONS
IR consult for biopsy for [facility]    Resume chemo with weekly Taxol after biopsy done    Follow-up appt ASAP with Palliative Medicine    Barbara Soto MD  Gynecologic Oncology  [facility]

## 2018-05-16 NOTE — TELEPHONE ENCOUNTER
RECORDS RECEIVED FROM: Barbara Soto   DATE RECEIVED: Internal   NOTES STATUS DETAILS   OFFICE NOTE from referring provider Internal 11/10/17 - 3/28/18   OFFICE NOTE from other specialist Internal IR OV 9/19/17 - Christy Vyas   DISCHARGE SUMMARY from hospital Internal    DISCHARGE REPORT from the ER Internal    OPERATIVE REPORT Internal    MEDICATION LIST Internal    XRAYS (IMAGES & REPORTS) Internal    PATHOLOGY  Slides & report N/A

## 2018-05-18 NOTE — PROGRESS NOTES
"CLINICAL NUTRITION SERVICES - ASSESSMENT NOTE    Ashvin Tiwari 63 year old referred for MNT related to cervical cancer    Time Spent: 30 minutes  Visit Type: initial  Referring Physician: Ruth  Pt accompanied by: her daughter    NUTRITION HISTORY  Factors affecting nutrition intake include:decreased appetite  Current diet: general  Current appetite/intake: fair  PEG Tube: No  Chemotherapy: Cisplatin/topotecan/Avastin   Radiation: Melinda Lock presents today c/o low appetite and low energy.   She denies diarrhea, constipation, mucositis.   She has been eating 3 meals/day, prepared by her daughter.    She has obtained a referral form for [facility], however, does not feel as though she needs this at this time.     She takes Ensure Plus when she skips meals 2/2 to poor appetite.  She reports having at least 1/day.     Diet Recall  Breakfast Oatmeal or cream of wheat, avocado   Lunch Vegetable soup (kale, beans, carrots, quinoa), salmon    Dinner Oatmeal or cream of wheat   Snacks Nuts, fruit   Beverages 5-6 cups water/day, 1 cup green tea, 1-2 cups juice     ANTHROPOMETRICS  Height: 63\"  Weight: 177 lbs/80  BMI: 31.1  Weight Status:  Obesity Grade I BMI 30-34.9  IBW: 115 lbs  % IBW: 153%  Weight History: stable, up 5 lbs x past 1 month  Wt Readings from Last 6 Encounters:   05/15/18 80.3 kg (177 lb)   05/08/18 77.1 kg (170 lb)   04/18/18 79.4 kg (175 lb)   04/18/18 79.5 kg (175 lb 3.2 oz)   04/17/18 80 kg (176 lb 4.8 oz)   04/10/18 78.4 kg (172 lb 13.5 oz)     Dosing Weight: 130 lbs/60kg    Medications/vitamins/minerals/herbals:   Reviewed  Vitamin D3 per pt report (amount unknown)  Zyprexa for appetite stimulant per pt report    LABS  Labs reviewed    ASSESSED NUTRITION NEEDS:  Estimated Energy Needs: 1800 kcals (30 Kcal/Kg)  Justification: maintenance  Estimated Protein Needs: 72 grams protein 1.2 g pro/Kg)  Justification: maintenance  Estimated Fluid Needs: 2000mL   Justification: increased needs with " chemotherapy    MALNUTRITION:  % Weight Loss:  None noted  % Intake:  No decreased intake noted  Subcutaneous Fat Loss:  None observed  Muscle Loss:  None observed  Fluid Retention:  None noted    Malnutrition Diagnosis: Patient does not meet two of the above criteria necessary for diagnosing malnutrition but is at risk    NUTRITION DIAGNOSIS:  Inadequate oral intake related to decreased appetite as evidenced by pt report, diet recall    INTERVENTIONS  Nutrition Education     Provided written & verbal education on:   - Ways to maximize kcal and protein intake. Discussed calorie and protein needs for maintenance of weight and nutrition status.  Advised pt to aim for at least 1800kcal and 70g protein via 5-6 small frequent meals.  Encouraged pt to have a protein source with each meal.  Reviewed sources of protein.    - Water intake - encouraged pt to increase water to at least 8 cups /day with receiving cisplatin 2/2 risk of nephrotoxicity.  Suggested pt drink fluids between meals.    - Coping with barriers - as chemotherapy/radiation progresses, eating may become more difficult and discussed ways to cope with this.   Encouraged pt to utilize [facility] if meal prep becomes distressing for her and/or her mother.   - ONS (Ensure Enlive, Ensure Plus/Boost Plus, CIB etc). Suggested ways to incorporate these supplements to avoid flavor fatigue. Encouraged pt to start consuming 2 ONS daily.       Provided pt with corresponding education materials/handouts on:  High Calorie, High Protein Recipe booklet, Tips to Increase the Calories in Your Diet  and Protein Sources.      Goals  1.  Aim for 5-6 small frequent meals  2.  Aim for 1800kcal and 70g protein/day  3. Weight maintenance through cancer treatment      Follow-Up Plans: Pt has RD contact information for questions.    Pt encouraged to follow up with RD in 2 weeks at the time of infusion or radiation treatment.     MONITORING AND EVALUATION:  -Food intake  -Fluid/beverage  intake  -Liquid meal replacement or supplement  -Weight trends    Tabitha Millan RD, LD

## 2018-05-18 NOTE — LETTER
"5/18/2018       RE: Ashvin Tiwari  [address]  [place] [facility] [place] [place] [facility] [place] [address]     Dear Colleague,    Thank you for referring your patient, Ashvin Tiwari, to the [facility]. Please see a copy of my visit note below.    CLINICAL NUTRITION SERVICES - ASSESSMENT NOTE    Ashvin Tiwari 63 year old referred for MNT related to cervical cancer    Time Spent: 30 minutes  Visit Type:  initial  Referring Physician:  Ruth  Pt accompanied by:  her daughter    NUTRITION HISTORY  Factors affecting nutrition intake include:decreased appetite  Current diet: general  Current appetite/intake: fair  PEG Tube: No  Chemotherapy: Cisplatin/topotecan/Avastin   Radiation: No    Ashvin presents today c/o low appetite and low energy.   She denies diarrhea, constipation, mucositis.   She has been eating 3 meals/day, prepared by her daughter.    She has obtained a referral form for [facility], however, does not feel as though she needs this at this time.     She takes Ensure Plus when she skips meals 2/2 to poor appetite.  She reports having at least 1/day.     Diet Recall  Breakfast Oatmeal or cream of wheat, avocado   Lunch Vegetable soup (kale, beans, carrots, quinoa), salmon    Dinner Oatmeal or cream of wheat   Snacks Nuts, fruit   Beverages 5-6 cups water/day, 1 cup green tea, 1-2 cups juice     ANTHROPOMETRICS  Height: 63\"  Weight: 177 lbs/80  BMI: 31.1  Weight Status:  Obesity Grade I BMI 30-34.9  IBW: 115 lbs  % IBW: 153%  Weight History: stable, up 5 lbs x past 1 month  Wt Readings from Last 6 Encounters:   05/15/18 80.3 kg (177 lb)   05/08/18 77.1 kg (170 lb)   04/18/18 79.4 kg (175 lb)   04/18/18 79.5 kg (175 lb 3.2 oz)   04/17/18 80 kg (176 lb 4.8 oz)   04/10/18 78.4 kg (172 lb 13.5 oz)     Dosing Weight: 130 lbs/60kg    Medications/vitamins/minerals/herbals:   Reviewed  Vitamin D3 per pt report (amount unknown)  Zyprexa for appetite stimulant per pt report    LABS  Labs reviewed    ASSESSED NUTRITION " NEEDS:  Estimated Energy Needs: 1800 kcals (30 Kcal/Kg)  Justification: maintenance  Estimated Protein Needs: 72 grams protein 1.2 g pro/Kg)  Justification: maintenance  Estimated Fluid Needs: 2000mL   Justification: increased needs with chemotherapy    MALNUTRITION:  % Weight Loss:  None noted  % Intake:  No decreased intake noted  Subcutaneous Fat Loss:  None observed  Muscle Loss:  None observed  Fluid Retention:  None noted    Malnutrition Diagnosis: Patient does not meet two of the above criteria necessary for diagnosing malnutrition but is at risk    NUTRITION DIAGNOSIS:  Inadequate oral intake related to decreased appetite as evidenced by pt report, diet recall    INTERVENTIONS  Nutrition Education     Provided written & verbal education on:   - Ways to maximize kcal and protein intake. Discussed calorie and protein needs for maintenance of weight and nutrition status.  Advised pt to aim for at least 1800kcal and 70g protein via 5-6 small frequent meals.  Encouraged pt to have a protein source with each meal.  Reviewed sources of protein.    - Water intake - encouraged pt to increase water to at least 8 cups /day with receiving cisplatin 2/2 risk of nephrotoxicity.  Suggested pt drink fluids between meals.    - Coping with barriers - as chemotherapy/radiation progresses, eating may become more difficult and discussed ways to cope with this.   Encouraged pt to utilize [facility] if meal prep becomes distressing for her and/or her mother.   - ONS (Ensure Enlive, Ensure Plus/Boost Plus, CIB etc). Suggested ways to incorporate these supplements to avoid flavor fatigue. Encouraged pt to start consuming 2 ONS daily.       Provided pt with corresponding education materials/handouts on:  High Calorie, High Protein Recipe booklet, Tips to Increase the Calories in Your Diet  and Protein Sources.      Goals  1.  Aim for 5-6 small frequent meals  2.  Aim for 1800kcal and 70g protein/day  3. Weight maintenance through  cancer treatment      Follow-Up Plans: Pt has RD contact information for questions.    Pt encouraged to follow up with RD in 2 weeks at the time of infusion or radiation treatment.     MONITORING AND EVALUATION:  -Food intake  -Fluid/beverage intake  -Liquid meal replacement or supplement  -Weight trends    Tabitha Millan, MARLENE, LD

## 2018-05-18 NOTE — TELEPHONE ENCOUNTER
Ashvin's daughter called reporting she is having a lot of trouble breathing today and would like to know if she could get her IR thoracentesis rescheduled to today (set for Monday 5/21). Advised if she is struggling to breath and SOB she needs to be transported via ambulance to the ED.     Her daughter is concerned about the wait time in the ED. Advised again if she is brought in via ambulance for dyspnea she will be top priority. She is agreeable with the plan and will call 911 for transport.

## 2018-05-18 NOTE — MR AVS SNAPSHOT
After Visit Summary   5/18/2018    Ashvin Tiwari    MRN: [id number]           Patient Information     Date Of Birth          1955        Visit Information        Provider Department      5/18/2018 1:00 PM Tabitha Mills, MARLENE [facility]        Today's Diagnoses     Malignant neoplasm of cervix, unspecified site (H)    -  1       Follow-ups after your visit        Your next 10 appointments already scheduled     May 21, 2018  7:30 AM CDT   (Arrive by 6:30 AM)   IR THORACENTESIS with [id number]   [facility] ([facility])    [address]  5th Floor  [facility] [address] [phone]           1. Laboratory test are to be obtained by your doctor prior to the exam (Hgb/Hct, platelet count, INR) 2. Someone will need to drive you to and from the hospital if you feel you may need sedation for the procedure. 3. Bring a list of all drugs you are taking; include supplements and over-the-counter medications. 4. Wear comfortable clothes and leave your valuables at home. 5. If you are or may be pregnant, be sure to contact your doctor or a Radiology nurse prior to the day of the exam. 6. If you have diabetes, check with your doctor or a Radiology nurse to see if your insulin needs to be adjusted for the exam. 7. If you are taking Coumadin (to thin your blood) please contact your doctor or a Radiology nurse at least 3 days before the exam for special instructions (only need the INR to be <2.0). 8. The day before your exam you may eat your regular diet. Drink no alcoholic beverages for 24 hours prior to the exam. 9. Do not eat any solid food or milk products for 6 hours prior to the exam. You may drink clear liquids until 2 hours prior to the exam. Clear liquids include the following: water, Jell-O, clear broth, apple juice or any noncarbonated drink that you can see through (no pop!) 10. The morning of the exam you may brush your  teeth and take medications as directed with a sip of water. 11. Tell the Radiology nurse if you have any allergies. 12. If the tube needs to remain in place you will remain in the hospital until the tube can be removed 13. If the tube is removed immediately you may leave the hospital following your exam. However, if you received sedation you will need to stay 1-2 hours. You may be asked to stay longer and have a chest x-ray sometime after the procedure. 14. If you received sedation, you cannot drive until morning, and an adult must be with you until then. If you live more than one hour away you should stay in the [place] area overnight.            May 21, 2018   Procedure with Sonu Denson PA-C   [facility] ([facility])    [address] [place] [address]  5th Floor  [facility] [address] [phone]           Located in the [facility] at [address] [place] [address].   parking is very convenient and highly recommended.  is a $6 flat rate fee.  Both  and self parkers should enter the main arrival plaza from [facility]; parking attendants will direct you based on your parking preference.            May 21, 2018  8:30 AM CDT   (Arrive by 8:15 AM)   New Patient Visit with Cuong Wright PA-C   [facility] ([facility])    [address] [place] [address]  1st Floor  [facility] [id number] [phone]            Jun 05, 2018 10:00 AM CDT   (Arrive by 9:45 AM)   Return Visit with Briana Medina MD   [facility] ([facility])    [address] [place] [address]  [address]  [facility] [address] [phone]              Who to contact     If you have questions or need follow up information about today's clinic visit or your schedule please contact [facility] directly at [phone].  Normal or non-critical  "lab and imaging results will be communicated to you by MyChart, letter or phone within 4 business days after the clinic has received the results. If you do not hear from us within 7 days, please contact the clinic through [employer] or phone. If you have a critical or abnormal lab result, we will notify you by phone as soon as possible.  Submit refill requests through [employer] or call your pharmacy and they will forward the refill request to us. Please allow 3 business days for your refill to be completed.          Additional Information About Your Visit        [employer] Information     [employer] lets you send messages to your doctor, view your test results, renew your prescriptions, schedule appointments and more. To sign up, go to [email or web address] . Click on \"Log in\" on the left side of the screen, which will take you to the Welcome page. Then click on \"Sign up Now\" on the right side of the page.     You will be asked to enter the access code listed below, as well as some personal information. Please follow the directions to create your username and password.     Your access code is: [email or web address]  Expires: 2018  7:30 AM     Your access code will  in 90 days. If you need help or a new code, please call your [facility] clinic or [phone].        Care EveryWhere ID     This is your Care EveryWhere ID. This could be used by other organizations to access your [facility] medical records  [id number]         Blood Pressure from Last 3 Encounters:   05/15/18 146/79   18 154/90   18 145/87    Weight from Last 3 Encounters:   05/15/18 80.3 kg (177 lb)   18 77.1 kg (170 lb)   18 79.4 kg (175 lb)              We Performed the Following     MNT INDIVIDUAL INITIAL EA 15 MIN        Primary Care Provider Office Phone # Fax #    Chelsea Wren [phone]       [facility] [address] [facility] [address]        Equal Access to Services     LISSET LUGO AH: Hadii aad " kaz Reese, wamaryurida luqadaha, qaybta kaalmada kamala, elizabeth lainain hayaarenetta carrizalesrodolfo kendalvika laLloydchris anette. So [facility] [phone].    ATENCIÓN: Si habla william, tiene a mae disposición servicios gratuitos de asistencia lingüística. [place] al [phone].    We comply with applicable federal civil rights laws and [place] laws. We do not discriminate on the basis of race, color, national origin, age, disability, sex, sexual orientation, or gender identity.            Thank you!     Thank you for choosing [facility]  for your care. Our goal is always to provide you with excellent care. Hearing back from our patients is one way we can continue to improve our services. Please take a few minutes to complete the written survey that you may receive in the mail after your visit with us. Thank you!             Your Updated Medication List - Protect others around you: Learn how to safely use, store and throw away your medicines at www.disposemymeds.org.          This list is accurate as of 5/18/18  1:13 PM.  Always use your most recent med list.                   Brand Name Dispense Instructions for use Diagnosis    acetaminophen 325 MG tablet    TYLENOL    100 tablet    Take 3 tablets (975 mg) by mouth 3 times daily    Cancer related pain       amLODIPine 10 MG tablet    NORVASC    30 tablet    Take 1 tablet (10 mg) by mouth daily    Essential hypertension       dronabinol 2.5 MG capsule    MARINOL    60 capsule    Take 1 capsule (2.5 mg) by mouth 2 times daily (before meals)    Anorexia, Malignant neoplasm of cervix, unspecified site (H), Nausea       glutamine 500 MG Tabs     120 tablet    Take 500 mg by mouth 2 times daily    Drug-induced polyneuropathy (H)       LORazepam 1 MG tablet    ATIVAN    30 tablet    Take 1 tablet (1 mg) by mouth every 6 hours as needed (Anxiety, Nausea/Vomiting or Sleep)    Malignant neoplasm of cervix, unspecified site (H)       OLANZapine zydis 5 MG ODT tab    zyPREXA     30 tablet    Take 1 tablet (5 mg) by mouth At Bedtime    Anorexia, Malignant neoplasm of cervix, unspecified site (H), Nausea       polyethylene glycol powder    MIRALAX    510 g    Take 17-34 g (1-2 capfuls) by mouth daily    Cancer related pain, Generalized abdominal pain       prochlorperazine 10 MG tablet    COMPAZINE    120 tablet    Take 1 tablet (10 mg) by mouth 3 times daily    Malignant neoplasm of cervix, unspecified site (H), Nausea       ranitidine 75 MG tablet    ZANTAC    60 tablet    Take 1 tablet (75 mg) by mouth 2 times daily    Gastroesophageal reflux disease, esophagitis presence not specified       senna-docusate 8.6-50 MG per tablet    SENOKOT-S;PERICOLACE    100 tablet    Take 2-4 tablets by mouth 2 times daily    Cancer related pain       traMADol 50 MG tablet    ULTRAM    240 tablet    Take 2 tablets (100mg) every morning when you wake up, 2 tablets (100mg)around noon, 2 tablets (100mg) beofre dinner and 2 tablets (100mg) before bed.    Malignant neoplasm of cervix, unspecified site (H), Cancer related pain

## 2018-05-21 NOTE — IP AVS SNAPSHOT
MRN:[id number]                      After Visit Summary   5/21/2018    Ashvin Tiwari    MRN: [id number]           Thank you!     Thank you for choosing [facility] for your care. Our goal is always to provide you with excellent care. Hearing back from our patients is one way we can continue to improve our services. Please take a few minutes to complete the written survey that you may receive in the mail after you visit with us. Thank you!        Patient Information     Date Of Birth          1955        About your hospital stay     You were admitted on:  May 21, 2018 You last received care in the:  [facility]    You were discharged on:  May 21, 2018       Who to Call     For medical emergencies, please call 911.  For non-urgent questions about your medical care, please call your primary care provider or clinic, [phone]  For questions related to your surgery, please call your surgery clinic        Attending Provider     Provider Specialty    Sonu Denson PA-C Radiology       Primary Care Provider Office Phone # Fax #    Chelsea Wren [phone]      Your next 10 appointments already scheduled     Jun 05, 2018 10:00 AM CDT   (Arrive by 9:45 AM)   Return Visit with Briana Medina MD   [facility] ([facility])    [address] [place] [address]  [address] [place] [address] [phone]              Further instructions from your care team       Discharge Instructions for Paracentesis or Thoracentesis     Paracentesis is a procedure to remove extra fluid from your belly (abdomen). Thoracentesis is a procedure to remove extra fluid from your chest.  This fluid buildup is called ascites. The procedure may have been done to take a sample of the fluid. Or, it may have been done to drain the extra fluid from your abdomen or chest to help make you more comfortable.     Home care    If you have  pain after the procedure, your healthcare provider can prescribe or recommend pain medicines. Take these exactly as directed. If you stopped taking other medicines before the procedure, ask your provider when you can start them again.    Avoid strenuous activity for 48 hours.    You will have a small bandage over the puncture site. Adhesive tapes, adhesive strips, or surgical glue may also be used to close the incision. They also help stop bleeding and promote healing. You may take the bandage off in 24-48 hours. Once there is a scab over the site, no bandages are required.    Check the puncture site for the signs of infection listed below.     Follow-up care  Make a follow-up appointment with your healthcare provider as directed. During your follow-up visit, your provider will check your healing. Let your provider know how you are feeling. You can also discuss the cause of your fluid, and if you need any further treatment.    When to seek medical advice:  Call your healthcare provider if you have any of the following after the procedure:    A fever of 100.4 F (38.0 C) or higher    Trouble breathing    Abdominal pain that is worse than expected    Belly Abdominal pain not caused by having the skin punctured that is worse than expected    Persistent bleeding from the puncture site    More than a small amount of fluid leaking from the puncture site    Swollen belly    Signs of infection at the puncture site. These include increased pain, redness, or swelling, warmth, or bad-smelling drainage.    Feeling dizzy or lightheaded, or fainting     Call our Interventional Radiology (IR) service if:  If you start bleeding from the procedure site.  If you do start to bleed from the site, lie down and hold some pressure on the site.  Our radiology provider can help you decide if you need to return to the hospital.  If you have new or worsening pain related to the procedure.  If you have pronounced swelling at the procedure  "site.  If you develop persistent nausea or vomiting.  If you develop hives or a rash or any unexplained itching.  If you have a fever of greater than 100.4  F and chills in the first 5 days after procedure.  Any other concerns related to your procedure.      [facility]  Interventional Radiology (IR)  [address]  2nd Floor, [facility] [place] [address]    Contact Number:  [phone]  (IR control desk)  Monday - Friday 8:00 am - 4:30 pm    After hours for urgent concerns:  [phone]  After 4:30 pm Monday - Friday, Weekends and Holidays.   Ask for Interventional Radiology on-call.  Someone is available 24 hours a day.  [facility] toll free number:  [phone]    Pending Results     Date and Time Order Name Status Description    2018 0647 IR THORACENTESIS In process             Admission Information     Date & Time Provider Department Dept. Phone    2018 Sonu Densno PA-C [facility] [phone]      Your Vitals Were     Blood Pressure Temperature Respirations Height Weight Pulse Oximetry    142/90 98.9  F (37.2  C) (Oral) 22 1.6 m (5' 3\") 80.3 kg (177 lb) 96%    BMI (Body Mass Index)                   31.35 kg/m2           [employer] Information     [employer] is an electronic gateway that provides easy, online access to your medical records. With [employer], you can request a clinic appointment, read your test results, renew a prescription or communicate with your care team.     To sign up for [employer] visit the website at [email or web address]   You will be asked to enter the access code listed below, as well as some personal information. Please follow the directions to create your username and password.     Your access code is: [email or web address]  Expires: 2018  7:30 AM     Your access code will  in 90 days. If you need help or a new code, please contact your [facility] or call " [phone] for assistance.        Care EveryWhere ID     This is your Care EveryWhere ID. This could be used by other organizations to access your [facility] medical records  [id number]        Equal Access to Services     LISSET LUGO : Hadii aad ku hadoscarmylene Hugh, wamaryurida luqadaha, qaybta kaalmada kamala, elizabeth lainain hayaarenetta carrizalesrodolfo worthy harmeet cheema. So [facility] [phone].    ATENCIÓN: Si habla español, tiene a mae disposición servicios gratuitos de asistencia lingüística. [place]ame al [phone].    We comply with applicable federal civil rights laws and [place] laws. We do not discriminate on the basis of race, color, national origin, age, disability, sex, sexual orientation, or gender identity.               Review of your medicines      UNREVIEWED medicines. Ask your doctor about these medicines        Dose / Directions    acetaminophen 325 MG tablet   Commonly known as:  TYLENOL   Used for:  Cancer related pain        Dose:  1000 mg   Take 3 tablets (975 mg) by mouth 3 times daily   Quantity:  100 tablet   Refills:  0       amLODIPine 10 MG tablet   Commonly known as:  NORVASC   Used for:  Essential hypertension        Dose:  10 mg   Take 1 tablet (10 mg) by mouth daily   Quantity:  30 tablet   Refills:  3       dronabinol 2.5 MG capsule   Commonly known as:  MARINOL   Used for:  Anorexia, Malignant neoplasm of cervix, unspecified site (H), Nausea        Dose:  2.5 mg   Take 1 capsule (2.5 mg) by mouth 2 times daily (before meals)   Quantity:  60 capsule   Refills:  1       glutamine 500 MG Tabs   Used for:  Drug-induced polyneuropathy (H)        Dose:  500 mg   Take 500 mg by mouth 2 times daily   Quantity:  120 tablet   Refills:  3       LORazepam 1 MG tablet   Commonly known as:  ATIVAN   Used for:  Malignant neoplasm of cervix, unspecified site (H)        Dose:  1 mg   Take 1 tablet (1 mg) by mouth every 6 hours as needed (Anxiety, Nausea/Vomiting or Sleep)   Quantity:  30 tablet   Refills:  2        OLANZapine zydis 5 MG ODT tab   Commonly known as:  zyPREXA   Used for:  Anorexia, Malignant neoplasm of cervix, unspecified site (H), Nausea        Dose:  5 mg   Take 1 tablet (5 mg) by mouth At Bedtime   Quantity:  30 tablet   Refills:  3       polyethylene glycol powder   Commonly known as:  MIRALAX   Used for:  Cancer related pain, Generalized abdominal pain        Dose:  1-2 capful   Take 17-34 g (1-2 capfuls) by mouth daily   Quantity:  510 g   Refills:  1       prochlorperazine 10 MG tablet   Commonly known as:  COMPAZINE   Used for:  Malignant neoplasm of cervix, unspecified site (H), Nausea        Dose:  10 mg   Take 1 tablet (10 mg) by mouth 3 times daily   Quantity:  120 tablet   Refills:  2       ranitidine 75 MG tablet   Commonly known as:  ZANTAC   Used for:  Gastroesophageal reflux disease, esophagitis presence not specified        Dose:  75 mg   Take 1 tablet (75 mg) by mouth 2 times daily   Quantity:  60 tablet   Refills:  1       senna-docusate 8.6-50 MG per tablet   Commonly known as:  SENOKOT-S;PERICOLACE   Used for:  Cancer related pain        Dose:  2-4 tablet   Take 2-4 tablets by mouth 2 times daily   Quantity:  100 tablet   Refills:  1       traMADol 50 MG tablet   Commonly known as:  ULTRAM   Used for:  Malignant neoplasm of cervix, unspecified site (H), Cancer related pain        Take 2 tablets (100mg) every morning when you wake up, 2 tablets (100mg)around noon, 2 tablets (100mg) beofre dinner and 2 tablets (100mg) before bed.   Quantity:  240 tablet   Refills:  0                Protect others around you: Learn how to safely use, store and throw away your medicines at www.disposemymeds.org.             Medication List: This is a list of all your medications and when to take them. Check marks below indicate your daily home schedule. Keep this list as a reference.      Medications           Morning Afternoon Evening Bedtime As Needed    acetaminophen 325 MG tablet   Commonly known as:   TYLENOL   Take 3 tablets (975 mg) by mouth 3 times daily                                amLODIPine 10 MG tablet   Commonly known as:  NORVASC   Take 1 tablet (10 mg) by mouth daily                                dronabinol 2.5 MG capsule   Commonly known as:  MARINOL   Take 1 capsule (2.5 mg) by mouth 2 times daily (before meals)                                glutamine 500 MG Tabs   Take 500 mg by mouth 2 times daily                                LORazepam 1 MG tablet   Commonly known as:  ATIVAN   Take 1 tablet (1 mg) by mouth every 6 hours as needed (Anxiety, Nausea/Vomiting or Sleep)                                OLANZapine zydis 5 MG ODT tab   Commonly known as:  zyPREXA   Take 1 tablet (5 mg) by mouth At Bedtime                                polyethylene glycol powder   Commonly known as:  MIRALAX   Take 17-34 g (1-2 capfuls) by mouth daily                                prochlorperazine 10 MG tablet   Commonly known as:  COMPAZINE   Take 1 tablet (10 mg) by mouth 3 times daily                                ranitidine 75 MG tablet   Commonly known as:  ZANTAC   Take 1 tablet (75 mg) by mouth 2 times daily                                senna-docusate 8.6-50 MG per tablet   Commonly known as:  SENOKOT-S;PERICOLACE   Take 2-4 tablets by mouth 2 times daily                                traMADol 50 MG tablet   Commonly known as:  ULTRAM   Take 2 tablets (100mg) every morning when you wake up, 2 tablets (100mg)around noon, 2 tablets (100mg) beofre dinner and 2 tablets (100mg) before bed.

## 2018-05-21 NOTE — PROGRESS NOTES
A collaboration between [facility] and [facility]  Experts in minimally invasive, targeted treatments performed using imaging guidance    Patient Name:  Ashvin Tiwari   YOB: 1955  Medical Record Number (MRN):  [id number]  Age:  63 year old female    Consultation:  Patient seen 5/21/2018 at the request of referring provider: [facility]    Requested procedure:  Liver lesion biopsy for Foundation One testing    Indication/History:  Cervical cancer, PET+ liver lesions and LEFT groin lymphadenopathy.     Imaging Reviewed:  PET/CT dated 5/10/18           Discussion/Plan:    No further imaging will be necessary prior to the procedure.    No further laboratory testing will be necessary prior to the procedure.  Updated labs can be checked the day of the procedure.    The laboratory parameters are INR <=1.8 and platelets >=50.    Order:  Ultrasound guided liver lesion biopsy with moderate sedation.    IR nurse coordinator, Jeanine Perez RN, will handle scheduling and care coordination.  She can be reached at pager [phone] and office phone [phone] with any questions.    CHARLEE Ramirez PA-C  Physician Assistant - Certified  Interventional Radiology  [phone] (IR control desk)    =====    Past Medical History:  Past Medical History:   Diagnosis Date     Cancer (H)     cervical     Hypertension        Past Surgical History:  Past Surgical History:   Procedure Laterality Date     BIOPSY/EXCISION LYMPH NODE(S), NEEDLE, SUPERFICIAL (CERVICAL/INGUINAL/AXILLARY) Right 9/21/2017    Procedure: BIOPSY/EXCISION LYMPH NODE(S), NEEDLE, SUPERFICIAL (CERVICAL/INGUINAL/AXILLARY);;  Surgeon: Sonu Denson PA-C;  Location: [facility] OR     INSERT PORT VASCULAR ACCESS Right 9/21/2017    Procedure: INSERT PORT VASCULAR ACCESS;  Single Lumen Chest Power Port, Right Axillary Lymph Node Biopsy;  Surgeon: Sonu Denson PA-C;   Location: [facility] OR     no previous surgery       THORACENTESIS N/A 4/18/2018    Procedure: THORACENTESIS;  Thoracentesis;  Surgeon: Sonu Denson PA-C;  Location: [facility] OR       Problem List:  Patient Active Problem List    Diagnosis Date Noted     Pleural effusion 04/17/2018     Priority: Medium     Pulmonary nodules 04/17/2018     Priority: Medium     Chemotherapy-induced neutropenia (H) 03/20/2018     Priority: Medium     Secondary hypertension 11/02/2017     Priority: Medium     Encounter for antineoplastic chemotherapy 10/12/2017     Priority: Medium     Nausea 10/11/2017     Priority: Medium     Obesity 09/19/2017     Priority: Medium     Cervix cancer (H) 09/13/2017     Priority: Medium     Abdominal pain 09/07/2017     Priority: Medium       Medications:  Prescription Medications as of 5/21/2018             acetaminophen (TYLENOL) 325 MG tablet Take 3 tablets (975 mg) by mouth 3 times daily    amLODIPine (NORVASC) 10 MG tablet Take 1 tablet (10 mg) by mouth daily    dronabinol (MARINOL) 2.5 MG capsule Take 1 capsule (2.5 mg) by mouth 2 times daily (before meals)    glutamine 500 MG TABS Take 500 mg by mouth 2 times daily    LORazepam (ATIVAN) 1 MG tablet Take 1 tablet (1 mg) by mouth every 6 hours as needed (Anxiety, Nausea/Vomiting or Sleep)    OLANZapine zydis (ZYPREXA) 5 MG ODT tab Take 1 tablet (5 mg) by mouth At Bedtime    polyethylene glycol (MIRALAX) powder Take 17-34 g (1-2 capfuls) by mouth daily    prochlorperazine (COMPAZINE) 10 MG tablet Take 1 tablet (10 mg) by mouth 3 times daily    ranitidine (ZANTAC) 75 MG tablet Take 1 tablet (75 mg) by mouth 2 times daily    senna-docusate (SENOKOT-S;PERICOLACE) 8.6-50 MG per tablet Take 2-4 tablets by mouth 2 times daily    traMADol (ULTRAM) 50 MG tablet Take 2 tablets (100mg) every morning when you wake up, 2 tablets (100mg)around noon, 2 tablets (100mg) beofre dinner and 2 tablets (100mg) before bed.      Facility Administered Medications as of  "5/21/2018             acetaminophen (TYLENOL) tablet 650 mg Take 2 tablets (650 mg) by mouth once    heparin 100 UNIT/ML injection 5 mL 5 mLs by Intracatheter route every 28 days    heparin lock flush 10 UNIT/ML injection 5-10 mL 5-10 mLs by Intracatheter route every 24 hours    heparin lock flush 10 UNIT/ML injection 5-10 mL 5-10 mLs by Intracatheter route every hour as needed for other (to lock each port in dual implanted port.)    lidocaine (LMX4) kit Apply topically every hour as needed for moderate pain (with VAD insertion or accessing implanted port)    lidocaine BUFFERED 1 % injection 1 mL 1 mL by Other route every hour as needed (mild pain with VAD insertion or accessing implanted port)    sodium chloride (PF) 0.9% PF flush 10-20 mL 10-20 mLs by Intracatheter route every hour as needed for line flush or post meds or blood draw (To flush each CVC Implanted port.)    NONFORMULARY (Discontinued) as needed          Allergies:  No Known Allergies    Results Reviewed:  Complete Blood Count:  Lab Results   Component Value Date     05/21/2018       Coagulation:  Lab Results   Component Value Date    INR 1.03 05/21/2018       Vital Signs:  /86  Pulse 107  Temp 97.4  F (36.3  C) (Temporal)  Resp 18  Ht 1.6 m (5' 3\")  Wt 80.3 kg (177 lb)  SpO2 95%  BMI 31.35 kg/m2    "

## 2018-05-21 NOTE — IP AVS SNAPSHOT
[facility]    [address] [place] [id number]    Phone:  [phone]    Fax:  [phone]                                       After Visit Summary   5/21/2018    Ashvin Tiwari    MRN: [id number]           After Visit Summary Signature Page     I have received my discharge instructions, and my questions have been answered. I have discussed any challenges I see with this plan with the nurse or doctor.    ..........................................................................................................................................  Patient/Patient Representative Signature      ..........................................................................................................................................  Patient Representative Print Name and Relationship to Patient    ..................................................               ................................................  Date                                            Time    ..........................................................................................................................................  Reviewed by Signature/Title    ...................................................              ..............................................  Date                                                            Time

## 2018-05-21 NOTE — PROCEDURES
Interventional Radiology Brief Post Procedure Note    Procedure:  IR THORACENTESIS [[id number]]    Proceduralist: CHARLEE Sanchez PA-C    Assistant: None    Time Out: Prior to the start of the procedure and with procedural staff participation, I verbally confirmed the patient s identity using two indicators, relevant allergies, that the procedure was appropriate and matched the consent or emergent situation, and that the correct equipment/implants were available. Immediately prior to starting the procedure I conducted the Time Out with the procedural staff and re-confirmed the patient s name, procedure, and site/side. (The Joint Commission universal protocol was followed.)  Yes        Sedation: None. Local Anesthestic used    Findings:   Completed ultrasound guided LEFT-sided thoracentesis. A total of 800 mL fluid drained from the chest.  No fluid sample was collected for analysis. No immediate complication.  Dx: Pleural effusion. Jarett.    Completed ultrasound guided RIGHT-sided thoracentesis. A total of 700 mL fluid drained from the chest.  No fluid sample was collected for analysis. No immediate complication.  Dx: Pleural effusion. Jarett.    Estimated Blood Loss: Minimal    Fluoroscopy Time:  minute(s)    SPECIMENS: None    Complications: 1. None     Condition: Stable    Plan:     Comments: See dictated procedure note for full details.    Sonu Denson PA-C

## 2018-05-21 NOTE — OR NURSING
Removed 800 cc fluid from left lung and 700 cc from the right lung during pt. thoracentesis procedure.

## 2018-05-21 NOTE — DISCHARGE INSTRUCTIONS
Discharge Instructions for Paracentesis or Thoracentesis     Paracentesis is a procedure to remove extra fluid from your belly (abdomen). Thoracentesis is a procedure to remove extra fluid from your chest.  This fluid buildup is called ascites. The procedure may have been done to take a sample of the fluid. Or, it may have been done to drain the extra fluid from your abdomen or chest to help make you more comfortable.     Home care    If you have pain after the procedure, your healthcare provider can prescribe or recommend pain medicines. Take these exactly as directed. If you stopped taking other medicines before the procedure, ask your provider when you can start them again.    Avoid strenuous activity for 48 hours.    You will have a small bandage over the puncture site. Adhesive tapes, adhesive strips, or surgical glue may also be used to close the incision. They also help stop bleeding and promote healing. You may take the bandage off in 24-48 hours. Once there is a scab over the site, no bandages are required.    Check the puncture site for the signs of infection listed below.     Follow-up care  Make a follow-up appointment with your healthcare provider as directed. During your follow-up visit, your provider will check your healing. Let your provider know how you are feeling. You can also discuss the cause of your fluid, and if you need any further treatment.    When to seek medical advice:  Call your healthcare provider if you have any of the following after the procedure:    A fever of 100.4 F (38.0 C) or higher    Trouble breathing    Abdominal pain that is worse than expected    Belly Abdominal pain not caused by having the skin punctured that is worse than expected    Persistent bleeding from the puncture site    More than a small amount of fluid leaking from the puncture site    Swollen belly    Signs of infection at the puncture site. These include increased pain, redness, or swelling, warmth, or  bad-smelling drainage.    Feeling dizzy or lightheaded, or fainting     Call our Interventional Radiology (IR) service if:  If you start bleeding from the procedure site.  If you do start to bleed from the site, lie down and hold some pressure on the site.  Our radiology provider can help you decide if you need to return to the hospital.  If you have new or worsening pain related to the procedure.  If you have pronounced swelling at the procedure site.  If you develop persistent nausea or vomiting.  If you develop hives or a rash or any unexplained itching.  If you have a fever of greater than 100.4  F and chills in the first 5 days after procedure.  Any other concerns related to your procedure.      [facility]  Interventional Radiology (IR)  [address]  [facility] [place] [address]    Contact Number:  [phone]  (IR control desk)  Monday - Friday 8:00 am - 4:30 pm    After hours for urgent concerns:  [phone]  After 4:30 pm Monday - Friday, Weekends and Holidays.   Ask for Interventional Radiology on-call.  Someone is available 24 hours a day.  [facility] toll free number:  [phone]

## 2018-05-22 NOTE — TELEPHONE ENCOUNTER
Called and spoke to pt to inquire on an appt for her liver lesion biopsy.     She states that she'll take any date. Will schedule and call her back.     Jeanine Perez RN, BSN  Interventional Radiology Nurse Coordinator   Phone: [phone]

## 2018-05-22 NOTE — TELEPHONE ENCOUNTER
Called pt again and informed her that we have her scheduled for her biopsy.  This is scheduled for Friday 5/25. She is to check into the [facility] waiting room [facility], [address]. She is to check in at 930am for an 11am start time.     Informed her that should she have any other questions, she can call me back. Left direct line for her to reach me.       Jeanine Perez RN, BSN  Interventional Radiology Nurse Coordinator   Phone: [phone]

## 2018-05-23 NOTE — TELEPHONE ENCOUNTER
Received VM from patient's sister requesting refill of tramadol.     Last refill: 3/2/2018  Last office visit: 4/10/2018  Scheduled for follow up 6/5/2018     Will route request to MD for review. Approved script to be faxed to [facility].    Reviewed [place]  Report.

## 2018-05-25 NOTE — PROGRESS NOTES
Patient Name: Ashvin Tiwari  Medical Record Number: [id number]  Today's Date: 5/25/2018    Procedure: Percutaneous liver lesion biopsy.  Proceduralist: MD Paul, Susan HAN.     Sedation start time: 1155  Sedation end time: 1225  Sedation medications administered: Fentanyl: 50mcg Versed:1mg   Total sedation time: 30 Minutes    Procedure start time: 1156    Procedure end time: 1225    Report given to: Ann AGUIRRE      Other Notes: Pt arrived to IR room 7 from . Pt denies any questions or concerns regarding procedure. Pt positioned supine and monitored per protocol. Pt tolerated procedure without any noted complications. 8 cores obtained. Pathology present for specimen collection and Foundation One testing protocol.  Pt transferred back to .    Fartun Villa, RN
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
## 2018-05-25 NOTE — PROGRESS NOTES
1235 Pt arrived on 2a post liver biopsy. VSS Ra. Dressing c/d/i. No pain. Pt wanting to rest at this time. 1500 Pt eating and drinking. Family at BS. 1600 Pt ambulated in [facility] well. Discharge instructions reviewed, copy given to pt. 1615 Received order for TPA to instill into port until next clinic visit due to inability to aspirate blood. 1725 TPA instilled into port, pt instructed to inform staff of TPA at next clinic visit. Information also placed on discharge instructions and highlighted and family informed. 1730 Pt discharged to home accompanied by daughter and sister.

## 2018-05-25 NOTE — PROGRESS NOTES
Interventional Radiology Pre-Procedure Sedation Assessment   Time of Assessment: 11:23 AM    Expected Level: Moderate Sedation    Indication: Sedation is required for the following type of Procedure: Biopsy    Sedation and procedural consent: Risks, benefits and alternatives were discussed with Patient and Relative sister and daughter    PO Intake: Appropriately NPO for procedure    ASA Class: Class 2 - MILD SYSTEMIC DISEASE, NO ACUTE PROBLEMS, NO FUNCTIONAL LIMITATIONS.    Mallampati: Grade 2:  Soft palate, base of uvula, tonsillar pillars, and portion of posterior pharyngeal wall visible    Lungs: Lungs Clear with good breath sounds bilaterally    Heart: Normal heart sounds and rate    History and physical reviewed and no updates needed. I have reviewed the lab findings, diagnostic data, medications, and the plan for sedation. I have determined this patient to be an appropriate candidate for the planned sedation and procedure and have reassessed the patient IMMEDIATELY PRIOR to sedation and procedure.    Michelle Sparrow PA-C

## 2018-05-25 NOTE — IP AVS SNAPSHOT
MRN:[id number]                      After Visit Summary   5/25/2018    Ashvin Tiwari    MRN: [id number]           Visit Information        Department      5/25/2018  9:41 AM [address] [facility]          Review of your medicines      UNREVIEWED medicines. Ask your doctor about these medicines        Dose / Directions    acetaminophen 325 MG tablet   Commonly known as:  TYLENOL   Used for:  Cancer related pain        Dose:  1000 mg   Take 3 tablets (975 mg) by mouth 3 times daily   Quantity:  100 tablet   Refills:  0       amLODIPine 10 MG tablet   Commonly known as:  NORVASC   Used for:  Essential hypertension        Dose:  10 mg   Take 1 tablet (10 mg) by mouth daily   Quantity:  30 tablet   Refills:  3       dronabinol 2.5 MG capsule   Commonly known as:  MARINOL   Used for:  Anorexia, Malignant neoplasm of cervix, unspecified site (H), Nausea        Dose:  2.5 mg   Take 1 capsule (2.5 mg) by mouth 2 times daily (before meals)   Quantity:  60 capsule   Refills:  1       glutamine 500 MG Tabs   Used for:  Drug-induced polyneuropathy (H)        Dose:  500 mg   Take 500 mg by mouth 2 times daily   Quantity:  120 tablet   Refills:  3       LORazepam 1 MG tablet   Commonly known as:  ATIVAN   Used for:  Malignant neoplasm of cervix, unspecified site (H)        Dose:  1 mg   Take 1 tablet (1 mg) by mouth every 6 hours as needed (Anxiety, Nausea/Vomiting or Sleep)   Quantity:  30 tablet   Refills:  2       OLANZapine zydis 5 MG ODT tab   Commonly known as:  zyPREXA   Used for:  Anorexia, Malignant neoplasm of cervix, unspecified site (H), Nausea        Dose:  5 mg   Take 1 tablet (5 mg) by mouth At Bedtime   Quantity:  30 tablet   Refills:  3       polyethylene glycol powder   Commonly known as:  MIRALAX   Used for:  Cancer related pain, Generalized abdominal pain        Dose:  1-2 capful   Take 17-34 g (1-2 capfuls) by mouth daily   Quantity:  510 g   Refills:  1       prochlorperazine 10 MG  tablet   Commonly known as:  COMPAZINE   Used for:  Malignant neoplasm of cervix, unspecified site (H), Nausea        Dose:  10 mg   Take 1 tablet (10 mg) by mouth 3 times daily   Quantity:  120 tablet   Refills:  2       ranitidine 75 MG tablet   Commonly known as:  ZANTAC   Used for:  Gastroesophageal reflux disease, esophagitis presence not specified        Dose:  75 mg   Take 1 tablet (75 mg) by mouth 2 times daily   Quantity:  60 tablet   Refills:  1       senna-docusate 8.6-50 MG per tablet   Commonly known as:  SENOKOT-S;PERICOLACE   Used for:  Cancer related pain        Dose:  2-4 tablet   Take 2-4 tablets by mouth 2 times daily   Quantity:  100 tablet   Refills:  1       traMADol 50 MG tablet   Commonly known as:  ULTRAM   Used for:  Malignant neoplasm of cervix, unspecified site (H), Cancer related pain        Take 2 tablets (100mg) every morning when you wake up, 2 tablets (100mg)around noon, 2 tablets (100mg) beofre dinner and 2 tablets (100mg) before bed.   Quantity:  240 tablet   Refills:  0                Protect others around you: Learn how to safely use, store and throw away your medicines at www.disposemymeds.org.         Follow-ups after your visit        Your next 10 appointments already scheduled     Jun 05, 2018 10:00 AM CDT   (Arrive by 9:45 AM)   Return Visit with Briana Medina MD   [facility] ([facility])    [address] [facility]  [address] [place] [address] [phone]               Care Instructions        Further instructions from your care team       [facility]    Interventional Radiology  Patient Instructions Following Liver Biopsy    AFTER YOU GO HOME  ? If you were given sedation DO NOT drive or operate machinery at home or at work for at least 24 hours  ? DO relax and take it easy for 48 hours, no strenuous activity for 24 hours  ? DO drink plenty of fluids  ? DO resume your regular diet, unless  otherwise instructed by your Primary Physician  ? Keep the dressing dry and in place for 24 hours.  ? DO NOT SMOKE FOR AT LEAST 24 HOURS, if you have been given any medications that were to help you relax or sedate you during your procedure  ? DO NOT drink alcoholic beverages the day of your procedure  ? DO NOT do any strenuous exercise or lifting (> 10 lbs) for at least 3 days following your procedure  ? DO NOT take a bath or shower for at least 12 hours following your procedure  ? Remove dressing after shower the next day. Replace with Band aid for 2 days.  Never leave a wet dressing in place.  ? DO NOT make any important or legal decisions for 24 hours following your procedure  ? There should be minimum drainage from the biopsy site    CALL THE PHYSICIAN IF:  ? You start bleeding from the procedure site.  If you do start to bleed from that site,  hold pressure on the site for a minimum of 10 minutes.  Your physician will tell you if you need to return to the hospital  ? You develop nausea or vomiting  ? You have excessive swelling, redness, or tenderness at the site  ? You have drainage that looks like it is infected.  ? You experience severe pain  ? You develop hives or a rash or unexplained itching  ? You develop shortness of breath  ? You develop a temperature of 101 degrees F or greater    ADDITIONAL INSTRUCTIONS: None    [facility] INTERVENTIONAL RADIOLOGY DEPARTMENT  Procedure Physician:          Dr. Miller              Date of procedure: May 25, 2018  Telephone Numbers: [phone] Monday-Friday 8:00 am to 4:30 pm  [phone] After 4:30 pm Monday-Friday, Weekends & Holidays.   Ask for the Interventional Radiologist on call.  Someone is on call 24 hrs/day  [facility] toll free number: [phone] Monday-Friday 8:00 am to 4:30 pm  [facility] Emergency Dept: [phone]           Additional Information About Your Visit        [employer] Information     [employer] lets you send messages to your doctor, view your test results,  "renew your prescriptions, schedule appointments and more. To sign up, go to [email or web address] . Click on \"Log in\" on the left side of the screen, which will take you to the Welcome page. Then click on \"Sign up Now\" on the right side of the page.     You will be asked to enter the access code listed below, as well as some personal information. Please follow the directions to create your username and password.     Your access code is: [email or web address]  Expires: 2018  7:30 AM     Your access code will  in 90 days. If you need help or a new code, please call your [facility] clinic or [phone].        Care EveryWhere ID     This is your Care EveryWhere ID. This could be used by other organizations to access your [facility] medical records  [id number]        Your Vitals Were     Blood Pressure Temperature Respirations Pulse Oximetry          139/82 98.5  F (36.9  C) (Oral) 16 98%         Primary Care Provider Office Phone # Fax #    Chelsea Wren [phone]      Equal Access to Services     [facility] : Hadii elvia lackey Sosyd, waaxda liyah, qaybta william wray, elizabeth ga . So [facility] [phone].    ATENCIÓN: Si habla español, tiene a mae disposición servicios gratuitos de asistencia lingüística. [place] al [phone].    We comply with applicable federal civil rights laws and [place] laws. We do not discriminate on the basis of race, color, national origin, age, disability, sex, sexual orientation, or gender identity.            Thank you!     Thank you for choosing [facility] for your care. Our goal is always to provide you with excellent care. Hearing back from our patients is one way we can continue to improve our services. Please take a few minutes to complete the written survey that you may receive in the mail after you visit with us. Thank you!             Medication List: This is a list of all your medications and when to take them. Check " marks below indicate your daily home schedule. Keep this list as a reference.      Medications           Morning Afternoon Evening Bedtime As Needed    acetaminophen 325 MG tablet   Commonly known as:  TYLENOL   Take 3 tablets (975 mg) by mouth 3 times daily                                amLODIPine 10 MG tablet   Commonly known as:  NORVASC   Take 1 tablet (10 mg) by mouth daily                                dronabinol 2.5 MG capsule   Commonly known as:  MARINOL   Take 1 capsule (2.5 mg) by mouth 2 times daily (before meals)                                glutamine 500 MG Tabs   Take 500 mg by mouth 2 times daily                                LORazepam 1 MG tablet   Commonly known as:  ATIVAN   Take 1 tablet (1 mg) by mouth every 6 hours as needed (Anxiety, Nausea/Vomiting or Sleep)                                OLANZapine zydis 5 MG ODT tab   Commonly known as:  zyPREXA   Take 1 tablet (5 mg) by mouth At Bedtime                                polyethylene glycol powder   Commonly known as:  MIRALAX   Take 17-34 g (1-2 capfuls) by mouth daily                                prochlorperazine 10 MG tablet   Commonly known as:  COMPAZINE   Take 1 tablet (10 mg) by mouth 3 times daily                                ranitidine 75 MG tablet   Commonly known as:  ZANTAC   Take 1 tablet (75 mg) by mouth 2 times daily                                senna-docusate 8.6-50 MG per tablet   Commonly known as:  SENOKOT-S;PERICOLACE   Take 2-4 tablets by mouth 2 times daily                                traMADol 50 MG tablet   Commonly known as:  ULTRAM   Take 2 tablets (100mg) every morning when you wake up, 2 tablets (100mg)around noon, 2 tablets (100mg) beofre dinner and 2 tablets (100mg) before bed.

## 2018-05-25 NOTE — PROCEDURES
Interventional Radiology Brief Post Procedure Note    Procedure: Liver lesion biopsy    Proceduralist: Mike Miller MD    Assistant: Radiology Resident Physician, Jaxson Davis MD    Time Out: Prior to the start of the procedure and with procedural staff participation, I verbally confirmed the patient s identity using two indicators, relevant allergies, that the procedure was appropriate and matched the consent or emergent situation, and that the correct equipment/implants were available. Immediately prior to starting the procedure I conducted the Time Out with the procedural staff and re-confirmed the patient s name, procedure, and site/side. (The Joint Commission universal protocol was followed.)  Yes        Sedation: IR Nurse Monitored Care   Post Procedure Summary:  Prior to the start of the procedure and with procedural staff participation, I verbally confirmed the patient s identity using two indicators, relevant allergies, that the procedure was appropriate and matched the consent or emergent situation, and that the correct equipment/implants were available. Immediately prior to starting the procedure I conducted the Time Out with the procedural staff and re-confirmed the patient s name, procedure, and site/side. (The Joint Commission universal protocol was followed.)  Yes       Sedatives: Fentanyl and Midazolam (Versed)    Vital signs, airway and pulse oximetry were monitored and remained stable throughout the procedure and sedation was maintained until the procedure was complete.  The patient was monitored by staff until sedation discharge criteria were met.    Patient tolerance: Patient tolerated the procedure well with no immediate complications.    Time of sedation in minutes: 30 Minutes minutes from beginning to end of physician one to one monitoring.          Findings: Liver lesion, core needle biopsy    Estimated Blood Loss: Minimal    Fluoroscopy Time:  minute(s)    SPECIMENS: None    Complications:  1. None     Condition: Stable    Plan: Bedrest for 2 hours    Comments: See dictated procedure note for full details.    Eric Davis MD

## 2018-05-25 NOTE — DISCHARGE INSTRUCTIONS
[facility]    Interventional Radiology  Patient Instructions Following Liver Biopsy    AFTER YOU GO HOME  ? If you were given sedation DO NOT drive or operate machinery at home or at work for at least 24 hours  ? DO relax and take it easy for 48 hours, no strenuous activity for 24 hours  ? DO drink plenty of fluids  ? DO resume your regular diet, unless otherwise instructed by your Primary Physician  ? Keep the dressing dry and in place for 24 hours.  ? DO NOT SMOKE FOR AT LEAST 24 HOURS, if you have been given any medications that were to help you relax or sedate you during your procedure  ? DO NOT drink alcoholic beverages the day of your procedure  ? DO NOT do any strenuous exercise or lifting (> 10 lbs) for at least 3 days following your procedure  ? DO NOT take a bath or shower for at least 12 hours following your procedure  ? Remove dressing after shower the next day. Replace with Band aid for 2 days.  Never leave a wet dressing in place.  ? DO NOT make any important or legal decisions for 24 hours following your procedure  ? There should be minimum drainage from the biopsy site    CALL THE PHYSICIAN IF:  ? You start bleeding from the procedure site.  If you do start to bleed from that site,  hold pressure on the site for a minimum of 10 minutes.  Your physician will tell you if you need to return to the hospital  ? You develop nausea or vomiting  ? You have excessive swelling, redness, or tenderness at the site  ? You have drainage that looks like it is infected.  ? You experience severe pain  ? You develop hives or a rash or unexplained itching  ? You develop shortness of breath  ? You develop a temperature of 101 degrees F or greater    ADDITIONAL INSTRUCTIONS: None    [facility] INTERVENTIONAL RADIOLOGY DEPARTMENT  Procedure Physician:          Dr. Miller              Date of procedure: May 25, 2018  Telephone Numbers: [phone] Monday-Friday 8:00 am to 4:30 pm  [phone] After 4:30 pm  Monday-Friday, Weekends & Holidays.   Ask for the Interventional Radiologist on call.  Someone is on call 24 hrs/day  [facility] toll free number: [phone] Monday-Friday 8:00 am to 4:30 pm  [facility] Emergency Dept: [phone]

## 2018-05-25 NOTE — IP AVS SNAPSHOT
[address] [address]                                       After Visit Summary   5/25/2018    Ashvin Tiwari    MRN: [id number]           After Visit Summary Signature Page     I have received my discharge instructions, and my questions have been answered. I have discussed any challenges I see with this plan with the nurse or doctor.    ..........................................................................................................................................  Patient/Patient Representative Signature      ..........................................................................................................................................  Patient Representative Print Name and Relationship to Patient    ..................................................               ................................................  Date                                            Time    ..........................................................................................................................................  Reviewed by Signature/Title    ...................................................              ..............................................  Date                                                            Time

## 2018-06-01 PROBLEM — J93.83 ACUTE PNEUMOTHORAX: Status: ACTIVE | Noted: 2018-01-01

## 2018-06-01 NOTE — PROCEDURES
Interventional Radiology Brief Post Procedure Note    Procedure: Bilateral thoracentesis    Proceduralist: Michelle Sparrow MS, SHRAVAN    Assistant: None    Time Out: Prior to the start of the procedure and with procedural staff participation, I verbally confirmed the patient s identity using two indicators, relevant allergies, that the procedure was appropriate and matched the consent or emergent situation, and that the correct equipment/implants were available. Immediately prior to starting the procedure I conducted the Time Out with the procedural staff and re-confirmed the patient s name, procedure, and site/side. (The Joint Commission universal protocol was followed.)  Yes        Sedation: None. Local Anesthestic used    Findings: Completed ultrasound-guided bilateral therapeutic thoracenteses. A total of 575 mL clear yellow fluid drained from the right pleural space and a total of 550 mL clear yellow fluid drained from the left pleural space. No immediate complication.      Estimated Blood Loss: Minimal    Fluoroscopy Time:  minute(s)    SPECIMENS: None    Complications: 1. None     Condition: Stable    Plan: Patient has BP that has returned to her baseline. HR still elevated at 130s. Patient to follow up with primary care and continue to tract BP and HR at home. Informed her to report to ED if she becomes symptomatic.     Comments: See dictated procedure note for full details.    Michelle Sparrow PA-C

## 2018-06-01 NOTE — IP AVS SNAPSHOT
[facility]    [address] [place] [address]    Phone:  [phone]    Fax:  [phone]                                       After Visit Summary   6/1/2018    Ashvin Tiwari    MRN: [id number]           After Visit Summary Signature Page     I have received my discharge instructions, and my questions have been answered. I have discussed any challenges I see with this plan with the nurse or doctor.    ..........................................................................................................................................  Patient/Patient Representative Signature      ..........................................................................................................................................  Patient Representative Print Name and Relationship to Patient    ..................................................               ................................................  Date                                            Time    ..........................................................................................................................................  Reviewed by Signature/Title    ...................................................              ..............................................  Date                                                            Time

## 2018-06-01 NOTE — PLAN OF CARE
Problem: Pain, Acute (Adult)  Goal: Identify Related Risk Factors and Signs and Symptoms  Related risk factors and signs and symptoms are identified upon initiation of Human Response Clinical Practice Guideline (CPG).   Outcome: Improving  Tachycardic, OVSS. Pt up with SBA and cane. Pain controlled with Tramadol. MIV infusing through port. Tolerating regular diet. Thoracentesis sites CDI. Left lung course throughout. Cont. POC.

## 2018-06-01 NOTE — IP AVS SNAPSHOT
MRN:[id number]                      After Visit Summary   6/1/2018    Ashvin Tiwari    MRN: [id number]           Thank you!     Thank you for choosing [facility] for your care. Our goal is always to provide you with excellent care. Hearing back from our patients is one way we can continue to improve our services. Please take a few minutes to complete the written survey that you may receive in the mail after you visit with us. Thank you!        Patient Information     Date Of Birth          1955        About your hospital stay     You were admitted on:  June 1, 2018 You last received care in the:  [facility]    You were discharged on:  June 1, 2018       Who to Call     For medical emergencies, please call 911.  For non-urgent questions about your medical care, please call your primary care provider or clinic, [phone]  For questions related to your surgery, please call your surgery clinic        Attending Provider     Provider Michelle Lopez PA-C Physician Assistant       Primary Care Provider Office Phone # Fax #    Chelsea Wren [phone]      Your next 10 appointments already scheduled     Jun 05, 2018  7:00 AM CDT   [facility] with  [facility]   [facility] ([facility])    [address] [place] [address]  [address]  [facility] [id number] [phone]            Jun 05, 2018  7:30 AM CDT   (Arrive by 7:15 AM)   Return Visit with ROSA Mendoza CNP   [facility] ([facility])    [address] [place] [address]  [address]  [facility] [id number] [phone]            Jun 05, 2018 10:00 AM CDT   (Arrive by 9:45 AM)   Return Visit with Briana Medina MD   [facility] ([facility])    [address] [place] [address]  [address]  [facility] [id number] [phone]              Further  instructions from your care team       Discharge Instructions for Paracentesis or Thoracentesis     Paracentesis is a procedure to remove extra fluid from your belly (abdomen). Thoracentesis is a procedure to remove extra fluid from your chest.  This fluid buildup is called ascites. The procedure may have been done to take a sample of the fluid. Or, it may have been done to drain the extra fluid from your abdomen or chest to help make you more comfortable.     Home care    If you have pain after the procedure, your healthcare provider can prescribe or recommend pain medicines. Take these exactly as directed. If you stopped taking other medicines before the procedure, ask your provider when you can start them again.    Avoid strenuous activity for 48 hours.    You will have a small bandage over the puncture site. Adhesive tapes, adhesive strips, or surgical glue may also be used to close the incision. They also help stop bleeding and promote healing. You may take the bandage off in 24-48 hours. Once there is a scab over the site, no bandages are required.    Check the puncture site for the signs of infection listed below.     Follow-up care  Make a follow-up appointment with your healthcare provider as directed. During your follow-up visit, your provider will check your healing. Let your provider know how you are feeling. You can also discuss the cause of your fluid, and if you need any further treatment.    When to seek medical advice:  Call your healthcare provider if you have any of the following after the procedure:    A fever of 100.4 F (38.0 C) or higher    Trouble breathing    Abdominal pain that is worse than expected    Belly Abdominal pain not caused by having the skin punctured that is worse than expected    Persistent bleeding from the puncture site    More than a small amount of fluid leaking from the puncture site    Swollen belly    Signs of infection at the puncture site. These include increased pain,  "redness, or swelling, warmth, or bad-smelling drainage.    Feeling dizzy or lightheaded, or fainting     Call our Interventional Radiology (IR) service if:  If you start bleeding from the procedure site.  If you do start to bleed from the site, lie down and hold some pressure on the site.  Our radiology provider can help you decide if you need to return to the hospital.  If you have new or worsening pain related to the procedure.  If you have pronounced swelling at the procedure site.  If you develop persistent nausea or vomiting.  If you develop hives or a rash or any unexplained itching.  If you have a fever of greater than 100.4  F and chills in the first 5 days after procedure.  Any other concerns related to your procedure.      [facility]  Interventional Radiology (IR)  [address] [place] [address]    Contact Number:  [phone]  (IR control desk)  Monday - Friday 8:00 am - 4:30 pm    After hours for urgent concerns:  [phone]  After 4:30 pm Monday - Friday, Weekends and Holidays.   Ask for Interventional Radiology on-call.  Someone is available 24 hours a day.  [facility] toll free number:  [phone]    Pending Results     Date and Time Order Name Status Description    6/1/2018 0747 IR THORACENTESIS In process             Admission Information     Date & Time Provider Department Dept. Phone    6/1/2018 Michelle Sparrow PA-C [facility] [phone]      Your Vitals Were     Blood Pressure Temperature Respirations Height Weight Pulse Oximetry    153/87 99.4  F (37.4  C) (Oral) 18 1.6 m (5' 3\") 80.3 kg (177 lb) 95%    BMI (Body Mass Index)                   31.35 kg/m2           [employer] Information     [employer] is an electronic gateway that provides easy, online access to your medical records. With [employer], you can request a clinic appointment, read your test results, renew a prescription or communicate with your " care team.     To sign up for [employer] visit the website at [email or web address]   You will be asked to enter the access code listed below, as well as some personal information. Please follow the directions to create your username and password.     Your access code is: [email or web address]  Expires: 2018  6:30 AM     Your access code will  in 90 days. If you need help or a new code, please contact your [facility] or call [phone] for assistance.        Care EveryWhere ID     This is your Care EveryWhere ID. This could be used by other organizations to access your [place] medical records  [id number]        Equal Access to Services     LISSET LUGO : Sundar Reese, juan miguel pelletier, rajeev wray, elizabeth cheema. So [facility] [phone].    ATENCIÓN: Si habla español, tiene a mae disposición servicios gratuitos de asistencia lingüística. Llame al [phone].    We comply with applicable federal civil rights laws and [place] laws. We do not discriminate on the basis of race, color, national origin, age, disability, sex, sexual orientation, or gender identity.               Review of your medicines      UNREVIEWED medicines. Ask your doctor about these medicines        Dose / Directions    acetaminophen 325 MG tablet   Commonly known as:  TYLENOL   Used for:  Cancer related pain        Dose:  1000 mg   Take 3 tablets (975 mg) by mouth 3 times daily   Quantity:  100 tablet   Refills:  0       amLODIPine 10 MG tablet   Commonly known as:  NORVASC   Used for:  Essential hypertension        Dose:  10 mg   Take 1 tablet (10 mg) by mouth daily   Quantity:  30 tablet   Refills:  3       dronabinol 2.5 MG capsule   Commonly known as:  MARINOL   Used for:  Anorexia, Malignant neoplasm of cervix, unspecified site (H), Nausea        Dose:  2.5 mg   Take 1 capsule (2.5 mg) by mouth 2 times daily (before meals)   Quantity:  60  capsule   Refills:  1       glutamine 500 MG Tabs   Used for:  Drug-induced polyneuropathy (H)        Dose:  500 mg   Take 500 mg by mouth 2 times daily   Quantity:  120 tablet   Refills:  3       LORazepam 1 MG tablet   Commonly known as:  ATIVAN   Used for:  Malignant neoplasm of cervix, unspecified site (H)        Dose:  1 mg   Take 1 tablet (1 mg) by mouth every 6 hours as needed (Anxiety, Nausea/Vomiting or Sleep)   Quantity:  30 tablet   Refills:  2       OLANZapine zydis 5 MG ODT tab   Commonly known as:  zyPREXA   Used for:  Anorexia, Malignant neoplasm of cervix, unspecified site (H), Nausea        Dose:  5 mg   Take 1 tablet (5 mg) by mouth At Bedtime   Quantity:  30 tablet   Refills:  3       polyethylene glycol powder   Commonly known as:  MIRALAX   Used for:  Cancer related pain, Generalized abdominal pain        Dose:  1-2 capful   Take 17-34 g (1-2 capfuls) by mouth daily   Quantity:  510 g   Refills:  1       prochlorperazine 10 MG tablet   Commonly known as:  COMPAZINE   Used for:  Malignant neoplasm of cervix, unspecified site (H), Nausea        Dose:  10 mg   Take 1 tablet (10 mg) by mouth 3 times daily   Quantity:  120 tablet   Refills:  2       ranitidine 75 MG tablet   Commonly known as:  ZANTAC   Used for:  Gastroesophageal reflux disease, esophagitis presence not specified        Dose:  75 mg   Take 1 tablet (75 mg) by mouth 2 times daily   Quantity:  60 tablet   Refills:  1       senna-docusate 8.6-50 MG per tablet   Commonly known as:  SENOKOT-S;PERICOLACE   Used for:  Cancer related pain        Dose:  2-4 tablet   Take 2-4 tablets by mouth 2 times daily   Quantity:  100 tablet   Refills:  1       traMADol 50 MG tablet   Commonly known as:  ULTRAM   Used for:  Malignant neoplasm of cervix, unspecified site (H), Cancer related pain        Take 2 tablets (100mg) every morning when you wake up, 2 tablets (100mg)around noon, 2 tablets (100mg) beofre dinner and 2 tablets (100mg) before bed.    Quantity:  240 tablet   Refills:  0                Protect others around you: Learn how to safely use, store and throw away your medicines at www.disposemymeds.org.             Medication List: This is a list of all your medications and when to take them. Check marks below indicate your daily home schedule. Keep this list as a reference.      Medications           Morning Afternoon Evening Bedtime As Needed    acetaminophen 325 MG tablet   Commonly known as:  TYLENOL   Take 3 tablets (975 mg) by mouth 3 times daily                                amLODIPine 10 MG tablet   Commonly known as:  NORVASC   Take 1 tablet (10 mg) by mouth daily                                dronabinol 2.5 MG capsule   Commonly known as:  MARINOL   Take 1 capsule (2.5 mg) by mouth 2 times daily (before meals)                                glutamine 500 MG Tabs   Take 500 mg by mouth 2 times daily                                LORazepam 1 MG tablet   Commonly known as:  ATIVAN   Take 1 tablet (1 mg) by mouth every 6 hours as needed (Anxiety, Nausea/Vomiting or Sleep)                                OLANZapine zydis 5 MG ODT tab   Commonly known as:  zyPREXA   Take 1 tablet (5 mg) by mouth At Bedtime                                polyethylene glycol powder   Commonly known as:  MIRALAX   Take 17-34 g (1-2 capfuls) by mouth daily                                prochlorperazine 10 MG tablet   Commonly known as:  COMPAZINE   Take 1 tablet (10 mg) by mouth 3 times daily                                ranitidine 75 MG tablet   Commonly known as:  ZANTAC   Take 1 tablet (75 mg) by mouth 2 times daily                                senna-docusate 8.6-50 MG per tablet   Commonly known as:  SENOKOT-S;PERICOLACE   Take 2-4 tablets by mouth 2 times daily                                traMADol 50 MG tablet   Commonly known as:  ULTRAM   Take 2 tablets (100mg) every morning when you wake up, 2 tablets (100mg)around noon, 2 tablets (100mg) beofre  dinner and 2 tablets (100mg) before bed.

## 2018-06-01 NOTE — PROGRESS NOTES
Observation goals      -diagnostic tests and consults completed and resulted - Not met  -vital signs normal or at patient baseline - Not met  -tolerating oral intake to maintain hydration - Not met  -dyspnea improved and O2 sats greater than 88% on room air or prior home oxygen levels - Not met  -safe disposition plan has been identified - Not met  -Pneumothorax improved - Not met

## 2018-06-01 NOTE — PROGRESS NOTES
Pt in Phase II after thoracentesis with HR 120s-130s, oxygen saturation 91-93% on room air (RA). Pt came in this AM with O2 saturation at 88% on RA. Michelle Sparrow PA-C, notified. Instructed RN to keep pt for 20 minutes with spot checks of oxygen saturation. Per Michelle, OK to DC home with sats >93% on RA, and instructions for pt to follow up with primary care provider.    Addendum: Prior to discharge, pt c/o 10/10 chest pain with inspiration. Michelle Sparrow PA-C, at bedside to evaluate patient. Instructed patient, accompanied by daughter, to go to the ED for further evaluation of chest pain. Per Michelle, pt did not need an ambulance transport to ED.     Temp: 99.4  F (37.4  C) Temp src: Oral BP: 136/85   Heart Rate: 129 Resp: 16 SpO2: 94 % O2 Device: None (Room air)

## 2018-06-01 NOTE — PROGRESS NOTES
"Patient presented today with daughter for diagnostic therapeutic bilateral thoracentesis. Upon arrival I was alerted by preop RN that Ashvin had HR elevated at 140 and BP 150s/90s. EKG was done showing sinus tachycardia. Patient was complaining of SOB worsening over the last few days which she contriubuted to the pleural effusions returning. Daughter stated that she has noticed [place] more tired and with less energy over the last few days noting that her sister's  passed away. Patient otherwise denied chest pain, palpitations, fevers, chills, nausea, lightheadedness, anxiety.    I spoke with Genevieve Miranda CNP to inform her for Ashvin's status. She is due to see oncology on Tuesday. Spoke with both Dr. Berry and Vikram Denson PA-C in IR who also agreed it was safe to proceed with her thoracentesis at the [facility].    The family and patient were informed that is symptoms develop that Ashvin should report to the ED immediately. They agreed with plan.    Past Medical History:  Past Medical History:   Diagnosis Date     Cancer (H)     cervical     Hypertension      Allergies:  No Known Allergies    Results Reviewed:  Lab Results   Component Value Date     05/21/2018     Lab Results   Component Value Date    INR 1.05 06/01/2018     Vital signs:  Temp: 99.4  F (37.4  C) Temp src: Oral BP: (!) 137/94   Heart Rate: 132 Resp: 16 SpO2: 96 % O2 Device: Nasal cannula Oxygen Delivery: 2 LPM Height: 160 cm (5' 3\") Weight: 80.3 kg (177 lb)    Exam:  Constitutional: alert, no distress and cooperative  Cardiovascular: regular rhythm, tachy at 130s  Respiratory: decreased breath sounds throughout lower jerome b/l    Michelle Sparrow PA-C  Interventional Radiology              "

## 2018-06-01 NOTE — IP AVS SNAPSHOT
[address] [address]    Phone:  [phone]                                       After Visit Summary   6/1/2018    Ashvin Tiwari    MRN: [id number]           After Visit Summary Signature Page     I have received my discharge instructions, and my questions have been answered. I have discussed any challenges I see with this plan with the nurse or doctor.    ..........................................................................................................................................  Patient/Patient Representative Signature      ..........................................................................................................................................  Patient Representative Print Name and Relationship to Patient    ..................................................               ................................................  Date                                            Time    ..........................................................................................................................................  Reviewed by Signature/Title    ...................................................              ..............................................  Date                                                            Time

## 2018-06-01 NOTE — DISCHARGE SUMMARY
Gynecologic Oncology Discharge Summary    Ashvin Tiwari  [id number]    Admit Date: 6/1/2018  Discharge Date: 6/2/2018    Admitting Provider: Dr. Justo Banuelos  Discharge Provider: Dr. Justo Banuelos    Admission Dx:   -Right hydropneumothorax s/p bilateral thoracentesis  -Shortness of breath  -Chest pain  -Stage IV poorly differentiated cervical cancer     Discharge Dx:  -Right hydropneumothorax s/p bilateral thoracentesis  -Shortness of breath  -Chest pain  -Stage IV poorly differentiated cervical cancer     Patient Active Problem List   Diagnosis     Abdominal pain     Cervix cancer (H)     Obesity     Nausea     Encounter for antineoplastic chemotherapy     Secondary hypertension     Chemotherapy-induced neutropenia (H)     Pleural effusion     Pulmonary nodules     Acute pneumothorax       Procedures:  Chest Xray x3    Prior to Admission Medications:  Prescriptions Prior to Admission   Medication Sig Dispense Refill Last Dose     acetaminophen (TYLENOL) 325 MG tablet Take 975 mg by mouth 3 times daily  100 tablet 0 5/31/2018 at PM     amLODIPine (NORVASC) 10 MG tablet Take 1 tablet (10 mg) by mouth daily 30 tablet 3 6/1/2018 at AM     dronabinol (MARINOL) 2.5 MG capsule Take 1 capsule (2.5 mg) by mouth 2 times daily (before meals) 60 capsule 1 5/31/2018 at AM/PM     glutamine 500 MG TABS Take 500 mg by mouth 2 times daily 120 tablet 3 5/31/2018 at AM/PM     LORazepam (ATIVAN) 1 MG tablet Take 1 tablet (1 mg) by mouth every 6 hours as needed (Anxiety, Nausea/Vomiting or Sleep) 30 tablet 2 5/31/2018 at PRN     OLANZapine zydis (ZYPREXA) 5 MG ODT tab Take 1 tablet (5 mg) by mouth At Bedtime 30 tablet 3 5/31/2018 at PM     polyethylene glycol (MIRALAX) powder Take 17-34 g (1-2 capfuls) by mouth daily 510 g 1 Past Month at PRN     prochlorperazine (COMPAZINE) 10 MG tablet Take 1 tablet (10 mg) by mouth 3 times daily 120 tablet 2 5/31/2018 at AM/afternoon/PM     ranitidine (ZANTAC) 75 MG tablet Take 1 tablet (75  mg) by mouth 2 times daily 60 tablet 1 5/31/2018 at AM/PM     senna-docusate (SENOKOT-S;PERICOLACE) 8.6-50 MG per tablet Take 2-4 tablets by mouth 2 times daily 100 tablet 1 Past Month at PRN     traMADol (ULTRAM) 50 MG tablet Take 2 tablets (100mg) every morning when you wake up, 2 tablets (100mg)around noon, 2 tablets (100mg) beofre dinner and 2 tablets (100mg) before bed. 240 tablet 0 5/31/2018 at AM/Noon/Dinner/Bed time       Discharge Medications:     Review of your medicines      START taking       Dose / Directions    oxyCODONE IR 5 MG tablet   Commonly known as:  ROXICODONE   Used for:  Pneumothorax, acute        Dose:  5 mg   Take 1 tablet (5 mg) by mouth every 4 hours as needed for moderate to severe pain   Quantity:  6 tablet   Refills:  0         CONTINUE these medicines which have NOT CHANGED       Dose / Directions    acetaminophen 325 MG tablet   Commonly known as:  TYLENOL   Used for:  Cancer related pain        Dose:  975 mg   Take 975 mg by mouth 3 times daily   Quantity:  100 tablet   Refills:  0       amLODIPine 10 MG tablet   Commonly known as:  NORVASC   Used for:  Essential hypertension        Dose:  10 mg   Take 1 tablet (10 mg) by mouth daily   Quantity:  30 tablet   Refills:  3       dronabinol 2.5 MG capsule   Commonly known as:  MARINOL   Used for:  Anorexia, Malignant neoplasm of cervix, unspecified site (H), Nausea        Dose:  2.5 mg   Take 1 capsule (2.5 mg) by mouth 2 times daily (before meals)   Quantity:  60 capsule   Refills:  1       glutamine 500 MG Tabs   Used for:  Drug-induced polyneuropathy (H)        Dose:  500 mg   Take 500 mg by mouth 2 times daily   Quantity:  120 tablet   Refills:  3       LORazepam 1 MG tablet   Commonly known as:  ATIVAN   Used for:  Malignant neoplasm of cervix, unspecified site (H)        Dose:  1 mg   Take 1 tablet (1 mg) by mouth every 6 hours as needed (Anxiety, Nausea/Vomiting or Sleep)   Quantity:  30 tablet   Refills:  2       OLANZapine  zydis 5 MG ODT tab   Commonly known as:  zyPREXA   Used for:  Anorexia, Malignant neoplasm of cervix, unspecified site (H), Nausea        Dose:  5 mg   Take 1 tablet (5 mg) by mouth At Bedtime   Quantity:  30 tablet   Refills:  3       polyethylene glycol powder   Commonly known as:  MIRALAX   Used for:  Cancer related pain, Generalized abdominal pain        Dose:  1-2 capful   Take 17-34 g (1-2 capfuls) by mouth daily   Quantity:  510 g   Refills:  1       prochlorperazine 10 MG tablet   Commonly known as:  COMPAZINE   Used for:  Malignant neoplasm of cervix, unspecified site (H), Nausea        Dose:  10 mg   Take 1 tablet (10 mg) by mouth 3 times daily   Quantity:  120 tablet   Refills:  2       ranitidine 75 MG tablet   Commonly known as:  ZANTAC   Used for:  Gastroesophageal reflux disease, esophagitis presence not specified        Dose:  75 mg   Take 1 tablet (75 mg) by mouth 2 times daily   Quantity:  60 tablet   Refills:  1       senna-docusate 8.6-50 MG per tablet   Commonly known as:  SENOKOT-S;PERICOLACE   Used for:  Cancer related pain        Dose:  2-4 tablet   Take 2-4 tablets by mouth 2 times daily   Quantity:  100 tablet   Refills:  1       traMADol 50 MG tablet   Commonly known as:  ULTRAM   Used for:  Malignant neoplasm of cervix, unspecified site (H), Cancer related pain        Take 2 tablets (100mg) every morning when you wake up, 2 tablets (100mg)around noon, 2 tablets (100mg) beofre dinner and 2 tablets (100mg) before bed.   Quantity:  240 tablet   Refills:  0            Where to get your medicines      Some of these will need a paper prescription and others can be bought over the counter. Ask your nurse if you have questions.     Bring a paper prescription for each of these medications      oxyCODONE IR 5 MG tablet             Consultations:  -Interventional radiology     Brief History of Illness:  This patient is a 63 year old female with stage IV poorly differentiated SCC of the cervix who  presents to the ER with SOB and CP following bilateral thoracentesis in IR. She has a history of recurrent pleural effusions and has been receiving weekly bilateral thoracentesis with IR for palliation of her symptoms. During the procedure she reports acute onset chest pain, shortness of breath. The symptoms have been persistent since the procedure. She reports associated dizziness as well. The pain has somewhat improved since she received IV morphine in the ER. She denies fevers, chills, nausea, vomiting, lower extremity swelling.     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  FEN:  - Patient tolerated a regular diet without nausea and vomiting. Electrolytes were WNL on admission and on recheck on HD#2  Pain:  - She reported chest pain on admission. She received 4 mg IV morphine in the ER and was transitioned to oral medications on admission. Her pain was adequately controlled on Tramadol and Tylenol. At the time of discharge pain was minimal.   CV:  - History of hypertension was managed with PTA amlodipine. She was notably tachycardic on admission, initially to the 140s, improved to 120s with IV fluid resuscitation. EKG demonstrated sinus tachycardia. Troponin was negative. At discharge, her HR improved to her baseline in 100s-110s and she was asymptomatic.   PULM:   - Given SOB, CXR in the ER was performed which demonstrated a right sided pneumothorax without tension. IR was consulted and recommended no chest tube placement as this was more consistent with trap lung and the above noted intervention would not improve her symptoms. A second follow up CXR was performed several hours after the first to confirm no evidence of  expansion. On HD#2 repeat XR was obtained and again showed slight increase in fluid component of hydropneumothorax, consistent with history of pleural effusion. Her symptoms improved. She initially required supplemental O2 to maintain her saturations above 93% but was able to wean off this prior to discharge.  HEME:  - Hemoglobin was 12.0 at time of admission. She had no other acute heme issues while in house.  GI:  - Her baseline nusea and anorexia was controlled with PRN Zofran, Compazine, Ativan, Zyprexa as well as scheduled Marinol without acute issues. Patient was tolerating regular diet on discharge without nausea or vomiting.   :   - No issues  ID:  - The patient was AF during her hospitalization.   ENDO:  - no issues  PSYCH/NEURO:  - no issues  PPX:   - She was given SCDs, IS during her hospital course. She tolerated these prophylactic interventions without incident.  They were discontinued at the time of her discharge.      Discharge Instructions and Follow up:  Ms. Ashvin Tiwari was discharged from the hospital with follow up on 6/5 with Yoko Miranda for GYN ONC and Adam for Palliative care.    Discharge Diet: Regular  Discharge Activity: Activity as tolerated  Discharge Follow up: 6/5 with Yoko Miranda for GYN ONC and Adam for Palliative care.      Discharge Disposition:  Discharged to home    Discharge Staff: Dr. Vin Lobato  Ob/Gyn PGY2      I have examined this patient with our resident who acted as as scribe and agree with the above findings and plan.    Justo Banuelos

## 2018-06-01 NOTE — CONSULTS
IR consulted for right sided pneumothorax post-thoracentesis.    Breanna is a 63 year old female with history of pleural effusion, pulmonary nodules, HTN, obesity, and cervical cancer who presented to the ED with complaints of chest pain and SOB post outpatient thoracentesis. This is not her first thoracentesis with IR for recurrent effusions. She had 575 cc out of the right and 550 cc out of the left today. Patient developed chest pain after the procedure and was sent to the ER for further evaluation. HR prior to the procedure was in the 130s and it remained elevated post-procedurally. Her O2 sats improved. IR provider spoke with primary care provider regarding vitals and f/u was planned for Tuesday with oncology.    Case and imaging reviewed with Dr. Orellana and Dr. Espana from IR. Chest xray shows basilar pneumo on the right. This is likely trapped lung and chest tube will not improve the patient's chest pain. Would recommend pain management. Can repeat chest xray in 1 hour and ensure pneumo is not enlarging.    This was relayed to ER physician. Please follow-up with any questions or concerns.     Dawn Holloway DNP, APRN  Interventional Radiology   Phone: [phone]  Pager: [phone]

## 2018-06-01 NOTE — ED NOTES
[facility], [place]   ED Nurse to Floor Handoff     Ashvin Tiwari is a 63 year old female who speaks English and lives with family,  in a home  They arrived in the ED by car from clinic    ED Chief Complaint: Chest Pain and Shortness of Breath    ED Dx;   Final diagnoses:   Pneumothorax, acute         Needed?: No    Allergies: No Known Allergies.  Past Medical Hx:   Past Medical History:   Diagnosis Date     Cancer (H)     cervical     Hypertension       Baseline Mental status: WDL  Current Mental Status changes: at basesline    Infection present or suspected this encounter: no  Sepsis suspected: No  Isolation type: No active isolations     Activity level - Baseline/Home:  Stand with Assist  Activity Level - Current:   Stand with Assist    Bariatric equipment needed?: No    In the ED these meds were given:   Medications   sodium chloride 0.9% infusion (not administered)   0.9% sodium chloride BOLUS (1,000 mLs Intravenous New Bag 6/1/18 1310)   morphine (PF) injection 4 mg (4 mg Intravenous Given 6/1/18 1311)   ketorolac (TORADOL) injection 15 mg (15 mg Intravenous Given 6/1/18 1310)       Drips running?  Yes    Home pump  No    Current LDAs  Port A Cath Single 09/21/17 Right Chest wall (Active)   Access Date 06/01/18 6/1/2018 11:30 AM   Number of days:253       Incision/Surgical Site 09/21/17 Right Axillary (Active)   Number of days:253       Incision/Surgical Site 09/21/17 Right Chest (Active)   Number of days:253       Incision/Surgical Site 05/21/18 Bilateral Back (Active)   Number of days:11       Incision/Surgical Site 06/01/18 Bilateral Back (Active)   Number of days:0       Labs results:   Labs Ordered and Resulted from Time of ED Arrival Up to the Time of Departure from the ED   CBC WITH PLATELETS DIFFERENTIAL - Abnormal; Notable for the following:        Result Value    RDW 16.8 (*)     All other components within normal limits   BASIC METABOLIC PANEL - Abnormal;  Notable for the following:     Glucose 101 (*)     All other components within normal limits   TROPONIN I   IP ASSIGN PROVIDER TEAM TO TREATMENT TEAM       Imaging Studies:   Recent Results (from the past 24 hour(s))   XR Chest 2 Views    Impression    Impression: Right-sided pneumothorax.       Recent vital signs:   BP (!) 152/98  Temp 97.8  F (36.6  C) (Oral)  Resp (!) 32  SpO2 97%    Cardiac Rhythm: Normal Sinus  Pt needs tele? Yes  Skin/wound Issues: None    Code Status: unknown- pt does not have ACD    Pain control: good    Nausea control: pt had none    Abnormal labs/tests/findings requiring intervention: see epic    Family present during ED course? Yes   Family Comments/Social Situation comments: N/A    Tasks needing completion: None    Brenda Hendrickson, RN  [phone] [facility]

## 2018-06-01 NOTE — ED PROVIDER NOTES
History     Chief Complaint   Patient presents with     Chest Pain     Shortness of Breath     HPI  Ashvin Tiwari is a 63 year old female with a history of pleural effusion, pulmonary nodules, secondary HTN, obesity, and cervix cancer who presents to the Emergency Department today with the chief complaint of chest pain and shortness of breath. Patient reports going to Interventional Radiology for bilateral thoracentesis for effusion and after experiencing sharp chest pain in the epigastric region with a reported severity of 10/10; patient also reports shortness of breath. Patient denies experiencing similar pain after this procedure in the past. Patient denies leg swelling or pain. Patient reports normally having a high hear rate but not as high as it is in ED visit.     I have reviewed the Medications, Allergies, Past Medical and Surgical History, and Social History in the [employer] system.    Past Medical History:   Diagnosis Date     Cancer (H)     cervical     Hypertension      Social History     Social History     Marital status: Single     Spouse name: N/A     Number of children: N/A     Years of education: N/A     Occupational History     Not on file.     Social History Main Topics     Smoking status: Never Smoker     Smokeless tobacco: Never Used     Alcohol use No     Drug use: No     Sexual activity: Not on file     Other Topics Concern     Not on file     Social History Narrative       Review of Systems   Respiratory: Positive for shortness of breath.    Cardiovascular: Positive for chest pain. Negative for leg swelling.   Musculoskeletal: Negative for myalgias.   All other systems reviewed and are negative.      Physical Exam   BP: (!) 152/98  Heart Rate: 129  Temp: 97.8  F (36.6  C)  Resp: (!) 32  Weight: 77.6 kg (171 lb 1.6 oz)  SpO2: 97 %      Physical Exam   Constitutional: She is oriented to person, place, and time. She appears well-developed and well-nourished. She appears distressed.   Eyes:  Pupils are equal, round, and reactive to light.   Neck: No JVD present.   Cardiovascular: Tachycardia present.    Pulmonary/Chest: No respiratory distress.   Abdominal: Soft.   Neurological: She is alert and oriented to person, place, and time.   Skin: She is diaphoretic.   Nursing note and vitals reviewed.      ED Course   11:41 AM  The patient was seen and examined by Donnell Orlando MD in Room 19.     ED Course     Procedures        Results for orders placed or performed during the hospital encounter of 06/01/18   XR Chest 2 Views    Impression    Impression: Right-sided pneumothorax.            Labs Ordered and Resulted from Time of ED Arrival Up to the Time of Departure from the ED   CBC WITH PLATELETS DIFFERENTIAL - Abnormal; Notable for the following:        Result Value    RDW 16.8 (*)     All other components within normal limits   BASIC METABOLIC PANEL - Abnormal; Notable for the following:     Glucose 101 (*)     All other components within normal limits   TROPONIN I       Consults  Obstetrics/Gynecology: Responded (06/01/18 7724)    Assessments & Plan (with Medical Decision Making)   Ashvin Tiwari is a 63 year old female with metastatic cervical cancer who has weekly thoracenteses. She was having it done today and afterward developed chest pain and was sent to the Emergency Department for evaluation. Here chest xray revealed a pneumothorax in the right base. Interventional Radiology who performed the procedure was then consulted regarding this finding and they did not feel that a chest tube was indicated. The patient will be admitted to the Gynecology/Oncology service for pain management and repeat chest xray to exclude progression of the pneumothorax.     I have reviewed the nursing notes.    I have reviewed the findings, diagnosis, plan and need for follow up with the patient.    Discharge Medication List as of 6/2/2018  1:36 PM      START taking these medications    Details   oxyCODONE IR  (ROXICODONE) 5 MG tablet Take 1 tablet (5 mg) by mouth every 4 hours as needed for moderate to severe pain, Disp-6 tablet, R-0, Local Print             Final diagnoses:   Pneumothorax, acute     I, Maico Barber, am serving as a trained medical scribe to document services personally performed by Donnell Orlando MD, based on the provider's statements to me.   I, Donnell Orlando MD, was physically present and have reviewed and verified the accuracy of this note documented by Maico Barber.    6/1/2018   [facility], [place], EMERGENCY DEPARTMENT     Seymour Orlando MD  06/02/18 [id number]

## 2018-06-01 NOTE — DISCHARGE INSTRUCTIONS
Discharge Instructions for Paracentesis or Thoracentesis     Paracentesis is a procedure to remove extra fluid from your belly (abdomen). Thoracentesis is a procedure to remove extra fluid from your chest.  This fluid buildup is called ascites. The procedure may have been done to take a sample of the fluid. Or, it may have been done to drain the extra fluid from your abdomen or chest to help make you more comfortable.     Home care    If you have pain after the procedure, your healthcare provider can prescribe or recommend pain medicines. Take these exactly as directed. If you stopped taking other medicines before the procedure, ask your provider when you can start them again.    Avoid strenuous activity for 48 hours.    You will have a small bandage over the puncture site. Adhesive tapes, adhesive strips, or surgical glue may also be used to close the incision. They also help stop bleeding and promote healing. You may take the bandage off in 24-48 hours. Once there is a scab over the site, no bandages are required.    Check the puncture site for the signs of infection listed below.     Follow-up care  Make a follow-up appointment with your healthcare provider as directed. During your follow-up visit, your provider will check your healing. Let your provider know how you are feeling. You can also discuss the cause of your fluid, and if you need any further treatment.    When to seek medical advice:  Call your healthcare provider if you have any of the following after the procedure:    A fever of 100.4 F (38.0 C) or higher    Trouble breathing    Abdominal pain that is worse than expected    Belly Abdominal pain not caused by having the skin punctured that is worse than expected    Persistent bleeding from the puncture site    More than a small amount of fluid leaking from the puncture site    Swollen belly    Signs of infection at the puncture site. These include increased pain, redness, or swelling, warmth, or  bad-smelling drainage.    Feeling dizzy or lightheaded, or fainting     Call our Interventional Radiology (IR) service if:  If you start bleeding from the procedure site.  If you do start to bleed from the site, lie down and hold some pressure on the site.  Our radiology provider can help you decide if you need to return to the hospital.  If you have new or worsening pain related to the procedure.  If you have pronounced swelling at the procedure site.  If you develop persistent nausea or vomiting.  If you develop hives or a rash or any unexplained itching.  If you have a fever of greater than 100.4  F and chills in the first 5 days after procedure.  Any other concerns related to your procedure.      [facility]  Interventional Radiology (IR)  [address]  [facility] [place] [address]    Contact Number:  [phone]  (IR control desk)  Monday - Friday 8:00 am - 4:30 pm    After hours for urgent concerns:  [phone]  After 4:30 pm Monday - Friday, Weekends and Holidays.   Ask for Interventional Radiology on-call.  Someone is available 24 hours a day.  [facility] toll free number:  [phone]

## 2018-06-01 NOTE — PHARMACY-ADMISSION MEDICATION HISTORY
Admission medication history interview status for the 6/1/2018 admission is complete. See Epic admission navigator for allergy information, pharmacy, prior to admission medications and immunization status.     Medication history interview sources:  patient.    Changes made to PTA medication list (reason)  Added: n/a  Deleted: n/a  Changed: n/a    Additional medication history information (including reliability of information, actions taken by pharmacist):  - Patient was an excellent historian.  - Patient did not receive an influenza vaccination this past season.  - Home pharmacy is [facility] in [place], [place].  - Reviewed allergies.  No known drug allergies.  - Usage of Miralax and senna-docusate is infrequent.  Last usage of Miralax was approximately 2 weeks ago.  Last usage of senna-docusate was approximately 3 weeks ago.      Prior to Admission medications    Medication Sig Last Dose Taking? Auth Provider   acetaminophen (TYLENOL) 325 MG tablet Take 975 mg by mouth 3 times daily  5/31/2018 at PM Yes Briana Medina MD   amLODIPine (NORVASC) 10 MG tablet Take 1 tablet (10 mg) by mouth daily 6/1/2018 at AM Yes Briana Medina MD   dronabinol (MARINOL) 2.5 MG capsule Take 1 capsule (2.5 mg) by mouth 2 times daily (before meals) 5/31/2018 at AM/PM Yes Briana Medina MD   glutamine 500 MG TABS Take 500 mg by mouth 2 times daily 5/31/2018 at AM/PM Yes Briana Medina MD   LORazepam (ATIVAN) 1 MG tablet Take 1 tablet (1 mg) by mouth every 6 hours as needed (Anxiety, Nausea/Vomiting or Sleep) 5/31/2018 at PRN Yes Barbara Soto MD   OLANZapine zydis (ZYPREXA) 5 MG ODT tab Take 1 tablet (5 mg) by mouth At Bedtime 5/31/2018 at PM Yes Briana Medina MD   polyethylene glycol (MIRALAX) powder Take 17-34 g (1-2 capfuls) by mouth daily Past Month at PRN Yes Briana Medina MD   prochlorperazine (COMPAZINE) 10 MG tablet Take 1 tablet (10 mg) by mouth 3 times daily 5/31/2018 at  AM/afternoon/PM Yes Briana Medina MD   ranitidine (ZANTAC) 75 MG tablet Take 1 tablet (75 mg) by mouth 2 times daily 5/31/2018 at AM/PM Yes Rangel Robles MD   senna-docusate (SENOKOT-S;PERICOLACE) 8.6-50 MG per tablet Take 2-4 tablets by mouth 2 times daily Past Month at PRN Yes Briana Medina MD   traMADol (ULTRAM) 50 MG tablet Take 2 tablets (100mg) every morning when you wake up, 2 tablets (100mg)around noon, 2 tablets (100mg) beofre dinner and 2 tablets (100mg) before bed. 5/31/2018 at AM/Noon/Dinner/Bed time Yes Rangel Robles MD         Medication history completed by: Cuong Thacker [employer]4 Student

## 2018-06-01 NOTE — H&P
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 She has a history of recurrent pleural effusions and has been receiving weekly bilateral thoracentesis with IR for palliation of her symptoms. During the procedure she reports acute onset chest pain, shortness of breath. The symptoms have been persistent since the procedure. She reports associated dizziness as well. The pain has somewhat improved since she received IV morphine in the ER. She denies fevers, chills, nausea, vomiting, lower extremity swelling.          Cancer Treatment History:   8/29/17:  Seen in Gyn Onc.  Exam concerning for at least a Stage IIB cervical cancer.  Biopsy obtained consistent with poorly differentiated squamous cell ca.  MRI ordered.      9/1/17: MRI Pelvis with 7 x 7cm mass at level of cervix, protrudes into upper third of vagina, bilateral parametrial extension, abuts bilateral ureters but no obstruction, abuts rectal wall with obliteration of fat plan but no mucosal invasion, no clear bladder wall invasion, suspicious lymph nodes left hemipelvis, peritoneal nodule versus lymph node.      9/6/17 to 9/7/17:  Patient admitted to hospital with pain. CT Chest PE was negative for PE but showed multiple pulmonary nodules consistent with metastatic disease.  CT A/P showed cervical mass with regional metastatic disease as well as peritoneal nodularity.      9/8/17:  PET scan with multiple metastatic hypermetabolic lung nodules, axillary, mediastinal, hilar and abdominal lymph nodes.      9/21/17-11/2/17: Cycle  #1-3 Paclitaxel/Carboplatin/Avastin.   11/17/17: PET CTIMPRESSION:   1. In this patient with a history of stage IV poorly differentiated squamous cell carcinoma of the cervix, there has been a positive response to therapy. The primary cervical mass is significantly decreased in size and demonstrates decreased FDG uptake. Similarly, the metastatic pulmonary nodules and metastatic lymphadenopathy has significantly improved.      2. Bilateral hypermetabolic level 2A lymph nodes which have slightly increased in size, of uncertain clinical significance given the positive response in the remainder of the body. Enlargement may be related to the patient's dental and sinus disease. Recommend close attention on follow-up imaging.      3. Periapical lucencies with FDG uptake and dental caries involving multiple upper teeth. Recommend follow-up with dentistry.      4. Asymmetric radiotracer uptake in the left prevertebral soft tissues without discernible mass. Recommend close attention on follow-up imaging.      5. Small bilateral pleural effusions.      11/21/17-1/11/18: Cycle #4-6 Paclitaxel/Carboplatin/Avastin.   2/14/18: PET CT IMPRESSION:   1. In this patient with stage IV poorly differentiated squamous cell carcinoma of the cervix there is evidence for progression of disease:  a. Increased size and FDG avidity of primary cervical mass (maximum SUV 30.49, previously 12.57)  b. New FDG avid 7 mm left inguinal lymph node (maximum SUV 6.06)  c. New FDG avid 10 mm right paratracheal lymph node (maximum SUV 12.40. No FDG avid 10 mm right axillary lymph node (maximum SUV 6.14). Resolution of previously FDG avid 9 mm right axillary lymph node. These findings may be reactive versus metastatic.   2. Increased FDG avidity of the rectum is nonspecific and may represent infection, inflamed hemorrhoids, or malignancy. Recommend direct visualization.  3. Small stable right-sided pleural effusion.   4. Please see dedicated dictation of  high-resolution PET CT of the head and neck.       Plan: Avastin/Cisplatin 50 mg/m2 day 1 +Topotecan 0.75 mg/m2 for 3 days Q 21 days for 3 cycles then repeat imaging.       2/27/18: Cycle #1 Cisplatin/Topotecan/Avastin.   3/20/18: Cycle #2 Cisplatin/Topotecan/Avastin deferred secondary to neutropenia. Given 3/28/18.  4/15/18: ED for SOB; CT Chest Pulmonary Angio:  IMPRESSION:    1.  No CT evidence of pulmonary emboli.  2.  Large right and small left pleural effusions.  3.  Complete consolidation of the right lower lobe.  Atelectasis in the right upper and left lower lobe.  4.  Borderline prominent right axillary lymph node.  4/17/18: Cycle #3 Cisplatin/Topotecan/Avastin. Cisplatin and Topotecan reduced 25% secondary to CrCl.     5/8/18 ER visit for SOB, pleural effusion  - IR right thoracentesis performed with improvement in symptoms    5/21/18 IR bilateral thoracentesis (Left 800 cc, Right 700 cc)    5/25/18 Percutaneous liver lesion biopsy  FINAL DIAGNOSIS:   Liver, lesion, ultrasound guided core biopsy:   - POORLY DIFFERENTIATED SQUAMOUS CELL CARCINOMA WITH NECROSIS, compatible   with metastasis from cervical   primary (see comment)     6/1/18 IR bilateral thoracentesis (Left 550 cc, Right 575 cc), acute onset chest pain  6/1/18 ER Visit, CXR  Impression:   1. Small right-sided hydropneumothorax without evidence of tension.  2. Small left sided pleural effusion.  3. Pulmonary vascular congestion.         Past Medical History:     Past Medical History:   Diagnosis Date     Cancer (H)     cervical     Hypertension             Past Surgical History:      Past Surgical History:   Procedure Laterality Date     BIOPSY/EXCISION LYMPH NODE(S), NEEDLE, SUPERFICIAL (CERVICAL/INGUINAL/AXILLARY) Right 9/21/2017    Procedure: BIOPSY/EXCISION LYMPH NODE(S), NEEDLE, SUPERFICIAL (CERVICAL/INGUINAL/AXILLARY);;  Surgeon: Sonu Denson PA-C;  Location: [facility] OR     INSERT PORT VASCULAR ACCESS Right 9/21/2017    Procedure:  INSERT PORT VASCULAR ACCESS;  Single Lumen Chest Power Port, Right Axillary Lymph Node Biopsy;  Surgeon: Sonu Denson PA-C;  Location: [facility] OR     no previous surgery       THORACENTESIS N/A 4/18/2018    Procedure: THORACENTESIS;  Thoracentesis;  Surgeon: Sonu Denson PA-C;  Location: [facility] OR     THORACENTESIS Bilateral 5/21/2018    Procedure: THORACENTESIS;  Thoracentesis;  Surgeon: Sonu Denson PA-C;  Location:  OR            Social History:     Social History   Substance Use Topics     Smoking status: Never Smoker     Smokeless tobacco: Never Used     Alcohol use No            Allergies:   No Known Allergies         Medications:       Current Facility-Administered Medications on File Prior to Encounter:  heparin 100 UNIT/ML injection 5 mL   heparin lock flush 10 UNIT/ML injection 5-10 mL   heparin lock flush 10 UNIT/ML injection 5-10 mL   lidocaine (LMX4) kit   lidocaine (LMX4) kit   lidocaine BUFFERED 1 % injection 1 mL   lidocaine BUFFERED 1 % injection 1 mL   [COMPLETED] lidocaine BUFFERED 1 % injection 30 mL   May take regular AM medications except those listed below.   sodium chloride (PF) 0.9% PF flush 10-20 mL   sodium chloride (PF) 0.9% PF flush 10-20 mL   sodium chloride (PF) 0.9% PF flush 3 mL   sodium chloride (PF) 0.9% PF flush 3 mL   sodium chloride (PF) 0.9% PF flush 3 mL   sodium chloride (PF) 0.9% PF flush 3 mL     Current Outpatient Prescriptions on File Prior to Encounter:  acetaminophen (TYLENOL) 325 MG tablet Take 975 mg by mouth 3 times daily    amLODIPine (NORVASC) 10 MG tablet Take 1 tablet (10 mg) by mouth daily   dronabinol (MARINOL) 2.5 MG capsule Take 1 capsule (2.5 mg) by mouth 2 times daily (before meals)   glutamine 500 MG TABS Take 500 mg by mouth 2 times daily   LORazepam (ATIVAN) 1 MG tablet Take 1 tablet (1 mg) by mouth every 6 hours as needed (Anxiety, Nausea/Vomiting or Sleep)   OLANZapine zydis (ZYPREXA) 5 MG ODT tab Take 1 tablet (5 mg)  by mouth At Bedtime   polyethylene glycol (MIRALAX) powder Take 17-34 g (1-2 capfuls) by mouth daily   prochlorperazine (COMPAZINE) 10 MG tablet Take 1 tablet (10 mg) by mouth 3 times daily   ranitidine (ZANTAC) 75 MG tablet Take 1 tablet (75 mg) by mouth 2 times daily   senna-docusate (SENOKOT-S;PERICOLACE) 8.6-50 MG per tablet Take 2-4 tablets by mouth 2 times daily   traMADol (ULTRAM) 50 MG tablet Take 2 tablets (100mg) every morning when you wake up, 2 tablets (100mg)around noon, 2 tablets (100mg) beofre dinner and 2 tablets (100mg) before bed.            Review of Systems:     CONSTITUTIONAL:  negative  RESPIRATORY:  positive for dyspnea, chest pain and pleuritic pain  CARDIOVASCULAR:  positive for chest pain and pleuritic pain. Negative for palpitations  GASTROINTESTINAL:  negative  GENITOURINARY:  negative  HEMATOLOGIC/LYMPHATIC:  negative  ENDOCRINE:  negative         Physical Exam:     Vitals:    06/01/18 1245 06/01/18 1315 06/01/18 1330 06/01/18 1407   BP: (!) 148/91 (!) 149/92 137/86 149/85   Resp: 29 28 26 26   Temp:    97.2  F (36.2  C)   TempSrc:    Oral   SpO2: 98% 100% 100% 99%       General: sitting up in bed, appears anxious but otherwise in no acute ditress  CV: Tachycardic, no R/M/G, well perfused  Resp: upper lung fields clear bilaterally, middle and lower lung fields with wet crackles, diminished lung sounds over right lower lung field  Abdomen: soft, obese, non-distended  Extremities: bilateral lower extremities without evidence of edema         Data:     Results for orders placed or performed during the hospital encounter of 06/01/18 (from the past 24 hour(s))   EKG 12-lead, tracing only   Result Value Ref Range    Interpretation ECG Click View Image link to view waveform and result    Troponin I   Result Value Ref Range    Troponin I ES <0.015 0.000 - 0.045 ug/L   CBC with platelets differential   Result Value Ref Range    WBC 9.1 4.0 - 11.0 10e9/L    RBC Count 4.08 3.8 - 5.2 10e12/L     Hemoglobin 12.0 11.7 - 15.7 g/dL    Hematocrit 37.7 35.0 - 47.0 %    MCV 92 78 - 100 fl    MCH 29.4 26.5 - 33.0 pg    MCHC 31.8 31.5 - 36.5 g/dL    RDW 16.8 (H) 10.0 - 15.0 %    Platelet Count 253 150 - 450 10e9/L    Diff Method Automated Method     % Neutrophils 73.0 %    % Lymphocytes 19.5 %    % Monocytes 6.7 %    % Eosinophils 0.5 %    % Basophils 0.2 %    % Immature Granulocytes 0.1 %    Nucleated RBCs 0 0 /100    Absolute Neutrophil 6.7 1.6 - 8.3 10e9/L    Absolute Lymphocytes 1.8 0.8 - 5.3 10e9/L    Absolute Monocytes 0.6 0.0 - 1.3 10e9/L    Absolute Eosinophils 0.1 0.0 - 0.7 10e9/L    Absolute Basophils 0.0 0.0 - 0.2 10e9/L    Abs Immature Granulocytes 0.0 0 - 0.4 10e9/L    Absolute Nucleated RBC 0.0    Basic metabolic panel   Result Value Ref Range    Sodium 134 133 - 144 mmol/L    Potassium 3.9 3.4 - 5.3 mmol/L    Chloride 98 94 - 109 mmol/L    Carbon Dioxide 26 20 - 32 mmol/L    Anion Gap 11 3 - 14 mmol/L    Glucose 101 (H) 70 - 99 mg/dL    Urea Nitrogen 9 7 - 30 mg/dL    Creatinine 0.82 0.52 - 1.04 mg/dL    GFR Estimate 70 >60 mL/min/1.7m2    GFR Estimate If Black 85 >60 mL/min/1.7m2    Calcium 9.2 8.5 - 10.1 mg/dL   XR Chest 2 Views   Result Value Ref Range    Radiologist flags Right-sided hydropneumothorax (Urgent)     Narrative    Exam: XR CHEST 2 VW, 6/1/2018 11:37 AM    Indication: CP after thoracentesis     Comparison: Chest x-ray 5/8/2018    Findings:   Port-A-Cath tip projects over the superior cavoatrial junction. There  is a small right-sided hydropneumothorax. No mediastinal shift. Small  left-sided pleural effusion. Mild perihilar interstitial opacities.      Impression    Impression:   1. Small right-sided hydropneumothorax without evidence of tension.  2. Small left sided pleural effusion.  3. Pulmonary vascular congestion.    [Urgent Result: Right-sided hydropneumothorax]    Finding was identified on 6/1/2018 11:40 AM.     Dr. Orlando was contacted by Dr. Lozada at 6/1/2018 11:43 AM  and  verbalized understanding of the urgent finding.     I have personally reviewed the examination and initial interpretation  and I agree with the findings.    JASON EMERY MD   Interventional Radiology Adult/Peds IP Consult: Patient to be seen: URGENT within 4 hours; Call back #: ER; Pt had bilateral thoracentesis done by IR then developed CP and sent to ED, CXR show PTX on right    Narrative    Dawn Holloway APRN CNP     6/1/2018 12:30 PM  IR consulted for right sided pneumothorax post-thoracentesis.    Breanna is a 63 year old female with history of pleural effusion,   pulmonary nodules, HTN, obesity, and cervical cancer who   presented to the ED with complaints of chest pain and SOB post   outpatient thoracentesis. This is not her first thoracentesis   with IR for recurrent effusions. She had 575 cc out of the right   and 550 cc out of the left today. Patient developed chest pain   after the procedure and was sent to the ER for further   evaluation. HR prior to the procedure was in the 130s and it   remained elevated post-procedurally. Her O2 sats improved. IR   provider spoke with primary care provider regarding vitals and   f/u was planned for Tuesday with oncology.    Case and imaging reviewed with Dr. Orellana and Dr. Espana from   IR. Chest xray shows basilar pneumo on the right. 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 Would recommend pain management. Can repeat chest xray in 1   hour and ensure pneumo is not enlarging.    This was relayed to ER physician. Please follow-up with any   questions or concerns.     Dawn Holloway DNP, ROSA  Interventional Radiology   Phone: [phone]  Pager: [phone]               Assessment and Plan:     Assessment: 63 year old female with Stage IV poorly differentiated SCC of the cervix admitted with chest pain related to right pneumothorax s/p bilateral IR guided thoracentesis.       Plan:  Dz: Stage IV  poorly differentiated SCC of the cervix diagnosed in August 2017 with metastases to the lungs and axillary, mediastinal, hilar and abdominal lymph nodes s/p 6 cycles of Paclitaxel/Carboplatin/Avastin (completed January 2018) and 3 cycles of Cisplastin/Topotecan/Avastin (last treatment 4/17/18).  FEN: Regular diet. NS at 75 cc/hr, AM BMP ordered.   Pain: Scheduled Tramadol and PRN Tylenol for pain. Received morphine in ED.  Heme: Hemoglobin 12.0. No issues.   CV: Hypertension, continue PTA Amlodipine. Tachycardia to 140s on admission, improved to 120s s/p IVF hydration. Baseline appears to be 100 - 110. EKG sinus tachycardia. Troponin, Lactate negative. Continue to monitor closely.  Pulm: Recurrent bilateral pleural effusions, receiving scheduled IR guided thoracentesis. CXR in ER consistent with small right sided hydropneumothorax. Discussed plan of care with IR provider on call. 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 Recommended reimaging with CXR 1-2 hours from prior to ensure that the pneumothorax is not continuing to expand. Additional recommendations include attentive pain management.   GI: Baseline nausea, anorexia, PTA CABRERA Marinol, PRN Zofran, Compazine, Ativan, Zyprexa ordered.  : No issues  ID: No issues  Endocrine: No issues  Psych/Neuro: No issues  PPX: SCDs while in bed, encourage ambulation  Dispo:  Pending improvement in pain, vital stability, plan for observation overnight and likely discharge tomorrow AM    Plan of care discussed with GYN ONC fellow MD Nydia Fallon MD  OBGYN PGY-3  [phone]  3:23 PM 6/1/2018

## 2018-06-01 NOTE — IP AVS SNAPSHOT
MRN:[id number]                      After Visit Summary   6/1/2018    Ashvin Tiwari    MRN: [id number]           Thank you!     Thank you for choosing [facility] for your care. Our goal is always to provide you with excellent care. Hearing back from our patients is one way we can continue to improve our services. Please take a few minutes to complete the written survey that you may receive in the mail after you visit with us. Thank you!        Patient Information     Date Of Birth          1955        About your hospital stay     You were admitted on:  June 1, 2018 You last received care in the:  [address] [facility]    You were discharged on:  June 2, 2018        Reason for your hospital stay       For observation following collapse of your lung after fluid was drained off                  Who to Call     For medical emergencies, please call 911.  For non-urgent questions about your medical care, please call your primary care provider or clinic, [phone]          Attending Provider     Provider Specialty    Seymour Orlando MD Emergency Medicine    Nogal, Justo Vasquez MD Oncology       Primary Care Provider Office Phone # Fax #    Chelsea Wren [phone]       When to contact your care team       Call your primary doctor if you have any of the following: temperature greater than 100.4 or less than 97.0,  increased shortness of breath, increased drainage, increased swelling or increased pain.                  After Care Instructions     Activity       Your activity upon discharge: activity as tolerated            Diet       Follow this diet upon discharge: Regular                  Follow-up Appointments     Follow Up and recommended labs and tests       Follow up with BHARATH Miranda with Gyn Onc on 6/5/18 as previously scheduled    Follow up with MD Dory with Palliative care on 6/5/18 as previously scheduled                  Your next 10 appointments  already scheduled     Jun 05, 2018  7:00 AM CDT   [facility] with [facility]   [facility] ([facility])    [address]  [address]  [facility] [address]   [phone]            Jun 05, 2018  7:30 AM CDT   (Arrive by 7:15 AM)   Return Visit with ROSA Mendoza CNP   [facility] ([facility])    [address]  [address]  [place] [facility] [address]   [phone]            Jun 05, 2018 10:00 AM CDT   (Arrive by 9:45 AM)   Return Visit with Briana Medina MD   [facility] ([facility])    [address]  [address]  [place] [facility] [address]   [phone]            Jun 05, 2018 11:30 AM CDT   Infusion 120 with UC ONCOLOGY INFUSION, UC 21 ATC   [facility] ([facility])    [address]  [address]  [place] [facility] [address]   [phone]            Jun 12, 2018  7:00 AM CDT   [facility] Draw with [facility] DRAW   [facility] ([facility])    [address]  [address]  [place] [facility] [address]   [phone]            Jun 12, 2018  7:30 AM CDT   Infusion 120 with UC ONCOLOGY INFUSION, UC 13 ATC   [facility] ([facility])    [address]  [address]  [place] [facility] [address]   [phone]            Jun 19, 2018 11:30 AM CDT   [facility] Lab Draw with [facility] DRAW   [facility] ([facility])    [address]  [address]  [place] [facility] [address]   [phone]            Jun 19, 2018 12:00 PM CDT   Infusion 120 with UC ONCOLOGY INFUSION   [facility] ([facility])    [address] [place] [address]  [address]  [facility] [address]   [phone]              Pending Results     Date and Time Order Name  "Status Description    2018 0747 IR THORACENTESIS In process             Statement of Approval     Ordered          18 1333  I have reviewed and agree with all the recommendations and orders detailed in this document.  EFFECTIVE NOW     Approved and electronically signed by:  Marie Lobato MD             Admission Information     Date & Time Provider Department Dept. Phone    2018 Justo Banuelos MD [address] [facility] [phone]      Your Vitals Were     Blood Pressure Temperature Respirations Weight Pulse Oximetry BMI (Body Mass Index)    130/72 (BP Location: Left arm) 97.5  F (36.4  C) (Oral) 18 77.6 kg (171 lb 1.6 oz) 94% 30.31 kg/m2      MyChart Information     [employer] lets you send messages to your doctor, view your test results, renew your prescriptions, schedule appointments and more. To sign up, go to [email or web address] . Click on \"Log in\" on the left side of the screen, which will take you to the Welcome page. Then click on \"Sign up Now\" on the right side of the page.     You will be asked to enter the access code listed below, as well as some personal information. Please follow the directions to create your username and password.     Your access code is: [email or web address]  Expires: 2018  6:30 AM     Your access code will  in 90 days. If you need help or a new code, please call your [facility] clinic or [phone].        Care EveryWhere ID     This is your Care EveryWhere ID. This could be used by other organizations to access your [facility] medical records  [id number]        Equal Access to Services     [facility]: Hadii elvia mccurdy hadashmylene Sosyd, waaxda luqadaha, qaybta kaalmada elizabeth wray. So [facility] [phone].    ATENCIÓN: Si habla español, tiene a mae disposición servicios gratuitos de asistencia lingüística. Llame al [phone].    We comply with applicable federal civil rights laws and [place] laws. We do not " discriminate on the basis of race, color, national origin, age, disability, sex, sexual orientation, or gender identity.               Review of your medicines      START taking        Dose / Directions    oxyCODONE IR 5 MG tablet   Commonly known as:  ROXICODONE   Used for:  Pneumothorax, acute        Dose:  5 mg   Take 1 tablet (5 mg) by mouth every 4 hours as needed for moderate to severe pain   Quantity:  6 tablet   Refills:  0         CONTINUE these medicines which have NOT CHANGED        Dose / Directions    acetaminophen 325 MG tablet   Commonly known as:  TYLENOL   Used for:  Cancer related pain        Dose:  975 mg   Take 975 mg by mouth 3 times daily   Quantity:  100 tablet   Refills:  0       amLODIPine 10 MG tablet   Commonly known as:  NORVASC   Used for:  Essential hypertension        Dose:  10 mg   Take 1 tablet (10 mg) by mouth daily   Quantity:  30 tablet   Refills:  3       dronabinol 2.5 MG capsule   Commonly known as:  MARINOL   Used for:  Anorexia, Malignant neoplasm of cervix, unspecified site (H), Nausea        Dose:  2.5 mg   Take 1 capsule (2.5 mg) by mouth 2 times daily (before meals)   Quantity:  60 capsule   Refills:  1       glutamine 500 MG Tabs   Used for:  Drug-induced polyneuropathy (H)        Dose:  500 mg   Take 500 mg by mouth 2 times daily   Quantity:  120 tablet   Refills:  3       LORazepam 1 MG tablet   Commonly known as:  ATIVAN   Used for:  Malignant neoplasm of cervix, unspecified site (H)        Dose:  1 mg   Take 1 tablet (1 mg) by mouth every 6 hours as needed (Anxiety, Nausea/Vomiting or Sleep)   Quantity:  30 tablet   Refills:  2       OLANZapine zydis 5 MG ODT tab   Commonly known as:  zyPREXA   Used for:  Anorexia, Malignant neoplasm of cervix, unspecified site (H), Nausea        Dose:  5 mg   Take 1 tablet (5 mg) by mouth At Bedtime   Quantity:  30 tablet   Refills:  3       polyethylene glycol powder   Commonly known as:  MIRALAX   Used for:  Cancer related pain,  Generalized abdominal pain        Dose:  1-2 capful   Take 17-34 g (1-2 capfuls) by mouth daily   Quantity:  510 g   Refills:  1       prochlorperazine 10 MG tablet   Commonly known as:  COMPAZINE   Used for:  Malignant neoplasm of cervix, unspecified site (H), Nausea        Dose:  10 mg   Take 1 tablet (10 mg) by mouth 3 times daily   Quantity:  120 tablet   Refills:  2       ranitidine 75 MG tablet   Commonly known as:  ZANTAC   Used for:  Gastroesophageal reflux disease, esophagitis presence not specified        Dose:  75 mg   Take 1 tablet (75 mg) by mouth 2 times daily   Quantity:  60 tablet   Refills:  1       senna-docusate 8.6-50 MG per tablet   Commonly known as:  SENOKOT-S;PERICOLACE   Used for:  Cancer related pain        Dose:  2-4 tablet   Take 2-4 tablets by mouth 2 times daily   Quantity:  100 tablet   Refills:  1       traMADol 50 MG tablet   Commonly known as:  ULTRAM   Used for:  Malignant neoplasm of cervix, unspecified site (H), Cancer related pain        Take 2 tablets (100mg) every morning when you wake up, 2 tablets (100mg)around noon, 2 tablets (100mg) beofre dinner and 2 tablets (100mg) before bed.   Quantity:  240 tablet   Refills:  0            Where to get your medicines      Some of these will need a paper prescription and others can be bought over the counter. Ask your nurse if you have questions.     Bring a paper prescription for each of these medications     oxyCODONE IR 5 MG tablet                Protect others around you: Learn how to safely use, store and throw away your medicines at www.disposemymeds.org.        Information about OPIOIDS     PRESCRIPTION OPIOIDS: WHAT YOU NEED TO KNOW   You have a prescription for an opioid (narcotic) pain medicine. Opioids can cause addiction. If you have a history of chemical dependency of any type, you are at a higher risk of becoming addicted to opioids. Only take this medicine after all other options have been tried. Take it for as short a  time and as few doses as possible.     Do not:    Drive. If you drive while taking these medicines, you could be arrested for driving under the influence (DUI).    Operate heavy machinery    Do any other dangerous activities while taking these medicines.     Drink any alcohol while taking these medicines.      Take with any other medicines that contain acetaminophen. Read all labels carefully. Look for the word  acetaminophen  or  Tylenol.  Ask your pharmacist if you have questions or are unsure.    Store your pills in a secure place, locked if possible. We will not replace any lost or stolen medicine. If you don t finish your medicine, please throw away (dispose) as directed by your pharmacist. The [place] Pollution Control [employer] has more information about safe disposal: https://www.pca.[facility].[place].us/living-green/managing-unwanted-medications    All opioids tend to cause constipation. Drink plenty of water and eat foods that have a lot of fiber, such as fruits, vegetables, prune juice, apple juice and high-fiber cereal. Take a laxative (Miralax, milk of magnesia, Colace, Senna) if you don t move your bowels at least every other day.              Medication List: This is a list of all your medications and when to take them. Check marks below indicate your daily home schedule. Keep this list as a reference.      Medications           Morning Afternoon Evening Bedtime As Needed    acetaminophen 325 MG tablet   Commonly known as:  TYLENOL   Take 975 mg by mouth 3 times daily                                amLODIPine 10 MG tablet   Commonly known as:  NORVASC   Take 1 tablet (10 mg) by mouth daily   Last time this was given:  10 mg on 6/2/2018  8:35 AM                                dronabinol 2.5 MG capsule   Commonly known as:  MARINOL   Take 1 capsule (2.5 mg) by mouth 2 times daily (before meals)   Last time this was given:  2.5 mg on 6/2/2018  8:35 AM                                glutamine 500 MG Tabs   Take 500  mg by mouth 2 times daily                                LORazepam 1 MG tablet   Commonly known as:  ATIVAN   Take 1 tablet (1 mg) by mouth every 6 hours as needed (Anxiety, Nausea/Vomiting or Sleep)                                OLANZapine zydis 5 MG ODT tab   Commonly known as:  zyPREXA   Take 1 tablet (5 mg) by mouth At Bedtime   Last time this was given:  5 mg on 6/1/2018 10:27 PM                                oxyCODONE IR 5 MG tablet   Commonly known as:  ROXICODONE   Take 1 tablet (5 mg) by mouth every 4 hours as needed for moderate to severe pain                                polyethylene glycol powder   Commonly known as:  MIRALAX   Take 17-34 g (1-2 capfuls) by mouth daily                                prochlorperazine 10 MG tablet   Commonly known as:  COMPAZINE   Take 1 tablet (10 mg) by mouth 3 times daily   Last time this was given:  10 mg on 6/2/2018  8:35 AM                                ranitidine 75 MG tablet   Commonly known as:  ZANTAC   Take 1 tablet (75 mg) by mouth 2 times daily   Last time this was given:  75 mg on 6/2/2018  8:35 AM                                senna-docusate 8.6-50 MG per tablet   Commonly known as:  SENOKOT-S;PERICOLACE   Take 2-4 tablets by mouth 2 times daily   Last time this was given:  2 tablets on 6/2/2018  8:35 AM                                traMADol 50 MG tablet   Commonly known as:  ULTRAM   Take 2 tablets (100mg) every morning when you wake up, 2 tablets (100mg)around noon, 2 tablets (100mg) beofre dinner and 2 tablets (100mg) before bed.   Last time this was given:  100 mg on 6/2/2018 11:55 AM

## 2018-06-02 NOTE — PROGRESS NOTES
Obserbation goals:  -diagnostic tests and consults completed and resulted- not met  -vital signs normal or at patient baseline- not met- pt is tachycardic  -tolerating oral intake to maintain hydration- goal met  -dyspnea improved and O2 sats greater than 88% on room air or prior home oxygen levels- not met    -safe disposition plan has been identified- not met  -Pneumothorax improved- not met

## 2018-06-02 NOTE — PROGRESS NOTES
Gynecologic Oncology Progress Note  6/2/2018     HD#2 admitted for CP/SOB after therapeutic thoracentesis by IR, found to have R pneumothorax    S: Feels much better, pain has decreased significantly. Used tramadol yesterday but nothing overnight. SOB significantly improved as well. Has ambulated. Voiding with no problems. She does report she passed a quarter sized clot from the vagina this morning. States she occasionally has vaginal bleeding. No continued bleeding.     O:  Vitals:    06/01/18 1330 06/01/18 1407 06/01/18 2057 06/01/18 2303   BP: 137/86 149/85 130/79 148/78   BP Location:    Left arm   Resp: 26 26 22 15   Temp:  97.2  F (36.2  C)  97.8  F (36.6  C)   TempSrc:  Oral Oral Oral   SpO2: 100% 99%  93%        I/O last 3 completed shifts:  In: 1206.25 [P.O.:120; I.V.:1086.25]  Out: 700 [Urine:700]   IV: 1086  PO: 120  UOP: 50 mL/hr overnight    Exam:  GEN - NAD, sitting comfortably in bed  CV - mildly tachycardic, regular rhythm.   PULM - mild crackles at bases, decreased breath sounds on right.   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 Her symptoms have improved significantly though she still has an oxygen requirement of 2 L overnight.     Active problem list:  1. Stage IV SCC of cervix  2. Recurrent malignant pleural effusions  3. New iatrogenic pneumothorax  5. Tachycardia   6. HTN  7. Nausea/Anorexia      Plan:  Dz: Stage IV poorly differentiated SCC of cervix. S/p 6C Carbo/Taxol/Avastin. S/p 3C Cis/Topotecan/Avastin,  last 4/17/18. IR guided liver bx 5/25 for Foundation 1.   FEN: Regular diet, NS 75 cc/hr  Pain: continue PTA CABRERA Tramadol, PRN PO Tylenol.  Heme: Hgb 12.0, passed clot overnight per patient though she has not had continued bleeding. Hgb reassuring. Likely 2/2 cervical cancer. Will give bleeding precautions.    CV:  - Tachycardia - does have baseline tachycardia to 100s though this was elevated to 140s on arrival. Improved with pain control and IV fluid hydration.   -  HTN, PTA Amlodipine  Pulm:   - Recurrent pleural effusions, currently receiving palliative thoracenteses.   - CXR with right sided pneumothorax on 6/1, repeat unchanged.   - IR consulted, likely trap lung, no role for chest tube placement. Recommend expectant management  GI: Nausea, Anorexia, PTA PRN Zofran, Compazine, Ativan, Zyprexa, CABRERA Marinol  : NI  ID: NI  Endo: NI  Psych/Neuro/MSK: NI  PPX: SCDs  Dispo: Pending clinical improvement  Drains/Lines: PIV, Port        Promise MD [facility]  OB/GYN Resident PGY2  Pgr [phone]    I have examined this patient with our resident who acted as as scribe and agree with the above findings and plan.    Justo Banuelos

## 2018-06-02 NOTE — PLAN OF CARE
Problem: Patient Care Overview  Goal: Plan of Care/Patient Progress Review  Outcome: Adequate for Discharge Date Met: 06/02/18  Pt D/C to home with daughter. Discharge instructions discussed with Pt and paperwork signed. Medications picked up at D/C pharmacy. Port 28 day heparin locked and de-accessed. All belongings with Pt.

## 2018-06-02 NOTE — PLAN OF CARE
Problem: Patient Care Overview  Goal: Plan of Care/Patient Progress Review  Outcome: Improving  Patient afebrile,Tachycardic, OVSS. Pt up with SBA and cane. Pain controlled with Tramadol. MIV infusing through port. Tolerating regular diet. Thoracentesis sites CDI. Left lung course throughout. Patients family at bedside. Appears to rest between cares.Cont. POC.

## 2018-06-02 NOTE — PLAN OF CARE
Problem: Patient Care Overview  Goal: Plan of Care/Patient Progress Review  Outcome: No Change  VSS ex tachy. Pt denies c/o pain. Tolerating regular diet well; denies N/V. + BM/gas. UOP adequate- pt had clot approx 2 cm in diameter overnight- she reports this is baseline for her. IVF via PIV. Pt up with SBA. Dressings to back CDI. On O2 overnight due to low O2 sats. PLAN: continue POC. Pt on OBS status.    Pt ok with bedside report if awake.

## 2018-06-02 NOTE — PROGRESS NOTES
Obserbation goals:  -diagnostic tests and consults completed and resulted- not met  -vital signs normal or at patient baseline- not met- pt is tachycardic  -tolerating oral intake to maintain hydration- goal met  -dyspnea improved and O2 sats greater than 88% on room air or prior home oxygen levels- goal met- pt on RA    -safe disposition plan has been identified- not met  -Pneumothorax improved- not met

## 2018-06-02 NOTE — PROGRESS NOTES
Obserbation goals:  -diagnostic tests and consults completed and resulted- met  -vital signs normal or at patient baseline- not met- pt is tachycardic  -tolerating oral intake to maintain hydration- goal met  -dyspnea improved and O2 sats greater than 88% on room air or prior home oxygen levels- goal met- pt on RA    -safe disposition plan has been identified- met  -Pneumothorax improved-  met

## 2018-06-04 NOTE — PROGRESS NOTES
Follow Up Notes on Referred Patient    Date: 2018       Dr. Barbara Soto MD  [address]  [place] [address]       RE: Ashvin Tiwari  : 1955  DENIS: 2018    Dear Dr. Barbara Soto:    Ashvin Tiwari is a 63 year old woman with a diagnosis of stage IV poorly differentiated SCC cervix.   She is here today for follow up and disease management.          Oncology history:   17:  Seen in Gyn Onc.  Exam concerning for at least a Stage IIB cervical cancer.  Biopsy obtained consistent with poorly differentiated squamous cell ca.  MRI ordered.      17: MRI Pelvis with 7 x 7cm mass at level of cervix, protrudes into upper third of vagina, bilateral parametrial extension, abuts bilateral ureters but no obstruction, abuts rectal wall with obliteration of fat plan but no mucosal invasion, no clear bladder wall invasion, suspicious lymph nodes left hemipelvis, peritoneal nodule versus lymph node.      17 to 17:  Patient admitted to hospital with pain. CT Chest PE was negative for PE but showed multiple pulmonary nodules consistent with metastatic disease.  CT A/P showed cervical mass with regional metastatic disease as well as peritoneal nodularity.      17:  PET scan with multiple metastatic hypermetabolic lung nodules, axillary, mediastinal, hilar and abdominal lymph nodes.      17-17: Cycle #1-3 Paclitaxel/Carboplatin/Avastin.   17: PET CTIMPRESSION:   1. In this patient with a history of stage IV poorly differentiated squamous cell carcinoma of the cervix, there has been a positive response to therapy. The primary cervical mass is significantly decreased in size and demonstrates decreased FDG uptake. Similarly, the metastatic pulmonary nodules and metastatic lymphadenopathy has significantly improved.      2. Bilateral hypermetabolic level 2A lymph nodes which have slightly increased in size, of uncertain clinical significance given the  positive response in the remainder of the body. Enlargement may be related to the patient's dental and sinus disease. Recommend close attention on follow-up imaging.      3. Periapical lucencies with FDG uptake and dental caries involving multiple upper teeth. Recommend follow-up with dentistry.      4. Asymmetric radiotracer uptake in the left prevertebral soft tissues without discernible mass. Recommend close attention on follow-up imaging.      5. Small bilateral pleural effusions.      11/21/17-1/11/18: Cycle #4-6 Paclitaxel/Carboplatin/Avastin.   2/14/18: PET CT IMPRESSION:   1. In this patient with stage IV poorly differentiated squamous cell carcinoma of the cervix there is evidence for progression of disease:  a. Increased size and FDG avidity of primary cervical mass (maximum SUV 30.49, previously 12.57)  b. New FDG avid 7 mm left inguinal lymph node (maximum SUV 6.06)  c. New FDG avid 10 mm right paratracheal lymph node (maximum SUV 12.40. No FDG avid 10 mm right axillary lymph node (maximum SUV 6.14). Resolution of previously FDG avid 9 mm right axillary lymph node. These findings may be reactive versus metastatic.   2. Increased FDG avidity of the rectum is nonspecific and may represent infection, inflamed hemorrhoids, or malignancy. Recommend direct visualization.  3. Small stable right-sided pleural effusion.   4. Please see dedicated dictation of high-resolution PET CT of the head and neck.       Plan: Avastin/Cisplatin 50 mg/m2 day 1 +Topotecan 0.75 mg/m2 for 3 days Q 21 days for 3 cycles then repeat imaging.       2/27/18: Cycle #1 Cisplatin/Topotecan/Avastin.   3/20/18: Cycle #2 Cisplatin/Topotecan/Avastin deferred secondary to neutropenia. Given 3/28/18.  4/15/18: ED for SOB; CT Chest Pulmonary Angio IMPRESSION:    1.  No CT evidence of pulmonary emboli.  2.  Large right and small left pleural effusions.  3.  Complete consolidation of the right lower lobe.  Atelectasis in the right upper and left  lower lobe.  4.  Borderline prominent right axillary lymph node.    4/17/18: Cycle #3 Cisplatin/Topotecan/Avastin. Cisplatin and Topotecan reduced 25% secondary to CrCl.     4/18/18: Right thoracentesis 800 ml    5/10/18: PET CT IMPRESSION:   In this patient with history of advanced cervical cancer, the current scan shows progression of disease as evidenced by:  1. Persistent cervical mass with increased FDG uptake and multiple new peritoneal, hypermetabolic nodules and left iliac lymph nodes.  2. Multiple new hypermetabolic liver metastasis.  3. Increased size and number of metastatic left inguinal lymph nodes.  4. Increased number and size of mediastinal metastatic lymph nodes and new, enlarged left hilar lymph nodes.  5. New hypermetabolic metastatic left lung pulmonary nodules.  6. New hypermetabolic left lower cervical lymph node.  7. Enlarged metastatic right axillary lymph node.  8. Bilateral moderate to severe pleural effusion associated with bibasilar atelectasis/consolidation.   9. Small ascites.    5/21/18: Thoracentesis; 800 ml from left; 700 ml from right  5/25/18: Liver lesion biopsy    6/1/18-6/2/18: Admission for right hydropneumothorax s/p bilateral thoracentesis. 800 ml from left; 700 ml from right    6/5/18: Cycle #1 weekly Paclitaxel.       Today she comes to clinic and denies difficulty breathing or feeling like she needs another thoracentesis. She is drinking about 40 oz water/day and does spend a lot of time laying on a couch and has noticed her back is aching some. She is consuming 1-2 Ensure/day, is taking Compazine TID, Ativan as needed, scheduled Tramadol, B6 50 mg BID, and L-glutamine 2 g BID. She denies any neuropathy. She is taking Miralax and Senna two tabs BID for constipation and this is helping. She passed some darker blood and clots when she was recently in the hospital. She denies any changes in her  bladder habits, no lower extremity edema, and no new difficulties sleeping. She  denies any abdominal bloating (has discomfort when constipated), no fevers or chills, and no chest pain or shortness of breath but does have CARMONA. She does not need any medication refills. She sees Palliative Care and has an appointment this morning.       Review of Systems:    Systemic           no weight changes; no fever; no chills; + night sweats; no appetite changes  Skin           no rashes, or lesions  Eye           no irritation; no changes in vision  Tabby-Laryngeal           no dysphagia; no hoarseness   Pulmonary    no cough; no shortness of breath  Cardiovascular    no chest pain; no palpitations; + HTN  Gastrointestinal    no diarrhea; + constipation; + abdominal pain; no changes in bowel habits; no blood in stool; + n/v  Genitourinary   no urinary frequency; no urinary urgency; no dysuria; no pain; no abnormal vaginal discharge; + abnormal vaginal bleeding  Breast    no breast discharge; no breast changes; no breast pain  Musculoskeletal    no myalgias; no arthralgias; no back pain  Psychiatric           no depressed mood; + anxiety    Hematologic               no tender lymph nodes; no noticeable swellings or lumps   Endocrine    no hot flashes; no heat/cold intolerance         Neurological   no tremor; no numbness and tingling; no headaches; + difficulty sleeping      Past Medical History:    Past Medical History:   Diagnosis Date     Cancer (H)     cervical     Hypertension          Past Surgical History:    Past Surgical History:   Procedure Laterality Date     BIOPSY/EXCISION LYMPH NODE(S), NEEDLE, SUPERFICIAL (CERVICAL/INGUINAL/AXILLARY) Right 9/21/2017    Procedure: BIOPSY/EXCISION LYMPH NODE(S), NEEDLE, SUPERFICIAL (CERVICAL/INGUINAL/AXILLARY);;  Surgeon: Sonu Denson PA-C;  Location:  OR     INSERT PORT VASCULAR ACCESS Right 9/21/2017    Procedure: INSERT PORT VASCULAR ACCESS;  Single Lumen Chest Power Port, Right Axillary Lymph Node Biopsy;  Surgeon: Sonu Denson  SHRAVAN;  Location: [facility] OR     no previous surgery       THORACENTESIS N/A 4/18/2018    Procedure: THORACENTESIS;  Thoracentesis;  Surgeon: Sonu Denson PA-C;  Location: [facility] OR     THORACENTESIS Bilateral 5/21/2018    Procedure: THORACENTESIS;  Thoracentesis;  Surgeon: Sonu Denson PA-C;  Location: [facility] OR         Health Maintenance Due   Topic Date Due     TETANUS IMMUNIZATION (SYSTEM ASSIGNED)  05/10/1971     HIV SCREEN (SYSTEM ASSIGNED)  05/10/1971     HEPATITIS C SCREENING  05/10/1971     LIPID SCREEN Q5 YR FEMALE (SYSTEM ASSIGNED)  05/10/1998     COLON CANCER SCREEN (SYSTEM ASSIGNED)  05/10/2003     ADVANCE DIRECTIVE PLANNING Q5 YRS  05/10/2008     PNEUMOCOCCAL (1 of 2 - PCV13) 05/10/2018     FALL RISK ASSESSMENT  05/10/2018     DEXA SCAN SCREENING (SYSTEM ASSIGNED)  05/10/2018       Current Medications:     Current Outpatient Prescriptions   Medication Sig Dispense Refill     acetaminophen (TYLENOL) 325 MG tablet Take 975 mg by mouth 3 times daily  100 tablet 0     amLODIPine (NORVASC) 10 MG tablet Take 1 tablet (10 mg) by mouth daily 30 tablet 3     dronabinol (MARINOL) 2.5 MG capsule Take 1 capsule (2.5 mg) by mouth 2 times daily (before meals) 60 capsule 1     glutamine 500 MG TABS Take 500 mg by mouth 2 times daily 120 tablet 3     LORazepam (ATIVAN) 1 MG tablet Take 1 tablet (1 mg) by mouth every 6 hours as needed (Anxiety, Nausea/Vomiting or Sleep) 30 tablet 2     OLANZapine zydis (ZYPREXA) 5 MG ODT tab Take 1 tablet (5 mg) by mouth At Bedtime 30 tablet 3     oxyCODONE IR (ROXICODONE) 5 MG tablet Take 1 tablet (5 mg) by mouth every 4 hours as needed for moderate to severe pain 6 tablet 0     polyethylene glycol (MIRALAX) powder Take 17-34 g (1-2 capfuls) by mouth daily 510 g 1     prochlorperazine (COMPAZINE) 10 MG tablet Take 1 tablet (10 mg) by mouth 3 times daily 120 tablet 2     prochlorperazine (COMPAZINE) 5 MG tablet Take 1 tablet (5 mg) by mouth every 6 hours as needed  "(Nausea/Vomiting) 30 tablet 1     ranitidine (ZANTAC) 75 MG tablet Take 1 tablet (75 mg) by mouth 2 times daily 60 tablet 1     senna-docusate (SENOKOT-S;PERICOLACE) 8.6-50 MG per tablet Take 2-4 tablets by mouth 2 times daily 100 tablet 1     traMADol (ULTRAM) 50 MG tablet Take 2 tablets (100mg) every morning when you wake up, 2 tablets (100mg)around noon, 2 tablets (100mg) beofre dinner and 2 tablets (100mg) before bed. 240 tablet 0     LORazepam (ATIVAN) 1 MG tablet Take 1 tablet (1 mg) by mouth every 6 hours as needed (Anxiety, Nausea/Vomiting or Sleep) (Patient not taking: Reported on 6/5/2018) 30 tablet 2         Allergies:      No Known Allergies     Social History:     Social History   Substance Use Topics     Smoking status: Never Smoker     Smokeless tobacco: Never Used     Alcohol use No       History   Drug Use No         Family History:       No family history on file.      Physical Exam:     /90 (BP Location: Right arm, Cuff Size: Adult Large)  Pulse 120  Temp 98.5  F (36.9  C) (Oral)  Resp 16  Ht 1.6 m (5' 3\")  Wt 79 kg (174 lb 3.2 oz)  SpO2 92%  BMI 30.86 kg/m2  Body mass index is 30.86 kg/(m^2).    General Appearance: healthy and alert, no distress     HEENT: no thyromegaly, no palpable nodules or masses        Cardiovascular: regular rhythm, tachycardic, no gallops, rubs or murmurs     Respiratory: lungs clear, no rales, rhonchi or wheezes, normal diaphragmatic excursion    Musculoskeletal: extremities non tender and without edema    Skin: no lesions or rashes     Neurological: Seated in w/c     Psychiatric: appropriate mood and affect                               Hematological: normal cervical, supraclavicular lymph nodes     Gastrointestinal:       abdomen soft, non-tender, non-distended, no organomegaly or masses    Genitourinary: deferred      Assessment:    Ashvin Tiwari is a 63 year old woman with a diagnosis of stage IV poorly differentiated SCC cervix.   She is here today " for follow up and disease management.     30 minutes were spent with this patient, over 50% of that time was spent in symptom management, treatment planning and in counseling and coordination of care.    Plan:     1.)        Ok to proceed with planned chemotherapy pending labs are WNL. She will return for her next cycle. Reviewed importance of adequate hydration, pacing activities, and eating smaller meals with lean proteins as well as increasing to two nutritional supplements a day. Reviewed signs and symptoms for when she should contact the clinic or seek additional care. Patient to contact the clinic with any questions or concerns in the interim. Recheck of HR was 112. Discussed scheduled thoracentesis as well as iv hydration.      2.) Continue to see Palliative Care. She will be seeing them again today.     3.) Labs and/or tests ordered include:  Chemo labs.      4.) Health maintenance issues addressed today include annual health maintenance and non-gynecologic issues with PCP.    ROSA Tavera, WHNP-BC, ANP-BC  Women's Health Nurse Practitioner  Adult Nurse Pracitioner  Division of Gynecologic Oncology          CC  Patient Care Team:  Chelsea Wren as PCP - General (Family Practice)  Sita Gallardo as Referring Physician (OB/Gyn)  Rima Saunders, RN as Continuity Care Coordinator (Gyn-Onc)  Leia Caraballo, RN as Registered Nurse (Palliative)  Barbara Soto MD as Referring Physician (OB/Gyn)  BARBARA SOTO

## 2018-06-05 NOTE — LETTER
2018       RE: Ashvin Tiwari  [address]  [facility] [address]     Dear Colleague,    Thank you for referring your patient, Ashvin Tiwari, to the [facility]. Please see a copy of my visit note below.                Follow Up Notes on Referred Patient    Date: 2018       Dr. Barbara Soto MD  [address]  [place] [address]       RE: Ashvin Tiwari  : 1955  DENIS: 2018    Dear Dr. Barbara Soto:    Ashvin Tiwari is a 63 year old woman with a diagnosis of stage IV poorly differentiated SCC cervix.   She is here today for follow up and disease management.          Oncology history:   17:  Seen in Gyn Onc.  Exam concerning for at least a Stage IIB cervical cancer.  Biopsy obtained consistent with poorly differentiated squamous cell ca.  MRI ordered.      17: MRI Pelvis with 7 x 7cm mass at level of cervix, protrudes into upper third of vagina, bilateral parametrial extension, abuts bilateral ureters but no obstruction, abuts rectal wall with obliteration of fat plan but no mucosal invasion, no clear bladder wall invasion, suspicious lymph nodes left hemipelvis, peritoneal nodule versus lymph node.      17 to 17:  Patient admitted to hospital with pain. CT Chest PE was negative for PE but showed multiple pulmonary nodules consistent with metastatic disease.  CT A/P showed cervical mass with regional metastatic disease as well as peritoneal nodularity.      17:  PET scan with multiple metastatic hypermetabolic lung nodules, axillary, mediastinal, hilar and abdominal lymph nodes.      17-17: Cycle #1-3 Paclitaxel/Carboplatin/Avastin.   17: PET CTIMPRESSION:   1. In this patient with a history of stage IV poorly differentiated squamous cell carcinoma of the cervix, there has been a positive response to therapy. The primary cervical mass is significantly decreased in size and demonstrates decreased FDG uptake.  Similarly, the metastatic pulmonary nodules and metastatic lymphadenopathy has significantly improved.      2. Bilateral hypermetabolic level 2A lymph nodes which have slightly increased in size, of uncertain clinical significance given the positive response in the remainder of the body. Enlargement may be related to the patient's dental and sinus disease. Recommend close attention on follow-up imaging.      3. Periapical lucencies with FDG uptake and dental caries involving multiple upper teeth. Recommend follow-up with dentistry.      4. Asymmetric radiotracer uptake in the left prevertebral soft tissues without discernible mass. Recommend close attention on follow-up imaging.      5. Small bilateral pleural effusions.      11/21/17-1/11/18: Cycle #4-6 Paclitaxel/Carboplatin/Avastin.   2/14/18: PET CT IMPRESSION:   1. In this patient with stage IV poorly differentiated squamous cell carcinoma of the cervix there is evidence for progression of disease:  a. Increased size and FDG avidity of primary cervical mass (maximum SUV 30.49, previously 12.57)  b. New FDG avid 7 mm left inguinal lymph node (maximum SUV 6.06)  c. New FDG avid 10 mm right paratracheal lymph node (maximum SUV 12.40. No FDG avid 10 mm right axillary lymph node (maximum SUV 6.14). Resolution of previously FDG avid 9 mm right axillary lymph node. These findings may be reactive versus metastatic.   2. Increased FDG avidity of the rectum is nonspecific and may represent infection, inflamed hemorrhoids, or malignancy. Recommend direct visualization.  3. Small stable right-sided pleural effusion.   4. Please see dedicated dictation of high-resolution PET CT of the head and neck.       Plan: Avastin/Cisplatin 50 mg/m2 day 1 +Topotecan 0.75 mg/m2 for 3 days Q 21 days for 3 cycles then repeat imaging.       2/27/18: Cycle #1 Cisplatin/Topotecan/Avastin.   3/20/18: Cycle #2 Cisplatin/Topotecan/Avastin deferred secondary to neutropenia. Given  3/28/18.  4/15/18: ED for SOB; CT Chest Pulmonary Angio IMPRESSION:    1.  No CT evidence of pulmonary emboli.  2.  Large right and small left pleural effusions.  3.  Complete consolidation of the right lower lobe.  Atelectasis in the right upper and left lower lobe.  4.  Borderline prominent right axillary lymph node.    4/17/18: Cycle #3 Cisplatin/Topotecan/Avastin. Cisplatin and Topotecan reduced 25% secondary to CrCl.     4/18/18: Right thoracentesis 800 ml    5/10/18: PET CT IMPRESSION:   In this patient with history of advanced cervical cancer, the current scan shows progression of disease as evidenced by:  1. Persistent cervical mass with increased FDG uptake and multiple new peritoneal, hypermetabolic nodules and left iliac lymph nodes.  2. Multiple new hypermetabolic liver metastasis.  3. Increased size and number of metastatic left inguinal lymph nodes.  4. Increased number and size of mediastinal metastatic lymph nodes and new, enlarged left hilar lymph nodes.  5. New hypermetabolic metastatic left lung pulmonary nodules.  6. New hypermetabolic left lower cervical lymph node.  7. Enlarged metastatic right axillary lymph node.  8. Bilateral moderate to severe pleural effusion associated with bibasilar atelectasis/consolidation.   9. Small ascites.    5/21/18: Thoracentesis; 800 ml from left; 700 ml from right  5/25/18: Liver lesion biopsy    6/1/18-6/2/18: Admission for right hydropneumothorax s/p bilateral thoracentesis. 800 ml from left; 700 ml from right    6/5/18: Cycle #1 weekly Paclitaxel.       Today she comes to clinic and denies difficulty breathing or feeling like she needs another thoracentesis. She is drinking about 40 oz water/day and does spend a lot of time laying on a couch and has noticed her back is aching some. She is consuming 1-2 Ensure/day, is taking Compazine TID, Ativan as needed, scheduled Tramadol, B6 50 mg BID, and L-glutamine 2 g BID. She denies any neuropathy. She is taking  Miralax and Senna two tabs BID for constipation and this is helping. She passed some darker blood and clots when she was recently in the hospital. She denies any changes in her  bladder habits, no lower extremity edema, and no new difficulties sleeping. She denies any abdominal bloating (has discomfort when constipated), no fevers or chills, and no chest pain or shortness of breath but does have CARMONA. She does not need any medication refills. She sees Palliative Care and has an appointment this morning.       Review of Systems:    Systemic           no weight changes; no fever; no chills; + night sweats; no appetite changes  Skin           no rashes, or lesions  Eye           no irritation; no changes in vision  Tabby-Laryngeal           no dysphagia; no hoarseness   Pulmonary    no cough; no shortness of breath  Cardiovascular    no chest pain; no palpitations; + HTN  Gastrointestinal    no diarrhea; + constipation; + abdominal pain; no changes in bowel habits; no blood in stool; + n/v  Genitourinary   no urinary frequency; no urinary urgency; no dysuria; no pain; no abnormal vaginal discharge; + abnormal vaginal bleeding  Breast    no breast discharge; no breast changes; no breast pain  Musculoskeletal    no myalgias; no arthralgias; no back pain  Psychiatric           no depressed mood; + anxiety    Hematologic               no tender lymph nodes; no noticeable swellings or lumps   Endocrine    no hot flashes; no heat/cold intolerance         Neurological   no tremor; no numbness and tingling; no headaches; + difficulty sleeping      Past Medical History:    Past Medical History:   Diagnosis Date     Cancer (H)     cervical     Hypertension          Past Surgical History:    Past Surgical History:   Procedure Laterality Date     BIOPSY/EXCISION LYMPH NODE(S), NEEDLE, SUPERFICIAL (CERVICAL/INGUINAL/AXILLARY) Right 9/21/2017    Procedure: BIOPSY/EXCISION LYMPH NODE(S), NEEDLE, SUPERFICIAL  (CERVICAL/INGUINAL/AXILLARY);;  Surgeon: Sonu Denson PA-C;  Location: [facility] OR     INSERT PORT VASCULAR ACCESS Right 9/21/2017    Procedure: INSERT PORT VASCULAR ACCESS;  Single Lumen Chest Power Port, Right Axillary Lymph Node Biopsy;  Surgeon: Sonu Denson PA-C;  Location: [facility] OR     no previous surgery       THORACENTESIS N/A 4/18/2018    Procedure: THORACENTESIS;  Thoracentesis;  Surgeon: Sonu Denson PA-C;  [facility]: [facility] OR     THORACENTESIS Bilateral 5/21/2018    Procedure: THORACENTESIS;  Thoracentesis;  Surgeon: Sonu Denson PA-C;  Location: [facility] OR         Health Maintenance Due   Topic Date Due     TETANUS IMMUNIZATION (SYSTEM ASSIGNED)  05/10/1971     HIV SCREEN (SYSTEM ASSIGNED)  05/10/1971     HEPATITIS C SCREENING  05/10/1971     LIPID SCREEN Q5 YR FEMALE (SYSTEM ASSIGNED)  05/10/1998     COLON CANCER SCREEN (SYSTEM ASSIGNED)  05/10/2003     ADVANCE DIRECTIVE PLANNING Q5 YRS  05/10/2008     PNEUMOCOCCAL (1 of 2 - PCV13) 05/10/2018     FALL RISK ASSESSMENT  05/10/2018     DEXA SCAN SCREENING (SYSTEM ASSIGNED)  05/10/2018       Current Medications:     Current Outpatient Prescriptions   Medication Sig Dispense Refill     acetaminophen (TYLENOL) 325 MG tablet Take 975 mg by mouth 3 times daily  100 tablet 0     amLODIPine (NORVASC) 10 MG tablet Take 1 tablet (10 mg) by mouth daily 30 tablet 3     dronabinol (MARINOL) 2.5 MG capsule Take 1 capsule (2.5 mg) by mouth 2 times daily (before meals) 60 capsule 1     glutamine 500 MG TABS Take 500 mg by mouth 2 times daily 120 tablet 3     LORazepam (ATIVAN) 1 MG tablet Take 1 tablet (1 mg) by mouth every 6 hours as needed (Anxiety, Nausea/Vomiting or Sleep) 30 tablet 2     OLANZapine zydis (ZYPREXA) 5 MG ODT tab Take 1 tablet (5 mg) by mouth At Bedtime 30 tablet 3     oxyCODONE IR (ROXICODONE) 5 MG tablet Take 1 tablet (5 mg) by mouth every 4 hours as needed for moderate to severe pain 6 tablet 0     polyethylene  "glycol (MIRALAX) powder Take 17-34 g (1-2 capfuls) by mouth daily 510 g 1     prochlorperazine (COMPAZINE) 10 MG tablet Take 1 tablet (10 mg) by mouth 3 times daily 120 tablet 2     prochlorperazine (COMPAZINE) 5 MG tablet Take 1 tablet (5 mg) by mouth every 6 hours as needed (Nausea/Vomiting) 30 tablet 1     ranitidine (ZANTAC) 75 MG tablet Take 1 tablet (75 mg) by mouth 2 times daily 60 tablet 1     senna-docusate (SENOKOT-S;PERICOLACE) 8.6-50 MG per tablet Take 2-4 tablets by mouth 2 times daily 100 tablet 1     traMADol (ULTRAM) 50 MG tablet Take 2 tablets (100mg) every morning when you wake up, 2 tablets (100mg)around noon, 2 tablets (100mg) beofre dinner and 2 tablets (100mg) before bed. 240 tablet 0     LORazepam (ATIVAN) 1 MG tablet Take 1 tablet (1 mg) by mouth every 6 hours as needed (Anxiety, Nausea/Vomiting or Sleep) (Patient not taking: Reported on 6/5/2018) 30 tablet 2         Allergies:      No Known Allergies     Social History:     Social History   Substance Use Topics     Smoking status: Never Smoker     Smokeless tobacco: Never Used     Alcohol use No       History   Drug Use No         Family History:       No family history on file.      Physical Exam:     /90 (BP Location: Right arm, Cuff Size: Adult Large)  Pulse 120  Temp 98.5  F (36.9  C) (Oral)  Resp 16  Ht 1.6 m (5' 3\")  Wt 79 kg (174 lb 3.2 oz)  SpO2 92%  BMI 30.86 kg/m2  Body mass index is 30.86 kg/(m^2).    General Appearance: healthy and alert, no distress     HEENT: no thyromegaly, no palpable nodules or masses        Cardiovascular: regular rhythm, tachycardic, no gallops, rubs or murmurs     Respiratory: lungs clear, no rales, rhonchi or wheezes, normal diaphragmatic excursion    Musculoskeletal: extremities non tender and without edema    Skin: no lesions or rashes     Neurological: Seated in w/c     Psychiatric: appropriate mood and affect                               Hematological: normal cervical, supraclavicular " lymph nodes     Gastrointestinal:       abdomen soft, non-tender, non-distended, no organomegaly or masses    Genitourinary: deferred      Assessment:    Ashvin Tiwari is a 63 year old woman with a diagnosis of stage IV poorly differentiated SCC cervix.   She is here today for follow up and disease management.     30 minutes were spent with this patient, over 50% of that time was spent in symptom management, treatment planning and in counseling and coordination of care.    Plan:     1.)        Ok to proceed with planned chemotherapy pending labs are WNL. She will return for her next cycle. Reviewed importance of adequate hydration, pacing activities, and eating smaller meals with lean proteins as well as increasing to two nutritional supplements a day. Reviewed signs and symptoms for when she should contact the clinic or seek additional care. Patient to contact the clinic with any questions or concerns in the interim. Recheck of HR was 112. Discussed scheduled thoracentesis as well as iv hydration.      2.) Continue to see Palliative Care. She will be seeing them again today.     3.) Labs and/or tests ordered include:  Chemo labs.      4.) Health maintenance issues addressed today include annual health maintenance and non-gynecologic issues with PCP.    ROSA Tavera, WHNP-BC, ANP-BC  Women's Health Nurse Practitioner  Adult Nurse Pracitioner  Division of Gynecologic Oncology          CC  Patient Care Team:  Chelsea Wren as PCP - General (Family Practice)  Sita Gallardo as Referring Physician (OB/Gyn)  Rima Saunders, TIMOTHY as Continuity Care Coordinator (Gyn-Onc)  Leia Caraballo, RN as Registered Nurse (Palliative)  Barbara Soto MD as Referring Physician (OB/Gyn)

## 2018-06-05 NOTE — PROGRESS NOTES
Therapy: IV hydration   [place]  Ded: $3.15 monthly     Co-Insurance:100%    In reference to referral made on 6/5/18 to check IV hydration coverage    Please contact Intake with any questions, [phone] or In Basket pool, [facility] ([phone]).

## 2018-06-05 NOTE — PATIENT INSTRUCTIONS
[facility] Main Line: [phone]    Call triage nurse with chills and/or temperature greater than or equal to 100.4, uncontrolled nausea/vomiting, diarrhea, constipation, dizziness, shortness of breath, chest pain, bleeding, unexplained bruising, or any new/concerning symptoms, questions/concerns.   If you are having any concerning symptoms or wish to speak to a provider before your next infusion visit, please call your care coordinator or triage to notify them so we can adequately serve you.   Triage Nurse Line: [phone]    If after hours, weekends, or holidays [phone]               June 2018 Sunday Monday Tuesday Wednesday Thursday Friday Saturday                            1     Outpatient Visit    6:48 AM   [facility]     IR THORACENTESIS    7:15 AM   (75 min.)   [id number]   [facility]     THORACENTESIS    8:15 AM   Michelle Sparrow PA-C   [facility]     Admission   11:05 AM   Justo Banuelos MD   [address] [facility]   (Discharge: 6/2/2018)     XR CHEST 2 VIEWS   11:15 AM   (15 min.)   [email or web address]   [facility] [place],  Radiology     XR CHEST 2 VIEWS    2:15 PM   (15 min.)   [email or web address]   [facility] [place],  Radiology 2     XR CHEST 2 VIEWS    9:25 AM   (15 min.)   [email or web address]   [facility] [place],  Radiology   3     4     5     [facility] DRAW    7:00 AM   (15 min.)   [facility]   [facility] Lab Draw     [facility] RETURN    7:15 AM   (30 min.)   Rachelle Miranda APRN CNP   [facility]    9:45 AM   (30 min.)   Briana Medina MD   [facility] ONC INFUSION 120   11:30 AM   (120 min.)   [facility]COL[facility]   [facility] 6     7     8     9       10     11     12     P [facility] DRAW    7:00 AM   (15 min.)   [facility] DRAW   [facility] Lab Draw     [facility] ONC INFUSION 120    7:30 AM   (120 min.)    ONCOLOGY INFUSION   [facility]  13     14     15     16       17     18     19     [facility] DRAW   11:30 AM   (15 min.)    [facility] DRAW   [facility] Draw     [facility] ONC INFUSION 120   12:00 PM   (120 min.)    ONCOLOGY INFUSION   [facility] 20     21     22     23       24     25     26     27     [facility] DRAW   12:30 PM   (15 min.)    [facility] DRAW   [facility] Draw     [facility] ONC INFUSION 120    1:00 PM   (120 min.)    ONCOLOGY INFUSION   [facility] 28 29 30 July 2018 Sunday Monday Tuesday Wednesday Thursday Friday Saturday   1     2     3     [facility] RETURN    9:45 AM   (30 min.)   Briana Medina MD   [facility]     [facility] DRAW    2:00 PM   (15 min.)    [facility]     [facility]C INFUSION 120    2:30 PM   (120 min.)    ONCOLOGY INFUSION   [facility] 4     5     6     7       8     9     10     [facility] DRAW    1:00 PM   (15 min.)    [facility] DRAW   [facility] Draw     [facility] ONC INFUSION 120    1:30 PM   (120 min.)    ONCOLOGY INFUSION   [facility] 11     12     13     14       15     16     17     18     19     20     21       22     23     24     25     26     27     28       29     30     31                                      Lab Results:  Recent Results (from the past 12 hour(s))   CBC with platelets differential    Collection Time: 06/05/18  7:19 AM   Result Value Ref Range    WBC 6.1 4.0 - 11.0 10e9/L    RBC Count 4.04 3.8 - 5.2 10e12/L    Hemoglobin 11.5 (L) 11.7 - 15.7 g/dL    Hematocrit 37.5 35.0 - 47.0 %    MCV 93 78 - 100 fl    MCH 28.5 26.5 - 33.0 pg    MCHC 30.7 (L) 31.5 - 36.5 g/dL    RDW 15.9 (H) 10.0 - 15.0 %    Platelet Count 260 150 - 450 10e9/L    Diff Method Automated Method     % Neutrophils 67.4 %    % Lymphocytes 22.0 %    % Monocytes 8.8 %    % Eosinophils 1.3 %    % Basophils 0.3 %    %  Immature Granulocytes 0.2 %    Nucleated RBCs 0 0 /100    Absolute Neutrophil 4.1 1.6 - 8.3 10e9/L    Absolute Lymphocytes 1.4 0.8 - 5.3 10e9/L    Absolute Monocytes 0.5 0.0 - 1.3 10e9/L    Absolute Eosinophils 0.1 0.0 - 0.7 10e9/L    Absolute Basophils 0.0 0.0 - 0.2 10e9/L    Abs Immature Granulocytes 0.0 0 - 0.4 10e9/L    Absolute Nucleated RBC 0.0    Comprehensive metabolic panel    Collection Time: 06/05/18  7:19 AM   Result Value Ref Range    Sodium 135 133 - 144 mmol/L    Potassium 4.3 3.4 - 5.3 mmol/L    Chloride 101 94 - 109 mmol/L    Carbon Dioxide 27 20 - 32 mmol/L    Anion Gap 7 3 - 14 mmol/L    Glucose 102 (H) 70 - 99 mg/dL    Urea Nitrogen 7 7 - 30 mg/dL    Creatinine 0.81 0.52 - 1.04 mg/dL    GFR Estimate 71 >60 mL/min/1.7m2    GFR Estimate If Black 85 >60 mL/min/1.7m2    Calcium 9.5 8.5 - 10.1 mg/dL    Bilirubin Total 0.3 0.2 - 1.3 mg/dL    Albumin 2.8 (L) 3.4 - 5.0 g/dL    Protein Total 8.0 6.8 - 8.8 g/dL    Alkaline Phosphatase 88 40 - 150 U/L    ALT 21 0 - 50 U/L    AST 30 0 - 45 U/L   Magnesium    Collection Time: 06/05/18  7:19 AM   Result Value Ref Range    Magnesium 1.9 1.6 - 2.3 mg/dL

## 2018-06-05 NOTE — PROGRESS NOTES
"Palliative Care Outpatient Clinic Progress Note    Patient Name:  Ashvin Tiwari  Primary Provider:  Chelsea Wren    Chief Complaint:   Metastatic cervical cancer  Nausea  Abdominal, chest, back pain  Weakness  Fatigue    Interim History:  Ashvin Tiwari 63 year old female returns to be seen by palliative care today.  Ashvin has metastatic cervical cancer. Disease progression noted on most recent scan 5/10/18.  Liver biopsy done to see if disease target therapy available to her. Starting cycle #1 of paclitaxel today.     She is feeling very tired today with very early morning appointments. She went into the hospital because breathing was terrible and needed a lot of fluid drained and then developed chest pain so went to the ED and was admitted for a day.  Discharged 4 days ago. Chest pain was thought to be due to effects of drainage of the fluid from the lungs. Chest pain decreased significantly. (Note: 6/1/18-6/2/18: Admission for right hydropneumothorax s/p bilateral thoracentesis. 800 ml from left; 700 ml from right).     Last night with a lot of abdominal pain.  Took oxycodone last night and today and decreased he pain. Now having a little bit of pain.     vomited this morning on the way to the office.     Bowel movements are painful and stool seems to only come out a little bit. Taking miralax BID and senna 4qd    Not sleeping very well.  Difficulty falling asleep. Energy level up and down. Mood good.     Occasional nausea, appitite is low and feels weak.     \"god's plans are different\".  Starting another round of chemo today.     Most bothersome - can't get around because feels so weak and tired.     Review of Systems:  ROS: 10 point ROS neg other than the symptoms noted above in the HPI      No Known Allergies  Current Outpatient Prescriptions   Medication Sig Dispense Refill     acetaminophen (TYLENOL) 325 MG tablet Take 975 mg by mouth 3 times daily  100 tablet 0     amLODIPine (NORVASC) 10 MG " tablet Take 1 tablet (10 mg) by mouth daily 30 tablet 3     dronabinol (MARINOL) 2.5 MG capsule Take 1 capsule (2.5 mg) by mouth 2 times daily (before meals) 60 capsule 1     glutamine 500 MG TABS Take 500 mg by mouth 2 times daily 120 tablet 3     LORazepam (ATIVAN) 1 MG tablet Take 1 tablet (1 mg) by mouth every 6 hours as needed (Anxiety, Nausea/Vomiting or Sleep) 30 tablet 2     OLANZapine zydis (ZYPREXA) 5 MG ODT tab Take 1 tablet (5 mg) by mouth At Bedtime 30 tablet 3     oxyCODONE IR (ROXICODONE) 5 MG tablet Take 1 tablet (5 mg) by mouth every 4 hours as needed for moderate to severe pain 6 tablet 0     polyethylene glycol (MIRALAX) powder Take 17-34 g (1-2 capfuls) by mouth daily 510 g 1     prochlorperazine (COMPAZINE) 10 MG tablet Take 1 tablet (10 mg) by mouth 3 times daily 120 tablet 2     prochlorperazine (COMPAZINE) 5 MG tablet Take 1 tablet (5 mg) by mouth every 6 hours as needed (Nausea/Vomiting) 30 tablet 1     ranitidine (ZANTAC) 75 MG tablet Take 1 tablet (75 mg) by mouth 2 times daily 60 tablet 1     senna-docusate (SENOKOT-S;PERICOLACE) 8.6-50 MG per tablet Take 2-4 tablets by mouth 2 times daily 100 tablet 1     traMADol (ULTRAM) 50 MG tablet Take 2 tablets (100mg) every morning when you wake up, 2 tablets (100mg)around noon, 2 tablets (100mg) beofre dinner and 2 tablets (100mg) before bed. 240 tablet 0     LORazepam (ATIVAN) 1 MG tablet Take 1 tablet (1 mg) by mouth every 6 hours as needed (Anxiety, Nausea/Vomiting or Sleep) (Patient not taking: Reported on 6/5/2018) 30 tablet 2     Past Medical History:   Diagnosis Date     Cancer (H)     cervical     Hypertension      Past Surgical History:   Procedure Laterality Date     BIOPSY/EXCISION LYMPH NODE(S), NEEDLE, SUPERFICIAL (CERVICAL/INGUINAL/AXILLARY) Right 9/21/2017    Procedure: BIOPSY/EXCISION LYMPH NODE(S), NEEDLE, SUPERFICIAL (CERVICAL/INGUINAL/AXILLARY);;  Surgeon: Sonu Denson PA-C;  [facility]: [facility]     INSERT PORT  "VASCULAR ACCESS Right 9/21/2017    Procedure: INSERT PORT VASCULAR ACCESS;  Single Lumen Chest Power Port, Right Axillary Lymph Node Biopsy;  Surgeon: Sonu Denson PA-C;  Location: [facility]     no previous surgery       THORACENTESIS N/A 4/18/2018    Procedure: THORACENTESIS;  Thoracentesis;  Surgeon: Sonu Denson PA-C;  [facility]     THORACENTESIS Bilateral 5/21/2018    Procedure: THORACENTESIS;  Thoracentesis;  Surgeon: Sonu Denson PA-C;  [facility]     THORACENTESIS Bilateral 6/1/2018    Procedure: THORACENTESIS;  Thoracentesis;  Surgeon: Michelle Sparrow PA-C;  [facility]           /90  Pulse 120  Temp 98.5  F (36.9  C) (Oral)  Ht 1.6 m (5' 3\")  Wt 78.9 kg (174 lb)  BMI 30.82 kg/m2  General: well groomed, appears very tired, fatigued, in NAD  Eyes: EOMI, sclera clear  HEENT: NC/AT; mucous membranes slightly dry  Lungs: no increased work of breathing while sitting in chair, speaking full sentences   Abdomen: soft, diffused tenderness to palpation without rebound or guarding  Neuro: A&O x 3; CN II-XII grossly intact;   Neuropsych: alert but very tired, good eye contact, voice quiet, engaged, pleasant, sensorium intact      Key Data Reviewed:  GFR 85; alb 2.8; hgb 11.5;       Impression:     Ashvin Tiwari is a 63 year old woman with a diagnosis of stage IV poorly differentiated SCC cervix dx 8/2017.  Disease progression despite different chemotherapy (disease progression noted on most recent scan 5/10/18).  Liver biopsy done to see if disease target therapy available to her. Starting cycle #1 of paclitaxel today.    Recommendations & Counseling:    Pain: Abdominal pain  - tramadol 2 tabs four times a day scheduled - Ashvin wants to continue with tramadol as she knows she tolerates it.   - oxycodone 1/2 - 1 tab every four hours if needed to control pain not controlled with tramadol - Ashvin has not been able to tolerate oxycodone in the past but " did seem to tolerate it during most recent hospitalization last week.   - oxycodone 1 pill (5mg) before bed  - tylenol 975mg three times a day IF NEEDED to control pain      Constipation:   - increase senna 4 tablets every morning and evening  - Miralax twice a day with senna    Nausea and loss of appetite:  - continue marinol 2.5mg twice a day. Can increase 5mg twice a day. Call me if you want a new prescription.  - continue with zyprexa 5mg under your tongue before bed. Helps with nausea and appetite.     Lack of energy:   - start ritalin 2.5mg (1/2 tab) in the morning. If not feeling improvement in energy after 3 days, okay to increase to 5mg (1 tab) in the morning. Okay to take only as needed -- you don't need to take this everyday, but if you feel it is helping it is okay to take everyday    Follow up July 3rd at 10AM          Thank you for involving us in the patient's care. 40 minutes face time over half spent counseling.  Briaan Medina MD / Palliative Medicine / Pager [phone] / After-Hours Answering Service [phone] / Main Palliative Clinic - [phone] / [facility] Inpatient Team Consult Pager [phone] (answered 8am-430pm M-F)

## 2018-06-05 NOTE — PROGRESS NOTES
Infusion Nursing Note:  Ashvin Tiwari presents today for Cycle 1 Day 1 Taxol (New Regimen, has had Taxol previously).    Patient seen by provider today: Yes: Rachelle Miranda NP   present during visit today: Not Applicable.    Note:  Presents to infusion in wheelchair.  New Regimen today.  Has had Taxol previously, last in January 2018.   Saw Rachelle Miranda pre chemo and Palliative Care to address pain.    Taxol rates today:  25ml/hr x2 min  50ml/hr x2 min  100ml/hr x2 min  300 ml/hr for remainder    Intravenous Access:  Implanted Port.    Treatment Conditions:  Lab Results   Component Value Date    HGB 11.5 06/05/2018     Lab Results   Component Value Date    WBC 6.1 06/05/2018      Lab Results   Component Value Date    ANEU 4.1 06/05/2018     Lab Results   Component Value Date     06/05/2018      Lab Results   Component Value Date     06/05/2018                   Lab Results   Component Value Date    POTASSIUM 4.3 06/05/2018           Lab Results   Component Value Date    MAG 1.9 06/05/2018            Lab Results   Component Value Date    CR 0.81 06/05/2018                   Lab Results   Component Value Date    BEATRIZ 9.5 06/05/2018                Lab Results   Component Value Date    BILITOTAL 0.3 06/05/2018           Lab Results   Component Value Date    ALBUMIN 2.8 06/05/2018                    Lab Results   Component Value Date    ALT 21 06/05/2018           Lab Results   Component Value Date    AST 30 06/05/2018       Results reviewed, labs MET treatment parameters, ok to proceed with treatment.      Post Infusion Assessment:  Patient tolerated infusion without incident.  Blood return noted pre and post infusion.  Site patent and intact, free from redness, edema or discomfort.  No evidence of extravasations.  Access discontinued per protocol.    Discharge Plan:   Patient declined prescription refills.  Copy of AVS reviewed with patient and/or family.  Patient will return 6/12/18  labs/chemo for next appointment.  Patient discharged in stable condition accompanied by: daughter.  Departure Mode: Wheelchair.  Face to Face time: 0.    Alyx Ritchie RN

## 2018-06-05 NOTE — PROGRESS NOTES
Care Coordinator Note  Arrangements made for IV fluids weekly on Friday via [facility].   Patient and her daughter are agreeable to that and will start this Friday and every Friday  Arrangements made for patient to have a thoracentesis on 6/13 at 6 AM.  Reviewed plan of care with them.  Patients daughter Mariela was left a message regrading the fluids and thoracentesis.          Patient  and Mariela verbalized back understanding of the above information discussed.   Face to face time spent with patient 5.  Rima MADSEN RN, OCN  Care Coordinator   Gynecologic Cancer   Office [phone]  Pager [phone]

## 2018-06-05 NOTE — LETTER
"6/5/2018     RE: Ashvin Tiwari  [address]  [facility] [address]     Dear Colleague,    Thank you for referring your patient, Ashvin Tiwari, to the [facility] at [facility]. Please see a copy of my visit note below.    Palliative Care Outpatient Clinic Progress Note    Patient Name:  Ashvin Tiwari  Primary Provider:  Chelsea Wren    Chief Complaint:   Metastatic cervical cancer  Nausea  Abdominal, chest, back pain  Weakness  Fatigue    Interim History:  Ashvin Tiwari 63 year old female returns to be seen by palliative care today.  Ashvin has metastatic cervical cancer. Disease progression noted on most recent scan 5/10/18.  Liver biopsy done to see if disease target therapy available to her. Starting cycle #1 of paclitaxel today.     She is feeling very tired today with very early morning appointments. She went into the hospital because breathing was terrible and needed a lot of fluid drained and then developed chest pain so went to the ED and was admitted for a day.  Discharged 4 days ago. Chest pain was thought to be due to effects of drainage of the fluid from the lungs. Chest pain decreased significantly. (Note: 6/1/18-6/2/18: Admission for right hydropneumothorax s/p bilateral thoracentesis. 800 ml from left; 700 ml from right).     Last night with a lot of abdominal pain.  Took oxycodone last night and today and decreased he pain. Now having a little bit of pain.     vomited this morning on the way to the office.     Bowel movements are painful and stool seems to only come out a little bit. Taking miralax BID and senna 4qd    Not sleeping very well.  Difficulty falling asleep. Energy level up and down. Mood good.     Occasional nausea, appitite is low and feels weak.     \"god's plans are different\".  Starting another round of chemo today.     Most bothersome - can't get around because feels so weak and tired.     Review of " Systems:  ROS: 10 point ROS neg other than the symptoms noted above in the HPI      No Known Allergies  Current Outpatient Prescriptions   Medication Sig Dispense Refill     acetaminophen (TYLENOL) 325 MG tablet Take 975 mg by mouth 3 times daily  100 tablet 0     amLODIPine (NORVASC) 10 MG tablet Take 1 tablet (10 mg) by mouth daily 30 tablet 3     dronabinol (MARINOL) 2.5 MG capsule Take 1 capsule (2.5 mg) by mouth 2 times daily (before meals) 60 capsule 1     glutamine 500 MG TABS Take 500 mg by mouth 2 times daily 120 tablet 3     LORazepam (ATIVAN) 1 MG tablet Take 1 tablet (1 mg) by mouth every 6 hours as needed (Anxiety, Nausea/Vomiting or Sleep) 30 tablet 2     OLANZapine zydis (ZYPREXA) 5 MG ODT tab Take 1 tablet (5 mg) by mouth At Bedtime 30 tablet 3     oxyCODONE IR (ROXICODONE) 5 MG tablet Take 1 tablet (5 mg) by mouth every 4 hours as needed for moderate to severe pain 6 tablet 0     polyethylene glycol (MIRALAX) powder Take 17-34 g (1-2 capfuls) by mouth daily 510 g 1     prochlorperazine (COMPAZINE) 10 MG tablet Take 1 tablet (10 mg) by mouth 3 times daily 120 tablet 2     prochlorperazine (COMPAZINE) 5 MG tablet Take 1 tablet (5 mg) by mouth every 6 hours as needed (Nausea/Vomiting) 30 tablet 1     ranitidine (ZANTAC) 75 MG tablet Take 1 tablet (75 mg) by mouth 2 times daily 60 tablet 1     senna-docusate (SENOKOT-S;PERICOLACE) 8.6-50 MG per tablet Take 2-4 tablets by mouth 2 times daily 100 tablet 1     traMADol (ULTRAM) 50 MG tablet Take 2 tablets (100mg) every morning when you wake up, 2 tablets (100mg)around noon, 2 tablets (100mg) beofre dinner and 2 tablets (100mg) before bed. 240 tablet 0     LORazepam (ATIVAN) 1 MG tablet Take 1 tablet (1 mg) by mouth every 6 hours as needed (Anxiety, Nausea/Vomiting or Sleep) (Patient not taking: Reported on 6/5/2018) 30 tablet 2     Past Medical History:   Diagnosis Date     Cancer (H)     cervical     Hypertension      Past Surgical History:   Procedure  "Laterality Date     BIOPSY/EXCISION LYMPH NODE(S), NEEDLE, SUPERFICIAL (CERVICAL/INGUINAL/AXILLARY) Right 9/21/2017    Procedure: BIOPSY/EXCISION LYMPH NODE(S), NEEDLE, SUPERFICIAL (CERVICAL/INGUINAL/AXILLARY);;  Surgeon: Sonu Denson PA-C;  [facility]     INSERT PORT VASCULAR ACCESS Right 9/21/2017    Procedure: INSERT PORT VASCULAR ACCESS;  Single Lumen Chest Power Port, Right Axillary Lymph Node Biopsy;  Surgeon: Sonu Denson PA-C;  [facility]     no previous surgery       THORACENTESIS N/A 4/18/2018    Procedure: THORACENTESIS;  Thoracentesis;  Surgeon: Sonu Denson PA-C;  [facility]     THORACENTESIS Bilateral 5/21/2018    Procedure: THORACENTESIS;  Thoracentesis;  Surgeon: Sonu Denson PA-C;  [facility]     THORACENTESIS Bilateral 6/1/2018    Procedure: THORACENTESIS;  Thoracentesis;  Surgeon: Michelle Sparrow PA-C;  [facility]           /90  Pulse 120  Temp 98.5  F (36.9  C) (Oral)  Ht 1.6 m (5' 3\")  Wt 78.9 kg (174 lb)  BMI 30.82 kg/m2  General: well groomed, appears very tired, fatigued, in NAD  Eyes: EOMI, sclera clear  HEENT: NC/AT; mucous membranes slightly dry  Lungs: no increased work of breathing while sitting in chair, speaking full sentences   Abdomen: soft, diffused tenderness to palpation without rebound or guarding  Neuro: A&O x 3; CN II-XII grossly intact;   Neuropsych: alert but very tired, good eye contact, voice quiet, engaged, pleasant, sensorium intact      Key Data Reviewed:  GFR 85; alb 2.8; hgb 11.5;       Impression:     Ashvin Tiwari is a 63 year old woman with a diagnosis of stage IV poorly differentiated SCC cervix dx 8/2017.  Disease progression despite different chemotherapy (disease progression noted on most recent scan 5/10/18).  Liver biopsy done to see if disease target therapy available to her. Starting cycle #1 of paclitaxel today.    Recommendations & Counseling:    Pain: Abdominal " pain  - tramadol 2 tabs four times a day scheduled - Ashvin wants to continue with tramadol as she knows she tolerates it.   - oxycodone 1/2 - 1 tab every four hours if needed to control pain not controlled with tramadol - Ashvin has not been able to tolerate oxycodone in the past but did seem to tolerate it during most recent hospitalization last week.   - oxycodone 1 pill (5mg) before bed  - tylenol 975mg three times a day IF NEEDED to control pain      Constipation:   - increase senna 4 tablets every morning and evening  - Miralax twice a day with senna    Nausea and loss of appetite:  - continue marinol 2.5mg twice a day. Can increase 5mg twice a day. Call me if you want a new prescription.  - continue with zyprexa 5mg under your tongue before bed. Helps with nausea and appetite.     Lack of energy:   - start ritalin 2.5mg (1/2 tab) in the morning. If not feeling improvement in energy after 3 days, okay to increase to 5mg (1 tab) in the morning. Okay to take only as needed -- you don't need to take this everyday, but if you feel it is helping it is okay to take everyday    Follow up July 3rd at 10AM    Thank you for involving us in the patient's care. 40 minutes face time over half spent counseling.  Briana Medina MD / Palliative Medicine / Pager [phone] / After-Hours Answering Service [phone] / Main Palliative Clinic - [phone] / [facility] Inpatient Team Consult Pager [phone] (answered 8am-430pm M-F)

## 2018-06-05 NOTE — PATIENT INSTRUCTIONS
Pain: Abdominal pain  - tramadol 2 tabs four times a day scheduled  - oxycodone 1/2 - 1 tab every four hours if needed to control pain not controlled with tramadol  - oxycodone 1 pill (5mg) before bed  - tylenol 975mg three times a day IF NEEDED to control pain      Constipation:   - increase senna 4 tablets every morning and evening  - Miralax twice a day with senna    Nausea and loss of appetite:  - continue marinol 2.5mg twice a day. Can increase 5mg twice a day. Call me if you want a new prescription.  - continue with zyprexa 5mg under your tongue before bed. Helps with nausea and appetite.     Lack of energy:   - start ritalin 2.5mg (1/2 tab) in the morning. If not feeling improvement in energy after 3 days, okay to increase to 5mg (1 tab) in the morning. Okay to take only as needed -- you don't need to take this everyday, but if you feel it is helping it is okay to take everyday    Follow up July 3rd at 10AM

## 2018-06-05 NOTE — MR AVS SNAPSHOT
After Visit Summary   6/5/2018    Ashvin Tiwari    MRN: [id number]           Patient Information     Date Of Birth          1955        Visit Information        Provider Department      6/5/2018 11:30 AM  21 ATC; UC ONCOLOGY INFUSION [facility]        Today's Diagnoses     Chemotherapy-induced neutropenia (H)    -  1    Malignant neoplasm of cervix, unspecified site (H)          Care [facility] Main Line: [phone]    Call triage nurse with chills and/or temperature greater than or equal to 100.4, uncontrolled nausea/vomiting, diarrhea, constipation, dizziness, shortness of breath, chest pain, bleeding, unexplained bruising, or any new/concerning symptoms, questions/concerns.   If you are having any concerning symptoms or wish to speak to a provider before your next infusion visit, please call your care coordinator or triage to notify them so we can adequately serve you.   Triage Nurse Line: [phone]    If after hours, weekends, or holidays [phone]               June 2018 Sunday Monday Tuesday Wednesday Thursday Friday Saturday                            1     Outpatient Visit    6:48 AM   [facility]     IR THORACENTESIS    7:15 AM   (75 min.)   [id number]   [facility]     THORACENTESIS    8:15 AM   Michelle Sparrow PA-C    OR     Admission   11:05 AM   Justo Banuelos MD   [facility]   (Discharge: 6/2/2018)     XR CHEST 2 VIEWS   11:15 AM   (15 min.)   [email or web address]   [facility], [place],  Radiology     XR CHEST 2 VIEWS    2:15 PM   (15 min.)   [email or web address]   [facility], [place],  Radiology 2     XR CHEST 2 VIEWS    9:25 AM   (15 min.)   UUXR1   [facility], [place],  Radiology   3     4     5     P [facility] LAB DRAW    7:00 AM   (15 min.)    [facility] LAB DRAW   [facility] Draw     [facility] RETURN    7:15 AM   (30 min.)   Rachelle Miranda APRN CNP   [facility]      UMP RETURN    9:45 AM   (30 min.)   Briana Medina MD   [facility]     UMP ONC INFUSION 120   11:30 AM   (120 min.)    ONCOLOGY INFUSION   [facility] 6     7     8     9       10     11     12     [facility] DRAW    7:00 AM   (15 min.)    [facility] LAB DRAW   [facility] Draw     UMP ONC INFUSION 120    7:30 AM   (120 min.)    ONCOLOGY INFUSION   [facility] 13     14     15     16       17     18     19     [facility] DRAW   11:30 AM   (15 min.)    [facility] DRAW   [facility] Draw     UMP ONC INFUSION 120   12:00 PM   (120 min.)    ONCOLOGY INFUSION   [facility] 20     21     22     23       24     25     26     27     [facility] DRAW   12:30 PM   (15 min.)    [facility]     [facility] ONC INFUSION 120    1:00 PM   (120 min.)    ONCOLOGY INFUSION   [facility] 28 29 30 July 2018 Sunday Monday Tuesday Wednesday Thursday Friday Saturday   1     2     3     UMP RETURN    9:45 AM   (30 min.)   Briana Medina MD   [facility]     UMP [facility] DRAW    2:00 PM   (15 min.)    [facility] LAB DRAW   [facility] Draw     UMP ONC INFUSION 120    2:30 PM   (120 min.)    ONCOLOGY INFUSION   [facility] 4     5     6     7       8     9     10     [facility] DRAW    1:00 PM   (15 min.)    [facility] LAB DRAW   [facility] Draw     UMP ONC INFUSION 120    1:30 PM   (120 min.)    ONCOLOGY INFUSION   [facility] 11     12     13     14       15     16     17     18     19     20     21       22     23     24     25     26     27     28       29     30     31                                      Lab Results:  Recent Results (from the past 12 hour(s))   CBC with platelets differential    Collection Time: 06/05/18  7:19 AM   Result Value Ref  Range    WBC 6.1 4.0 - 11.0 10e9/L    RBC Count 4.04 3.8 - 5.2 10e12/L    Hemoglobin 11.5 (L) 11.7 - 15.7 g/dL    Hematocrit 37.5 35.0 - 47.0 %    MCV 93 78 - 100 fl    MCH 28.5 26.5 - 33.0 pg    MCHC 30.7 (L) 31.5 - 36.5 g/dL    RDW 15.9 (H) 10.0 - 15.0 %    Platelet Count 260 150 - 450 10e9/L    Diff Method Automated Method     % Neutrophils 67.4 %    % Lymphocytes 22.0 %    % Monocytes 8.8 %    % Eosinophils 1.3 %    % Basophils 0.3 %    % Immature Granulocytes 0.2 %    Nucleated RBCs 0 0 /100    Absolute Neutrophil 4.1 1.6 - 8.3 10e9/L    Absolute Lymphocytes 1.4 0.8 - 5.3 10e9/L    Absolute Monocytes 0.5 0.0 - 1.3 10e9/L    Absolute Eosinophils 0.1 0.0 - 0.7 10e9/L    Absolute Basophils 0.0 0.0 - 0.2 10e9/L    Abs Immature Granulocytes 0.0 0 - 0.4 10e9/L    Absolute Nucleated RBC 0.0    Comprehensive metabolic panel    Collection Time: 06/05/18  7:19 AM   Result Value Ref Range    Sodium 135 133 - 144 mmol/L    Potassium 4.3 3.4 - 5.3 mmol/L    Chloride 101 94 - 109 mmol/L    Carbon Dioxide 27 20 - 32 mmol/L    Anion Gap 7 3 - 14 mmol/L    Glucose 102 (H) 70 - 99 mg/dL    Urea Nitrogen 7 7 - 30 mg/dL    Creatinine 0.81 0.52 - 1.04 mg/dL    GFR Estimate 71 >60 mL/min/1.7m2    GFR Estimate If Black 85 >60 mL/min/1.7m2    Calcium 9.5 8.5 - 10.1 mg/dL    Bilirubin Total 0.3 0.2 - 1.3 mg/dL    Albumin 2.8 (L) 3.4 - 5.0 g/dL    Protein Total 8.0 6.8 - 8.8 g/dL    Alkaline Phosphatase 88 40 - 150 U/L    ALT 21 0 - 50 U/L    AST 30 0 - 45 U/L   Magnesium    Collection Time: 06/05/18  7:19 AM   Result Value Ref Range    Magnesium 1.9 1.6 - 2.3 mg/dL             Follow-ups after your visit        Your next 10 appointments already scheduled     Jun 12, 2018  7:00 AM CDT   [facility] Draw with  [facility] DRAW   [facility] Lab Draw ([facility])    [address]  [address]  [facility] [address]   [phone]            Jun 12, 2018  7:30 AM CDT   Infusion 120 with UC ONCOLOGY  INFUSION, UC 13 ATC   [facility]    [address]  [address]  [place] [address]   [phone]            Jun 19, 2018 11:30 AM CDT   [facility] Draw with UC [facility] DRAW   [facility]    [address]  [address]  [facility] [facility] [address]   [phone]            Jun 19, 2018 12:00 PM CDT   Infusion 120 with UC ONCOLOGY INFUSION, UC 15 ATC   [facility]    [address]  [address]  [place] [address]   [phone]            Jun 27, 2018 12:30 PM CDT   [facility] Lab Draw with UC [facility] DRAW   [facility]    [address]  [address]  [place] [address]   [phone]            Jun 27, 2018  1:00 PM CDT   Infusion 120 with UC ONCOLOGY INFUSION, UC 25 ATC   [facility]    [address]  [address]  [place] [address]   [phone]            Jul 03, 2018 10:00 AM CDT   (Arrive by 9:45 AM)   Return Visit with Briana Medina MD   [facility]    [address] [place] [address]  [address]  [place] [address]   [phone]              Future tests that were ordered for you today     Open Standing Orders        Priority Remaining Interval Expires Ordered    IR Thoracentesis Routine 15/15 every 7 -10 days 7/20/2018 6/5/2018            Who to contact     If you have questions or need follow up information about today's clinic visit or your schedule please contact [facility] directly at [phone].  Normal or non-critical lab and imaging results will be communicated to you by MyChart, letter or phone within 4 business days after the clinic has received the results. If you do not hear from us within 7  "days, please contact the clinic through [employer] or phone. If you have a critical or abnormal lab result, we will notify you by phone as soon as possible.  Submit refill requests through [employer] or call your pharmacy and they will forward the refill request to us. Please allow 3 business days for your refill to be completed.          Additional Information About Your Visit        [facility] Information     [facility] lets you send messages to your doctor, view your test results, renew your prescriptions, schedule appointments and more. To sign up, go to [email or web address] . Click on \"Log in\" on the left side of the screen, which will take you to the Welcome page. Then click on \"Sign up Now\" on the right side of the page.     You will be asked to enter the access code listed below, as well as some personal information. Please follow the directions to create your username and password.     Your access code is: [email or web address]  Expires: 2018  6:30 AM     Your access code will  in 90 days. If you need help or a new code, please call your [place] clinic or [phone].        Care EveryWhere ID     This is your Care EveryWhere ID. This could be used by other organizations to access your [place] medical records  [id number]         Blood Pressure from Last 3 Encounters:   18 142/90   18 142/90   18 130/72    Weight from Last 3 Encounters:   18 78.9 kg (174 lb)   18 79 kg (174 lb 3.2 oz)   18 77.6 kg (171 lb 1.6 oz)              We Performed the Following     CBC with platelets differential     Comprehensive metabolic panel     Magnesium          Today's Medication Changes          These changes are accurate as of 18 12:45 PM.  If you have any questions, ask your nurse or doctor.               Start taking these medicines.        Dose/Directions    methylphenidate 5 MG tablet   Commonly known as:  RITALIN   Used for:  Neoplastic malignant related fatigue   Started by:  " Briana Medina MD        Take 1/2 tablet every morning x 3 days. If no increase in energy levels, increase to 1 tablet in the morning.   Quantity:  30 tablet   Refills:  0         These medicines have changed or have updated prescriptions.        Dose/Directions    acetaminophen 325 MG tablet   Commonly known as:  TYLENOL   This may have changed:    - when to take this  - reasons to take this   Used for:  Cancer related pain   Changed by:  Birana Medina MD        Dose:  975 mg   Take 3 tablets (975 mg) by mouth 3 times daily as needed for mild pain   Quantity:  100 tablet   Refills:  0       * LORazepam 1 MG tablet   Commonly known as:  ATIVAN   This may have changed:  Another medication with the same name was added. Make sure you understand how and when to take each.   Used for:  Malignant neoplasm of cervix, unspecified site (H)        Dose:  1 mg   Take 1 tablet (1 mg) by mouth every 6 hours as needed (Anxiety, Nausea/Vomiting or Sleep)   Quantity:  30 tablet   Refills:  2       * LORazepam 1 MG tablet   Commonly known as:  ATIVAN   This may have changed:  You were already taking a medication with the same name, and this prescription was added. Make sure you understand how and when to take each.   Used for:  Chemotherapy-induced neutropenia (H), Malignant neoplasm of cervix, unspecified site (H)        Dose:  1 mg   Take 1 tablet (1 mg) by mouth every 6 hours as needed (Anxiety, Nausea/Vomiting or Sleep)   Quantity:  30 tablet   Refills:  2       oxyCODONE IR 5 MG tablet   Commonly known as:  ROXICODONE   This may have changed:  how much to take   Used for:  Cancer related pain   Changed by:  Briana Medina MD        Dose:  2.5-5 mg   Take 0.5-1 tablets (2.5-5 mg) by mouth every 4 hours as needed for moderate to severe pain   Quantity:  60 tablet   Refills:  0       * prochlorperazine 10 MG tablet   Commonly known as:  COMPAZINE   This may have changed:  Another medication with the same name  was added. Make sure you understand how and when to take each.   Used for:  Malignant neoplasm of cervix, unspecified site (H), Nausea        Dose:  10 mg   Take 1 tablet (10 mg) by mouth 3 times daily   Quantity:  120 tablet   Refills:  2       * prochlorperazine 5 MG tablet   Commonly known as:  COMPAZINE   This may have changed:  You were already taking a medication with the same name, and this prescription was added. Make sure you understand how and when to take each.   Used for:  Chemotherapy-induced neutropenia (H), Malignant neoplasm of cervix, unspecified site (H)        Dose:  5 mg   Take 1 tablet (5 mg) by mouth every 6 hours as needed (Nausea/Vomiting)   Quantity:  30 tablet   Refills:  1       * Notice:  This list has 4 medication(s) that are the same as other medications prescribed for you. Read the directions carefully, and ask your doctor or other care provider to review them with you.         Where to get your medicines      These medications were sent to [address] [address]     Phone:  [phone]     prochlorperazine 5 MG tablet         Some of these will need a paper prescription and others can be bought over the counter.  Ask your nurse if you have questions.     Bring a paper prescription for each of these medications     LORazepam 1 MG tablet    methylphenidate 5 MG tablet    oxyCODONE IR 5 MG tablet                Primary Care Provider Office Phone # Fax #    Chelsea Wren [phone]       [facility] [address]  [facility] [address]        Equal Access to Services     LISSET LUGO AH: Hadii aad ku hadasho Sosyd, waaxda luqadaha, qaybta kaalmada adeegyada, elizabeth cheema. So [facility] [phone].    ATENCIÓN: Si habla español, tiene a mae disposición servicios gratuitos de asistencia lingüística. Llame al [phone].    We comply with  applicable federal civil rights laws and [place] laws. We do not discriminate on the basis of race, color, national origin, age, disability, sex, sexual orientation, or gender identity.            Thank you!     Thank you for choosing [facility]  for your care. Our goal is always to provide you with excellent care. Hearing back from our patients is one way we can continue to improve our services. Please take a few minutes to complete the written survey that you may receive in the mail after your visit with us. Thank you!             Your Updated Medication List - Protect others around you: Learn how to safely use, store and throw away your medicines at www.disposemymeds.org.          This list is accurate as of 6/5/18 12:45 PM.  Always use your most recent med list.                   Brand Name Dispense Instructions for use Diagnosis    acetaminophen 325 MG tablet    TYLENOL    100 tablet    Take 3 tablets (975 mg) by mouth 3 times daily as needed for mild pain    Cancer related pain       amLODIPine 10 MG tablet    NORVASC    30 tablet    Take 1 tablet (10 mg) by mouth daily    Essential hypertension       dronabinol 2.5 MG capsule    MARINOL    60 capsule    Take 1 capsule (2.5 mg) by mouth 2 times daily (before meals)    Anorexia, Malignant neoplasm of cervix, unspecified site (H), Nausea       glutamine 500 MG Tabs     120 tablet    Take 500 mg by mouth 2 times daily    Drug-induced polyneuropathy (H)       * LORazepam 1 MG tablet    ATIVAN    30 tablet    Take 1 tablet (1 mg) by mouth every 6 hours as needed (Anxiety, Nausea/Vomiting or Sleep)    Malignant neoplasm of cervix, unspecified site (H)       * LORazepam 1 MG tablet    ATIVAN    30 tablet    Take 1 tablet (1 mg) by mouth every 6 hours as needed (Anxiety, Nausea/Vomiting or Sleep)    Chemotherapy-induced neutropenia (H), Malignant neoplasm of cervix, unspecified site (H)       methylphenidate 5 MG tablet    RITALIN    30 tablet     Take 1/2 tablet every morning x 3 days. If no increase in energy levels, increase to 1 tablet in the morning.    Neoplastic malignant related fatigue       OLANZapine zydis 5 MG ODT tab    zyPREXA    30 tablet    Take 1 tablet (5 mg) by mouth At Bedtime    Anorexia, Malignant neoplasm of cervix, unspecified site (H), Nausea       oxyCODONE IR 5 MG tablet    ROXICODONE    60 tablet    Take 0.5-1 tablets (2.5-5 mg) by mouth every 4 hours as needed for moderate to severe pain    Cancer related pain       polyethylene glycol powder    MIRALAX    510 g    Take 17-34 g (1-2 capfuls) by mouth daily    Cancer related pain, Generalized abdominal pain       * prochlorperazine 10 MG tablet    COMPAZINE    120 tablet    Take 1 tablet (10 mg) by mouth 3 times daily    Malignant neoplasm of cervix, unspecified site (H), Nausea       * prochlorperazine 5 MG tablet    COMPAZINE    30 tablet    Take 1 tablet (5 mg) by mouth every 6 hours as needed (Nausea/Vomiting)    Chemotherapy-induced neutropenia (H), Malignant neoplasm of cervix, unspecified site (H)       ranitidine 75 MG tablet    ZANTAC    60 tablet    Take 1 tablet (75 mg) by mouth 2 times daily    Gastroesophageal reflux disease, esophagitis presence not specified       senna-docusate 8.6-50 MG per tablet    SENOKOT-S;PERICOLACE    100 tablet    Take 2-4 tablets by mouth 2 times daily    Cancer related pain       traMADol 50 MG tablet    ULTRAM    240 tablet    Take 2 tablets (100mg) every morning when you wake up, 2 tablets (100mg)around noon, 2 tablets (100mg) beofre dinner and 2 tablets (100mg) before bed.    Malignant neoplasm of cervix, unspecified site (H), Cancer related pain       * Notice:  This list has 4 medication(s) that are the same as other medications prescribed for you. Read the directions carefully, and ask your doctor or other care provider to review them with you.

## 2018-06-05 NOTE — MR AVS SNAPSHOT
After Visit Summary   6/5/2018    Ashvin Tiwari    MRN: [id number]           Patient Information     Date Of Birth          1955        Visit Information        Provider Department      6/5/2018 7:30 AM Rachelle Miranda APRN CNP [facility]        Today's Diagnoses     Malignant neoplasm of cervix, unspecified site (H)    -  1    Encounter for antineoplastic chemotherapy          Care Instructions    Colorectal Cancer Screening: During our visit today, we discussed that it is recommended you receive colorectal cancer screening. Please call or make an appointment with your primary care provider to discuss this. You may also call the [facility] scheduling line ([phone]) to set up a colonoscopy appointment.            Follow-ups after your visit        Your next 10 appointments already scheduled     Jun 05, 2018 10:00 AM CDT   (Arrive by 9:45 AM)   Return Visit with Briana Medina MD   [facility] ([facility])    [address] [place] [address]  [address]  [facility] [address]   [phone]            Jun 05, 2018 11:30 AM CDT   Infusion 120 with UC ONCOLOGY INFUSION, UC 21 ATC   [facility] [place] ([facility])    [address] [place] [address]  [address]  [facility] [address]   [phone]            Jun 12, 2018  7:00 AM CDT   [facility] Lab Draw with UC MASONIC LAB DRAW   [facility] ([facility])    [address] [place] [address]  [address]  [facility] [address]   [phone]            Jun 12, 2018  7:30 AM CDT   Infusion 120 with UC ONCOLOGY INFUSION, UC 13 ATC   [facility] [place] ([facility])    [address] [place] [address]  [address]  [facility] [address]   [phone]            Jun 19, 2018 11:30 AM CDT   M[facility] Lab Draw with UC MASONIC LAB DRAW   [facility] ([facility])     "[address] [facility]  [address]  [facility] [address]   [phone]            2018 12:00 PM CDT   Infusion 120 with UC ONCOLOGY INFUSION, UC 15 ATC   [facility] ([facility])    [address] [place] [address]  [address]  [facility] [address] [phone]            2018 12:30 PM CDT   Masonic Lab Draw with UC MASONIC LAB DRAW   [facility] ([facility])    [address] [place] [address]  [address]  [facility] [address] [phone]              Who to contact     If you have questions or need follow up information about today's clinic visit or your schedule please contact [facility] directly at [phone].  Normal or non-critical lab and imaging results will be communicated to you by [facility], letter or phone within 4 business days after the clinic has received the results. If you do not hear from us within 7 days, please contact the clinic through [facility] or phone. If you have a critical or abnormal lab result, we will notify you by phone as soon as possible.  Submit refill requests through [employer] or call your pharmacy and they will forward the refill request to us. Please allow 3 business days for your refill to be completed.          Additional Information About Your Visit        [employer] Information     [employer] lets you send messages to your doctor, view your test results, renew your prescriptions, schedule appointments and more. To sign up, go to [email or web address] . Click on \"Log in\" on the left side of the screen, which will take you to the Welcome page. Then click on \"Sign up Now\" on the right side of the page.     You will be asked to enter the access code listed below, as well as some personal information. Please follow the directions to create your username and password.     Your access code is: [email or web address]  Expires: 2018  6:30 AM     Your access code will  in 90 days. " "If you need help or a new code, please call your [facility] clinic or [phone].        Care EveryWhere ID     This is your Care EveryWhere ID. This could be used by other organizations to access your [facility] medical records  [id number]        Your Vitals Were     Pulse Temperature Respirations Height Pulse Oximetry BMI (Body Mass Index)    120 98.5  F (36.9  C) (Oral) 16 1.6 m (5' 3\") 92% 30.86 kg/m2       Blood Pressure from Last 3 Encounters:   06/05/18 142/90   06/02/18 130/72   06/01/18 136/85    Weight from Last 3 Encounters:   06/05/18 79 kg (174 lb 3.2 oz)   06/02/18 77.6 kg (171 lb 1.6 oz)   06/01/18 80.3 kg (177 lb)              Today, you had the following     No orders found for display         Today's Medication Changes          These changes are accurate as of 6/5/18  8:20 AM.  If you have any questions, ask your nurse or doctor.               These medicines have changed or have updated prescriptions.        Dose/Directions    * LORazepam 1 MG tablet   Commonly known as:  ATIVAN   This may have changed:  Another medication with the same name was added. Make sure you understand how and when to take each.   Used for:  Malignant neoplasm of cervix, unspecified site (H)        Dose:  1 mg   Take 1 tablet (1 mg) by mouth every 6 hours as needed (Anxiety, Nausea/Vomiting or Sleep)   Quantity:  30 tablet   Refills:  2       * LORazepam 1 MG tablet   Commonly known as:  ATIVAN   This may have changed:  You were already taking a medication with the same name, and this prescription was added. Make sure you understand how and when to take each.   Used for:  Chemotherapy-induced neutropenia (H), Malignant neoplasm of cervix, unspecified site (H)        Dose:  1 mg   Take 1 tablet (1 mg) by mouth every 6 hours as needed (Anxiety, Nausea/Vomiting or Sleep)   Quantity:  30 tablet   Refills:  2       * prochlorperazine 10 MG tablet   Commonly known as:  COMPAZINE   This may have changed:  Another medication with " the same name was added. Make sure you understand how and when to take each.   Used for:  Malignant neoplasm of cervix, unspecified site (H), Nausea        Dose:  10 mg   Take 1 tablet (10 mg) by mouth 3 times daily   Quantity:  120 tablet   Refills:  2       * prochlorperazine 5 MG tablet   Commonly known as:  COMPAZINE   This may have changed:  You were already taking a medication with the same name, and this prescription was added. Make sure you understand how and when to take each.   Used for:  Chemotherapy-induced neutropenia (H), Malignant neoplasm of cervix, unspecified site (H)        Dose:  5 mg   Take 1 tablet (5 mg) by mouth every 6 hours as needed (Nausea/Vomiting)   Quantity:  30 tablet   Refills:  1       * Notice:  This list has 4 medication(s) that are the same as other medications prescribed for you. Read the directions carefully, and ask your doctor or other care provider to review them with you.         Where to get your medicines      These medications were sent to [facility] [address] [address]     Phone:  [phone]     prochlorperazine 5 MG tablet         Some of these will need a paper prescription and others can be bought over the counter.  Ask your nurse if you have questions.     Bring a paper prescription for each of these medications     LORazepam 1 MG tablet                Primary Care Provider Office Phone # Fax #    Chelsea Wren [phone]       [facility] [address]  [facility] [address]        Equal Access to Services     SHAN LUGO : Hadii elvia sifuenteso Sosyd, waaxda luqadaha, qaybta kaalmada adeegyada, elizabeth cheema. So [facility] [phone].    ATENCIÓN: Si habla español, tiene a mae disposición servicios gratuitos de asistencia lingüística. Llame al [phone].    We comply with applicable federal civil rights laws and [place]  laws. We do not discriminate on the basis of race, color, national origin, age, disability, sex, sexual orientation, or gender identity.            Thank you!     Thank you for choosing [facility]  for your care. Our goal is always to provide you with excellent care. Hearing back from our patients is one way we can continue to improve our services. Please take a few minutes to complete the written survey that you may receive in the mail after your visit with us. Thank you!             Your Updated Medication List - Protect others around you: Learn how to safely use, store and throw away your medicines at www.disposemymeds.org.          This list is accurate as of 6/5/18  8:20 AM.  Always use your most recent med list.                   Brand Name Dispense Instructions for use Diagnosis    acetaminophen 325 MG tablet    TYLENOL    100 tablet    Take 975 mg by mouth 3 times daily    Cancer related pain       amLODIPine 10 MG tablet    NORVASC    30 tablet    Take 1 tablet (10 mg) by mouth daily    Essential hypertension       dronabinol 2.5 MG capsule    MARINOL    60 capsule    Take 1 capsule (2.5 mg) by mouth 2 times daily (before meals)    Anorexia, Malignant neoplasm of cervix, unspecified site (H), Nausea       glutamine 500 MG Tabs     120 tablet    Take 500 mg by mouth 2 times daily    Drug-induced polyneuropathy (H)       * LORazepam 1 MG tablet    ATIVAN    30 tablet    Take 1 tablet (1 mg) by mouth every 6 hours as needed (Anxiety, Nausea/Vomiting or Sleep)    Malignant neoplasm of cervix, unspecified site (H)       * LORazepam 1 MG tablet    ATIVAN    30 tablet    Take 1 tablet (1 mg) by mouth every 6 hours as needed (Anxiety, Nausea/Vomiting or Sleep)    Chemotherapy-induced neutropenia (H), Malignant neoplasm of cervix, unspecified site (H)       OLANZapine zydis 5 MG ODT tab    zyPREXA    30 tablet    Take 1 tablet (5 mg) by mouth At Bedtime    Anorexia, Malignant neoplasm of cervix,  unspecified site (H), Nausea       oxyCODONE IR 5 MG tablet    ROXICODONE    6 tablet    Take 1 tablet (5 mg) by mouth every 4 hours as needed for moderate to severe pain    Pneumothorax, acute       polyethylene glycol powder    MIRALAX    510 g    Take 17-34 g (1-2 capfuls) by mouth daily    Cancer related pain, Generalized abdominal pain       * prochlorperazine 10 MG tablet    COMPAZINE    120 tablet    Take 1 tablet (10 mg) by mouth 3 times daily    Malignant neoplasm of cervix, unspecified site (H), Nausea       * prochlorperazine 5 MG tablet    COMPAZINE    30 tablet    Take 1 tablet (5 mg) by mouth every 6 hours as needed (Nausea/Vomiting)    Chemotherapy-induced neutropenia (H), Malignant neoplasm of cervix, unspecified site (H)       ranitidine 75 MG tablet    ZANTAC    60 tablet    Take 1 tablet (75 mg) by mouth 2 times daily    Gastroesophageal reflux disease, esophagitis presence not specified       senna-docusate 8.6-50 MG per tablet    SENOKOT-S;PERICOLACE    100 tablet    Take 2-4 tablets by mouth 2 times daily    Cancer related pain       traMADol 50 MG tablet    ULTRAM    240 tablet    Take 2 tablets (100mg) every morning when you wake up, 2 tablets (100mg)around noon, 2 tablets (100mg) beofre dinner and 2 tablets (100mg) before bed.    Malignant neoplasm of cervix, unspecified site (H), Cancer related pain       * Notice:  This list has 4 medication(s) that are the same as other medications prescribed for you. Read the directions carefully, and ask your doctor or other care provider to review them with you.

## 2018-06-05 NOTE — NURSING NOTE
"Oncology Rooming Note    June 5, 2018 7:27 AM   Ashvin Tiwari is a 63 year old female who presents for:    Chief Complaint   Patient presents with     Port Draw     Labs drawn from port by RN. Line flushed with saline and heparin. Vs taken and pt checked in for appt     Oncology Clinic Visit     Return Cervical Ca     Initial Vitals: /90 (BP Location: Right arm, Cuff Size: Adult Large)  Pulse 120  Temp 98.5  F (36.9  C) (Oral)  Resp 16  Ht 1.6 m (5' 3\")  Wt 79 kg (174 lb 3.2 oz)  SpO2 92%  BMI 30.86 kg/m2 Estimated body mass index is 30.86 kg/(m^2) as calculated from the following:    Height as of this encounter: 1.6 m (5' 3\").    Weight as of this encounter: 79 kg (174 lb 3.2 oz). Body surface area is 1.87 meters squared.  Severe Pain (6) Comment: Data Unavailable   No LMP recorded. Patient is postmenopausal.  Allergies reviewed: Yes  Medications reviewed: Yes    Medications: Medication refills not needed today.  Pharmacy name entered into EPIC:    [facility] (USE ONLY AT [facility]) - [place], [place] - [address] [facility] [address] - [place], [place] - [address] [facility]    Clinical concerns: Pain at 6 for abdominal pain; hard time walking. Patient due for colonoscopy, see AVS.    6 minutes for nursing intake (face to face time)     Silva Bartholomew CMA              "

## 2018-06-05 NOTE — MR AVS SNAPSHOT
After Visit Summary   6/5/2018    Ashvin Tiwari    MRN: [id number]           Patient Information     Date Of Birth          1955        Visit Information        Provider Department      6/5/2018 10:00 AM Briana Medina MD [facility]        Today's Diagnoses     Malignant neoplasm of cervix, unspecified site (H)    -  1    Cancer related pain        Neoplastic malignant related fatigue        Anorexia        SOB (shortness of breath)          Care Instructions    Pain: Abdominal pain  - tramadol 2 tabs four times a day scheduled  - oxycodone 1/2 - 1 tab every four hours if needed to control pain not controlled with tramadol  - oxycodone 1 pill (5mg) before bed  - tylenol 975mg three times a day IF NEEDED to control pain      Constipation:   - increase senna 4 tablets every morning and evening  - Miralax twice a day with senna    Nausea and loss of appetite:  - continue marinol 2.5mg twice a day. Can increase 5mg twice a day. Call me if you want a new prescription.  - continue with zyprexa 5mg under your tongue before bed. Helps with nausea and appetite.     Lack of energy:   - start ritalin 2.5mg (1/2 tab) in the morning. If not feeling improvement in energy after 3 days, okay to increase to 5mg (1 tab) in the morning. Okay to take only as needed -- you don't need to take this everyday, but if you feel it is helping it is okay to take everyday    Follow up July 3rd at 10AM            Follow-ups after your visit        Your next 10 appointments already scheduled     Jun 05, 2018 11:30 AM CDT   Infusion 120 with  ONCOLOGY INFUSION, UC 21 ATC   [facility] ([facility])    [address] [place] [address]  [address]  [facility] [address] [phone]            Jun 12, 2018  7:00 AM CDT   [facility] Lab Draw with UC MASONIC LAB DRAW   [facility] ([facility])    [address] [place] [address]  [address]  [facility] [address]   [phone]            Jun 12, 2018  7:30 AM CDT   Infusion 120 with UC ONCOLOGY INFUSION, UC 13 ATC   [facility] [place] ([facility])    [address] [place] [address]  [address]  [facility] [address]   [phone]            Jun 19, 2018 11:30 AM CDT   [facility] Lab Draw with UC [facility]ON[facility] LAB DRAW   [facility] ([facility])    [address] [place] [address]  [address]  [facility] [address]   [phone]            Jun 19, 2018 12:00 PM CDT   Infusion 120 with UC ONCOLOGY INFUSION, UC 15 ATC   [facility] [place] ([facility])    [address] [place] [address]  [address]  [facility] [address]   [phone]            Jun 27, 2018 12:30 PM CDT   [facility] Lab Draw with UC [facility]ON[facility] LAB DRAW   [facility] ([facility])    [address] [place] [address]  [address]  [facility] [address]   [phone]            Jun 27, 2018  1:00 PM CDT   Infusion 120 with UC ONCOLOGY INFUSION   [facility] ([facility])    [address] [place] [address]  [address]  [facility] [address]   [phone]              Who to contact     If you have questions or need follow up information about today's clinic visit or your schedule please contact [facility] directly at [phone].  Normal or non-critical lab and imaging results will be communicated to you by MyChart, letter or phone within 4 business days after the clinic has received the results. If you do not hear from us within 7 days, please contact the clinic through [employer] or phone. If you have a critical or abnormal lab result, we will notify you by phone as soon as possible.  Submit refill requests through [employer] or call your pharmacy and they will forward the refill request to us. Please allow 3 business days for your refill to be completed.          Additional  "Information About Your Visit        MyChart Information     [employer] lets you send messages to your doctor, view your test results, renew your prescriptions, schedule appointments and more. To sign up, go to [email or web address] . Click on \"Log in\" on the left side of the screen, which will take you to the Welcome page. Then click on \"Sign up Now\" on the right side of the page.     You will be asked to enter the access code listed below, as well as some personal information. Please follow the directions to create your username and password.     Your access code is: [email or web address]  Expires: 2018  6:30 AM     Your access code will  in 90 days. If you need help or a new code, please call your [place] clinic or [phone].        Care EveryWhere ID     This is your Care EveryWhere ID. This could be used by other organizations to access your [place] medical records  [id number]        Your Vitals Were     Pulse Temperature Height BMI (Body Mass Index)          120 98.5  F (36.9  C) (Oral) 1.6 m (5' 3\") 30.82 kg/m2         Blood Pressure from Last 3 Encounters:   18 142/90   18 142/90   18 130/72    Weight from Last 3 Encounters:   18 78.9 kg (174 lb)   18 79 kg (174 lb 3.2 oz)   18 77.6 kg (171 lb 1.6 oz)              Today, you had the following     No orders found for display         Today's Medication Changes          These changes are accurate as of 18 10:37 AM.  If you have any questions, ask your nurse or doctor.               Start taking these medicines.        Dose/Directions    methylphenidate 5 MG tablet   Commonly known as:  RITALIN   Used for:  Neoplastic malignant related fatigue   Started by:  Briana Medina MD        Take 1/2 tablet every morning x 3 days. If no increase in energy levels, increase to 1 tablet in the morning.   Quantity:  30 tablet   Refills:  0         These medicines have changed or have updated prescriptions.        " Dose/Directions    acetaminophen 325 MG tablet   Commonly known as:  TYLENOL   This may have changed:    - when to take this  - reasons to take this   Used for:  Cancer related pain   Changed by:  Briana Medina MD        Dose:  975 mg   Take 3 tablets (975 mg) by mouth 3 times daily as needed for mild pain   Quantity:  100 tablet   Refills:  0       * LORazepam 1 MG tablet   Commonly known as:  ATIVAN   This may have changed:  Another medication with the same name was added. Make sure you understand how and when to take each.   Used for:  Malignant neoplasm of cervix, unspecified site (H)        Dose:  1 mg   Take 1 tablet (1 mg) by mouth every 6 hours as needed (Anxiety, Nausea/Vomiting or Sleep)   Quantity:  30 tablet   Refills:  2       * LORazepam 1 MG tablet   Commonly known as:  ATIVAN   This may have changed:  You were already taking a medication with the same name, and this prescription was added. Make sure you understand how and when to take each.   Used for:  Chemotherapy-induced neutropenia (H), Malignant neoplasm of cervix, unspecified site (H)        Dose:  1 mg   Take 1 tablet (1 mg) by mouth every 6 hours as needed (Anxiety, Nausea/Vomiting or Sleep)   Quantity:  30 tablet   Refills:  2       oxyCODONE IR 5 MG tablet   Commonly known as:  ROXICODONE   This may have changed:  how much to take   Used for:  Cancer related pain   Changed by:  Briana Medina MD        Dose:  2.5-5 mg   Take 0.5-1 tablets (2.5-5 mg) by mouth every 4 hours as needed for moderate to severe pain   Quantity:  60 tablet   Refills:  0       * prochlorperazine 10 MG tablet   Commonly known as:  COMPAZINE   This may have changed:  Another medication with the same name was added. Make sure you understand how and when to take each.   Used for:  Malignant neoplasm of cervix, unspecified site (H), Nausea        Dose:  10 mg   Take 1 tablet (10 mg) by mouth 3 times daily   Quantity:  120 tablet   Refills:  2       *  prochlorperazine 5 MG tablet   Commonly known as:  COMPAZINE   This may have changed:  You were already taking a medication with the same name, and this prescription was added. Make sure you understand how and when to take each.   Used for:  Chemotherapy-induced neutropenia (H), Malignant neoplasm of cervix, unspecified site (H)        Dose:  5 mg   Take 1 tablet (5 mg) by mouth every 6 hours as needed (Nausea/Vomiting)   Quantity:  30 tablet   Refills:  1       * Notice:  This list has 4 medication(s) that are the same as other medications prescribed for you. Read the directions carefully, and ask your doctor or other care provider to review them with you.         Where to get your medicines      These medications were sent to [address] [address]     Phone:  [phone]     prochlorperazine 5 MG tablet         Some of these will need a paper prescription and others can be bought over the counter.  Ask your nurse if you have questions.     Bring a paper prescription for each of these medications     LORazepam 1 MG tablet    methylphenidate 5 MG tablet    oxyCODONE IR 5 MG tablet               Information about OPIOIDS     PRESCRIPTION OPIOIDS: WHAT YOU NEED TO KNOW   You have a prescription for an opioid (narcotic) pain medicine. Opioids can cause addiction. If you have a history of chemical dependency of any type, you are at a higher risk of becoming addicted to opioids. Only take this medicine after all other options have been tried. Take it for as short a time and as few doses as possible.     Do not:    Drive. If you drive while taking these medicines, you could be arrested for driving under the influence (DUI).    Operate heavy machinery    Do any other dangerous activities while taking these medicines.     Drink any alcohol while taking these medicines.      Take with any other medicines that contain acetaminophen.  Read all labels carefully. Look for the word  acetaminophen  or  Tylenol.  Ask your pharmacist if you have questions or are unsure.    Store your pills in a secure place, locked if possible. We will not replace any lost or stolen medicine. If you don t finish your medicine, please throw away (dispose) as directed by your pharmacist. The [place] [employer]ution [employer] has more information about safe disposal: https://www.pca.[facility].[place].us/living-green/managing-unwanted-medications    All opioids tend to cause constipation. Drink plenty of water and eat foods that have a lot of fiber, such as fruits, vegetables, prune juice, apple juice and high-fiber cereal. Take a laxative (Miralax, milk of magnesia, Colace, Senna) if you don t move your bowels at least every other day.          Primary Care Provider Office Phone # Fax #    Chelsea Wren [phone]       [facility] [address] [facility] [address]        Equal Access to Services     LISSET LUGO : Hadii aad ku hadasho Soomaali, waaxda luqadaha, qaybta kaalmada adeegyada, waxay idiin hayaden marquis ga . So [facility] [phone].    ATENCIÓN: Si habla español, tiene a mae disposición servicios gratuitos de asistencia lingüística. [place] al [phone].    We comply with applicable federal civil rights laws and [place] laws. We do not discriminate on the basis of race, color, national origin, age, disability, sex, sexual orientation, or gender identity.            Thank you!     Thank you for choosing [facility]  for your care. Our goal is always to provide you with excellent care. Hearing back from our patients is one way we can continue to improve our services. Please take a few minutes to complete the written survey that you may receive in the mail after your visit with us. Thank you!             Your Updated Medication List - Protect others around you: Learn how to safely use, store and throw away  your medicines at www.disposemymeds.org.          This list is accurate as of 6/5/18 10:37 AM.  Always use your most recent med list.                   Brand Name Dispense Instructions for use Diagnosis    acetaminophen 325 MG tablet    TYLENOL    100 tablet    Take 3 tablets (975 mg) by mouth 3 times daily as needed for mild pain    Cancer related pain       amLODIPine 10 MG tablet    NORVASC    30 tablet    Take 1 tablet (10 mg) by mouth daily    Essential hypertension       dronabinol 2.5 MG capsule    MARINOL    60 capsule    Take 1 capsule (2.5 mg) by mouth 2 times daily (before meals)    Anorexia, Malignant neoplasm of cervix, unspecified site (H), Nausea       glutamine 500 MG Tabs     120 tablet    Take 500 mg by mouth 2 times daily    Drug-induced polyneuropathy (H)       * LORazepam 1 MG tablet    ATIVAN    30 tablet    Take 1 tablet (1 mg) by mouth every 6 hours as needed (Anxiety, Nausea/Vomiting or Sleep)    Malignant neoplasm of cervix, unspecified site (H)       * LORazepam 1 MG tablet    ATIVAN    30 tablet    Take 1 tablet (1 mg) by mouth every 6 hours as needed (Anxiety, Nausea/Vomiting or Sleep)    Chemotherapy-induced neutropenia (H), Malignant neoplasm of cervix, unspecified site (H)       methylphenidate 5 MG tablet    RITALIN    30 tablet    Take 1/2 tablet every morning x 3 days. If no increase in energy levels, increase to 1 tablet in the morning.    Neoplastic malignant related fatigue       OLANZapine zydis 5 MG ODT tab    zyPREXA    30 tablet    Take 1 tablet (5 mg) by mouth At Bedtime    Anorexia, Malignant neoplasm of cervix, unspecified site (H), Nausea       oxyCODONE IR 5 MG tablet    ROXICODONE    60 tablet    Take 0.5-1 tablets (2.5-5 mg) by mouth every 4 hours as needed for moderate to severe pain    Cancer related pain       polyethylene glycol powder    MIRALAX    510 g    Take 17-34 g (1-2 capfuls) by mouth daily    Cancer related pain, Generalized abdominal pain       *  prochlorperazine 10 MG tablet    COMPAZINE    120 tablet    Take 1 tablet (10 mg) by mouth 3 times daily    Malignant neoplasm of cervix, unspecified site (H), Nausea       * prochlorperazine 5 MG tablet    COMPAZINE    30 tablet    Take 1 tablet (5 mg) by mouth every 6 hours as needed (Nausea/Vomiting)    Chemotherapy-induced neutropenia (H), Malignant neoplasm of cervix, unspecified site (H)       ranitidine 75 MG tablet    ZANTAC    60 tablet    Take 1 tablet (75 mg) by mouth 2 times daily    Gastroesophageal reflux disease, esophagitis presence not specified       senna-docusate 8.6-50 MG per tablet    SENOKOT-S;PERICOLACE    100 tablet    Take 2-4 tablets by mouth 2 times daily    Cancer related pain       traMADol 50 MG tablet    ULTRAM    240 tablet    Take 2 tablets (100mg) every morning when you wake up, 2 tablets (100mg)around noon, 2 tablets (100mg) beofre dinner and 2 tablets (100mg) before bed.    Malignant neoplasm of cervix, unspecified site (H), Cancer related pain       * Notice:  This list has 4 medication(s) that are the same as other medications prescribed for you. Read the directions carefully, and ask your doctor or other care provider to review them with you.

## 2018-06-05 NOTE — PATIENT INSTRUCTIONS
Colorectal Cancer Screening: During our visit today, we discussed that it is recommended you receive colorectal cancer screening. Please call or make an appointment with your primary care provider to discuss this. You may also call the [facility] scheduling line ([phone]) to set up a colonoscopy appointment.

## 2018-06-05 NOTE — NURSING NOTE
"Oncology Rooming Note    June 5, 2018 9:18 AM   Ashvin Tiwari is a 63 year old female who presents for:    Chief Complaint   Patient presents with     Oncology Clinic Visit     Return: Palliative     Initial Vitals: /90  Pulse 120  Temp 98.5  F (36.9  C) (Oral)  Ht 1.6 m (5' 3\")  Wt 78.9 kg (174 lb)  BMI 30.82 kg/m2 Estimated body mass index is 30.82 kg/(m^2) as calculated from the following:    Height as of this encounter: 1.6 m (5' 3\").    Weight as of this encounter: 78.9 kg (174 lb). Body surface area is 1.87 meters squared.  Data Unavailable Comment: Data Unavailable   No LMP recorded. Patient is postmenopausal.  Allergies reviewed: Yes  Medications reviewed: Yes    Medications: Medication refills not needed today.  Pharmacy name entered into EPIC:    [facility] (USE ONLY AT [facility]) - [place], [place] - [address] [facility] [address] - [place], [place] - [address] [facility]    Clinical concerns: no new concerns today Dr. Medina was notified.    10 minutes for nursing intake (face to face time)     Ivory Eric CMA              "

## 2018-06-07 NOTE — PROGRESS NOTES
This is a recent snapshot of the patient's [facility] medical record.  For current drug dose and complete information and questions, call [phone] or In [facility] pool, [facility] home infusion ([id number])  CSN Number:  [id number]

## 2018-06-08 NOTE — PROGRESS NOTES
This is a recent snapshot of the patient's [facility] medical record.  For current drug dose and complete information and questions, call [phone] or In [facility] pool, [facility]v home infusion ([id number])  CSN Number:  [id number]

## 2018-06-11 NOTE — PROGRESS NOTES
Consult Notes on Referred Patient    Date: 5/15/2018     The patient returns today for follow-up.      Her history is as follows:  Patient is a 62 year old A1 woman with no significant past medical history who presented with 3 weeks of vaginal spotting, lower abdominal pain that radiates to the lower back. TShe was treated for UTI with Amoxicillin, since her UA was suggestive of an infection. She didn't feel any improvement after the treatment and underwent pelvic exam that revealed cervical mass. She was referred to gynecology at [facility] for biopsy however that was not done in the office due to concern of ongoing bleeding. Patient never had PAP smear done in the past, never had mammogram or colonoscopy.       17:  Seen in Gyn Onc.  Exam concerning for at least a Stage IIB cervical cancer.  Biopsy obtained consistent with poorly differentiated squamous cell ca.  MRI ordered.      17: MRI Pelvis with 7 x 7cm mass at level of cervix, protrudes into upper third of vagina, bilateral parametrial extension, abuts bilateral ureters but no obstruction, abuts rectal wall with obliteration of fat plan but no mucosal invasion, no clear bladder wall invasion, suspicious lymph nodes left hemipelvis, peritoneal nodule versus lymph node.      17 to 17:  Patient admitted to hospital with pain. CT Chest PE was negative for PE but showed multiple pulmonary nodules consistent with metastatic disease.  CT A/P showed cervical mass with regional metastatic disease as well as peritoneal nodularity.      17:  PET scan with multiple metastatic hypermetabolic lung nodules, axillary, mediastinal, hilar and abdominal lymph nodes.      17:  Seen in clinic.  Recommended treatment for Stage IV poorly differentiated squamous cell ca with , substituting Carbo for Cisplatin.       17-17: Cycle #1-3 Paclitaxel/Carboplatin/Avastin.   Avastin held with Cycle #3 due to  HTN.      11/17/17:  PET/CT with positive response to therapy.  Decrease in size of cervical mass, decreased pulmonary and metastatic lymphadenopathy.      11/21/17-1/11/18: Cycle #4-6 Paclitaxel/Carboplatin/Avastin      2/14/18:  PET/CT:  Progessive disease with increased pelvic mass, new left inguinal lymph node, right paratracheal lymph node, right axillary lymph node.  Switch to Cisplatin, Top, Monika.     2/27/18: Cycle #1 Cisplatin/Topotecan/Avastin.     3/28/18: Cycle #2 Cisplatin/Topotecan/Avastin deferred one week secondary to neutropenia.     4/15/18: ED for SOB; CT Chest Pulmonary Angio with large right and small left pleural effusions. Complete consolidation of the right lower lobe.  Atelectasis in the right upper and left lower lobe.     4/17/18: Cycle #3 Cisplatin/Topotecan/Avastin. Cisplatin and Topotecan reduced 25% secondary to CrCl.      4/18/18: Right thoracentesis 800 ml     5/10/18: PET CT Progressive disease with increased FDG uptake cervix, new peritoneal nodules, new hypermetabolic liver mets, increased number of metastatic left inguinal lymph nodes, increased mediastinal lymph nodes, new left pulmonary nodules, new cervical lymph node, moderate to severe pleural effusion    The patient presents today for follow-up.  She has had continued decline.  Decreased energy, increased SOB, recurrent effusions, increased abdominal pain.           Past Medical History:    Past Medical History:   Diagnosis Date     Cancer (H)     cervical     Hypertension          Past Surgical History:    Past Surgical History:   Procedure Laterality Date     BIOPSY/EXCISION LYMPH NODE(S), NEEDLE, SUPERFICIAL (CERVICAL/INGUINAL/AXILLARY) Right 9/21/2017    Procedure: BIOPSY/EXCISION LYMPH NODE(S), NEEDLE, SUPERFICIAL (CERVICAL/INGUINAL/AXILLARY);;  Surgeon: Sonu Denson PA-C;  Location: [facility] OR     INSERT PORT VASCULAR ACCESS Right 9/21/2017    Procedure: INSERT PORT VASCULAR ACCESS;  Single Lumen Chest Power  "Port, Right Axillary Lymph Node Biopsy;  Surgeon: Sonu Denson PA-C;  Location: [facility] OR     no previous surgery       THORACENTESIS N/A 4/18/2018    Procedure: THORACENTESIS;  Thoracentesis;  Surgeon: Sonu Denson PA-C;  Location: [facility]     THORACENTESIS Bilateral 5/21/2018    Procedure: THORACENTESIS;  Thoracentesis;  Surgeon: Sonu Denson PA-C;  Location: [facility]     THORACENTESIS Bilateral 6/1/2018    Procedure: THORACENTESIS;  Thoracentesis;  Surgeon: Michelle Sparrow PA-C;  Location: [facility] OR         Health Maintenance:  Health Maintenance Due   Topic Date Due     TETANUS IMMUNIZATION (SYSTEM ASSIGNED)  05/10/1971     HIV SCREEN (SYSTEM ASSIGNED)  05/10/1971     HEPATITIS C SCREENING  05/10/1971     LIPID SCREEN Q5 YR FEMALE (SYSTEM ASSIGNED)  05/10/1998     COLON CANCER SCREEN (SYSTEM ASSIGNED)  05/10/2003     ADVANCE DIRECTIVE PLANNING Q5 YRS  05/10/2008     FALL RISK ASSESSMENT  05/10/2018     DEXA SCAN SCREENING (SYSTEM ASSIGNED)  05/10/2018     PNEUMOCOCCAL (1 of 2 - PCV13) 05/10/2018         Current Medications:     has a current medication list which includes the following prescription(s): amlodipine, dronabinol, glutamine, lorazepam, olanzapine zydis, polyethylene glycol, prochlorperazine, ranitidine, senna-docusate, acetaminophen, lorazepam, methylphenidate, oxycodone ir, prochlorperazine, and tramadol.       Allergies:     [unfilled]        Social History:     Social History   Substance Use Topics     Smoking status: Never Smoker     Smokeless tobacco: Never Used     Alcohol use No       History   Drug Use No           Family History:     The patient's family history is notable for:    No family history on file.      Physical Exam:     /79  Pulse 125  Temp 98.5  F (36.9  C) (Oral)  Resp 16  Ht 1.6 m (5' 3\")  Wt 80.3 kg (177 lb)  SpO2 93%  BMI 31.35 kg/m2  Body mass index is 31.35 kg/(m^2).    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  Switched to Cis/Top/Monika and PET scan shows widely progressive disease and symptoms notable for recurrent symptomatic pleural effusions.    Dennis discussion with patient and her family regarding progressive disease, incurable nature of the cancer, that additional chemotherapy is palliative only.  Options of symptomatic supportive care versus palliative third line therapy discussed.  Her performance status is declining rapidly, is having worsening difficulty with pleural effusions.  Treatment options limited.  No current clinical trial options available.  Discussed IR biopsy of lesion for molecular profiling to assess for possible targeted therapy through [facility] [place].  Turn around time will likely be about 2-3 wks pending how quickly she can be biopsied.  Risks and benefits discussed.  At this time, patient would like to continue to receive treatment as long as she is able to tolerated.    Therefore, will arrange IR guided biopsy.  Once biopsy performed, will start single agent weekly Taxol as this is fairly well tolerated until results of tumor sequencing available.    Discussed in detail, questions  answered.  Will arrange for patient to have regular thoracentesis to manage pleural effusions.  Palliative, supportive care discussed. Will have patient continue to follow-up with Palliative Medicine.      Recommend Cisplatin/Topotecan with continuation of Monika, cisplatin 50 mg/m2 day 1 + topotecan 0.75 mg/m2 for 3 days q21 days recommended. Dose reduction for toxicity.  Continue Monika due to clear benefit for cervical cancer and in maintanence therapy for other gyn sites.        Risks and benefits of treatment discussed, anticipated side-effects and monitoring.  Chemo teaching.       2.)                 Palliative Care:  Symptom management, goals of care.  She has follow-up with them on 3/2/18        Barbara Soto MD  Gynecologic Oncology  [facility]    CC  Patient Care Team:  Chelsea Wren as PCP - General (Family Practice)  Sita Gallardo as Referring Physician (OB/Gyn)  Rima Saunders, TIMOTHY as Continuity Care Coordinator (Gyn-Onc)  Leia Caraballo, RN as Registered Nurse (Palliative)  Barbara Soto MD as Referring Physician (OB/Gyn)  CHELSEA WREN

## 2018-06-11 NOTE — PROGRESS NOTES
This is a recent snapshot of the patient's [facility] medical record.  For current drug dose and complete information and questions, call [phone] or In [facility] pool, [facility] home infusion ([id number])  CSN Number:  [id number]

## 2018-06-12 NOTE — MR AVS SNAPSHOT
After Visit Summary   6/12/2018    Ashvin Tiwari    MRN: [id number]           Patient Information     Date Of Birth          1955        Visit Information        Provider Department      6/12/2018 7:30 AM  13 ATC; [facility] ONCOLOGY INFUSION Formerly [facility]        Today's Diagnoses     Chemotherapy-induced neutropenia (H)    -  1    Malignant neoplasm of cervix, unspecified site (H)          Care [facility] Main Line: [phone]    Call triage nurse with chills and/or temperature greater than or equal to 100.4, uncontrolled nausea/vomiting, diarrhea, constipation, dizziness, shortness of breath, chest pain, bleeding, unexplained bruising, or any new/concerning symptoms, questions/concerns.   If you are having any concerning symptoms or wish to speak to a provider before your next infusion visit, please call your care coordinator or triage to notify them so we can adequately serve you.   Triage Nurse Line: [phone]    If after hours, weekends, or holidays [phone]               June 2018 Sunday Monday Tuesday Wednesday Thursday Friday Saturday                            1     Outpatient Visit    6:48 AM   [facility]     IR THORACENTESIS    7:15 AM   (75 min.)   [email or web address]   [facility]     THORACENTESIS    8:15 AM   Michelle Sparrow PA-C    OR     Admission   11:05 AM   Justo Banuelos MD   [address] [employer]   (Discharge: 6/2/2018)     XR CHEST 2 VIEWS   11:15 AM   (15 min.)   [email or web address]   [facility] [place],  Radiology     XR CHEST 2 VIEWS    2:15 PM   (15 min.)   UUXR1   [facility] [place],  Radiology 2     XR CHEST 2 VIEWS    9:25 AM   (15 min.)   UUXR1   [facility] [place],  Radiology   3     4     5     [facility] MASONIC LAB DRAW    7:00 AM   (15 min.)    MASONIC LAB DRAW   [facility] Masonic Lab Draw     [facility] RETURN    7:15 AM   (30 min.)   Rachelle Miranda APRN CNP   Formerly [facility]      [facility] RETURN    9:45 AM   (30 min.)   Briana Medina MD   Formerly [facility]     [facility] ONC INFUSION 120   11:30 AM   (120 min.)   UC ONCOLOGY INFUSION   [facility] 6     7     8     9       10     11     12     [facility] LAB DRAW    7:00 AM   (15 min.)   [facility]     [facility] ONC INFUSION 120    7:30 AM   (120 min.)   UC ONCOLOGY INFUSION   [facility] 13     IR THORACENTESIS    6:00 AM   (60 min.)   [email or web address]   [facility] Imaging     THORACENTESIS    7:00 AM   Cuong Wright PA-C   UC OR     Outpatient Visit    7:00 AM   [facility] 14     15     16       17     18     19     [facility]ON[facility] LAB DRAW   11:30 AM   (15 min.)    [facility]     [facility] ONC INFUSION 120   12:00 PM   (120 min.)    ONCOLOGY INFUSION   [facility] 20     21     22     23       24     25     26     27     [facility] MAS[facility]IC LAB DRAW   12:30 PM   (15 min.)    [facility]     [facility] ONC INFUSION 120    1:00 PM   (120 min.)    ONCOLOGY INFUSION   [facility] 28     29 30 July 2018 Sunday Monday Tuesday Wednesday Thursday Friday Saturday   1     2     3     [facility] RETURN    9:45 AM   (30 min.)   Briana Medina MD   [facility]     [facility] MASONIC LAB DRAW    2:00 PM   (15 min.)   [facility] MASONIC LAB DRAW   [facility] Masonic Lab Draw     [facility] ONC INFUSION 120    2:30 PM   (120 min.)   UC ONCOLOGY INFUSION   [facility] 4     5     6     7       8     9     10     [facility] MASONIC LAB DRAW    1:00 PM   (15 min.)    MASONIC LAB DRAW   [facility] Masonic Lab Draw     [facility] ONC INFUSION 120    1:30 PM   (120 min.)    ONCOLOGY INFUSION   [facility] 11     12     13     14       15     16     17     18     19     20     21       22     23     24     25     26     27      28       29     30     31                                      Lab Results:  Recent Results (from the past 12 hour(s))   CBC with platelets differential    Collection Time: 06/12/18  7:18 AM   Result Value Ref Range    WBC 4.4 4.0 - 11.0 10e9/L    RBC Count 3.91 3.8 - 5.2 10e12/L    Hemoglobin 11.1 (L) 11.7 - 15.7 g/dL    Hematocrit 35.7 35.0 - 47.0 %    MCV 91 78 - 100 fl    MCH 28.4 26.5 - 33.0 pg    MCHC 31.1 (L) 31.5 - 36.5 g/dL    RDW 16.4 (H) 10.0 - 15.0 %    Platelet Count 189 150 - 450 10e9/L    Diff Method Automated Method     % Neutrophils 58.4 %    % Lymphocytes 28.1 %    % Monocytes 12.6 %    % Eosinophils 0.2 %    % Basophils 0.2 %    % Immature Granulocytes 0.5 %    Nucleated RBCs 0 0 /100    Absolute Neutrophil 2.6 1.6 - 8.3 10e9/L    Absolute Lymphocytes 1.2 0.8 - 5.3 10e9/L    Absolute Monocytes 0.6 0.0 - 1.3 10e9/L    Absolute Eosinophils 0.0 0.0 - 0.7 10e9/L    Absolute Basophils 0.0 0.0 - 0.2 10e9/L    Abs Immature Granulocytes 0.0 0 - 0.4 10e9/L    Absolute Nucleated RBC 0.0    Magnesium    Collection Time: 06/12/18  7:18 AM   Result Value Ref Range    Magnesium 1.8 1.6 - 2.3 mg/dL               Follow-ups after your visit        Your next 10 appointments already scheduled     Jun 13, 2018  7:00 AM CDT   (Arrive by 6:00 AM)   IR THORACENTESIS with [email or web address]   [facility] ([facility])    [address]  [address] [facility] [address] [phone]           1. Laboratory test are to be obtained by your doctor prior to the exam (Hgb/Hct, platelet count, INR) 2. Someone will need to drive you to and from the hospital if you feel you may need sedation for the procedure. 3. Bring a list of all drugs you are taking; include supplements and over-the-counter medications. 4. Wear comfortable clothes and leave your valuables at home. 5. If you are or may be pregnant, be sure to contact your doctor or a Radiology nurse prior to the day of the exam. 6. If you  have diabetes, check with your doctor or a Radiology nurse to see if your insulin needs to be adjusted for the exam. 7. If you are taking Coumadin (to thin your blood) please contact your doctor or a Radiology nurse at least 3 days before the exam for special instructions (only need the INR to be <2.0). 8. The day before your exam you may eat your regular diet. Drink no alcoholic beverages for 24 hours prior to the exam. 9. Do not eat any solid food or milk products for 6 hours prior to the exam. You may drink clear liquids until 2 hours prior to the exam. Clear liquids include the following: water, Jell-O, clear broth, apple juice or any noncarbonated drink that you can see through (no pop!) 10. The morning of the exam you may brush your teeth and take medications as directed with a sip of water. 11. Tell the Radiology nurse if you have any allergies. 12. If the tube needs to remain in place you will remain in the hospital until the tube can be removed 13. If the tube is removed immediately you may leave the hospital following your exam. However, if you received sedation you will need to stay 1-2 hours. You may be asked to stay longer and have a chest x-ray sometime after the procedure. 14. If you received sedation, you cannot drive until morning, and an adult must be with you until then. If you live more than one hour away you should stay in the [place] area overnight.            Jun 13, 2018   Procedure with Cuong Wright PA-C   [facility] ([facility])    [address] [place] [address]  5th Floor  [place] [id number]   [phone]           Located in the Clinics and Surgery Center at [address] [place] [address].   parking is very convenient and highly recommended.  is a $6 flat rate fee.  Both  and self parkers should enter the main arrival plaza from [facility]; parking attendants will direct you based on your parking  preference.            Jun 19, 2018 11:30 AM CDT   Masonic Lab Draw with UC MASONIC LAB DRAW   [facility] Masonic Lab Draw ([facility])    [address]  [address]  [facility] [address]   [phone]            Jun 19, 2018 12:00 PM CDT   Infusion 120 with UC ONCOLOGY INFUSION, UC 15 ATC   [facility] ([facility])    [address]  [address]  [facility] [address]   [phone]            Jun 27, 2018 12:30 PM CDT   Masonic Lab Draw with UC MASONIC LAB DRAW   [facility] Masonic Lab Draw ([facility])    [address]  [address]  [facility] [address]   [phone]            Jun 27, 2018  1:00 PM CDT   Infusion 120 with UC ONCOLOGY INFUSION, UC 25 ATC   [facility] ([facility])    [address] [place] [address]  [address]  [facility] [address]   [phone]            Jul 03, 2018 10:00 AM CDT   (Arrive by 9:45 AM)   Return Visit with Briana Medina MD   [facility] ([facility])    [address] [place] [address]  [address]  [facility] [address]   [phone]            Jul 03, 2018  2:00 PM CDT   Masonic Lab Draw with UC MASONIC LAB DRAW   [facility] Masonic Lab Draw ([facility])    [address] [place] [address]  [address]  [facility] [address] [phone]              Who to contact     If you have questions or need follow up information about today's clinic visit or your schedule please contact [facility] directly at [phone].  Normal or non-critical lab and imaging results will be communicated to you by MyChart, letter or phone within 4 business days after the clinic has received the results. If you do not hear from us within 7 days, please contact the clinic through MyChart or phone. If you have a critical or abnormal lab result, we will notify you by phone  "as soon as possible.  Submit refill requests through [employer] or call your pharmacy and they will forward the refill request to us. Please allow 3 business days for your refill to be completed.          Additional Information About Your Visit        [employer] Information     [employer] lets you send messages to your doctor, view your test results, renew your prescriptions, schedule appointments and more. To sign up, go to [email or web address] . Click on \"Log in\" on the left side of the screen, which will take you to the Welcome page. Then click on \"Sign up Now\" on the right side of the page.     You will be asked to enter the access code listed below, as well as some personal information. Please follow the directions to create your username and password.     Your access code is: [email or web address]  Expires: 2018  6:30 AM     Your access code will  in 90 days. If you need help or a new code, please call your [place] clinic or [phone].        Care EveryWhere ID     This is your Care EveryWhere ID. This could be used by other organizations to access your [place] medical records  [id number]        Your Vitals Were     Pulse Temperature Respirations Pulse Oximetry BMI (Body Mass Index)       127 99.4  F (37.4  C) (Oral) 16 93% 30.29 kg/m2        Blood Pressure from Last 3 Encounters:   18 142/85   18 142/90   18 142/90    Weight from Last 3 Encounters:   18 77.6 kg (171 lb)   18 78.9 kg (174 lb)   18 79 kg (174 lb 3.2 oz)              We Performed the Following     CBC with platelets differential     Magnesium     Potassium        Primary Care Provider Office Phone # Fax #    Chelsea Wren [phone]       [facility] [address] [facility] [address]        Equal Access to Services     LISSET LUGO AH: Sundar Reese, waaxda luqadaha, qaybta kaalelizabeth danielle. So [facility] " [phone].    ATENCIÓN: Si jazlyn thomas, tiene a mae disposición servicios gratuitos de asistencia lingüística. Remington singh [phone].    We comply with applicable federal civil rights laws and [place] laws. We do not discriminate on the basis of race, color, national origin, age, disability, sex, sexual orientation, or gender identity.            Thank you!     Thank you for choosing [facility]  for your care. Our goal is always to provide you with excellent care. Hearing back from our patients is one way we can continue to improve our services. Please take a few minutes to complete the written survey that you may receive in the mail after your visit with us. Thank you!             Your Updated Medication List - Protect others around you: Learn how to safely use, store and throw away your medicines at www.disposemymeds.org.          This list is accurate as of 6/12/18  9:10 AM.  Always use your most recent med list.                   Brand Name Dispense Instructions for use Diagnosis    acetaminophen 325 MG tablet    TYLENOL    100 tablet    Take 3 tablets (975 mg) by mouth 3 times daily as needed for mild pain    Cancer related pain       amLODIPine 10 MG tablet    NORVASC    30 tablet    Take 1 tablet (10 mg) by mouth daily    Essential hypertension       dronabinol 2.5 MG capsule    MARINOL    60 capsule    Take 1 capsule (2.5 mg) by mouth 2 times daily (before meals)    Anorexia, Malignant neoplasm of cervix, unspecified site (H), Nausea       glutamine 500 MG Tabs     120 tablet    Take 500 mg by mouth 2 times daily    Drug-induced polyneuropathy (H)       * LORazepam 1 MG tablet    ATIVAN    30 tablet    Take 1 tablet (1 mg) by mouth every 6 hours as needed (Anxiety, Nausea/Vomiting or Sleep)    Malignant neoplasm of cervix, unspecified site (H)       * LORazepam 1 MG tablet    ATIVAN    30 tablet    Take 1 tablet (1 mg) by mouth every 6 hours as needed (Anxiety, Nausea/Vomiting or  Sleep)    Chemotherapy-induced neutropenia (H), Malignant neoplasm of cervix, unspecified site (H)       methylphenidate 5 MG tablet    RITALIN    30 tablet    Take 1/2 tablet every morning x 3 days. If no increase in energy levels, increase to 1 tablet in the morning.    Neoplastic malignant related fatigue       OLANZapine zydis 5 MG ODT tab    zyPREXA    30 tablet    Take 1 tablet (5 mg) by mouth At Bedtime    Anorexia, Malignant neoplasm of cervix, unspecified site (H), Nausea       oxyCODONE IR 5 MG tablet    ROXICODONE    60 tablet    Take 0.5-1 tablets (2.5-5 mg) by mouth every 4 hours as needed for moderate to severe pain    Cancer related pain       polyethylene glycol powder    MIRALAX    510 g    Take 17-34 g (1-2 capfuls) by mouth daily    Cancer related pain, Generalized abdominal pain       * prochlorperazine 10 MG tablet    COMPAZINE    120 tablet    Take 1 tablet (10 mg) by mouth 3 times daily    Malignant neoplasm of cervix, unspecified site (H), Nausea       * prochlorperazine 5 MG tablet    COMPAZINE    30 tablet    Take 1 tablet (5 mg) by mouth every 6 hours as needed (Nausea/Vomiting)    Chemotherapy-induced neutropenia (H), Malignant neoplasm of cervix, unspecified site (H)       ranitidine 75 MG tablet    ZANTAC    60 tablet    Take 1 tablet (75 mg) by mouth 2 times daily    Gastroesophageal reflux disease, esophagitis presence not specified       senna-docusate 8.6-50 MG per tablet    SENOKOT-S;PERICOLACE    100 tablet    Take 2-4 tablets by mouth 2 times daily    Cancer related pain       traMADol 50 MG tablet    ULTRAM    240 tablet    Take 2 tablets (100mg) every morning when you wake up, 2 tablets (100mg)around noon, 2 tablets (100mg) beofre dinner and 2 tablets (100mg) before bed.    Malignant neoplasm of cervix, unspecified site (H), Cancer related pain       * Notice:  This list has 4 medication(s) that are the same as other medications prescribed for you. Read the directions  carefully, and ask your doctor or other care provider to review them with you.

## 2018-06-12 NOTE — NURSING NOTE
Chief Complaint   Patient presents with     Port Draw     labs drawn from port by RN     Vitals done: 99.9 temp  157/92 BP  127 Pulse  89% Sats  Port accessed, labs drawn, flushed with NS & heparin.  Patient checked in for infusion.  Pallavi Child RN

## 2018-06-12 NOTE — PROGRESS NOTES
Infusion Nursing Note:  Ashvin Tiwari presents today for C1D8 Taxol.    Patient seen by provider today: No   present during visit today: Not Applicable.    Note: Patient reported to clinic today with no new complaints. BP, pulse and temp elevated in clinic, SpO2 decreased in lab. Back to baseline when in infusion clinic.     Intravenous Access:  Implanted Port.    Treatment Conditions:  Lab Results   Component Value Date    HGB 11.1 06/12/2018     Lab Results   Component Value Date    WBC 4.4 06/12/2018      Lab Results   Component Value Date    ANEU 2.6 06/12/2018     Lab Results   Component Value Date     06/12/2018      Lab Results   Component Value Date     06/05/2018                   Lab Results   Component Value Date    POTASSIUM 4.3 06/05/2018           Lab Results   Component Value Date    MAG 1.8 06/12/2018            Lab Results   Component Value Date    CR 0.81 06/05/2018                   Lab Results   Component Value Date    BEATRIZ 9.5 06/05/2018                Lab Results   Component Value Date    BILITOTAL 0.3 06/05/2018           Lab Results   Component Value Date    ALBUMIN 2.8 06/05/2018                    Lab Results   Component Value Date    ALT 21 06/05/2018           Lab Results   Component Value Date    AST 30 06/05/2018       Results reviewed, labs MET treatment parameters, ok to proceed with treatment.      Post Infusion Assessment:  Patient tolerated infusion without incident.  Blood return noted pre and post infusion.  Site patent and intact, free from redness, edema or discomfort.  No evidence of extravasations.  Access discontinued per protocol.    Discharge Plan:   Patient declined prescription refills.  Discharge instructions reviewed with: Patient and Family.  Patient and/or family verbalized understanding of discharge instructions and all questions answered.  Copy of AVS reviewed with patient and/or family.  Patient will return 6/19/18 for next  appointment.  Patient discharged in stable condition accompanied by: self and daughter.  Departure Mode: Wheelchair.  Face to Face time: 0 minutes.    Nicholas Lilly RN

## 2018-06-13 NOTE — DISCHARGE INSTRUCTIONS
Discharge Instructions for Paracentesis or Thoracentesis     Paracentesis is a procedure to remove extra fluid from your belly (abdomen). Thoracentesis is a procedure to remove extra fluid from your chest.  This fluid buildup is called ascites. The procedure may have been done to take a sample of the fluid. Or, it may have been done to drain the extra fluid from your abdomen or chest to help make you more comfortable.     Home care    If you have pain after the procedure, your healthcare provider can prescribe or recommend pain medicines. Take these exactly as directed. If you stopped taking other medicines before the procedure, ask your provider when you can start them again.    Avoid strenuous activity for 48 hours.    You will have a small bandage over the puncture site. Adhesive tapes, adhesive strips, or surgical glue may also be used to close the incision. They also help stop bleeding and promote healing. You may take the bandage off in 24-48 hours. Once there is a scab over the site, no bandages are required.    Check the puncture site for the signs of infection listed below.     Follow-up care  Make a follow-up appointment with your healthcare provider as directed. During your follow-up visit, your provider will check your healing. Let your provider know how you are feeling. You can also discuss the cause of your fluid, and if you need any further treatment.    When to seek medical advice:  Call your healthcare provider if you have any of the following after the procedure:    A fever of 100.4 F (38.0 C) or higher    Trouble breathing    Abdominal pain that is worse than expected    Belly Abdominal pain not caused by having the skin punctured that is worse than expected    Persistent bleeding from the puncture site    More than a small amount of fluid leaking from the puncture site    Swollen belly    Signs of infection at the puncture site. These include increased pain, redness, or swelling, warmth, or  bad-smelling drainage.    Feeling dizzy or lightheaded, or fainting     Call our Interventional Radiology (IR) service if:  If you start bleeding from the procedure site.  If you do start to bleed from the site, lie down and hold some pressure on the site.  Our radiology provider can help you decide if you need to return to the hospital.  If you have new or worsening pain related to the procedure.  If you have pronounced swelling at the procedure site.  If you develop persistent nausea or vomiting.  If you develop hives or a rash or any unexplained itching.  If you have a fever of greater than 100.4  F and chills in the first 5 days after procedure.  Any other concerns related to your procedure.      [facility]  Interventional Radiology (IR)  [address]  [facility] [place] [place] [address]    Contact Number:  [phone]  (IR control desk)  Monday - Friday 8:00 am - 4:30 pm    After hours for urgent concerns:  [phone]  After 4:30 pm Monday - Friday, Weekends and Holidays.   Ask for Interventional Radiology on-call.  Someone is available 24 hours a day.  [facility] toll free number:  [phone]

## 2018-06-13 NOTE — IP AVS SNAPSHOT
MRN:[id number]                      After Visit Summary   6/13/2018    Ashvin Tiwari    MRN: [id number]           Thank you!     Thank you for choosing [facility] for your care. Our goal is always to provide you with excellent care. Hearing back from our patients is one way we can continue to improve our services. Please take a few minutes to complete the written survey that you may receive in the mail after you visit with us. Thank you!        Patient Information     Date Of Birth          1955        About your hospital stay     You were admitted on:  June 13, 2018 You last received care in the:  [facility]    You were discharged on:  June 13, 2018       Who to Call     For medical emergencies, please call 911.  For non-urgent questions about your medical care, please call your primary care provider or clinic, [phone]  For questions related to your surgery, please call your surgery clinic        Attending Provider     Provider Cuong Dubois PA-C Physician Assistant       Primary Care Provider Office Phone # Fax #    Chelsea Wren [phone]      Your next 10 appointments already scheduled     Jun 19, 2018 11:30 AM CDT   Masonic Lab Draw with UC MASONIC LAB DRAW   [facility] Mason[facility] Draw ([facility])    [address] [place] [address]  [address]  [facility] [address]   [phone]            Jun 19, 2018 12:00 PM CDT   Infusion 120 with UC ONCOLOGY INFUSION, UC 15 ATC   [facility] ([facility])    [address] [place] [address]  [address]  [facility] [address]   [phone]            Jun 27, 2018 12:30 PM CDT   Masonic Lab Draw with UC MASONIC LAB DRAW   [facility] Lab Draw ([facility])    [address] [place] [address]  [address]  [facility] [address]   [phone]            Jun 27, 2018  1:00 PM CDT   Infusion 120 with UC ONCOLOGY  INFUSION, UC 25 ATC   [facility] ([facility])    [address]  [address]  [facility] [address]   [phone]            Jul 03, 2018 10:00 AM CDT   (Arrive by 9:45 AM)   Return Visit with Briana Medina MD   [facility] ([facility])    [address]  [address]  [facility] [address]   [phone]            Jul 03, 2018  2:00 PM CDT   Masonic Lab Draw with UC MASONIC LAB DRAW   [facility] Lab Draw ([facility])    [address]  [address]  [facility] [address]   [phone]            Jul 03, 2018  2:30 PM CDT   Infusion 120 with UC ONCOLOGY INFUSION, UC 20 ATC   [facility] ([facility])    [address]  [address]  [facility] [address]   [phone]              Further instructions from your care team       Discharge Instructions for Paracentesis or Thoracentesis     Paracentesis is a procedure to remove extra fluid from your belly (abdomen). Thoracentesis is a procedure to remove extra fluid from your chest.  This fluid buildup is called ascites. The procedure may have been done to take a sample of the fluid. Or, it may have been done to drain the extra fluid from your abdomen or chest to help make you more comfortable.     Home care    If you have pain after the procedure, your healthcare provider can prescribe or recommend pain medicines. Take these exactly as directed. If you stopped taking other medicines before the procedure, ask your provider when you can start them again.    Avoid strenuous activity for 48 hours.    You will have a small bandage over the puncture site. Adhesive tapes, adhesive strips, or surgical glue may also be used to close the incision. They also help stop bleeding and promote healing. You may take the bandage off in 24-48 hours. Once there is a scab over the site, no  bandages are required.    Check the puncture site for the signs of infection listed below.     Follow-up care  Make a follow-up appointment with your healthcare provider as directed. During your follow-up visit, your provider will check your healing. Let your provider know how you are feeling. You can also discuss the cause of your fluid, and if you need any further treatment.    When to seek medical advice:  Call your healthcare provider if you have any of the following after the procedure:    A fever of 100.4 F (38.0 C) or higher    Trouble breathing    Abdominal pain that is worse than expected    Belly Abdominal pain not caused by having the skin punctured that is worse than expected    Persistent bleeding from the puncture site    More than a small amount of fluid leaking from the puncture site    Swollen belly    Signs of infection at the puncture site. These include increased pain, redness, or swelling, warmth, or bad-smelling drainage.    Feeling dizzy or lightheaded, or fainting     Call our Interventional Radiology (IR) service if:  If you start bleeding from the procedure site.  If you do start to bleed from the site, lie down and hold some pressure on the site.  Our radiology provider can help you decide if you need to return to the hospital.  If you have new or worsening pain related to the procedure.  If you have pronounced swelling at the procedure site.  If you develop persistent nausea or vomiting.  If you develop hives or a rash or any unexplained itching.  If you have a fever of greater than 100.4  F and chills in the first 5 days after procedure.  Any other concerns related to your procedure.      [facility]  Interventional Radiology (IR)  [address]  [facility] [place] [address]    Contact Number:  [phone]  (IR control desk)  Monday - Friday 8:00 am - 4:30 pm    After hours for urgent concerns:  [phone]  After 4:30 pm Monday -  "Friday, Weekends and Holidays.   Ask for Interventional Radiology on-call.  Someone is available 24 hours a day.  [facility] toll free number:  [phone]    Pending Results     No orders found from 2018 to 2018.            Admission Information     Date & Time Provider Department Dept. Phone    2018 Cuong Wright PA-C [facility] [phone]      Your Vitals Were     Blood Pressure Pulse Temperature Respirations Height Weight    131/88 106 97.9  F (36.6  C) 16 1.6 m (5' 3\") 77.6 kg (171 lb)    Pulse Oximetry BMI (Body Mass Index)                96% 30.29 kg/m2          MyChart Information     [employer] is an electronic gateway that provides easy, online access to your medical records. With [employer], you can request a clinic appointment, read your test results, renew a prescription or communicate with your care team.     To sign up for [employer] visit the website at [email or web address]   You will be asked to enter the access code listed below, as well as some personal information. Please follow the directions to create your username and password.     Your access code is: [email or web address]  Expires: 2018  6:30 AM     Your access code will  in 90 days. If you need help or a new code, please contact your [facility] or call [phone] for assistance.        Care EveryWhere ID     This is your Care EveryWhere ID. This could be used by other organizations to access your [facility] medical records  [id number]        Equal Access to Services     LISSET LUGO : Hadii elvia lackey Sosyd, waaxda luqadaha, qaybta kaalmada kamala, waxdioni idiin hayaan adeeg kharash la'aan . So [facility] [phone].    ATENCIÓN: Si habla español, tiene a mae disposición servicios gratuitos de asistencia lingüística. Llame al [phone].    We comply with applicable federal civil rights laws and [place] laws. We do not discriminate on the basis of race, " color, national origin, age, disability, sex, sexual orientation, or gender identity.               Review of your medicines      UNREVIEWED medicines. Ask your doctor about these medicines        Dose / Directions    acetaminophen 325 MG tablet   Commonly known as:  TYLENOL   Used for:  Cancer related pain        Dose:  975 mg   Take 3 tablets (975 mg) by mouth 3 times daily as needed for mild pain   Quantity:  100 tablet   Refills:  0       amLODIPine 10 MG tablet   Commonly known as:  NORVASC   Used for:  Essential hypertension        Dose:  10 mg   Take 1 tablet (10 mg) by mouth daily   Quantity:  30 tablet   Refills:  3       dronabinol 2.5 MG capsule   Commonly known as:  MARINOL   Used for:  Anorexia, Malignant neoplasm of cervix, unspecified site (H), Nausea        Dose:  2.5 mg   Take 1 capsule (2.5 mg) by mouth 2 times daily (before meals)   Quantity:  60 capsule   Refills:  1       glutamine 500 MG Tabs   Used for:  Drug-induced polyneuropathy (H)        Dose:  500 mg   Take 500 mg by mouth 2 times daily   Quantity:  120 tablet   Refills:  3       * LORazepam 1 MG tablet   Commonly known as:  ATIVAN   Used for:  Malignant neoplasm of cervix, unspecified site (H)        Dose:  1 mg   Take 1 tablet (1 mg) by mouth every 6 hours as needed (Anxiety, Nausea/Vomiting or Sleep)   Quantity:  30 tablet   Refills:  2       * LORazepam 1 MG tablet   Commonly known as:  ATIVAN   Used for:  Chemotherapy-induced neutropenia (H), Malignant neoplasm of cervix, unspecified site (H)        Dose:  1 mg   Take 1 tablet (1 mg) by mouth every 6 hours as needed (Anxiety, Nausea/Vomiting or Sleep)   Quantity:  30 tablet   Refills:  2       methylphenidate 5 MG tablet   Commonly known as:  RITALIN   Used for:  Neoplastic malignant related fatigue        Take 1/2 tablet every morning x 3 days. If no increase in energy levels, increase to 1 tablet in the morning.   Quantity:  30 tablet   Refills:  0       OLANZapine zydis 5 MG ODT  tab   Commonly known as:  zyPREXA   Used for:  Anorexia, Malignant neoplasm of cervix, unspecified site (H), Nausea        Dose:  5 mg   Take 1 tablet (5 mg) by mouth At Bedtime   Quantity:  30 tablet   Refills:  3       oxyCODONE IR 5 MG tablet   Commonly known as:  ROXICODONE   Used for:  Cancer related pain        Dose:  2.5-5 mg   Take 0.5-1 tablets (2.5-5 mg) by mouth every 4 hours as needed for moderate to severe pain   Quantity:  60 tablet   Refills:  0       polyethylene glycol powder   Commonly known as:  MIRALAX   Used for:  Cancer related pain, Generalized abdominal pain        Dose:  1-2 capful   Take 17-34 g (1-2 capfuls) by mouth daily   Quantity:  510 g   Refills:  1       * prochlorperazine 10 MG tablet   Commonly known as:  COMPAZINE   Used for:  Malignant neoplasm of cervix, unspecified site (H), Nausea        Dose:  10 mg   Take 1 tablet (10 mg) by mouth 3 times daily   Quantity:  120 tablet   Refills:  2       * prochlorperazine 5 MG tablet   Commonly known as:  COMPAZINE   Used for:  Chemotherapy-induced neutropenia (H), Malignant neoplasm of cervix, unspecified site (H)        Dose:  5 mg   Take 1 tablet (5 mg) by mouth every 6 hours as needed (Nausea/Vomiting)   Quantity:  30 tablet   Refills:  1       ranitidine 75 MG tablet   Commonly known as:  ZANTAC   Used for:  Gastroesophageal reflux disease, esophagitis presence not specified        Dose:  75 mg   Take 1 tablet (75 mg) by mouth 2 times daily   Quantity:  60 tablet   Refills:  1       senna-docusate 8.6-50 MG per tablet   Commonly known as:  SENOKOT-S;PERICOLACE   Used for:  Cancer related pain        Dose:  2-4 tablet   Take 2-4 tablets by mouth 2 times daily   Quantity:  100 tablet   Refills:  1       traMADol 50 MG tablet   Commonly known as:  ULTRAM   Used for:  Malignant neoplasm of cervix, unspecified site (H), Cancer related pain        Take 2 tablets (100mg) every morning when you wake up, 2 tablets (100mg)around noon, 2 tablets  (100mg) beofre dinner and 2 tablets (100mg) before bed.   Quantity:  240 tablet   Refills:  0       * Notice:  This list has 4 medication(s) that are the same as other medications prescribed for you. Read the directions carefully, and ask your doctor or other care provider to review them with you.             Protect others around you: Learn how to safely use, store and throw away your medicines at www.disposemymeds.org.             Medication List: This is a list of all your medications and when to take them. Check marks below indicate your daily home schedule. Keep this list as a reference.      Medications           Morning Afternoon Evening Bedtime As Needed    acetaminophen 325 MG tablet   Commonly known as:  TYLENOL   Take 3 tablets (975 mg) by mouth 3 times daily as needed for mild pain                                amLODIPine 10 MG tablet   Commonly known as:  NORVASC   Take 1 tablet (10 mg) by mouth daily                                dronabinol 2.5 MG capsule   Commonly known as:  MARINOL   Take 1 capsule (2.5 mg) by mouth 2 times daily (before meals)                                glutamine 500 MG Tabs   Take 500 mg by mouth 2 times daily                                * LORazepam 1 MG tablet   Commonly known as:  ATIVAN   Take 1 tablet (1 mg) by mouth every 6 hours as needed (Anxiety, Nausea/Vomiting or Sleep)                                * LORazepam 1 MG tablet   Commonly known as:  ATIVAN   Take 1 tablet (1 mg) by mouth every 6 hours as needed (Anxiety, Nausea/Vomiting or Sleep)                                methylphenidate 5 MG tablet   Commonly known as:  RITALIN   Take 1/2 tablet every morning x 3 days. If no increase in energy levels, increase to 1 tablet in the morning.                                OLANZapine zydis 5 MG ODT tab   Commonly known as:  zyPREXA   Take 1 tablet (5 mg) by mouth At Bedtime                                oxyCODONE IR 5 MG tablet   Commonly known as:  ROXICODONE    Take 0.5-1 tablets (2.5-5 mg) by mouth every 4 hours as needed for moderate to severe pain                                polyethylene glycol powder   Commonly known as:  MIRALAX   Take 17-34 g (1-2 capfuls) by mouth daily                                * prochlorperazine 10 MG tablet   Commonly known as:  COMPAZINE   Take 1 tablet (10 mg) by mouth 3 times daily                                * prochlorperazine 5 MG tablet   Commonly known as:  COMPAZINE   Take 1 tablet (5 mg) by mouth every 6 hours as needed (Nausea/Vomiting)                                ranitidine 75 MG tablet   Commonly known as:  ZANTAC   Take 1 tablet (75 mg) by mouth 2 times daily                                senna-docusate 8.6-50 MG per tablet   Commonly known as:  SENOKOT-S;PERICOLACE   Take 2-4 tablets by mouth 2 times daily                                traMADol 50 MG tablet   Commonly known as:  ULTRAM   Take 2 tablets (100mg) every morning when you wake up, 2 tablets (100mg)around noon, 2 tablets (100mg) beofre dinner and 2 tablets (100mg) before bed.                                * Notice:  This list has 4 medication(s) that are the same as other medications prescribed for you. Read the directions carefully, and ask your doctor or other care provider to review them with you.

## 2018-06-13 NOTE — PROCEDURES
Interventional Radiology Brief Post Procedure Note    Procedure: IR THORACENTESIS [[id number]]    Proceduralist: Aguilar Wright PA-C    Assistant: None    Time Out: Prior to the start of the procedure and with procedural staff participation, I verbally confirmed the patient s identity using two indicators, relevant allergies, that the procedure was appropriate and matched the consent or emergent situation, and that the correct equipment/implants were available. Immediately prior to starting the procedure I conducted the Time Out with the procedural staff and re-confirmed the patient s name, procedure, and site/side. (The Joint Commission universal protocol was followed.)  Yes    Sedation: None. Local Anesthestic used    Findings: Completed ultrasound-guided left therapeutic thoracentesis. A total of 575 mL clear denny fluid drained from the left pleural space. 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 No immediate complication.    Estimated Blood Loss: None    Fluoroscopy Time:  None    Specimens: None    Complications: 1. None     Condition: Stable    Plan: Follow up per primary team. Return to IR as needed.    Comments: See dictated procedure note for full details.    Aguilar Wright PA-C  Interventional Radiology  [phone]

## 2018-06-13 NOTE — IP AVS SNAPSHOT
[facility]    [address] [place] [address]    Phone:  [phone]    Fax:  [phone]                                       After Visit Summary   6/13/2018    Ashvin Tiwari    MRN: [id number]           After Visit Summary Signature Page     I have received my discharge instructions, and my questions have been answered. I have discussed any challenges I see with this plan with the nurse or doctor.    ..........................................................................................................................................  Patient/Patient Representative Signature      ..........................................................................................................................................  Patient Representative Print Name and Relationship to Patient    ..................................................               ................................................  Date                                            Time    ..........................................................................................................................................  Reviewed by Signature/Title    ...................................................              ..............................................  Date                                                            Time

## 2018-06-14 NOTE — PROGRESS NOTES
This is a recent snapshot of the patient's [facility] medical record.  For current drug dose and complete information and questions, call [phone] or In [facility] pool, fv home infusion ([id number])  CSN Number:  [id number]

## 2018-06-18 NOTE — PROGRESS NOTES
This is a recent snapshot of the patient's [facility] medical record.  For current drug dose and complete information and questions, call [phone] or In Basket pool, fv home infusion ([id number])  CSN Number:  [id number]

## 2018-06-22 NOTE — PROGRESS NOTES
Care Coordinator Note  Called and talked with Hardy   Her Mother is still in ICU but they removed her breathing tube today.   Canceled the chemo for the 27th and then will see Dr Soto and discuss plan       Patient verbalized back understanding of the above information discussed.   Rima MADSEN, RN, OCN  Care Coordinator   Gynecologic Cancer   Office [phone]  Pager [phone] #[phone]

## 2018-06-29 NOTE — PROGRESS NOTES
Care Coordinator Note  Talked with her daughter Mariela  She remains in the hospital out of intensive care, very weak and they are looking for a care center for rehab.  Will cancel  appt for Tuesday and they will call us when she is ready to be seen.      Mariela  (Daughter) verbalized back understanding of the above information discussed.   Rima MADSEN RN, OCN  Care Coordinator   Gynecologic Cancer   Office [phone]  Pager [phone] #[phone]

## 2018-07-11 NOTE — TELEPHONE ENCOUNTER
Called Mariela and spoke with her regarding her mother.  Patient is currently still hospitalized at [facility], ill, recurring ascites and pleural effusion.     Foundation One testing with no MSI-H/Keytruda option.  Given decline in PFS, progressive disease, explained to Mariela as well as patient's other daughter who was participating in phone call that her mother's disease is progressive, terminal. She is not a candidate for further chemotherapy due to decline in functional status as well as rapidly progessive disease, limited treatment options.    Recommend comfort cares, hospice care.  Could consider Aspira catheter placement to help manage ascites.    They expressed understanding.  Care conference planned today at [place].    Spoke also with providing MD at [facility], Dr. Ga.  Discussed my recommendations for palliative care, hospice.  He is also in agreement based on his direct care of the patient currently.    Barbara Soto MD  Gynecologic Oncology  [facility]

## 2018-07-25 NOTE — NURSING NOTE
Chief Complaint   Patient presents with     Blood Draw     Labs drawn from venipuncture by RN.     Labs collected from venipuncture by RN.    Namita Hewitt RN  
pacemaker: good capture, regular rhythm and appropriate intervals.

## 2018-08-27 NOTE — PROGRESS NOTES
This is a recent snapshot of the patient's [facility] medical record.  For current drug dose and complete information and questions, call [phone] or In Basket pool, fv home infusion ([id number])  CSN Number:  [id number]

## 2018-09-11 ENCOUNTER — CARE COORDINATION (OUTPATIENT)
Dept: ONCOLOGY | Facility: CLINIC | Age: 63
End: 2018-09-11

## 2018-09-11 NOTE — PROGRESS NOTES
Care Coordinator Note  Called daughter Hardy to check in on [place].  She said she  on 18.  I passed on our condolences and sympathy.       Patient verbalized back understanding of the above information discussed.   Rima MADSEN RN, OCN  Care Coordinator   Gynecologic Cancer   Office [phone]  Pager [phone] #[phone]

## 2018-11-16 NOTE — DISCHARGE SUMMARY
Gynecologic Oncology Discharge Summary    Ashvin Tiwari  [id number]    Admit Date: 9/6/2017  Discharge Date: 9/7/2017   Admitting Provider: Tri Chen MD  Discharge Provider: Jeremy Dean MD    Admission Dx:   - Squamous Cell Carcinoma of the Cervix  - Poor PO intake  - Poor pain controll    Discharge Dx:  - Squamous Cell Carcinoma of the Cervix  - Hypertension    Patient Active Problem List   Diagnosis     Abdominal pain       Procedures:   - CT Chest PE protocol  - Lumbar spine CT without contrast  - CT abdomen pelvis without contrast     Prior to Admission Medications:  No prescriptions prior to admission.       Discharge Medications:       Review of your medicines      START taking       Dose / Directions    amLODIPine 2.5 MG tablet   Commonly known as:  NORVASC   Used for:  Essential hypertension        Dose:  2.5 mg   Take 1 tablet (2.5 mg) by mouth daily   Quantity:  30 tablet   Refills:  1       oxyCODONE 5 MG IR tablet   Commonly known as:  ROXICODONE   Used for:  Cancer related pain        Dose:  5-10 mg   Take 1-2 tablets (5-10 mg) by mouth every 3 hours as needed for moderate to severe pain   Quantity:  100 tablet   Refills:  0       polyethylene glycol powder   Commonly known as:  MIRALAX   Used for:  Cancer related pain, Generalized abdominal pain        Dose:  1-2 capful   Take 17-34 g (1-2 capfuls) by mouth daily   Quantity:  510 g   Refills:  1       senna-docusate 8.6-50 MG per tablet   Commonly known as:  SENOKOT-S;PERICOLACE   Used for:  Cancer related pain        Dose:  2-4 tablet   Take 2-4 tablets by mouth 2 times daily   Quantity:  100 tablet   Refills:  1         CONTINUE these medicines which may have CHANGED, or have new prescriptions. If we are uncertain of the size of tablets/capsules you have at home, strength may be listed as something that might have changed.       Dose / Directions    acetaminophen 325 MG tablet   Commonly known as:  TYLENOL   This may have changed:     - medication strength  - how much to take  - when to take this  - reasons to take this   Used for:  Cancer related pain        Dose:  1000 mg   Take 3 tablets (975 mg) by mouth 3 times daily   Quantity:  100 tablet   Refills:  0         STOP taking          oxyCODONE-acetaminophen 5-325 MG per tablet   Commonly known as:  PERCOCET                Where to get your medicines      Some of these will need a paper prescription and others can be bought over the counter. Ask your nurse if you have questions.     Bring a paper prescription for each of these medications      acetaminophen 325 MG tablet     amLODIPine 2.5 MG tablet     oxyCODONE 5 MG IR tablet     polyethylene glycol powder     senna-docusate 8.6-50 MG per tablet           Consultations:  Interventional Radiology, Palliative Care    Brief History of Illness:  This patient is a 62 year old female with new diagnosis of cervical cancer who presents with chest, abdominal, and back pain. She reports her symptoms started two weeks ago. She was seen in clinic and was prescribed oxycodone and tylenol. She has been taking 1 oxycodone tablet three times daily and 1-2 Tylenol three times a day. She also reports poor sleep due to the pain. Endorses decreased appetite and nausea. She has been tolerating oatmeal and fluids. Reports soft bowel movements. She has had vaginal bleeding for two weeks, typically goes through 4 pads per day. No change in vaginal bleeding today. Denies fevers, chills, shortness of breath, or LE edema    Hospital Course:   ED course: She was evaluated for MI with troponin, which was 0.026 and ECG, which revealed sinus rhythm. She was also evaluated for PE with a D.dimer, which was elevated to 0.9, however a CT PE was negative for PE. The CT PE also revealed multiple pulmonary nodules concerning for metastasis. CT abdomen revealed a poorly defined cervical mass with regional metastasis, iliac/obturator lymphadenopathy, peritoneal thickening, and  moderate volume ascites.      Floor course: On admission, she was started on scheduled tylenol, PRN oxycodone, and dilaudid IV bumps. On HD1, Due to nausea with PO pain medication and poor pain control, she received IV dilaudid and palliative care was consulted to help with pain regimen. Patient's pain was controlled with IV Dilaudid and she was able to sleep. While sleeping, patient SP02 dropped to 86% on room air. Continuous pulse ox. Monitoring and supplemental NC oxygen was ordered. Patient sats increased to 95% on 2L NC. She was weaned from O2 and breathing 92% while awake on RA. A walk test was performed and patient sats minimum was 92% suggesting she did not required home oxygen.    Hypertension: Ms. Tiwari had multiple episodes of elevated /91, 170/92, 171/78 over the course of her stay. Diagnosis of hypertension was made and Amlodipine 2.5mg was started. Discharge BP was 136/67.     Pulmonary Nodules: IR was consulted to determine whether a lung bx could be performed. IR agreed that there were a few operable nodules in bilateral lung apices butrecommended she receive this biopsy as an outpatient. Due to the fact that Ms. Tiwari had PET/CT scheduled for 9/8, opted to f/u with this appointment and following appointment with Dr. Soto 9/12 before determining whether lung biopsy was necessary.    Pain: palliative care came to the see the patient to make recommendations for pain control. Discharge medications reflect recommended routine. Also recommended daily bowel regimen of senna and miralax to control opioid-induced constipation.    On HD2 in the afternoon, patient's pain was well controlled, she was tolerated PO diet and voiding independently. She discharged home with plan to f/u for PET/CT tomorrow, 9/8        Discharge Instructions and Follow up:  Ms. Ashvin Tiwari was discharged from the hospital with follow up for:    Discharge Diet: Regular  Discharge Activity: Activity as tolerated, pelvic  rest  Discharge Follow up:   - 9/8/17 PET CT  - 9/12/27 Gynecology Oncology Clinic - Dr. Soto    Discharge Disposition:  Discharged to home    Discharge Staff: MD Lillian Green MD  Obstetrics and Gyncology, PGY-1  September 7, 2017      Provider Disclosure:   I agree with above History, Review of Systems, Physical exam and Plan. I have reviewed the content of the documentation and have edited it as needed. I have personally performed the services documented here and the documentation accurately represents those services and the decisions I have made.     Electronically signed by:   Jeremy Dean MD   Gynecologic Oncology   [facility]     negative

## 2018-12-20 NOTE — PROGRESS NOTES
This is a recent snapshot of the patient's [facility] medical record.  For current drug dose and complete information and questions, call [phone] or In Basket pool, [facility] home infusion ([id number])  CSN Number:  [id number]       I have discussed the discharge plan with the patient. The patient agrees with the plan, as discussed.  The patient understands Emergency Department diagnosis is a preliminary diagnosis often based on limited information and that the patient must adhere to the follow-up plan as discussed.  The patient understands that if the symptoms worsen or if prescribed medications do not have the desired/planned effect that the patient may return to the Emergency Department at any time for further evaluation and treatment.

## 2022-05-06 NOTE — MR AVS SNAPSHOT
After Visit Summary   12/5/2017    Ashvin Tiwari    MRN: [id number]           Patient Information     Date Of Birth          1955        Visit Information        Provider Department      12/5/2017 1:00 PM  18 ATC;  ONCOLOGY INFUSION [facility]        Today's Diagnoses     Nausea    -  1      Care [facility]    [facility] Main Line: [phone]    Call triage nurse with chills and/or temperature greater than or equal to 100.4, uncontrolled nausea/vomiting, diarrhea, constipation, dizziness, shortness of breath, chest pain, bleeding, unexplained bruising, or any new/concerning symptoms, questions/concerns.   If you are having any concerning symptoms or wish to speak to a provider before your next infusion visit, please call your care coordinator or triage to notify them so we can adequately serve you.   Triage Nurse Line: [phone]    If after hours, weekends, or holidays, call main hospital  and ask for Oncology doctor on call @ [phone]             December 2017 Sunday Monday Tuesday Wednesday Thursday Friday Saturday                            1     [facility] LAB DRAW    9:30 AM   (15 min.)   [facility] LAB DRAW   [facility] Lab Draw     [facility] ONC INFUSION 360   10:00 AM   (360 min.)    ONCOLOGY INFUSION   [facility] 2       3     4     5     [facility] ONC INFUSION 120    1:00 PM   (120 min.)    ONCOLOGY INFUSION   [facility] 6     7     8     9       10     11     12     13     EVALUATION   10:45 AM   (60 min.)   Dwayne Barbour PT   [facility] 14     15     16       17     18     19     [facility] RETURN    2:15 PM   (30 min.)   Briana Medina MD   [facility] 20     21     [facility] LAB DRAW    9:30 AM   (15 min.)    [facility] DRAW   [facility] Lab Draw     [facility] RETURN    9:45 AM   (30 min.)   Rachelle Miranda APRN CNP   [facility]     05/06/22 1112   Wound Coccyx Pressure Injury   Date First Assessed/Time First Assessed: 05/06/22 0400   Present on Hospital Admission: No  Location: Coccyx  Level of Skin Injury: Partial Thickness  Primary Wound Type: Pressure Injury  Initial Pressure Injury Stage: Stage 2   Dressing Assessment Intact;Drainage present   Dressing Activity Changed   Dressing Changed On   05/06/22   Wound Exudate Minimal;Serous;No odor   Cleansing Agent Commercial cleansing solution   Wound Bed/Tissue Type Pink  (100% pinkl moist tissue)   Periwound Condition Erythema, blanchable   Wound Edge Attached to wound bed;Well defined   Wound Dressing Foam with borders   Wound Last Measured 05/06/22   Wound Length (cm) 1.7 cm   Wound Width (cm) 0.6 cm   Wound Depth (cm) 0.1 cm   Wound Surface Area (cm^2) 1.02 cm^2   Wound Volume (cm^3) 0.102 cm^3      Clinic     [facility] ONC INFUSION 360   11:00 AM   (360 min.)   [facility] ONCOLOGY INFUSION   [facility] 22     23       24     25     26     27     28     29     30       31                                              January 2018 Sunday Monday Tuesday Wednesday Thursday Friday Saturday        1     2     3     4     5     6       7     8     9     10     11     [facility] LAB DRAW   10:00 AM   (15 min.)    [facility]   [facility]     [facility] RETURN   10:15 AM   (30 min.)   Rachelle Miranda APRN CNP   [facility] ONC INFUSION 360   11:30 AM   (360 min.)   [facility] ONCOLOGY INFUSION   [facility] 12     13       14     15     16     17     18     19     20       21     22     23     24     25     26     27       28     29     30     31                                 Lab Results:  No results found for this or any previous visit (from the past 12 hour(s)).          Follow-ups after your visit        Your next 10 appointments already scheduled     Dec 13, 2017 11:00 AM CST   (Arrive by 10:45 AM)   Evaluation with Dwayne Barbour PT   [facility] ([facility])    [address] [place] [address]  4th [facility] [address]   [phone]            Dec 19, 2017  2:30 PM CST   (Arrive by 2:15 PM)   Return Visit with Briana Medina MD   [facility] ([facility])    [address] [place] [address]   [phone]            Dec 21, 2017  9:30 AM CST   [facility] Draw with  [facility] DRAW   [facility] ([facility])    [address] [place] [address]   [phone]            Dec 21, 2017 10:00 AM CST   (Arrive by 9:45 AM)   Return Visit with ROSA Mendoza CNP   [facility] ([facility])    [address] [place] [address]  [facility] [address]   [phone]            Dec 21, 2017 11:00 AM CST   Infusion 360 with [facility] ONCOLOGY INFUSION, [facility] 18 ATC   [facility] ([facility])    [address] [place] [address]   [phone]            Jan 11, 2018 10:00 AM CST   [facility] Lab Draw with [facility] DRAW   [facility] Draw ([facility])    [address] [place] [address] [phone]            Jan 11, 2018 10:30 AM CST   (Arrive by 10:15 AM)   Return Visit with ROSA Mendoza CNP   [facility] ([facility])    [address] [place] [id number] [phone]            Jan 11, 2018 11:30 AM CST   Infusion 360 with [facility] ONCOLOGY INFUSION, UC 13 ATC   [facility] ([facility])    [address] [place] [id number] [phone]              Future tests that were ordered for you today     Open Future Orders        Priority Expected Expires Ordered    Vitamin B6 Routine 1/16/2018 12/5/2018 12/5/2017            Who to contact     If you have questions or need follow up information about today's clinic visit or your schedule please contact [facility] directly at [phone].  Normal or non-critical lab and imaging results will be communicated to you by MyChart, letter or phone within 4 business days after the clinic has received the results. If you do not hear from us within 7 days, please contact the clinic through MyChart or phone. If you have a critical or abnormal lab result, we will notify you by phone as soon as possible.  Submit refill requests through [email or web address] or call your pharmacy and they will forward the refill request to us. Please allow 3 business days for your refill to be completed.          Additional Information About  "Your Visit        MyChart Information     [email or web address] lets you send messages to your doctor, view your test results, renew your prescriptions, schedule appointments and more. To sign up, go to [email or web address] . Click on \"Log in\" on the left side of the screen, which will take you to the Welcome page. Then click on \"Sign up Now\" on the right side of the page.     You will be asked to enter the access code listed below, as well as some personal information. Please follow the directions to create your username and password.     Your access code is: [email or web address]  Expires: 2018  6:30 AM     Your access code will  in 90 days. If you need help or a new code, please call your [place] clinic or [phone].        Care EveryWhere ID     This is your Care EveryWhere ID. This could be used by other organizations to access your [place] medical records  [id number]        Your Vitals Were     Pulse Temperature Respirations Pulse Oximetry          96 99.2  F (37.3  C) (Oral) 16 100%         Blood Pressure from Last 3 Encounters:   17 156/90   17 146/86   17 (!) 184/104    Weight from Last 3 Encounters:   17 80.1 kg (176 lb 9.6 oz)   17 80.3 kg (177 lb)   17 80.3 kg (177 lb)              Today, you had the following     No orders found for display       Primary Care Provider Office Phone # Fax #    Chelsea Wren [phone]       [facility] [address] [facility] [address]        Equal Access to Services     SHAN LUGO : Hadnegrito Reese, juan miguel pelletier, elizabeth burciaga. So [facility] [phone].    ATENCIÓN: Si habla español, tiene a mae disposición servicios gratuitos de asistencia lingüística. Llame al [phone].    We comply with applicable federal civil rights laws and [place] laws. We do not discriminate on the basis of race, color, national origin, age, disability, " sex, sexual orientation, or gender identity.            Thank you!     Thank you for choosing [facility]  for your care. Our goal is always to provide you with excellent care. Hearing back from our patients is one way we can continue to improve our services. Please take a few minutes to complete the written survey that you may receive in the mail after your visit with us. Thank you!             Your Updated Medication List - Protect others around you: Learn how to safely use, store and throw away your medicines at www.disposemymeds.org.          This list is accurate as of: 12/5/17  1:46 PM.  Always use your most recent med list.                   Brand Name Dispense Instructions for use Diagnosis    acetaminophen 325 MG tablet    TYLENOL    100 tablet    Take 3 tablets (975 mg) by mouth 3 times daily    Cancer related pain       amLODIPine 2.5 MG tablet    NORVASC    30 tablet    Take 1 tablet (2.5 mg) by mouth daily    Essential hypertension       dronabinol 5 MG capsule    MARINOL    60 capsule    Take 1 capsule (5 mg) by mouth 2 times daily (before meals)    Anorexia, Malignant neoplasm of cervix, unspecified site (H)       glutamine 500 MG Tabs     120 tablet    Take 500 mg by mouth 2 times daily    Drug-induced polyneuropathy (H)       LORazepam 1 MG tablet    ATIVAN    30 tablet    Take 1 tablet (1 mg) by mouth every 6 hours as needed (Anxiety, Nausea/Vomiting or Sleep)    Malignant neoplasm of cervix, unspecified site (H)       polyethylene glycol powder    MIRALAX    510 g    Take 17-34 g (1-2 capfuls) by mouth daily    Cancer related pain, Generalized abdominal pain       prochlorperazine 10 MG tablet    COMPAZINE    30 tablet    Take 1 tablet (10 mg) by mouth every 6 hours as needed (Nausea/Vomiting)    Malignant neoplasm of cervix, unspecified site (H)       senna-docusate 8.6-50 MG per tablet    SENOKOT-S;PERICOLACE    100 tablet    Take 2-4 tablets by mouth 2 times daily    Cancer  related pain       traMADol 50 MG tablet    ULTRAM    240 tablet    Take 2 tablets (100mg) every morning when you wake up, 2 tablets (100mg) in the middle of the day and 2 tablets (100mg) before bed.  Take 1-2 tablets once a day if needed for pain not controlled with the three scheduled doses.    Malignant neoplasm of cervix, unspecified site (H), Cancer related pain

## 2022-08-10 NOTE — PATIENT INSTRUCTIONS
-- DO NOT REPLY / DO NOT REPLY ALL --  -- Message is from [facility] ([facility]) --    General Patient Message : [place] 52: chest tightness for 5 days    Caller Information       Type Contact Phone/Fax    08/10/2022 12:17 PM CDT Phone (Incoming) Felicity Villasenor (Self) [phone] (M)        Alternative phone number: n/a    Can a detailed message be left? Yes    Message Turnaround:     Did the caller agree that this message can wait until the office reopens in the morning? YES - The Message Can Wait - Send a message to the provider's clinical support pool     [place]:    Please give this turnaround time to the caller:   \"This message will be sent to [state Provider's name]. The clinical team will fulfill your request as soon as they review your message.\"                 [facility] Main Line: [phone]    Call triage nurse with chills and/or temperature greater than or equal to 100.4, uncontrolled nausea/vomiting, diarrhea, constipation, dizziness, shortness of breath, chest pain, bleeding, unexplained bruising, or any new/concerning symptoms, questions/concerns.   If you are having any concerning symptoms or wish to speak to a provider before your next infusion visit, please call your care coordinator or triage to notify them so we can adequately serve you.   Triage Nurse Line: [phone]    If after hours, weekends, or holidays [phone]               June 2018 Sunday Monday Tuesday Wednesday Thursday Friday Saturday                            1     Outpatient Visit    6:48 AM   [facility]     IR THORACENTESIS    7:15 AM   (75 min.)   [id number]   [facility]     THORACENTESIS    8:15 AM   Michelle Sparrow PA-C   [facility]     Admission   11:05 AM   Justo Banuelos MD   [address] [facility]   (Discharge: 6/2/2018)     XR CHEST 2 VIEWS   11:15 AM   (15 min.)   [email or web address]   [facility] [place],  Radiology     XR CHEST 2 VIEWS    2:15 PM   (15 min.)   [email or web address]   [facility] [place],  Radiology 2     XR CHEST 2 VIEWS    9:25 AM   (15 min.)   [email or web address]   [facility] [place],  Radiology   3     4     5     [facility] DRAW    7:00 AM   (15 min.)   [facility]   [facility] Lab Draw     [facility] RETURN    7:15 AM   (30 min.)   [facility]achel[facility] Miranda [facility] C[facility]    9:45 AM   (30 min.)   Briana Medina MD   [facility] ONC INFUSION 120   11:30 AM   (120 min.)   [facility] ONCOLOGY INFUSION   [facility] 6     7     8     9       10     11     12     P [facility] DRAW    7:00 AM   (15 min.)   [facility] DRAW   [facility] Lab Draw     [facility] ONC INFUSION 120    7:30 AM   (120 min.)    ONCOLOGY INFUSION   [facility]  13     IR THORACENTESIS    6:00 AM   (60 min.)   [id number]   [facility]     THORACENTESIS    7:00 AM   Cuong Wright PA-C    OR     Outpatient Visit    7:00 AM   [facility] 14     15     16       17     18     19     [facility]   11:30 AM   (15 min.)    [facility] DRAW   [facility] Draw     [facility] ONC INFUSION 120   12:00 PM   (120 min.)    ONCOLOGY INFUSION   [facility] 20     21     22     23       24     25     26     27     [facility] DRAW   12:30 PM   (15 min.)    [facility] DRAW   [facility] Draw     [facility] ONC INFUSION 120    1:00 PM   (120 min.)    ONCOLOGY INFUSION   [facility] 28 29 30 July 2018 Sunday Monday Tuesday Wednesday Thursday Friday Saturday   1     2     3     [facility] RETURN    9:45 AM   (30 min.)   Briana Medina MD   [facility]     [facility] DRAW    2:00 PM   (15 min.)    [facility] DRAW   [facility] Draw     [facility] ONC INFUSION 120    2:30 PM   (120 min.)    ONCOLOGY INFUSION   [facility] 4     5     6     7       8     9     10     [facility] DRAW    1:00 PM   (15 min.)    [facility] DRAW   [facility] Draw     [facility] ONC INFUSION 120    1:30 PM   (120 min.)    ONCOLOGY INFUSION   [facility] 11     12     13     14       15     16     17     18     19     20     21       22     23     24     25     26     27     28       29     30     31                                      Lab Results:  Recent Results (from the past 12 hour(s))   CBC with platelets differential    Collection Time: 06/12/18  7:18 AM   Result Value Ref Range    WBC 4.4 4.0 - 11.0 10e9/L    RBC Count 3.91 3.8 - 5.2 10e12/L    Hemoglobin 11.1 (L) 11.7 - 15.7 g/dL    Hematocrit 35.7 35.0 - 47.0 %    MCV 91 78 - 100 fl    MCH 28.4 26.5 - 33.0 pg    MCHC 31.1 (L) 31.5 - 36.5 g/dL    RDW  16.4 (H) 10.0 - 15.0 %    Platelet Count 189 150 - 450 10e9/L    Diff Method Automated Method     % Neutrophils 58.4 %    % Lymphocytes 28.1 %    % Monocytes 12.6 %    % Eosinophils 0.2 %    % Basophils 0.2 %    % Immature Granulocytes 0.5 %    Nucleated RBCs 0 0 /100    Absolute Neutrophil 2.6 1.6 - 8.3 10e9/L    Absolute Lymphocytes 1.2 0.8 - 5.3 10e9/L    Absolute Monocytes 0.6 0.0 - 1.3 10e9/L    Absolute Eosinophils 0.0 0.0 - 0.7 10e9/L    Absolute Basophils 0.0 0.0 - 0.2 10e9/L    Abs Immature Granulocytes 0.0 0 - 0.4 10e9/L    Absolute Nucleated RBC 0.0    Magnesium    Collection Time: 06/12/18  7:18 AM   Result Value Ref Range    Magnesium 1.8 1.6 - 2.3 mg/dL

## 2024-03-06 NOTE — PROGRESS NOTES
Infusion Nursing Note:  Ashvin Tiwari presents today for IVF, IV zofran.    Patient seen by provider today: No   present during visit today: Not Applicable.    Note: Pt arrives with her daughter for IVF today.  Pt states she has been nauseated, denies any vomiting.  She has had intermittent dizziness since her last chemo when walking. Denies any recent falls.    Pt states she was feeling better post the IVF and zofran.    Intravenous Access:  Implanted Port.    Treatment Conditions:  Not Applicable.      Post Infusion Assessment:  Patient tolerated infusion without incident.  Blood return noted pre and post infusion.  Site patent and intact, free from redness, edema or discomfort.  No evidence of extravasations.  Access discontinued per protocol.    Discharge Plan:   Patient declined prescription refills.  Discharge instructions reviewed with: Patient.  Patient and/or family verbalized understanding of discharge instructions and all questions answered.  Copy of AVS reviewed with patient and/or family.  Patient will return 1/11/18 for next appointment.  Patient discharged in stable condition accompanied by: daughter.  Departure Mode: Ambulatory.    Fartun Alarcon RN                        
Yes

## (undated) DEVICE — CONNECTOR MALE TO MALE LL

## (undated) DEVICE — LINEN GOWN XLG 5407

## (undated) DEVICE — GLOVE PROTEXIS POWDER FREE SMT 7.0  2D72PT70X

## (undated) DEVICE — CONNECTOR STOPCOCK 3 WAY MALE LL MX4311L

## (undated) DEVICE — GLOVE PROTEXIS POWDER FREE SMT 7.5  2D72PT75X

## (undated) DEVICE — Device

## (undated) DEVICE — PACK CENTRAL LINE INSERTION SAN32CLFCG

## (undated) DEVICE — NDL 15GA 1.5" 8881200029

## (undated) DEVICE — STOPCOCK ANGIO 1-WAY MARQUIS MLL  H1RC

## (undated) DEVICE — GOWN XLG DISP 9545

## (undated) DEVICE — TUBING MEDRAD 48" HIGH PRESSURE MX694

## (undated) DEVICE — DRSG TEGADERM 2 3/8X2 3/4" 1624W

## (undated) DEVICE — COVER ULTRASOUND PROBE W/GEL FLEXI-FEEL 6"X58" LF  25-FF658

## (undated) DEVICE — SU VICRYL 3-0 SH 27" J316H

## (undated) DEVICE — KNIFE HANDLE W/15 BLADE 371615

## (undated) DEVICE — SYR 50ML LL W/O NDL 309653

## (undated) DEVICE — PREP CHLORAPREP W/ORANGE TINT 10.5ML 260715

## (undated) DEVICE — LINEN TOWEL PACK X5 5464

## (undated) DEVICE — SU MONOCRYL 4-0 P-3 18" UND Y494G

## (undated) DEVICE — DRSG PRIMAPORE 02X3" 7133

## (undated) DEVICE — NDL YUEH CENTESIS 5FRX10CM G09490 DTVN-5.0-19-10.0-YUEH

## (undated) DEVICE — DECANTER BAG 2002S

## (undated) DEVICE — KIT INTRODUCER FLUENT MICRO 5FRX10CM ECHO TIP KIT-038-04

## (undated) DEVICE — GLOVE PROTEXIS POWDER FREE SMT 6.5  2D72PT65X

## (undated) DEVICE — DRSG TEGADERM 4X4 3/4" 1626W

## (undated) DEVICE — SU VICRYL 4-0 P-3 18" UND  J494H

## (undated) DEVICE — NDL BIOPSY TEMNO 18GAX11CM ACT1811

## (undated) RX ORDER — HEPARIN SODIUM,PORCINE 10 UNIT/ML
VIAL (ML) INTRAVENOUS
Status: DISPENSED
Start: 2018-01-01

## (undated) RX ORDER — ACETAMINOPHEN 325 MG/1
TABLET ORAL
Status: DISPENSED
Start: 2018-01-01

## (undated) RX ORDER — GABAPENTIN 300 MG/1
CAPSULE ORAL
Status: DISPENSED
Start: 2017-01-01

## (undated) RX ORDER — LIDOCAINE HYDROCHLORIDE 10 MG/ML
INJECTION, SOLUTION EPIDURAL; INFILTRATION; INTRACAUDAL; PERINEURAL
Status: DISPENSED
Start: 2018-01-01

## (undated) RX ORDER — HEPARIN SODIUM (PORCINE) LOCK FLUSH IV SOLN 100 UNIT/ML 100 UNIT/ML
SOLUTION INTRAVENOUS
Status: DISPENSED
Start: 2018-01-01

## (undated) RX ORDER — SODIUM CHLORIDE 9 MG/ML
INJECTION, SOLUTION INTRAVENOUS
Status: DISPENSED
Start: 2018-01-01

## (undated) RX ORDER — ACETAMINOPHEN 325 MG/1
TABLET ORAL
Status: DISPENSED
Start: 2017-01-01

## (undated) RX ORDER — FENTANYL CITRATE 50 UG/ML
INJECTION, SOLUTION INTRAMUSCULAR; INTRAVENOUS
Status: DISPENSED
Start: 2018-01-01